# Patient Record
Sex: FEMALE | Race: WHITE | NOT HISPANIC OR LATINO
[De-identification: names, ages, dates, MRNs, and addresses within clinical notes are randomized per-mention and may not be internally consistent; named-entity substitution may affect disease eponyms.]

---

## 2017-01-12 ENCOUNTER — APPOINTMENT (OUTPATIENT)
Dept: INTERNAL MEDICINE | Facility: CLINIC | Age: 72
End: 2017-01-12

## 2017-04-24 ENCOUNTER — FORM ENCOUNTER (OUTPATIENT)
Age: 72
End: 2017-04-24

## 2017-04-25 ENCOUNTER — OUTPATIENT (OUTPATIENT)
Dept: OUTPATIENT SERVICES | Facility: HOSPITAL | Age: 72
LOS: 1 days | End: 2017-04-25
Payer: MEDICARE

## 2017-04-25 ENCOUNTER — APPOINTMENT (OUTPATIENT)
Dept: RADIOLOGY | Facility: CLINIC | Age: 72
End: 2017-04-25

## 2017-04-25 ENCOUNTER — APPOINTMENT (OUTPATIENT)
Dept: INTERNAL MEDICINE | Facility: CLINIC | Age: 72
End: 2017-04-25

## 2017-04-25 VITALS
DIASTOLIC BLOOD PRESSURE: 83 MMHG | HEART RATE: 88 BPM | SYSTOLIC BLOOD PRESSURE: 140 MMHG | TEMPERATURE: 98.2 F | OXYGEN SATURATION: 96 % | RESPIRATION RATE: 13 BRPM

## 2017-04-25 DIAGNOSIS — Z98.89 OTHER SPECIFIED POSTPROCEDURAL STATES: Chronic | ICD-10-CM

## 2017-04-25 DIAGNOSIS — Z33.2 ENCOUNTER FOR ELECTIVE TERMINATION OF PREGNANCY: Chronic | ICD-10-CM

## 2017-04-25 DIAGNOSIS — J20.9 ACUTE BRONCHITIS, UNSPECIFIED: ICD-10-CM

## 2017-04-25 PROCEDURE — 71046 X-RAY EXAM CHEST 2 VIEWS: CPT

## 2017-04-25 RX ORDER — AZITHROMYCIN 250 MG/1
250 TABLET, FILM COATED ORAL
Qty: 6 | Refills: 2 | Status: DISCONTINUED | OUTPATIENT
Start: 2017-04-25 | End: 2017-04-25

## 2017-08-15 ENCOUNTER — APPOINTMENT (OUTPATIENT)
Dept: INTERNAL MEDICINE | Facility: CLINIC | Age: 72
End: 2017-08-15
Payer: MEDICARE

## 2017-08-15 VITALS
HEIGHT: 62 IN | SYSTOLIC BLOOD PRESSURE: 149 MMHG | WEIGHT: 108 LBS | BODY MASS INDEX: 19.88 KG/M2 | DIASTOLIC BLOOD PRESSURE: 87 MMHG | OXYGEN SATURATION: 96 % | RESPIRATION RATE: 14 BRPM | HEART RATE: 89 BPM

## 2017-08-15 PROCEDURE — 99215 OFFICE O/P EST HI 40 MIN: CPT

## 2017-09-05 ENCOUNTER — APPOINTMENT (OUTPATIENT)
Dept: INTERNAL MEDICINE | Facility: CLINIC | Age: 72
End: 2017-09-05
Payer: MEDICARE

## 2017-09-05 PROCEDURE — 94200 LUNG FUNCTION TEST (MBC/MVV): CPT | Mod: 59

## 2017-09-05 PROCEDURE — 94060 EVALUATION OF WHEEZING: CPT

## 2017-09-05 PROCEDURE — 94727 GAS DIL/WSHOT DETER LNG VOL: CPT

## 2017-10-26 ENCOUNTER — APPOINTMENT (OUTPATIENT)
Dept: INTERNAL MEDICINE | Facility: CLINIC | Age: 72
End: 2017-10-26
Payer: MEDICARE

## 2017-10-26 PROCEDURE — 90686 IIV4 VACC NO PRSV 0.5 ML IM: CPT

## 2017-10-26 PROCEDURE — G0008: CPT

## 2017-10-26 PROCEDURE — 99213 OFFICE O/P EST LOW 20 MIN: CPT | Mod: 25

## 2017-10-27 VITALS
DIASTOLIC BLOOD PRESSURE: 83 MMHG | HEART RATE: 86 BPM | RESPIRATION RATE: 14 BRPM | SYSTOLIC BLOOD PRESSURE: 141 MMHG | OXYGEN SATURATION: 97 %

## 2017-12-21 ENCOUNTER — APPOINTMENT (OUTPATIENT)
Dept: ORTHOPEDIC SURGERY | Facility: CLINIC | Age: 72
End: 2017-12-21
Payer: MEDICARE

## 2017-12-21 VITALS — WEIGHT: 104 LBS | HEIGHT: 62 IN | BODY MASS INDEX: 19.14 KG/M2

## 2017-12-21 VITALS — SYSTOLIC BLOOD PRESSURE: 157 MMHG | HEART RATE: 80 BPM | DIASTOLIC BLOOD PRESSURE: 78 MMHG

## 2017-12-21 DIAGNOSIS — M51.34 OTHER INTERVERTEBRAL DISC DEGENERATION, THORACIC REGION: ICD-10-CM

## 2017-12-21 DIAGNOSIS — M16.12 UNILATERAL PRIMARY OSTEOARTHRITIS, LEFT HIP: ICD-10-CM

## 2017-12-21 PROCEDURE — 99214 OFFICE O/P EST MOD 30 MIN: CPT

## 2017-12-21 PROCEDURE — 73522 X-RAY EXAM HIPS BI 3-4 VIEWS: CPT

## 2018-02-21 ENCOUNTER — RESULT REVIEW (OUTPATIENT)
Age: 73
End: 2018-02-21

## 2018-04-16 ENCOUNTER — APPOINTMENT (OUTPATIENT)
Dept: OTOLARYNGOLOGY | Facility: CLINIC | Age: 73
End: 2018-04-16

## 2018-05-15 ENCOUNTER — APPOINTMENT (OUTPATIENT)
Dept: OTOLARYNGOLOGY | Facility: CLINIC | Age: 73
End: 2018-05-15
Payer: MEDICARE

## 2018-05-15 VITALS — HEART RATE: 93 BPM | DIASTOLIC BLOOD PRESSURE: 97 MMHG | SYSTOLIC BLOOD PRESSURE: 161 MMHG

## 2018-05-15 DIAGNOSIS — J39.2 OTHER DISEASES OF PHARYNX: ICD-10-CM

## 2018-05-15 PROCEDURE — 99205 OFFICE O/P NEW HI 60 MIN: CPT | Mod: 25

## 2018-05-15 PROCEDURE — 31231 NASAL ENDOSCOPY DX: CPT

## 2018-05-31 ENCOUNTER — APPOINTMENT (OUTPATIENT)
Dept: OTOLARYNGOLOGY | Facility: CLINIC | Age: 73
End: 2018-05-31
Payer: MEDICARE

## 2018-05-31 VITALS
WEIGHT: 103 LBS | HEART RATE: 91 BPM | SYSTOLIC BLOOD PRESSURE: 156 MMHG | DIASTOLIC BLOOD PRESSURE: 88 MMHG | BODY MASS INDEX: 18.95 KG/M2 | HEIGHT: 62 IN

## 2018-05-31 PROCEDURE — 99214 OFFICE O/P EST MOD 30 MIN: CPT | Mod: 25

## 2018-05-31 PROCEDURE — 92511 NASOPHARYNGOSCOPY: CPT

## 2018-06-14 ENCOUNTER — APPOINTMENT (OUTPATIENT)
Dept: OTOLARYNGOLOGY | Facility: CLINIC | Age: 73
End: 2018-06-14
Payer: MEDICARE

## 2018-06-14 VITALS
HEIGHT: 62 IN | BODY MASS INDEX: 18.77 KG/M2 | WEIGHT: 102 LBS | SYSTOLIC BLOOD PRESSURE: 125 MMHG | HEART RATE: 98 BPM | DIASTOLIC BLOOD PRESSURE: 82 MMHG

## 2018-06-14 PROCEDURE — 99214 OFFICE O/P EST MOD 30 MIN: CPT

## 2018-07-06 ENCOUNTER — OUTPATIENT (OUTPATIENT)
Dept: OUTPATIENT SERVICES | Facility: HOSPITAL | Age: 73
LOS: 1 days | End: 2018-07-06
Payer: MEDICARE

## 2018-07-06 VITALS
SYSTOLIC BLOOD PRESSURE: 130 MMHG | RESPIRATION RATE: 16 BRPM | HEIGHT: 61 IN | DIASTOLIC BLOOD PRESSURE: 86 MMHG | WEIGHT: 100.97 LBS | TEMPERATURE: 97 F | OXYGEN SATURATION: 97 % | HEART RATE: 74 BPM

## 2018-07-06 DIAGNOSIS — J39.2 OTHER DISEASES OF PHARYNX: ICD-10-CM

## 2018-07-06 DIAGNOSIS — E89.2 POSTPROCEDURAL HYPOPARATHYROIDISM: Chronic | ICD-10-CM

## 2018-07-06 DIAGNOSIS — Z33.2 ENCOUNTER FOR ELECTIVE TERMINATION OF PREGNANCY: Chronic | ICD-10-CM

## 2018-07-06 DIAGNOSIS — T78.40XA ALLERGY, UNSPECIFIED, INITIAL ENCOUNTER: ICD-10-CM

## 2018-07-06 DIAGNOSIS — R73.03 PREDIABETES: ICD-10-CM

## 2018-07-06 DIAGNOSIS — I34.1 NONRHEUMATIC MITRAL (VALVE) PROLAPSE: ICD-10-CM

## 2018-07-06 DIAGNOSIS — Z98.89 OTHER SPECIFIED POSTPROCEDURAL STATES: Chronic | ICD-10-CM

## 2018-07-06 LAB
BUN SERPL-MCNC: 13 MG/DL — SIGNIFICANT CHANGE UP (ref 7–23)
CALCIUM SERPL-MCNC: 9.8 MG/DL — SIGNIFICANT CHANGE UP (ref 8.4–10.5)
CHLORIDE SERPL-SCNC: 99 MMOL/L — SIGNIFICANT CHANGE UP (ref 98–107)
CO2 SERPL-SCNC: 29 MMOL/L — SIGNIFICANT CHANGE UP (ref 22–31)
CREAT SERPL-MCNC: 0.58 MG/DL — SIGNIFICANT CHANGE UP (ref 0.5–1.3)
GLUCOSE SERPL-MCNC: 103 MG/DL — HIGH (ref 70–99)
HBA1C BLD-MCNC: 6.2 % — HIGH (ref 4–5.6)
HCT VFR BLD CALC: 44 % — SIGNIFICANT CHANGE UP (ref 34.5–45)
HGB BLD-MCNC: 13.8 G/DL — SIGNIFICANT CHANGE UP (ref 11.5–15.5)
MCHC RBC-ENTMCNC: 26.6 PG — LOW (ref 27–34)
MCHC RBC-ENTMCNC: 31.4 % — LOW (ref 32–36)
MCV RBC AUTO: 84.9 FL — SIGNIFICANT CHANGE UP (ref 80–100)
NRBC # FLD: 0 — SIGNIFICANT CHANGE UP
PLATELET # BLD AUTO: 347 K/UL — SIGNIFICANT CHANGE UP (ref 150–400)
PMV BLD: 10.3 FL — SIGNIFICANT CHANGE UP (ref 7–13)
POTASSIUM SERPL-MCNC: 4.6 MMOL/L — SIGNIFICANT CHANGE UP (ref 3.5–5.3)
POTASSIUM SERPL-SCNC: 4.6 MMOL/L — SIGNIFICANT CHANGE UP (ref 3.5–5.3)
RBC # BLD: 5.18 M/UL — SIGNIFICANT CHANGE UP (ref 3.8–5.2)
RBC # FLD: 15 % — HIGH (ref 10.3–14.5)
SODIUM SERPL-SCNC: 140 MMOL/L — SIGNIFICANT CHANGE UP (ref 135–145)
WBC # BLD: 13.69 K/UL — HIGH (ref 3.8–10.5)
WBC # FLD AUTO: 13.69 K/UL — HIGH (ref 3.8–10.5)

## 2018-07-06 PROCEDURE — 93010 ELECTROCARDIOGRAM REPORT: CPT

## 2018-07-06 RX ORDER — SODIUM CHLORIDE 9 MG/ML
1000 INJECTION, SOLUTION INTRAVENOUS
Qty: 0 | Refills: 0 | Status: DISCONTINUED | OUTPATIENT
Start: 2018-07-11 | End: 2018-07-26

## 2018-07-06 NOTE — H&P PST ADULT - RESPIRATORY AND THORAX COMMENTS
current smoker - COPD - wheezing sometimes - uses inhaler as needed - last used 3-4 months ago- last eval by pulmonologist in 5/2018

## 2018-07-06 NOTE — H&P PST ADULT - NEGATIVE OPHTHALMOLOGIC SYMPTOMS
no loss of vision L/no diplopia/no photophobia/no blurred vision L/no blurred vision R/no loss of vision R

## 2018-07-06 NOTE — H&P PST ADULT - MUSCULOSKELETAL
details… detailed exam no joint swelling/no joint erythema/no joint warmth/no calf tenderness/see back exam/decreased ROM due to pain no joint swelling/decreased ROM due to pain/no calf tenderness/limitation in cervical ROM

## 2018-07-06 NOTE — H&P PST ADULT - PMH
Cigarette smoker  current  COPD (chronic obstructive pulmonary disease)    Costal chondritis    Emphysema lung    Gastritis    Herniated disc, cervical  lumbar  Hyperparathyroidism    MVP (mitral valve prolapse)    Osteoporosis    Prediabetes    Psoriatic arthritis    Tachycardia    Ulcerative colitis    Varicose veins    Vertebral fracture, closed  Lumbar x 4

## 2018-07-06 NOTE — H&P PST ADULT - PROBLEM SELECTOR PLAN 1
Scheduled for nasal endoscopy with biopsy on 7/11/2018. labs done and results pending. Preop instruction given and explained. Famotidine provided with instruction. Verbalized understanding.  Medical evaluation requested by surgeon. Will obtain.

## 2018-07-06 NOTE — H&P PST ADULT - NECK DETAILS
supple/normal thyroid gland/no JVD normal thyroid gland/supple/no JVD/limited ROM neck - extension, rotation to R/L due to pain

## 2018-07-06 NOTE — H&P PST ADULT - NEGATIVE ENMT SYMPTOMS
no dysphagia/no nose bleeds/no gum bleeding/no abnormal taste sensation/no nasal obstruction/no tinnitus/no post-nasal discharge/no throat pain/no recurrent cold sores/no hearing difficulty/no ear pain/no vertigo no throat pain/no hearing difficulty/no gum bleeding/no nose bleeds/no dysphagia/no vertigo/no ear pain/no tinnitus/no nasal obstruction

## 2018-07-06 NOTE — H&P PST ADULT - RS GEN PE MLT RESP DETAILS PC
airway patent/clear to auscultation bilaterally/breath sounds equal/respirations non-labored/good air movement clear to auscultation bilaterally/no rhonchi/no rales/no wheezes/respirations non-labored

## 2018-07-06 NOTE — H&P PST ADULT - HISTORY OF PRESENT ILLNESS
74 yo female with hx of presents to have PST evaluation for nasal endoscopy with biopsy on 7/11/2018. lost 40 lbs in 1 year, had PET scan "found tiny mass in nasal area - s/p biopsy - benign, second biopsy was recommended. 72 yo female with hx of ulcerative colitis, psoriatic arthritis, COPD presents to have PST evaluation for nasal endoscopy with biopsy on 7/11/2018. Patient c/o unintentional weight loss (lost 40 lbs over 1 year), went for an evaluation, during work up, had PET CT scan "found tiny mass in nasal area - s/p biopsy - benign, second biopsy was recommended. Patient c/o nasal congestion, discharge, post nasal drip. Denies dyspnea. 74 yo female with hx of ulcerative colitis, psoriatic arthritis, COPD, MVP presents to have PST evaluation for nasal endoscopy with biopsy on 7/11/2018. Patient c/o unintentional weight loss ("lost 40 lbs over 1 - 1.5 year"), went for an evaluation, during work up, had PET CT scan done "found tiny mass in nasal area - s/p biopsy - benign, second biopsy was recommended. Patient c/o nasal congestion, discharge, post nasal drip. Denies dyspnea.

## 2018-07-06 NOTE — H&P PST ADULT - GASTROINTESTINAL DETAILS
no distention/no rebound tenderness/no bruit/soft/no organomegaly/bowel sounds normal/no rigidity/no guarding/no masses palpable/nontender bowel sounds normal/no distention/soft/nontender

## 2018-07-06 NOTE — H&P PST ADULT - OTHER CARE PROVIDERS
Dr. Brijesh Kebede (cardiologist) [phone] / Dr. Cameron (pain management) / Dr. Mora (GI) / Dr. Ernst (rheumatologist) / Dr. Lin (pulmonologist) [phone]

## 2018-07-06 NOTE — H&P PST ADULT - CHARACTER OF SYSTOLIC MURMUR
upper rsb, pansystolic radiating to b/l neck/murmur loudness: II/VI/upper lsb/crescendo-decrescendo murmur loudness: II/VI

## 2018-07-06 NOTE — H&P PST ADULT - NEGATIVE NEUROLOGICAL SYMPTOMS
no focal seizures/no difficulty walking/no tremors/no vertigo/no transient paralysis/no weakness/no syncope/no generalized seizures/no loss of sensation/no loss of consciousness

## 2018-07-06 NOTE — H&P PST ADULT - NEUROLOGICAL DETAILS
alert and oriented x 3/sensation intact/normal strength/cranial nerves intact alert and oriented x 3/normal strength/sensation intact/responds to verbal commands/responds to pain

## 2018-07-10 ENCOUNTER — TRANSCRIPTION ENCOUNTER (OUTPATIENT)
Age: 73
End: 2018-07-10

## 2018-07-11 ENCOUNTER — RESULT REVIEW (OUTPATIENT)
Age: 73
End: 2018-07-11

## 2018-07-11 ENCOUNTER — OUTPATIENT (OUTPATIENT)
Dept: OUTPATIENT SERVICES | Facility: HOSPITAL | Age: 73
LOS: 1 days | Discharge: HOME | End: 2018-07-11
Payer: MEDICARE

## 2018-07-11 ENCOUNTER — APPOINTMENT (OUTPATIENT)
Dept: OTOLARYNGOLOGY | Facility: HOSPITAL | Age: 73
End: 2018-07-11

## 2018-07-11 VITALS
RESPIRATION RATE: 16 BRPM | HEART RATE: 78 BPM | TEMPERATURE: 98 F | HEIGHT: 61 IN | DIASTOLIC BLOOD PRESSURE: 62 MMHG | SYSTOLIC BLOOD PRESSURE: 135 MMHG | OXYGEN SATURATION: 97 % | WEIGHT: 100.97 LBS

## 2018-07-11 VITALS
RESPIRATION RATE: 16 BRPM | HEART RATE: 66 BPM | SYSTOLIC BLOOD PRESSURE: 128 MMHG | DIASTOLIC BLOOD PRESSURE: 60 MMHG | OXYGEN SATURATION: 96 %

## 2018-07-11 DIAGNOSIS — J31.0 CHRONIC RHINITIS: ICD-10-CM

## 2018-07-11 DIAGNOSIS — J39.2 OTHER DISEASES OF PHARYNX: ICD-10-CM

## 2018-07-11 DIAGNOSIS — E89.2 POSTPROCEDURAL HYPOPARATHYROIDISM: Chronic | ICD-10-CM

## 2018-07-11 DIAGNOSIS — Z98.89 OTHER SPECIFIED POSTPROCEDURAL STATES: Chronic | ICD-10-CM

## 2018-07-11 DIAGNOSIS — Z33.2 ENCOUNTER FOR ELECTIVE TERMINATION OF PREGNANCY: Chronic | ICD-10-CM

## 2018-07-11 PROCEDURE — 31237 NSL/SINS NDSC SURG BX POLYPC: CPT

## 2018-07-11 PROCEDURE — 88331 PATH CONSLTJ SURG 1 BLK 1SPC: CPT | Mod: 26

## 2018-07-11 PROCEDURE — 88305 TISSUE EXAM BY PATHOLOGIST: CPT | Mod: 26

## 2018-07-11 RX ORDER — ONDANSETRON 8 MG/1
4 TABLET, FILM COATED ORAL ONCE
Qty: 0 | Refills: 0 | Status: DISCONTINUED | OUTPATIENT
Start: 2018-07-11 | End: 2018-07-11

## 2018-07-11 RX ORDER — OXYCODONE HYDROCHLORIDE 5 MG/1
5 TABLET ORAL ONCE
Qty: 0 | Refills: 0 | Status: DISCONTINUED | OUTPATIENT
Start: 2018-07-11 | End: 2018-07-11

## 2018-07-11 RX ORDER — FENTANYL CITRATE 50 UG/ML
25 INJECTION INTRAVENOUS
Qty: 0 | Refills: 0 | Status: DISCONTINUED | OUTPATIENT
Start: 2018-07-11 | End: 2018-07-11

## 2018-07-11 RX ORDER — ACETAMINOPHEN 500 MG
0 TABLET ORAL
Qty: 0 | Refills: 0 | COMMUNITY

## 2018-07-11 RX ADMIN — SODIUM CHLORIDE 30 MILLILITER(S): 9 INJECTION, SOLUTION INTRAVENOUS at 09:08

## 2018-07-11 NOTE — ASU DISCHARGE PLAN (ADULT/PEDIATRIC). - MEDICATION SUMMARY - MEDICATIONS TO TAKE
I will START or STAY ON the medications listed below when I get home from the hospital:    Vitamin D  -- 1 tab(s) by mouth once a day  -- Indication: For supplement    Probiotic  -- 1 tab(s) by mouth once a day  -- Indication: For Outpt med    Medical Marijuana oil  -- 1  under tongue every 8-12 hours PRN pain  -- Indication: For Outpt med    Asacol 400 mg oral delayed release tablet  -- Indication: For Outpt med    Percocet 5/325 oral tablet  -- 1 tab(s) by mouth every 6 hours MDD:4  -- Caution federal law prohibits the transfer of this drug to any person other  than the person for whom it was prescribed.  May cause drowsiness.  Alcohol may intensify this effect.  Use care when operating dangerous machinery.  This prescription cannot be refilled.  This product contains acetaminophen.  Do not use  with any other product containing acetaminophen to prevent possible liver damage.  Using more of this medication than prescribed may cause serious breathing problems.    -- Indication: For pain    ProAir HFA 90 mcg/inh inhalation aerosol  -- 2 puff(s) inhaled 4 times a day, As Needed - for shortness of breath and/or wheezing  -- Indication: For asthma    Vitamin C  -- 1 tab oral daily  -- Indication: For supplement

## 2018-07-11 NOTE — ASU DISCHARGE PLAN (ADULT/PEDIATRIC). - MEDICATION SUMMARY - MEDICATIONS TO STOP TAKING
I will STOP taking the medications listed below when I get home from the hospital:    Tylenol 325 mg oral tablet  -- 1-3 tabs daily PRN

## 2018-07-22 PROBLEM — M16.12 PRIMARY LOCALIZED OSTEOARTHROSIS OF PELVIC REGION, LEFT: Status: ACTIVE | Noted: 2017-12-21

## 2018-07-26 ENCOUNTER — APPOINTMENT (OUTPATIENT)
Dept: OTOLARYNGOLOGY | Facility: CLINIC | Age: 73
End: 2018-07-26
Payer: MEDICARE

## 2018-07-26 VITALS
WEIGHT: 100 LBS | SYSTOLIC BLOOD PRESSURE: 141 MMHG | HEART RATE: 88 BPM | BODY MASS INDEX: 18.4 KG/M2 | HEIGHT: 62 IN | DIASTOLIC BLOOD PRESSURE: 75 MMHG

## 2018-07-26 PROCEDURE — 31231 NASAL ENDOSCOPY DX: CPT

## 2018-07-26 PROCEDURE — 99213 OFFICE O/P EST LOW 20 MIN: CPT | Mod: 25

## 2018-07-26 RX ORDER — TRAMADOL HYDROCHLORIDE 50 MG/1
50 TABLET, COATED ORAL
Qty: 5 | Refills: 0 | Status: DISCONTINUED | COMMUNITY
Start: 2017-11-28 | End: 2018-07-26

## 2018-07-26 RX ORDER — METHYLPREDNISOLONE 4 MG/1
4 TABLET ORAL
Qty: 21 | Refills: 0 | Status: DISCONTINUED | COMMUNITY
Start: 2017-12-05 | End: 2018-07-26

## 2018-07-26 RX ORDER — VALACYCLOVIR 500 MG/1
500 TABLET, FILM COATED ORAL
Qty: 10 | Refills: 0 | Status: DISCONTINUED | COMMUNITY
Start: 2017-10-24 | End: 2018-07-26

## 2018-07-26 RX ORDER — SODIUM SULFATE, POTASSIUM SULFATE, MAGNESIUM SULFATE 17.5; 3.13; 1.6 G/ML; G/ML; G/ML
17.5-3.13-1.6 SOLUTION, CONCENTRATE ORAL
Qty: 354 | Refills: 0 | Status: DISCONTINUED | COMMUNITY
Start: 2017-09-18 | End: 2018-07-26

## 2018-09-05 PROBLEM — J43.9 EMPHYSEMA, UNSPECIFIED: Chronic | Status: ACTIVE | Noted: 2018-07-06

## 2018-09-05 PROBLEM — J44.9 CHRONIC OBSTRUCTIVE PULMONARY DISEASE, UNSPECIFIED: Chronic | Status: ACTIVE | Noted: 2018-07-06

## 2018-09-05 PROBLEM — M50.20 OTHER CERVICAL DISC DISPLACEMENT, UNSPECIFIED CERVICAL REGION: Chronic | Status: ACTIVE | Noted: 2018-07-06

## 2018-09-05 PROBLEM — R73.03 PREDIABETES: Chronic | Status: ACTIVE | Noted: 2018-07-06

## 2018-09-12 ENCOUNTER — APPOINTMENT (OUTPATIENT)
Dept: CHRONIC DISEASE MANAGEMENT | Facility: CLINIC | Age: 73
End: 2018-09-12
Payer: MEDICARE

## 2018-09-12 VITALS — BODY MASS INDEX: 18.47 KG/M2 | WEIGHT: 101 LBS

## 2018-09-12 PROCEDURE — 97802 MEDICAL NUTRITION INDIV IN: CPT

## 2018-10-10 ENCOUNTER — APPOINTMENT (OUTPATIENT)
Dept: CHRONIC DISEASE MANAGEMENT | Facility: CLINIC | Age: 73
End: 2018-10-10

## 2019-03-19 NOTE — H&P PST ADULT - MS GEN HX ROS MEA POS PC
Detail Level: Detailed
Price (Use Numbers Only, No Special Characters Or $): 50
back pain/neck pain/arthritis/stiffness

## 2019-04-26 ENCOUNTER — OUTPATIENT (OUTPATIENT)
Dept: OUTPATIENT SERVICES | Facility: HOSPITAL | Age: 74
LOS: 1 days | End: 2019-04-26
Payer: MEDICARE

## 2019-04-26 ENCOUNTER — APPOINTMENT (OUTPATIENT)
Dept: RADIOLOGY | Facility: CLINIC | Age: 74
End: 2019-04-26
Payer: MEDICARE

## 2019-04-26 DIAGNOSIS — J18.9 PNEUMONIA, UNSPECIFIED ORGANISM: ICD-10-CM

## 2019-04-26 DIAGNOSIS — Z98.89 OTHER SPECIFIED POSTPROCEDURAL STATES: Chronic | ICD-10-CM

## 2019-04-26 DIAGNOSIS — E89.2 POSTPROCEDURAL HYPOPARATHYROIDISM: Chronic | ICD-10-CM

## 2019-04-26 DIAGNOSIS — Z33.2 ENCOUNTER FOR ELECTIVE TERMINATION OF PREGNANCY: Chronic | ICD-10-CM

## 2019-04-26 PROCEDURE — 71046 X-RAY EXAM CHEST 2 VIEWS: CPT

## 2019-04-26 PROCEDURE — 71046 X-RAY EXAM CHEST 2 VIEWS: CPT | Mod: 26

## 2019-09-09 ENCOUNTER — EMERGENCY (EMERGENCY)
Facility: HOSPITAL | Age: 74
LOS: 1 days | Discharge: HOME | End: 2019-09-09
Attending: EMERGENCY MEDICINE | Admitting: EMERGENCY MEDICINE
Payer: MEDICARE

## 2019-09-09 VITALS
HEART RATE: 109 BPM | OXYGEN SATURATION: 95 % | SYSTOLIC BLOOD PRESSURE: 102 MMHG | RESPIRATION RATE: 14 BRPM | DIASTOLIC BLOOD PRESSURE: 57 MMHG

## 2019-09-09 VITALS
DIASTOLIC BLOOD PRESSURE: 65 MMHG | RESPIRATION RATE: 18 BRPM | OXYGEN SATURATION: 96 % | TEMPERATURE: 98 F | HEIGHT: 61 IN | HEART RATE: 123 BPM | SYSTOLIC BLOOD PRESSURE: 98 MMHG | WEIGHT: 98.99 LBS

## 2019-09-09 DIAGNOSIS — E89.2 POSTPROCEDURAL HYPOPARATHYROIDISM: Chronic | ICD-10-CM

## 2019-09-09 DIAGNOSIS — Z98.89 OTHER SPECIFIED POSTPROCEDURAL STATES: Chronic | ICD-10-CM

## 2019-09-09 DIAGNOSIS — Z33.2 ENCOUNTER FOR ELECTIVE TERMINATION OF PREGNANCY: Chronic | ICD-10-CM

## 2019-09-09 LAB
ALBUMIN SERPL ELPH-MCNC: 3.7 G/DL — SIGNIFICANT CHANGE UP (ref 3.3–5)
ALP SERPL-CCNC: 113 U/L — SIGNIFICANT CHANGE UP (ref 30–120)
ALT FLD-CCNC: 37 U/L DA — SIGNIFICANT CHANGE UP (ref 10–60)
ANION GAP SERPL CALC-SCNC: 9 MMOL/L — SIGNIFICANT CHANGE UP (ref 5–17)
APPEARANCE UR: CLEAR — SIGNIFICANT CHANGE UP
APTT BLD: 31.8 SEC — SIGNIFICANT CHANGE UP (ref 28.5–37)
AST SERPL-CCNC: 31 U/L — SIGNIFICANT CHANGE UP (ref 10–40)
BACTERIA # UR AUTO: ABNORMAL
BASOPHILS # BLD AUTO: 0.05 K/UL — SIGNIFICANT CHANGE UP (ref 0–0.2)
BASOPHILS NFR BLD AUTO: 0.3 % — SIGNIFICANT CHANGE UP (ref 0–2)
BILIRUB SERPL-MCNC: 0.3 MG/DL — SIGNIFICANT CHANGE UP (ref 0.2–1.2)
BILIRUB UR-MCNC: NEGATIVE — SIGNIFICANT CHANGE UP
BUN SERPL-MCNC: 20 MG/DL — SIGNIFICANT CHANGE UP (ref 7–23)
CALCIUM SERPL-MCNC: 9.7 MG/DL — SIGNIFICANT CHANGE UP (ref 8.4–10.5)
CHLORIDE SERPL-SCNC: 99 MMOL/L — SIGNIFICANT CHANGE UP (ref 96–108)
CO2 SERPL-SCNC: 26 MMOL/L — SIGNIFICANT CHANGE UP (ref 22–31)
COLOR SPEC: YELLOW — SIGNIFICANT CHANGE UP
CREAT SERPL-MCNC: 0.78 MG/DL — SIGNIFICANT CHANGE UP (ref 0.5–1.3)
DIFF PNL FLD: ABNORMAL
EOSINOPHIL # BLD AUTO: 0.04 K/UL — SIGNIFICANT CHANGE UP (ref 0–0.5)
EOSINOPHIL NFR BLD AUTO: 0.2 % — SIGNIFICANT CHANGE UP (ref 0–6)
EPI CELLS # UR: SIGNIFICANT CHANGE UP
GLUCOSE SERPL-MCNC: 122 MG/DL — HIGH (ref 70–99)
GLUCOSE UR QL: NEGATIVE MG/DL — SIGNIFICANT CHANGE UP
HCT VFR BLD CALC: 42.8 % — SIGNIFICANT CHANGE UP (ref 34.5–45)
HGB BLD-MCNC: 13.5 G/DL — SIGNIFICANT CHANGE UP (ref 11.5–15.5)
IMM GRANULOCYTES NFR BLD AUTO: 0.7 % — SIGNIFICANT CHANGE UP (ref 0–1.5)
INR BLD: 1.15 RATIO — SIGNIFICANT CHANGE UP (ref 0.88–1.16)
KETONES UR-MCNC: ABNORMAL
LEUKOCYTE ESTERASE UR-ACNC: ABNORMAL
LIDOCAIN IGE QN: 148 U/L — SIGNIFICANT CHANGE UP (ref 73–393)
LYMPHOCYTES # BLD AUTO: 20.7 % — SIGNIFICANT CHANGE UP (ref 13–44)
LYMPHOCYTES # BLD AUTO: 3.75 K/UL — HIGH (ref 1–3.3)
MAGNESIUM SERPL-MCNC: 1.9 MG/DL — SIGNIFICANT CHANGE UP (ref 1.6–2.6)
MCHC RBC-ENTMCNC: 27.6 PG — SIGNIFICANT CHANGE UP (ref 27–34)
MCHC RBC-ENTMCNC: 31.5 GM/DL — LOW (ref 32–36)
MCV RBC AUTO: 87.5 FL — SIGNIFICANT CHANGE UP (ref 80–100)
MONOCYTES # BLD AUTO: 0.99 K/UL — HIGH (ref 0–0.9)
MONOCYTES NFR BLD AUTO: 5.5 % — SIGNIFICANT CHANGE UP (ref 2–14)
NEUTROPHILS # BLD AUTO: 13.2 K/UL — HIGH (ref 1.8–7.4)
NEUTROPHILS NFR BLD AUTO: 72.6 % — SIGNIFICANT CHANGE UP (ref 43–77)
NITRITE UR-MCNC: NEGATIVE — SIGNIFICANT CHANGE UP
NRBC # BLD: 0 /100 WBCS — SIGNIFICANT CHANGE UP (ref 0–0)
PH UR: 5 — SIGNIFICANT CHANGE UP (ref 5–8)
PLATELET # BLD AUTO: 405 K/UL — HIGH (ref 150–400)
POTASSIUM SERPL-MCNC: 4.1 MMOL/L — SIGNIFICANT CHANGE UP (ref 3.5–5.3)
POTASSIUM SERPL-SCNC: 4.1 MMOL/L — SIGNIFICANT CHANGE UP (ref 3.5–5.3)
PROT SERPL-MCNC: 8 G/DL — SIGNIFICANT CHANGE UP (ref 6–8.3)
PROT UR-MCNC: 100 MG/DL
PROTHROM AB SERPL-ACNC: 12.6 SEC — SIGNIFICANT CHANGE UP (ref 10–12.9)
RBC # BLD: 4.89 M/UL — SIGNIFICANT CHANGE UP (ref 3.8–5.2)
RBC # FLD: 14.9 % — HIGH (ref 10.3–14.5)
RBC CASTS # UR COMP ASSIST: ABNORMAL /HPF (ref 0–4)
SODIUM SERPL-SCNC: 134 MMOL/L — LOW (ref 135–145)
SP GR SPEC: 1.02 — SIGNIFICANT CHANGE UP (ref 1.01–1.02)
UROBILINOGEN FLD QL: NEGATIVE MG/DL — SIGNIFICANT CHANGE UP
WBC # BLD: 18.15 K/UL — HIGH (ref 3.8–10.5)
WBC # FLD AUTO: 18.15 K/UL — HIGH (ref 3.8–10.5)
WBC UR QL: SIGNIFICANT CHANGE UP

## 2019-09-09 PROCEDURE — 83690 ASSAY OF LIPASE: CPT

## 2019-09-09 PROCEDURE — 81001 URINALYSIS AUTO W/SCOPE: CPT

## 2019-09-09 PROCEDURE — 99284 EMERGENCY DEPT VISIT MOD MDM: CPT

## 2019-09-09 PROCEDURE — 83735 ASSAY OF MAGNESIUM: CPT

## 2019-09-09 PROCEDURE — 74176 CT ABD & PELVIS W/O CONTRAST: CPT

## 2019-09-09 PROCEDURE — 85027 COMPLETE CBC AUTOMATED: CPT

## 2019-09-09 PROCEDURE — 80053 COMPREHEN METABOLIC PANEL: CPT

## 2019-09-09 PROCEDURE — 85610 PROTHROMBIN TIME: CPT

## 2019-09-09 PROCEDURE — 93005 ELECTROCARDIOGRAM TRACING: CPT

## 2019-09-09 PROCEDURE — 74176 CT ABD & PELVIS W/O CONTRAST: CPT | Mod: 26

## 2019-09-09 PROCEDURE — 99284 EMERGENCY DEPT VISIT MOD MDM: CPT | Mod: 25

## 2019-09-09 PROCEDURE — 85730 THROMBOPLASTIN TIME PARTIAL: CPT

## 2019-09-09 PROCEDURE — 36415 COLL VENOUS BLD VENIPUNCTURE: CPT

## 2019-09-09 PROCEDURE — 93010 ELECTROCARDIOGRAM REPORT: CPT

## 2019-09-09 RX ORDER — MESALAMINE 400 MG
0 TABLET, DELAYED RELEASE (ENTERIC COATED) ORAL
Qty: 0 | Refills: 0 | DISCHARGE

## 2019-09-09 NOTE — ED PROVIDER NOTE - OBJECTIVE STATEMENT
73 y/o female with a PMHx of COPD, costal chondritis, gastritis, herniated disc, hyperparathyroidism, MVP, osteoporosis, prediabetics, psoriatic arthritis, ulcerative colitis, vertebral fx, varicose veins presents to the ED c/o stomach cramps and nausea. +weakness. Pt was recently started on 2 medications for blood pressure, enalapril and metoprolol. Pt states she hasn't been feeling well since starting meds but feeling worse last few days. Pt denies vomiting or diarrhea. Pt has been urinating more frequently for a while. Pt has significant weight lose over the last few years without trying. Denies fever, blood in stool. Pt did not take metoprolol this morning in hopes it would help but she feels no better.

## 2019-09-09 NOTE — ED ADULT NURSE NOTE - CHPI ED NUR SYMPTOMS NEG
no change in level of consciousness/no vomiting/no blurred vision/no confusion/no fever/no loss of consciousness/no numbness

## 2019-09-09 NOTE — ED PROVIDER NOTE - NS_ ATTENDINGSCRIBEDETAILS _ED_A_ED_FT
Godwin Venegas MD - The scribe's documentation has been prepared under my direction and personally reviewed by me in its entirety. I confirm that the note above accurately reflects all work, treatment, procedures, and medical decision making performed by me.

## 2019-09-09 NOTE — ED PROVIDER NOTE - PATIENT PORTAL LINK FT
You can access the FollowMyHealth Patient Portal offered by [facility] by registering at the following website: [email or web address]. By joining [employer]’s FollowMyHealth portal, you will also be able to view your health information using other applications (apps) compatible with our system.

## 2019-09-09 NOTE — ED PROVIDER NOTE - PROGRESS NOTE DETAILS
Patient states she is allergic to iodine and is refusing oral contrast for the CT. Understands that lack of contrast will reduce the sensitivity of the test

## 2019-10-14 ENCOUNTER — APPOINTMENT (OUTPATIENT)
Dept: ELECTROPHYSIOLOGY | Facility: CLINIC | Age: 74
End: 2019-10-14
Payer: MEDICARE

## 2019-10-14 ENCOUNTER — NON-APPOINTMENT (OUTPATIENT)
Age: 74
End: 2019-10-14

## 2019-10-14 VITALS
HEART RATE: 97 BPM | HEIGHT: 62 IN | DIASTOLIC BLOOD PRESSURE: 84 MMHG | OXYGEN SATURATION: 95 % | SYSTOLIC BLOOD PRESSURE: 159 MMHG

## 2019-10-14 DIAGNOSIS — I47.2 VENTRICULAR TACHYCARDIA: ICD-10-CM

## 2019-10-14 PROCEDURE — 99201 OFFICE OUTPATIENT NEW 10 MINUTES: CPT

## 2019-10-14 PROCEDURE — 93000 ELECTROCARDIOGRAM COMPLETE: CPT

## 2019-10-14 RX ORDER — HYDROCODONE BITARTRATE AND HOMATROPINE METHYLBROMIDE 5; 1.5 MG/5ML; MG/5ML
5-1.5 SYRUP ORAL
Refills: 0 | Status: DISCONTINUED | COMMUNITY
Start: 2017-04-25 | End: 2019-10-14

## 2019-10-17 NOTE — HISTORY OF PRESENT ILLNESS
[FreeTextEntry1] : I had the pleasure of seeing your patient Merry Lee today in the arrhythmia clinic of [facility]. As you well know the patient is a delightful 74-year-old woman with a history of ulcerative colitis, psoriatic arthritis,hypertension and moderate aortic stenosis. Approximately 6 months ago the patient noticed that her heart rate was increased. She occasionally felt lightheaded in the last 2-3 months more so than in the past. She describes the lightheadedness more like feeling spacey than presyncopal. In addition the patient has a significant loss of appetite. This has been going on for a while and she has lost at least 40 pounds. She feels that her weight has stabilized over the last year but she has undergone an extensive workup looking for malignancy as a cause. She had prior parathyroid surgery but her TSH is normal. The patient has undergone an extensive cardiac workup. An echocardiogram in July 2019 showed mild pulmonary hypertension, moderate aortic stenosis and mild mitral stenosis. The peak gradient was 32 and the mean 17 across aortic valve with a valve area of 1.05 cm². Her ejection fraction was 74%. The patient had undergone stress test in 2016 and most recently in October of 2019 with normal ejection fraction and no ischemia. On monitoring the patient was in baseline sinus tachycardia with rates going up to 110-20 beats per minute. She also had a brief run of NSVT. She was begun on low-dose metoprolol, 12.5 mg a couple of months ago.\par \par Today in clinic the patient is overall doing fairly well. Interestingly the patient's blood pressure was slightly elevated and this morning she says it was even higher. It seems that the symptoms that she describes correlate more with her elevated blood pressure than they do with her heart rate. She seems to have fairly labile hypertension as this morning she says it was quite elevated than 2 hours later was down to 120/67. This seems to have correlated with her taking her antihypertensives. Today in clinic her blood pressure was 159/84 the pulse was 97 and regular. Her EKG reveals sinus rhythm at 97 beats per minute with left atrial enlargement.\par

## 2019-10-17 NOTE — REASON FOR VISIT
[Initial Evaluation] : an initial evaluation of [Spouse] : spouse [FreeTextEntry1] : sinus tachycardia

## 2019-10-17 NOTE — DISCUSSION/SUMMARY
[FreeTextEntry1] : In summary the patient is a 74-year-old woman with persistent low-grade sinus tachycardia. It seems like her symptoms of dizziness correlate more with her blood pressure being elevated. Perhaps a different blood pressure regimen can be sought that would give her a little more consistency and pressure management. I do not think we need to more aggressively treat her sinus tachycardia. I am also concerned that she has some underlying process going on with her weight loss. I asked her she drink any protein supplements but said she could not find any because she was lactose intolerant. We went on wine together in clinic and I found for her age lactose free, soy free, gluten-free supplement that she could drink. She is going to try this.

## 2020-01-17 ENCOUNTER — TRANSCRIPTION ENCOUNTER (OUTPATIENT)
Age: 75
End: 2020-01-17

## 2020-01-18 ENCOUNTER — INPATIENT (INPATIENT)
Facility: HOSPITAL | Age: 75
LOS: 2 days | Discharge: HOME | DRG: 482 | End: 2020-01-21
Attending: HOSPITALIST | Admitting: HOSPITALIST
Payer: MEDICARE

## 2020-01-18 VITALS
RESPIRATION RATE: 16 BRPM | DIASTOLIC BLOOD PRESSURE: 58 MMHG | HEIGHT: 61 IN | HEART RATE: 86 BPM | WEIGHT: 100.09 LBS | TEMPERATURE: 98 F | SYSTOLIC BLOOD PRESSURE: 154 MMHG | OXYGEN SATURATION: 98 %

## 2020-01-18 DIAGNOSIS — S72.009A FRACTURE OF UNSPECIFIED PART OF NECK OF UNSPECIFIED FEMUR, INITIAL ENCOUNTER FOR CLOSED FRACTURE: ICD-10-CM

## 2020-01-18 DIAGNOSIS — Z33.2 ENCOUNTER FOR ELECTIVE TERMINATION OF PREGNANCY: Chronic | ICD-10-CM

## 2020-01-18 DIAGNOSIS — E89.2 POSTPROCEDURAL HYPOPARATHYROIDISM: Chronic | ICD-10-CM

## 2020-01-18 DIAGNOSIS — Z98.89 OTHER SPECIFIED POSTPROCEDURAL STATES: Chronic | ICD-10-CM

## 2020-01-18 LAB
ALBUMIN SERPL ELPH-MCNC: 3.6 G/DL — SIGNIFICANT CHANGE UP (ref 3.3–5)
ALP SERPL-CCNC: 101 U/L — SIGNIFICANT CHANGE UP (ref 30–120)
ALT FLD-CCNC: 29 U/L DA — SIGNIFICANT CHANGE UP (ref 10–60)
ANION GAP SERPL CALC-SCNC: 5 MMOL/L — SIGNIFICANT CHANGE UP (ref 5–17)
APTT BLD: 30.9 SEC — SIGNIFICANT CHANGE UP (ref 28.5–37)
AST SERPL-CCNC: 16 U/L — SIGNIFICANT CHANGE UP (ref 10–40)
BASOPHILS # BLD AUTO: 0.06 K/UL — SIGNIFICANT CHANGE UP (ref 0–0.2)
BASOPHILS NFR BLD AUTO: 0.3 % — SIGNIFICANT CHANGE UP (ref 0–2)
BILIRUB SERPL-MCNC: 0.4 MG/DL — SIGNIFICANT CHANGE UP (ref 0.2–1.2)
BLD GP AB SCN SERPL QL: SIGNIFICANT CHANGE UP
BUN SERPL-MCNC: 17 MG/DL — SIGNIFICANT CHANGE UP (ref 7–23)
CALCIUM SERPL-MCNC: 8.9 MG/DL — SIGNIFICANT CHANGE UP (ref 8.4–10.5)
CHLORIDE SERPL-SCNC: 103 MMOL/L — SIGNIFICANT CHANGE UP (ref 96–108)
CO2 SERPL-SCNC: 32 MMOL/L — HIGH (ref 22–31)
CREAT SERPL-MCNC: 0.68 MG/DL — SIGNIFICANT CHANGE UP (ref 0.5–1.3)
EOSINOPHIL # BLD AUTO: 0.09 K/UL — SIGNIFICANT CHANGE UP (ref 0–0.5)
EOSINOPHIL NFR BLD AUTO: 0.4 % — SIGNIFICANT CHANGE UP (ref 0–6)
FLU A RESULT: SIGNIFICANT CHANGE UP
FLU A RESULT: SIGNIFICANT CHANGE UP
FLUAV AG NPH QL: SIGNIFICANT CHANGE UP
FLUBV AG NPH QL: SIGNIFICANT CHANGE UP
GLUCOSE SERPL-MCNC: 108 MG/DL — HIGH (ref 70–99)
HCT VFR BLD CALC: 44.4 % — SIGNIFICANT CHANGE UP (ref 34.5–45)
HGB BLD-MCNC: 14 G/DL — SIGNIFICANT CHANGE UP (ref 11.5–15.5)
IMM GRANULOCYTES NFR BLD AUTO: 1.6 % — HIGH (ref 0–1.5)
INR BLD: 1.02 RATIO — SIGNIFICANT CHANGE UP (ref 0.88–1.16)
LYMPHOCYTES # BLD AUTO: 13.1 % — SIGNIFICANT CHANGE UP (ref 13–44)
LYMPHOCYTES # BLD AUTO: 2.93 K/UL — SIGNIFICANT CHANGE UP (ref 1–3.3)
MCHC RBC-ENTMCNC: 27.4 PG — SIGNIFICANT CHANGE UP (ref 27–34)
MCHC RBC-ENTMCNC: 31.5 GM/DL — LOW (ref 32–36)
MCV RBC AUTO: 86.9 FL — SIGNIFICANT CHANGE UP (ref 80–100)
MONOCYTES # BLD AUTO: 1.06 K/UL — HIGH (ref 0–0.9)
MONOCYTES NFR BLD AUTO: 4.7 % — SIGNIFICANT CHANGE UP (ref 2–14)
NEUTROPHILS # BLD AUTO: 17.9 K/UL — HIGH (ref 1.8–7.4)
NEUTROPHILS NFR BLD AUTO: 79.9 % — HIGH (ref 43–77)
NRBC # BLD: 0 /100 WBCS — SIGNIFICANT CHANGE UP (ref 0–0)
PLATELET # BLD AUTO: 379 K/UL — SIGNIFICANT CHANGE UP (ref 150–400)
POTASSIUM SERPL-MCNC: 3.9 MMOL/L — SIGNIFICANT CHANGE UP (ref 3.5–5.3)
POTASSIUM SERPL-SCNC: 3.9 MMOL/L — SIGNIFICANT CHANGE UP (ref 3.5–5.3)
PROT SERPL-MCNC: 7.5 G/DL — SIGNIFICANT CHANGE UP (ref 6–8.3)
PROTHROM AB SERPL-ACNC: 11.3 SEC — SIGNIFICANT CHANGE UP (ref 10–12.9)
RBC # BLD: 5.11 M/UL — SIGNIFICANT CHANGE UP (ref 3.8–5.2)
RBC # FLD: 14.6 % — HIGH (ref 10.3–14.5)
RSV RESULT: SIGNIFICANT CHANGE UP
RSV RNA RESP QL NAA+PROBE: SIGNIFICANT CHANGE UP
SODIUM SERPL-SCNC: 140 MMOL/L — SIGNIFICANT CHANGE UP (ref 135–145)
WBC # BLD: 22.4 K/UL — HIGH (ref 3.8–10.5)
WBC # FLD AUTO: 22.4 K/UL — HIGH (ref 3.8–10.5)

## 2020-01-18 PROCEDURE — 71045 X-RAY EXAM CHEST 1 VIEW: CPT | Mod: 26

## 2020-01-18 PROCEDURE — 99285 EMERGENCY DEPT VISIT HI MDM: CPT

## 2020-01-18 PROCEDURE — 93010 ELECTROCARDIOGRAM REPORT: CPT

## 2020-01-18 PROCEDURE — 99223 1ST HOSP IP/OBS HIGH 75: CPT | Mod: AI

## 2020-01-18 PROCEDURE — 73502 X-RAY EXAM HIP UNI 2-3 VIEWS: CPT | Mod: 26,LT

## 2020-01-18 RX ORDER — MAGNESIUM HYDROXIDE 400 MG/1
30 TABLET, CHEWABLE ORAL DAILY
Refills: 0 | Status: DISCONTINUED | OUTPATIENT
Start: 2020-01-18 | End: 2020-01-21

## 2020-01-18 RX ORDER — ERGOCALCIFEROL 1.25 MG/1
50000 CAPSULE ORAL
Refills: 0 | Status: DISCONTINUED | OUTPATIENT
Start: 2020-01-18 | End: 2020-01-19

## 2020-01-18 RX ORDER — LOSARTAN POTASSIUM 100 MG/1
25 TABLET, FILM COATED ORAL DAILY
Refills: 0 | Status: DISCONTINUED | OUTPATIENT
Start: 2020-01-18 | End: 2020-01-21

## 2020-01-18 RX ORDER — MORPHINE SULFATE 50 MG/1
2 CAPSULE, EXTENDED RELEASE ORAL ONCE
Refills: 0 | Status: DISCONTINUED | OUTPATIENT
Start: 2020-01-18 | End: 2020-01-18

## 2020-01-18 RX ORDER — OXYCODONE HYDROCHLORIDE 5 MG/1
10 TABLET ORAL
Refills: 0 | Status: DISCONTINUED | OUTPATIENT
Start: 2020-01-18 | End: 2020-01-21

## 2020-01-18 RX ORDER — ENOXAPARIN SODIUM 100 MG/ML
40 INJECTION SUBCUTANEOUS EVERY 24 HOURS
Refills: 0 | Status: DISCONTINUED | OUTPATIENT
Start: 2020-01-19 | End: 2020-01-21

## 2020-01-18 RX ORDER — ACETAMINOPHEN 500 MG
1000 TABLET ORAL EVERY 6 HOURS
Refills: 0 | Status: COMPLETED | OUTPATIENT
Start: 2020-01-19 | End: 2020-01-19

## 2020-01-18 RX ORDER — ACETAMINOPHEN 500 MG
1000 TABLET ORAL EVERY 6 HOURS
Refills: 0 | Status: DISCONTINUED | OUTPATIENT
Start: 2020-01-19 | End: 2020-01-21

## 2020-01-18 RX ORDER — SODIUM CHLORIDE 9 MG/ML
1000 INJECTION, SOLUTION INTRAVENOUS
Refills: 0 | Status: DISCONTINUED | OUTPATIENT
Start: 2020-01-18 | End: 2020-01-21

## 2020-01-18 RX ORDER — HYDROMORPHONE HYDROCHLORIDE 2 MG/ML
0.5 INJECTION INTRAMUSCULAR; INTRAVENOUS; SUBCUTANEOUS
Refills: 0 | Status: DISCONTINUED | OUTPATIENT
Start: 2020-01-18 | End: 2020-01-18

## 2020-01-18 RX ORDER — APREPITANT 80 MG/1
40 CAPSULE ORAL ONCE
Refills: 0 | Status: DISCONTINUED | OUTPATIENT
Start: 2020-01-18 | End: 2020-01-21

## 2020-01-18 RX ORDER — SODIUM CHLORIDE 9 MG/ML
1000 INJECTION INTRAMUSCULAR; INTRAVENOUS; SUBCUTANEOUS
Refills: 0 | Status: DISCONTINUED | OUTPATIENT
Start: 2020-01-18 | End: 2020-01-18

## 2020-01-18 RX ORDER — LACTOBACILLUS ACIDOPHILUS 100MM CELL
1 CAPSULE ORAL
Refills: 0 | Status: DISCONTINUED | OUTPATIENT
Start: 2020-01-18 | End: 2020-01-21

## 2020-01-18 RX ORDER — ONDANSETRON 8 MG/1
4 TABLET, FILM COATED ORAL ONCE
Refills: 0 | Status: DISCONTINUED | OUTPATIENT
Start: 2020-01-18 | End: 2020-01-18

## 2020-01-18 RX ORDER — POLYETHYLENE GLYCOL 3350 17 G/17G
17 POWDER, FOR SOLUTION ORAL AT BEDTIME
Refills: 0 | Status: DISCONTINUED | OUTPATIENT
Start: 2020-01-18 | End: 2020-01-21

## 2020-01-18 RX ORDER — VANCOMYCIN HCL 1 G
750 VIAL (EA) INTRAVENOUS ONCE
Refills: 0 | Status: DISCONTINUED | OUTPATIENT
Start: 2020-01-18 | End: 2020-01-18

## 2020-01-18 RX ORDER — ACETAMINOPHEN 500 MG
1000 TABLET ORAL ONCE
Refills: 0 | Status: DISCONTINUED | OUTPATIENT
Start: 2020-01-18 | End: 2020-01-21

## 2020-01-18 RX ORDER — METOPROLOL TARTRATE 50 MG
12.5 TABLET ORAL
Refills: 0 | Status: DISCONTINUED | OUTPATIENT
Start: 2020-01-18 | End: 2020-01-21

## 2020-01-18 RX ORDER — PANTOPRAZOLE SODIUM 20 MG/1
40 TABLET, DELAYED RELEASE ORAL
Refills: 0 | Status: DISCONTINUED | OUTPATIENT
Start: 2020-01-18 | End: 2020-01-21

## 2020-01-18 RX ORDER — HEPARIN SODIUM 5000 [USP'U]/ML
5000 INJECTION INTRAVENOUS; SUBCUTANEOUS
Refills: 0 | Status: DISCONTINUED | OUTPATIENT
Start: 2020-01-18 | End: 2020-01-18

## 2020-01-18 RX ORDER — HYDROMORPHONE HYDROCHLORIDE 2 MG/ML
0.5 INJECTION INTRAMUSCULAR; INTRAVENOUS; SUBCUTANEOUS EVERY 6 HOURS
Refills: 0 | Status: DISCONTINUED | OUTPATIENT
Start: 2020-01-18 | End: 2020-01-21

## 2020-01-18 RX ORDER — ALBUTEROL 90 UG/1
2 AEROSOL, METERED ORAL EVERY 6 HOURS
Refills: 0 | Status: DISCONTINUED | OUTPATIENT
Start: 2020-01-18 | End: 2020-01-21

## 2020-01-18 RX ORDER — SODIUM CHLORIDE 9 MG/ML
1000 INJECTION, SOLUTION INTRAVENOUS
Refills: 0 | Status: DISCONTINUED | OUTPATIENT
Start: 2020-01-18 | End: 2020-01-18

## 2020-01-18 RX ORDER — VANCOMYCIN HCL 1 G
750 VIAL (EA) INTRAVENOUS ONCE
Refills: 0 | Status: COMPLETED | OUTPATIENT
Start: 2020-01-19 | End: 2020-01-19

## 2020-01-18 RX ORDER — OXYCODONE HYDROCHLORIDE 5 MG/1
5 TABLET ORAL
Refills: 0 | Status: DISCONTINUED | OUTPATIENT
Start: 2020-01-18 | End: 2020-01-21

## 2020-01-18 RX ORDER — ONDANSETRON 8 MG/1
4 TABLET, FILM COATED ORAL EVERY 6 HOURS
Refills: 0 | Status: DISCONTINUED | OUTPATIENT
Start: 2020-01-18 | End: 2020-01-21

## 2020-01-18 RX ORDER — MORPHINE SULFATE 50 MG/1
2 CAPSULE, EXTENDED RELEASE ORAL EVERY 6 HOURS
Refills: 0 | Status: DISCONTINUED | OUTPATIENT
Start: 2020-01-18 | End: 2020-01-18

## 2020-01-18 RX ADMIN — MORPHINE SULFATE 2 MILLIGRAM(S): 50 CAPSULE, EXTENDED RELEASE ORAL at 15:48

## 2020-01-18 RX ADMIN — HYDROMORPHONE HYDROCHLORIDE 0.5 MILLIGRAM(S): 2 INJECTION INTRAMUSCULAR; INTRAVENOUS; SUBCUTANEOUS at 21:46

## 2020-01-18 RX ADMIN — HYDROMORPHONE HYDROCHLORIDE 0.5 MILLIGRAM(S): 2 INJECTION INTRAMUSCULAR; INTRAVENOUS; SUBCUTANEOUS at 21:59

## 2020-01-18 RX ADMIN — HYDROMORPHONE HYDROCHLORIDE 0.5 MILLIGRAM(S): 2 INJECTION INTRAMUSCULAR; INTRAVENOUS; SUBCUTANEOUS at 22:16

## 2020-01-18 RX ADMIN — MORPHINE SULFATE 2 MILLIGRAM(S): 50 CAPSULE, EXTENDED RELEASE ORAL at 15:50

## 2020-01-18 RX ADMIN — SODIUM CHLORIDE 100 MILLILITER(S): 9 INJECTION, SOLUTION INTRAVENOUS at 21:59

## 2020-01-18 RX ADMIN — HYDROMORPHONE HYDROCHLORIDE 0.5 MILLIGRAM(S): 2 INJECTION INTRAMUSCULAR; INTRAVENOUS; SUBCUTANEOUS at 22:36

## 2020-01-18 NOTE — H&P ADULT - NSHPOUTPATIENTPROVIDERS_GEN_ALL_CORE
Christopher Depth Of Biopsy: dermis X Size Of Lesion In Cm: 0 Biopsy Type: H and E Type Of Destruction Used: Curettage Electrodesiccation And Curettage Text: The wound bed was treated with electrodesiccation and curettage after the biopsy was performed. Billing Type: Third-Party Bill Dressing: pressure dressing Post-Care Instructions: The medical assistant reviewed detailed post-care instructions with the patient: leave the original dressing in place for 24 hours, clean the wound once daily allowing water and gentle soap to wash over the site, cover with Aquaphor/Vaseline, and then apply a new bandage daily or if preferred apply a thick layer of Aquaphor/Vaseline twice daily until healed. Advised not to apply Neosporin or Polysporin.\\nInstructional handout provided. Silver Nitrate Text: The wound bed was treated with silver nitrate after the biopsy was performed. Was A Bandage Applied: Yes Hemostasis: Electrocautery Notification Instructions: Patient will be notified of biopsy results in 1-2 weeks. Advised patient to contact the office if results have not been received in 2 weeks. Curettage Text: The wound bed was treated with curettage after the biopsy was performed. Lab: [id number] Biopsy Method: double edge Personna blade Detail Level: Detailed Bill For Surgical Tray: no Consent: Written consent was obtained and risks were reviewed including but not limited to scarring, infection, bleeding, scabbing, incomplete removal, nerve damage and allergy to anesthesia. Cryotherapy Text: The wound bed was treated with cryotherapy after the biopsy was performed. Lab Facility: [id number] Anesthesia Type: 1% lidocaine with epinephrine and a 1:10 solution of 8.4% sodium bicarbonate Wound Care: Aquaphor Electrodesiccation Text: The wound bed was treated with electrodesiccation after the biopsy was performed.

## 2020-01-18 NOTE — H&P ADULT - NSICDXPASTSURGICALHX_GEN_ALL_CORE_FT
PAST SURGICAL HISTORY:  History of tonsillectomy     S/P parathyroidectomy 2014    Termination of pregnancy (fetus)

## 2020-01-18 NOTE — H&P ADULT - NSHPREVIEWOFSYSTEMS_GEN_ALL_CORE
REVIEW OF SYSTEMS:    CONSTITUTIONAL: No fever, weight loss, or fatigue  EYES: No eye pain, visual disturbances, or discharge  ENMT:  No difficulty hearing, tinnitus, vertigo; No sinus or throat pain  NECK: No pain or stiffness  RESPIRATORY: +cough, wheezing, chills or hemoptysis; No shortness of breath  CARDIOVASCULAR: No chest pain, palpitations, dizziness, or leg swelling  GASTROINTESTINAL: No abdominal or epigastric pain. No nausea, vomiting, or hematemesis; No diarrhea or constipation. No melena or hematochezia.  GENITOURINARY: No dysuria, frequency, hematuria, or incontinence  NEUROLOGICAL: No headaches, memory loss, loss of strength, numbness, or tremors  MUSCULOSKELETAL: +Left hip pain.   PSYCHIATRIC: No depression, anxiety, mood swings, or difficulty sleeping REVIEW OF SYSTEMS:    CONSTITUTIONAL: No fever, weight loss, or fatigue  EYES: No eye pain, visual disturbances, or discharge  ENMT:  No difficulty hearing, tinnitus, vertigo; No sinus or throat pain  NECK: No pain or stiffness  RESPIRATORY: +cough, wheezing, chills or hemoptysis; No shortness of breath  CARDIOVASCULAR: No chest pain, palpitations, dizziness, or leg swelling  GASTROINTESTINAL: +nausea, No abdominal or epigastric pain. No  vomiting, or hematemesis; No diarrhea or constipation. No melena or hematochezia.  GENITOURINARY: No dysuria, frequency, hematuria, or incontinence  NEUROLOGICAL: No headaches, memory loss, loss of strength, numbness, or tremors  MUSCULOSKELETAL: +Left hip pain.   PSYCHIATRIC: No depression, anxiety, mood swings, or difficulty sleeping

## 2020-01-18 NOTE — BRIEF OPERATIVE NOTE - NSICDXBRIEFPROCEDURE_GEN_ALL_CORE_FT
PROCEDURES:  Open reduction and internal fixation (ORIF) of intertrochanteric fracture of hip 18-Jan-2020 21:52:15  Marlon Vaz

## 2020-01-18 NOTE — H&P ADULT - HISTORY OF PRESENT ILLNESS
73 y/o female with a PMHx of HTN COPD, psoriatic arthritis, ulcerative colitis presents to the ED c/o left hip pain s/p fall today. Pt lost her balance and fell onto her left side. Pain is exacerbated with movement. Pt denies LOC, head trauma. Of note, pt's  was in the hospital about 3 weeks ago with RSV.  Pt also recently sick but wasn't confirmed with RSV through lab.  PMD wanted pt's family to be seen by Dr. Sanders for clearance 2/2 having RSV.  Spoke with Dr. Sanders over the phone.  He recommended that pt go through the procedure and that RSV shouldn't hold the procedure.  Pt was saturating 96% room air.    In ED, pt's WBC is 22.4.   CXR - no infiltrates  Lift Hip Xray - There is a fracture of the left hip which likely involves the intertrochanteric region. A calcified fibroid overlies the sacrum. The right hip is grossly unremarkable. No evidence of displaced pelvic fracture. Bowel gas overlies and obscures portions of the sacrum and iliac bone.    EKG shows 73 y/o female with a PMHx of HTN COPD, psoriatic arthritis, ulcerative colitis presents to the ED c/o left hip pain s/p fall today. Pt lost her balance and fell onto her left side. Pain is exacerbated with movement. Pt denies LOC, head trauma. Of note, pt's  was in the hospital about 3 weeks ago with RSV.  Pt also recently sick but wasn't confirmed with RSV through lab.  PMD wanted pt's family to be seen by Dr. Sanders for clearance 2/2 having RSV.  Spoke with Dr. Sanders over the phone.  He recommended that pt go through the procedure and that RSV shouldn't hold the procedure.  Pt was saturating 96% room air.    In ED, pt's WBC is 22.4.   CXR - no infiltrates  Lift Hip Xray - There is a fracture of the left hip which likely involves the intertrochanteric region. A calcified fibroid overlies the sacrum. The right hip is grossly unremarkable. No evidence of displaced pelvic fracture. Bowel gas overlies and obscures portions of the sacrum and iliac bone.    EKG shows unofficial read of NSR at 81BPM, t wave abnormality, but looks like artifact in area of question.

## 2020-01-18 NOTE — H&P ADULT - ASSESSMENT
75 y/o female with a PMHx of HTN COPD, psoriatic arthritis, ulcerative colitis admitted for Left hip fracture    1. Left Hip fracture  pre op orders per ortho  medically cleared for procedure.   will need PT eval after procedure  may start heparin after procedure      2. Cough  evaluate for RSV  spoke with Dr. Sanders, no need to hold up procedure for pt if it's positive, will see pt in the AM.   on prednisone 10mg daily till Monday.  Unclear if it was going to be tapered further.      3. Leukocytosis   likely reactive from fx vs pt being on prednisone.     4. HTN  continue metoprolol and losartan with hold parameters    5. COPD  not on any medications per pt.  will order Duonebs PRN    6. Psoriatic arthritis  pt says she doesn't take anything chronic for it    7. Ulcerative colitis  chronic  not on any medications    8. DVT proph   Heparin BID 73 y/o female with a PMHx of HTN COPD, psoriatic arthritis, ulcerative colitis admitted for Left hip fracture    1. Left Hip fracture  pre op orders per ortho  medically optimized and cleared for procedure.   will need PT eval after procedure  may start heparin after procedure      2. Cough  evaluate for RSV  spoke with Dr. Sanders, no need to hold up procedure for pt if it's positive, will see pt in the AM.   on prednisone 10mg daily till Monday.  Unclear if it was going to be tapered further.      3. Leukocytosis   likely reactive from fx vs pt being on prednisone.     4. HTN  continue metoprolol and losartan with hold parameters    5. COPD  not on any medications per pt.  will order Duonebs PRN    6. Psoriatic arthritis  pt says she doesn't take anything chronic for it    7. Ulcerative colitis  chronic  not on any medications    8. DVT proph   Heparin BID

## 2020-01-18 NOTE — H&P ADULT - NSICDXPASTMEDICALHX_GEN_ALL_CORE_FT
PAST MEDICAL HISTORY:  Cigarette smoker current    COPD (chronic obstructive pulmonary disease)     Costal chondritis     Emphysema lung     Gastritis     Herniated disc, cervical lumbar    Hyperparathyroidism     MVP (mitral valve prolapse)     Osteoporosis     Prediabetes     Psoriatic arthritis     Tachycardia     Ulcerative colitis     Varicose veins     Vertebral fracture, closed Lumbar x 4

## 2020-01-18 NOTE — CONSULT NOTE ADULT - SUBJECTIVE AND OBJECTIVE BOX
s/p fall ground level and now with left hip pain     PE:  left hip pain with attempted IR/ER.   no flex or ext due to pain   no open wounds or leisons  no knee pain and no ankle pain  moving the toes and sensation in the extremity intact   remainder of exam limited due to pain     xray:  left IT femur farcture    A/P:  left intertroch femur fracture  - will plan on medical clearance  - NWB  - closed treatment of the femur fracture  - dvt prophylaxis  - risks and benefits discussed regarding surgery and non surgical management  - patient and family agree

## 2020-01-18 NOTE — ED PROVIDER NOTE - OBJECTIVE STATEMENT
75 y/o female with a PMHx of COPD, emphysema lung, herniated disc, prediabetes, costal chondritis, hyperparathyroidism, varicose veins, osteoporosis, vertebral fracture, psoriatic arthritis, cigarette smoker, ulcerative colitis, MVP, tachycardia, gastritis, presents to the ED c/o left hip pain s/p fall today. Pt lost her balance and fell onto her left side. Pain is exacerbated with movement. Denies LOC, head trauma. Recently sick with RSV. Takes metoprolol and multivitamins. Allergic to penicillin, and claims to not react well with most medications. Can take tylenol for pain. PCP: Dr. White. No other complaints at this time.

## 2020-01-18 NOTE — H&P ADULT - NSICDXFAMILYHX_GEN_ALL_CORE_FT
FAMILY HISTORY:  Family history of colorectal cancer  Family history of diabetes mellitus  Family history of Parkinson disease    Child  Still living? Yes, Estimated age: Age Unknown  Family history of melanoma, Age at diagnosis: Age Unknown  Family history of transitional cell carcinoma of bladder, Age at diagnosis: Age Unknown

## 2020-01-18 NOTE — ED ADULT NURSE NOTE - NSIMPLEMENTINTERV_GEN_ALL_ED
Implemented All Fall with Harm Risk Interventions:  [place] to call system. Call bell, personal items and telephone within reach. Instruct patient to call for assistance. Room bathroom lighting operational. Non-slip footwear when patient is off stretcher. Physically safe environment: no spills, clutter or unnecessary equipment. Stretcher in lowest position, wheels locked, appropriate side rails in place. Provide visual cue, wrist band, yellow gown, etc. Monitor gait and stability. Monitor for mental status changes and reorient to person, place, and time. Review medications for side effects contributing to fall risk. Reinforce activity limits and safety measures with patient and family. Provide visual clues: red socks.

## 2020-01-19 DIAGNOSIS — J44.9 CHRONIC OBSTRUCTIVE PULMONARY DISEASE, UNSPECIFIED: ICD-10-CM

## 2020-01-19 LAB
ALBUMIN SERPL ELPH-MCNC: 3.1 G/DL — LOW (ref 3.3–5)
ALP SERPL-CCNC: 73 U/L — SIGNIFICANT CHANGE UP (ref 30–120)
ALT FLD-CCNC: 33 U/L DA — SIGNIFICANT CHANGE UP (ref 10–60)
ANION GAP SERPL CALC-SCNC: 5 MMOL/L — SIGNIFICANT CHANGE UP (ref 5–17)
AST SERPL-CCNC: 20 U/L — SIGNIFICANT CHANGE UP (ref 10–40)
BASOPHILS # BLD AUTO: 0.04 K/UL — SIGNIFICANT CHANGE UP (ref 0–0.2)
BASOPHILS NFR BLD AUTO: 0.2 % — SIGNIFICANT CHANGE UP (ref 0–2)
BILIRUB SERPL-MCNC: 0.6 MG/DL — SIGNIFICANT CHANGE UP (ref 0.2–1.2)
BUN SERPL-MCNC: 12 MG/DL — SIGNIFICANT CHANGE UP (ref 7–23)
CALCIUM SERPL-MCNC: 8.7 MG/DL — SIGNIFICANT CHANGE UP (ref 8.4–10.5)
CHLORIDE SERPL-SCNC: 102 MMOL/L — SIGNIFICANT CHANGE UP (ref 96–108)
CO2 SERPL-SCNC: 30 MMOL/L — SIGNIFICANT CHANGE UP (ref 22–31)
CREAT SERPL-MCNC: 0.67 MG/DL — SIGNIFICANT CHANGE UP (ref 0.5–1.3)
EOSINOPHIL # BLD AUTO: 0 K/UL — SIGNIFICANT CHANGE UP (ref 0–0.5)
EOSINOPHIL NFR BLD AUTO: 0 % — SIGNIFICANT CHANGE UP (ref 0–6)
GLUCOSE SERPL-MCNC: 166 MG/DL — HIGH (ref 70–99)
HCT VFR BLD CALC: 40.1 % — SIGNIFICANT CHANGE UP (ref 34.5–45)
HGB BLD-MCNC: 12.5 G/DL — SIGNIFICANT CHANGE UP (ref 11.5–15.5)
IMM GRANULOCYTES NFR BLD AUTO: 0.7 % — SIGNIFICANT CHANGE UP (ref 0–1.5)
LYMPHOCYTES # BLD AUTO: 1.37 K/UL — SIGNIFICANT CHANGE UP (ref 1–3.3)
LYMPHOCYTES # BLD AUTO: 5.5 % — LOW (ref 13–44)
MCHC RBC-ENTMCNC: 27.1 PG — SIGNIFICANT CHANGE UP (ref 27–34)
MCHC RBC-ENTMCNC: 31.2 GM/DL — LOW (ref 32–36)
MCV RBC AUTO: 87 FL — SIGNIFICANT CHANGE UP (ref 80–100)
MONOCYTES # BLD AUTO: 0.94 K/UL — HIGH (ref 0–0.9)
MONOCYTES NFR BLD AUTO: 3.8 % — SIGNIFICANT CHANGE UP (ref 2–14)
NEUTROPHILS # BLD AUTO: 22.2 K/UL — HIGH (ref 1.8–7.4)
NEUTROPHILS NFR BLD AUTO: 89.8 % — HIGH (ref 43–77)
NRBC # BLD: 0 /100 WBCS — SIGNIFICANT CHANGE UP (ref 0–0)
PLATELET # BLD AUTO: 313 K/UL — SIGNIFICANT CHANGE UP (ref 150–400)
POTASSIUM SERPL-MCNC: 4.3 MMOL/L — SIGNIFICANT CHANGE UP (ref 3.5–5.3)
POTASSIUM SERPL-SCNC: 4.3 MMOL/L — SIGNIFICANT CHANGE UP (ref 3.5–5.3)
PROT SERPL-MCNC: 6.6 G/DL — SIGNIFICANT CHANGE UP (ref 6–8.3)
RBC # BLD: 4.61 M/UL — SIGNIFICANT CHANGE UP (ref 3.8–5.2)
RBC # FLD: 14.6 % — HIGH (ref 10.3–14.5)
SODIUM SERPL-SCNC: 137 MMOL/L — SIGNIFICANT CHANGE UP (ref 135–145)
WBC # BLD: 24.73 K/UL — HIGH (ref 3.8–10.5)
WBC # FLD AUTO: 24.73 K/UL — HIGH (ref 3.8–10.5)

## 2020-01-19 PROCEDURE — 99232 SBSQ HOSP IP/OBS MODERATE 35: CPT

## 2020-01-19 RX ORDER — CHOLECALCIFEROL (VITAMIN D3) 125 MCG
2000 CAPSULE ORAL DAILY
Refills: 0 | Status: DISCONTINUED | OUTPATIENT
Start: 2020-01-19 | End: 2020-01-21

## 2020-01-19 RX ORDER — SODIUM CHLORIDE 0.65 %
1 AEROSOL, SPRAY (ML) NASAL THREE TIMES A DAY
Refills: 0 | Status: DISCONTINUED | OUTPATIENT
Start: 2020-01-19 | End: 2020-01-21

## 2020-01-19 RX ORDER — IPRATROPIUM/ALBUTEROL SULFATE 18-103MCG
3 AEROSOL WITH ADAPTER (GRAM) INHALATION EVERY 12 HOURS
Refills: 0 | Status: DISCONTINUED | OUTPATIENT
Start: 2020-01-19 | End: 2020-01-21

## 2020-01-19 RX ORDER — NICOTINE POLACRILEX 2 MG
2 GUM BUCCAL EVERY 8 HOURS
Refills: 0 | Status: DISCONTINUED | OUTPATIENT
Start: 2020-01-19 | End: 2020-01-21

## 2020-01-19 RX ORDER — PSEUDOEPHEDRINE HCL 30 MG
30 TABLET ORAL DAILY
Refills: 0 | Status: DISCONTINUED | OUTPATIENT
Start: 2020-01-19 | End: 2020-01-21

## 2020-01-19 RX ADMIN — Medication 400 MILLIGRAM(S): at 13:24

## 2020-01-19 RX ADMIN — Medication 1000 MILLIGRAM(S): at 03:20

## 2020-01-19 RX ADMIN — Medication 1000 MILLIGRAM(S): at 22:03

## 2020-01-19 RX ADMIN — Medication 400 MILLIGRAM(S): at 03:15

## 2020-01-19 RX ADMIN — Medication 1000 MILLIGRAM(S): at 08:44

## 2020-01-19 RX ADMIN — POLYETHYLENE GLYCOL 3350 17 GRAM(S): 17 POWDER, FOR SOLUTION ORAL at 22:03

## 2020-01-19 RX ADMIN — Medication 12.5 MILLIGRAM(S): at 17:36

## 2020-01-19 RX ADMIN — Medication 2000 UNIT(S): at 12:45

## 2020-01-19 RX ADMIN — Medication 400 MILLIGRAM(S): at 08:43

## 2020-01-19 RX ADMIN — LOSARTAN POTASSIUM 25 MILLIGRAM(S): 100 TABLET, FILM COATED ORAL at 06:32

## 2020-01-19 RX ADMIN — Medication 1 TABLET(S): at 08:44

## 2020-01-19 RX ADMIN — Medication 1 SPRAY(S): at 22:03

## 2020-01-19 RX ADMIN — Medication 1000 MILLIGRAM(S): at 22:30

## 2020-01-19 RX ADMIN — Medication 250 MILLIGRAM(S): at 05:04

## 2020-01-19 RX ADMIN — Medication 2 MILLIGRAM(S): at 12:47

## 2020-01-19 RX ADMIN — ENOXAPARIN SODIUM 40 MILLIGRAM(S): 100 INJECTION SUBCUTANEOUS at 08:43

## 2020-01-19 RX ADMIN — Medication 10 MILLIGRAM(S): at 12:46

## 2020-01-19 RX ADMIN — Medication 1000 MILLIGRAM(S): at 13:24

## 2020-01-19 NOTE — CONSULT NOTE ADULT - SUBJECTIVE AND OBJECTIVE BOX
Date/Time Patient Seen:  		  Referring MD:   Data Reviewed	       Patient is a 74y old  Female who presents with a chief complaint of hip fracture (18 Jan 2020 18:07)      Subjective/HPI  in bed  on room air  seen and examined  vs and meds reviewed    lives at home  non smoker  recent exp to RSV    73 y/o female with a PMHx of HTN COPD, psoriatic arthritis, ulcerative colitis presents to the ED c/o left hip pain s/p fall today. Pt lost her balance and fell onto her left side. Pain is exacerbated with movement. Pt denies LOC, head trauma. Of note, pt's  was in the hospital about 3 weeks ago with RSV.  Pt also recently sick but wasn't confirmed with RSV through lab.  PMD wanted pt's family to be seen by Dr. Sanders for clearance 2/2 having RSV.  Spoke with Dr. Sanders over the phone.  He recommended that pt go through the procedure and that RSV shouldn't hold the procedure.  Pt was saturating 96% room air.    In ED, pt's WBC is 22.4.   CXR - no infiltrates  Lift Hip Xray - There is a fracture of the left hip which likely involves the intertrochanteric region. A calcified fibroid overlies the sacrum. The right hip is grossly unremarkable. No evidence of displaced pelvic fracture. Bowel gas overlies and obscures portions of the sacrum and iliac bone.    EKG shows unofficial read of NSR at 81BPM, t wave abnormality, but looks like artifact in area of   PAST MEDICAL & SURGICAL HISTORY:  COPD (chronic obstructive pulmonary disease)  Emphysema lung  Herniated disc, cervical: lumbar  Prediabetes  Costal chondritis  Hyperparathyroidism  Varicose veins  Osteoporosis  Vertebral fracture, closed: Lumbar x 4  Psoriatic arthritis  Cigarette smoker: current  Ulcerative colitis  MVP (mitral valve prolapse)  Tachycardia  Gastritis  Bronchitis  Nicotine addiction  S/P parathyroidectomy: 2014  History of tonsillectomy  Termination of pregnancy (fetus)        Medication list         MEDICATIONS  (STANDING):  acetaminophen   Tablet .. 1000 milliGRAM(s) Oral every 6 hours  acetaminophen  IVPB .. 1000 milliGRAM(s) IV Intermittent every 6 hours  acetaminophen  IVPB .. 1000 milliGRAM(s) IV Intermittent once  aprepitant 40 milliGRAM(s) Oral once  enoxaparin Injectable 40 milliGRAM(s) SubCutaneous every 24 hours  ergocalciferol 54882 Unit(s) Oral every week  lactated ringers. 1000 milliLiter(s) (100 mL/Hr) IV Continuous <Continuous>  lactobacillus acidophilus 1 Tablet(s) Oral three times a day with meals  losartan 25 milliGRAM(s) Oral daily  metoprolol tartrate 12.5 milliGRAM(s) Oral two times a day  pantoprazole    Tablet 40 milliGRAM(s) Oral before breakfast  polyethylene glycol 3350 17 Gram(s) Oral at bedtime    MEDICATIONS  (PRN):  ALBUTerol    90 MICROgram(s) HFA Inhaler 2 Puff(s) Inhalation every 6 hours PRN for shortness of breath and/or wheezing  HYDROmorphone  Injectable 0.5 milliGRAM(s) IV Push every 6 hours PRN Severe Pain (7 - 10)  magnesium hydroxide Suspension 30 milliLiter(s) Oral daily PRN Constipation  ondansetron Injectable 4 milliGRAM(s) IV Push every 6 hours PRN Nausea and/or Vomiting  oxyCODONE    IR 10 milliGRAM(s) Oral every 3 hours PRN Moderate Pain (4 - 6)  oxyCODONE    IR 5 milliGRAM(s) Oral every 3 hours PRN Mild Pain (1 - 3)         Vitals log        ICU Vital Signs Last 24 Hrs  T(C): 36.7 (19 Jan 2020 03:30), Max: 37 (18 Jan 2020 21:33)  T(F): 98 (19 Jan 2020 03:30), Max: 98.6 (18 Jan 2020 21:33)  HR: 76 (19 Jan 2020 06:00) (73 - 86)  BP: 128/72 (19 Jan 2020 06:00) (124/69 - 163/79)  BP(mean): --  ABP: --  ABP(mean): --  RR: 16 (19 Jan 2020 03:30) (16 - 22)  SpO2: 90% (19 Jan 2020 03:30) (90% - 99%)           Input and Output:  I&O's Detail    18 Jan 2020 07:01  -  19 Jan 2020 07:00  --------------------------------------------------------  IN:    lactated ringers.: 500 mL  Total IN: 500 mL    OUT:    Estimated Blood Loss: 50 mL    Voided: 350 mL  Total OUT: 400 mL    Total NET: 100 mL          Lab Data                        12.5   24.73 )-----------( 313      ( 19 Jan 2020 06:53 )             40.1     01-18    140  |  103  |  17  ----------------------------<  108<H>  3.9   |  32<H>  |  0.68    Ca    8.9      18 Jan 2020 15:43    TPro  7.5  /  Alb  3.6  /  TBili  0.4  /  DBili  x   /  AST  16  /  ALT  29  /  AlkPhos  101  01-18            Review of Systems	  cough      Objective     Physical Examination    on room air  heart s1s2  lung dec BS  abd soft  head nc      Pertinent Lab findings & Imaging      Crowder:  NO   Adequate UO     I&O's Detail    18 Jan 2020 07:01  -  19 Jan 2020 07:00  --------------------------------------------------------  IN:    lactated ringers.: 500 mL  Total IN: 500 mL    OUT:    Estimated Blood Loss: 50 mL    Voided: 350 mL  Total OUT: 400 mL    Total NET: 100 mL               Discussed with:     Cultures:	        Radiology        EXAM:  XR HIP WITH PELV 2-3V LT                                  PROCEDURE DATE:  01/18/2020          INTERPRETATION:  History:   Patient fell with left hip pain    Technique: AP pelvis and 4 views of the left hip    Comparison: None    Findings/  Impression:There is a fracture of the left hip which likely involves the intertrochanteric region. A calcified fibroid overlies the sacrum. The right hip is grossly unremarkable. No evidence of displaced pelvic fracture. Bowel gas overlies and obscures portions of the sacrum and iliac bone.                  FRANKLIN REY M.D., ATTENDING RADIOLOGIST  This document has been electronically signed. Jan 18 2020  4:50PM            EXAM:  XR CHEST AP OR PA 1V                                  PROCEDURE DATE:  01/18/2020          INTERPRETATION:  HISTORY: ; ; hip injury;  TECHNIQUE: Portable frontal view of the chest, 1 view.  COMPARISON: 4/26/2019.  FINDINGS:     HEART: normal in size   LUNGS: free of consolidation or effusion.    OSSEOUS STRUCTURES:: degenerative changes    IMPRESSION:   No infiltrates.                  FRANKLIN REY M.D., ATTENDING RADIOLOGIST  This document has been electronically signed. Jan 18 2020  4:48PM

## 2020-01-19 NOTE — PROGRESS NOTE ADULT - SUBJECTIVE AND OBJECTIVE BOX
ORTHOPEDIC PA PROGRESS NOTE  GABRIELA WHELAN      74y Female                                 SY 2WST 225 02                                                                                                                           POD #    1d    STATUS POST:       Procedure: Open reduction and internal fixation (ORIF) of intertrochanteric fracture of hip             Patient seen and examined at bedside.      Current Pain Management:    acetaminophen   Tablet .. 1000 milliGRAM(s) Oral every 6 hours  acetaminophen  IVPB .. 1000 milliGRAM(s) IV Intermittent once  aprepitant 40 milliGRAM(s) Oral once  HYDROmorphone  Injectable 0.5 milliGRAM(s) IV Push every 6 hours PRN  ondansetron Injectable 4 milliGRAM(s) IV Push every 6 hours PRN  oxyCODONE    IR 10 milliGRAM(s) Oral every 3 hours PRN  oxyCODONE    IR 5 milliGRAM(s) Oral every 3 hours PRN      T(F): 97.8  HR: 79  BP: 137/65  RR: 18  SpO2: 93%                         12.5   24.73 )-----------( 313      ( 19 Jan 2020 06:53 )             40.1         01-19    137  |  102  |  12  ----------------------------<  166<H>  4.3   |  30  |  0.67    Ca    8.7      19 Jan 2020 06:53    TPro  6.6  /  Alb  3.1<L>  /  TBili  0.6  /  DBili  x   /  AST  20  /  ALT  33  /  AlkPhos  73  01-19 01-18-20 @ 07:01  -  01-19-20 @ 07:00  --------------------------------------------------------  IN:    lactated ringers.: 500 mL  Total IN: 500 mL    OUT:    Estimated Blood Loss: 50 mL    Voided: 350 mL  Total OUT: 400 mL    Total NET: 100 mL        Physical Exam :    -   Dressing  C/D/I.   -   Distal Neurvascular status intact grossly.   -   Warm well perfused; capillary refill <3 seconds   -   (+)EHL/FHL   -   (+) Sensation to light touch  -   (-) Calf tenderness Bilaterally      A/P: 74y Female s/p Open reduction and internal fixation (ORIF) of intertrochanteric fracture of hip     -   Ortho Stable  -   Pain control:  acetaminophen   Tablet .. 1000 milliGRAM(s) Oral every 6 hours  acetaminophen  IVPB .. 1000 milliGRAM(s) IV Intermittent once  aprepitant 40 milliGRAM(s) Oral once  HYDROmorphone  Injectable 0.5 milliGRAM(s) IV Push every 6 hours PRN  ondansetron Injectable 4 milliGRAM(s) IV Push every 6 hours PRN  oxyCODONE    IR 10 milliGRAM(s) Oral every 3 hours PRN  oxyCODONE    IR 5 milliGRAM(s) Oral every 3 hours PRN    -   Medicine to follow  -   DVT ppx:    PAS +  enoxaparin Injectable: 40 milliGRAM(s) SubCutaneous  -   PT/OT OOB,  Weight bearing status: WBAT   -  Dispo:  [facility]

## 2020-01-19 NOTE — PHYSICAL THERAPY INITIAL EVALUATION ADULT - ADDITIONAL COMMENTS
Pt states she [place] with no ROANLD and no steps inside besides 13 steps to basement +HR. Pt does not own any DME.

## 2020-01-19 NOTE — CONSULT NOTE ADULT - PROBLEM SELECTOR RECOMMENDATION 9
copd and bronchitis -   post op  I guanaco  dvt p  ortho follow up  pain regimen - ACAP - and occ Opioids - PRN - bowel regimen  assist with ADL  imaging and labs reviewed  robitussin, sudafed, nasal saline and NEBS for Bronchitis and COPD and Upper Airway Congestion - sp RSV exposure and Bronchitis episode  on room air now  will follow  will message PMD in the community  discussed plan of care with pt and her

## 2020-01-19 NOTE — PROGRESS NOTE ADULT - SUBJECTIVE AND OBJECTIVE BOX
Subjective: DOing well with no overnight issues.  No complaints at this time. Working with PT and pain controlled.     MEDICATIONS  (STANDING):  acetaminophen   Tablet .. 1000 milliGRAM(s) Oral every 6 hours  acetaminophen  IVPB .. 1000 milliGRAM(s) IV Intermittent once  albuterol/ipratropium for Nebulization 3 milliLiter(s) Nebulizer every 12 hours  aprepitant 40 milliGRAM(s) Oral once  cholecalciferol 2000 Unit(s) Oral daily  enoxaparin Injectable 40 milliGRAM(s) SubCutaneous every 24 hours  lactated ringers. 1000 milliLiter(s) (100 mL/Hr) IV Continuous <Continuous>  lactobacillus acidophilus 1 Tablet(s) Oral three times a day with meals  losartan 25 milliGRAM(s) Oral daily  metoprolol tartrate 12.5 milliGRAM(s) Oral two times a day  pantoprazole    Tablet 40 milliGRAM(s) Oral before breakfast  polyethylene glycol 3350 17 Gram(s) Oral at bedtime  predniSONE   Tablet 10 milliGRAM(s) Oral daily  pseudoephedrine 30 milliGRAM(s) Oral daily  sodium chloride 0.65% Nasal 1 Spray(s) Both Nostrils three times a day    MEDICATIONS  (PRN):  ALBUTerol    90 MICROgram(s) HFA Inhaler 2 Puff(s) Inhalation every 6 hours PRN for shortness of breath and/or wheezing  guaiFENesin   Syrup  (Sugar-Free) 200 milliGRAM(s) Oral every 6 hours PRN Cough  HYDROmorphone  Injectable 0.5 milliGRAM(s) IV Push every 6 hours PRN Severe Pain (7 - 10)  magnesium hydroxide Suspension 30 milliLiter(s) Oral daily PRN Constipation  nicotine  Polacrilex Gum 2 milliGRAM(s) Oral every 8 hours PRN smoking  ondansetron Injectable 4 milliGRAM(s) IV Push every 6 hours PRN Nausea and/or Vomiting  oxyCODONE    IR 10 milliGRAM(s) Oral every 3 hours PRN Moderate Pain (4 - 6)  oxyCODONE    IR 5 milliGRAM(s) Oral every 3 hours PRN Mild Pain (1 - 3)      Allergies    caffeine (Blisters)  penicillins (Hives)    Intolerances    lactose (Vomiting)      Vital Signs Last 24 Hrs  T(C): 36.6 (19 Jan 2020 15:53), Max: 37 (18 Jan 2020 21:33)  T(F): 97.9 (19 Jan 2020 15:53), Max: 98.6 (18 Jan 2020 21:33)  HR: 76 (19 Jan 2020 15:53) (73 - 85)  BP: 144/77 (19 Jan 2020 15:53) (124/60 - 163/79)  BP(mean): --  RR: 17 (19 Jan 2020 15:53) (16 - 22)  SpO2: 93% (19 Jan 2020 15:53) (90% - 99%)    PHYSICAL EXAM:  GENERAL: NAD, well-groomed, well-developed  HEAD:  Atraumatic, Normocephalic  ENMT: Moist mucous membranes,   NECK: Supple, No JVD, Normal thyroid  NERVOUS SYSTEM:  All 4 extremities mobile, no gross sensory deficits.   CHEST/LUNG: Clear to auscultation bilaterally; No rales, rhonchi, wheezing, or rubs  HEART: Regular rate and rhythm; No murmurs, rubs, or gallops  ABDOMEN: Soft, Nontender, Nondistended; Bowel sounds present  EXTREMITIES:  2+ Peripheral Pulses, No clubbing, cyanosis, or edema      LABS:                        12.5   24.73 )-----------( 313      ( 19 Jan 2020 06:53 )             40.1     19 Jan 2020 06:53    137    |  102    |  12     ----------------------------<  166    4.3     |  30     |  0.67     Ca    8.7        19 Jan 2020 06:53    TPro  6.6    /  Alb  3.1    /  TBili  0.6    /  DBili  x      /  AST  20     /  ALT  33     /  AlkPhos  73     19 Jan 2020 06:53    PT/INR - ( 18 Jan 2020 15:43 )   PT: 11.3 sec;   INR: 1.02 ratio         PTT - ( 18 Jan 2020 15:43 )  PTT:30.9 sec    CAPILLARY BLOOD GLUCOSE          RADIOLOGY & ADDITIONAL TESTS:    Imaging Personally Reviewed:  [ ] YES     Consultant(s) Notes Reviewed:      Care Discussed with Consultants/Other Providers:    Advanced Directives: [ ] DNR  [ ] No feeding tube  [ ] MOLST in chart  [ ] MOLST completed today  [ ] Unknown

## 2020-01-19 NOTE — PHYSICAL THERAPY INITIAL EVALUATION ADULT - PERTINENT HX OF CURRENT PROBLEM, REHAB EVAL
73 y/o female with a PMHx of HTN COPD, psoriatic arthritis, ulcerative colitis presents to the ED c/o left hip pain s/p fall today. Pt lost her balance and fell onto her left side. Pain is exacerbated with movement. Pt denies LOC, head trauma. Of note, pt's  was in the hospital about 3 weeks ago with RSV.  Pt also recently sick but wasn't confirmed with RSV through lab.

## 2020-01-19 NOTE — PROGRESS NOTE ADULT - ASSESSMENT
75 y/o female with a PMHx of HTN COPD, psoriatic arthritis, ulcerative colitis admitted for Left hip fracture    Left Hip Fracture S/P ORIF  POD 1  Continue Bowel and pain control regimen.   Incentive Spirometer for lung expansion.  Work with PT to increase ambulation as per orthopedics.  Monitor Hgb and follow up electrolytes.   Orhtopedics on board and following     Leukocytosis  Most likely reactive; Remains afebrile but does have RSV infection  Patient also on Prednisone until Monday which we will continue  Monitor Off Abx and will trend CBC     HTN  Metoprolol and losartan with hold parameters    COPD  not on any medications per pt.  will order Duonebs PRN    Psoriatic arthritis  pt says she doesn't take anything chronic for it    Ulcerative colitis  chronic  not on any medications    Diet  Regular    DVT Prophylaxis  Lovenox    Disposition  Full Code/Inpatient  Discharge planning pending hospital course

## 2020-01-20 ENCOUNTER — TRANSCRIPTION ENCOUNTER (OUTPATIENT)
Age: 75
End: 2020-01-20

## 2020-01-20 DIAGNOSIS — J43.9 EMPHYSEMA, UNSPECIFIED: ICD-10-CM

## 2020-01-20 PROCEDURE — 99232 SBSQ HOSP IP/OBS MODERATE 35: CPT

## 2020-01-20 RX ADMIN — Medication 1 SPRAY(S): at 14:56

## 2020-01-20 RX ADMIN — Medication 1 TABLET(S): at 09:09

## 2020-01-20 RX ADMIN — Medication 12.5 MILLIGRAM(S): at 06:08

## 2020-01-20 RX ADMIN — Medication 1000 MILLIGRAM(S): at 17:37

## 2020-01-20 RX ADMIN — Medication 1000 MILLIGRAM(S): at 06:08

## 2020-01-20 RX ADMIN — Medication 1000 MILLIGRAM(S): at 15:18

## 2020-01-20 RX ADMIN — Medication 10 MILLIGRAM(S): at 06:08

## 2020-01-20 RX ADMIN — PANTOPRAZOLE SODIUM 40 MILLIGRAM(S): 20 TABLET, DELAYED RELEASE ORAL at 06:08

## 2020-01-20 RX ADMIN — Medication 12.5 MILLIGRAM(S): at 18:37

## 2020-01-20 RX ADMIN — LOSARTAN POTASSIUM 25 MILLIGRAM(S): 100 TABLET, FILM COATED ORAL at 06:08

## 2020-01-20 RX ADMIN — Medication 1000 MILLIGRAM(S): at 06:29

## 2020-01-20 RX ADMIN — Medication 2000 UNIT(S): at 12:29

## 2020-01-20 RX ADMIN — ENOXAPARIN SODIUM 40 MILLIGRAM(S): 100 INJECTION SUBCUTANEOUS at 09:08

## 2020-01-20 RX ADMIN — Medication 1 SPRAY(S): at 06:09

## 2020-01-20 NOTE — DISCHARGE NOTE NURSING/CASE MANAGEMENT/SOCIAL WORK - PATIENT PORTAL LINK FT
You can access the FollowMyHealth Patient Portal offered by [facility] by registering at the following website: [email or web address]. By joining [facility]’s FollowMyHealth portal, you will also be able to view your health information using other applications (apps) compatible with our system.

## 2020-01-20 NOTE — DISCHARGE NOTE NURSING/CASE MANAGEMENT/SOCIAL WORK - NSSCNAMETXT_GEN_ALL_CORE
[facility] (formerly [facility])  [address] [place] [address]

## 2020-01-20 NOTE — PROGRESS NOTE ADULT - SUBJECTIVE AND OBJECTIVE BOX
Date/Time Patient Seen:  		  Referring MD:   Data Reviewed	       Patient is a 74y old  Female who presents with a chief complaint of hip fracture (19 Jan 2020 17:29)      Subjective/HPI     PAST MEDICAL & SURGICAL HISTORY:  COPD (chronic obstructive pulmonary disease)  Emphysema lung  Herniated disc, cervical: lumbar  Prediabetes  Costal chondritis  Hyperparathyroidism  Varicose veins  Osteoporosis  Vertebral fracture, closed: Lumbar x 4  Psoriatic arthritis  Cigarette smoker: current  Ulcerative colitis  MVP (mitral valve prolapse)  Tachycardia  Gastritis  Bronchitis  Nicotine addiction  S/P parathyroidectomy: 2014  History of tonsillectomy  Termination of pregnancy (fetus)        Medication list         MEDICATIONS  (STANDING):  acetaminophen   Tablet .. 1000 milliGRAM(s) Oral every 6 hours  acetaminophen  IVPB .. 1000 milliGRAM(s) IV Intermittent once  albuterol/ipratropium for Nebulization 3 milliLiter(s) Nebulizer every 12 hours  aprepitant 40 milliGRAM(s) Oral once  cholecalciferol 2000 Unit(s) Oral daily  enoxaparin Injectable 40 milliGRAM(s) SubCutaneous every 24 hours  lactated ringers. 1000 milliLiter(s) (100 mL/Hr) IV Continuous <Continuous>  lactobacillus acidophilus 1 Tablet(s) Oral three times a day with meals  losartan 25 milliGRAM(s) Oral daily  metoprolol tartrate 12.5 milliGRAM(s) Oral two times a day  pantoprazole    Tablet 40 milliGRAM(s) Oral before breakfast  polyethylene glycol 3350 17 Gram(s) Oral at bedtime  predniSONE   Tablet 10 milliGRAM(s) Oral daily  pseudoephedrine 30 milliGRAM(s) Oral daily  sodium chloride 0.65% Nasal 1 Spray(s) Both Nostrils three times a day    MEDICATIONS  (PRN):  ALBUTerol    90 MICROgram(s) HFA Inhaler 2 Puff(s) Inhalation every 6 hours PRN for shortness of breath and/or wheezing  bisacodyl Suppository 10 milliGRAM(s) Rectal daily PRN If no bowel movement  guaiFENesin   Syrup  (Sugar-Free) 200 milliGRAM(s) Oral every 6 hours PRN Cough  HYDROmorphone  Injectable 0.5 milliGRAM(s) IV Push every 6 hours PRN Severe Pain (7 - 10)  magnesium hydroxide Suspension 30 milliLiter(s) Oral daily PRN Constipation  nicotine  Polacrilex Gum 2 milliGRAM(s) Oral every 8 hours PRN smoking  ondansetron Injectable 4 milliGRAM(s) IV Push every 6 hours PRN Nausea and/or Vomiting  oxyCODONE    IR 10 milliGRAM(s) Oral every 3 hours PRN Moderate Pain (4 - 6)  oxyCODONE    IR 5 milliGRAM(s) Oral every 3 hours PRN Mild Pain (1 - 3)         Vitals log        ICU Vital Signs Last 24 Hrs  T(C): 36.7 (19 Jan 2020 23:26), Max: 36.7 (19 Jan 2020 19:39)  T(F): 98.1 (19 Jan 2020 23:26), Max: 98.1 (19 Jan 2020 23:26)  HR: 68 (20 Jan 2020 06:23) (65 - 83)  BP: 155/68 (20 Jan 2020 06:23) (124/60 - 155/68)  BP(mean): --  ABP: --  ABP(mean): --  RR: 14 (19 Jan 2020 23:26) (14 - 18)  SpO2: 97% (19 Jan 2020 23:26) (90% - 97%)           Input and Output:  I&O's Detail    19 Jan 2020 07:01  -  20 Jan 2020 07:00  --------------------------------------------------------  IN:    Oral Fluid: 40 mL  Total IN: 40 mL    OUT:    Voided: 400 mL  Total OUT: 400 mL    Total NET: -360 mL          Lab Data                        12.5   24.73 )-----------( 313      ( 19 Jan 2020 06:53 )             40.1     01-19    137  |  102  |  12  ----------------------------<  166<H>  4.3   |  30  |  0.67    Ca    8.7      19 Jan 2020 06:53    TPro  6.6  /  Alb  3.1<L>  /  TBili  0.6  /  DBili  x   /  AST  20  /  ALT  33  /  AlkPhos  73  01-19            Review of Systems	      Objective     Physical Examination    heart s1s2  lung dec BS  abd soft  on room air  head nc      Pertinent Lab findings & Imaging      Crowder:  NO   Adequate UO     I&O's Detail    19 Jan 2020 07:01  -  20 Jan 2020 07:00  --------------------------------------------------------  IN:    Oral Fluid: 40 mL  Total IN: 40 mL    OUT:    Voided: 400 mL  Total OUT: 400 mL    Total NET: -360 mL               Discussed with:     Cultures:	        Radiology

## 2020-01-20 NOTE — PROGRESS NOTE ADULT - SUBJECTIVE AND OBJECTIVE BOX
Subjective: No overnight events. Still reluctant to go to rehab.     MEDICATIONS  (STANDING):  acetaminophen   Tablet .. 1000 milliGRAM(s) Oral every 6 hours  acetaminophen  IVPB .. 1000 milliGRAM(s) IV Intermittent once  albuterol/ipratropium for Nebulization 3 milliLiter(s) Nebulizer every 12 hours  aprepitant 40 milliGRAM(s) Oral once  cholecalciferol 2000 Unit(s) Oral daily  enoxaparin Injectable 40 milliGRAM(s) SubCutaneous every 24 hours  lactated ringers. 1000 milliLiter(s) (100 mL/Hr) IV Continuous <Continuous>  lactobacillus acidophilus 1 Tablet(s) Oral three times a day with meals  losartan 25 milliGRAM(s) Oral daily  metoprolol tartrate 12.5 milliGRAM(s) Oral two times a day  pantoprazole    Tablet 40 milliGRAM(s) Oral before breakfast  polyethylene glycol 3350 17 Gram(s) Oral at bedtime  predniSONE   Tablet 10 milliGRAM(s) Oral daily  pseudoephedrine 30 milliGRAM(s) Oral daily  sodium chloride 0.65% Nasal 1 Spray(s) Both Nostrils three times a day    MEDICATIONS  (PRN):  ALBUTerol    90 MICROgram(s) HFA Inhaler 2 Puff(s) Inhalation every 6 hours PRN for shortness of breath and/or wheezing  bisacodyl Suppository 10 milliGRAM(s) Rectal daily PRN If no bowel movement  guaiFENesin   Syrup  (Sugar-Free) 200 milliGRAM(s) Oral every 6 hours PRN Cough  HYDROmorphone  Injectable 0.5 milliGRAM(s) IV Push every 6 hours PRN Severe Pain (7 - 10)  magnesium hydroxide Suspension 30 milliLiter(s) Oral daily PRN Constipation  nicotine  Polacrilex Gum 2 milliGRAM(s) Oral every 8 hours PRN smoking  ondansetron Injectable 4 milliGRAM(s) IV Push every 6 hours PRN Nausea and/or Vomiting  oxyCODONE    IR 10 milliGRAM(s) Oral every 3 hours PRN Moderate Pain (4 - 6)  oxyCODONE    IR 5 milliGRAM(s) Oral every 3 hours PRN Mild Pain (1 - 3)      Allergies    caffeine (Blisters)  penicillins (Hives)    Intolerances    lactose (Vomiting)      Vital Signs Last 24 Hrs  T(C): 36.7 (19 Jan 2020 23:26), Max: 36.7 (19 Jan 2020 19:39)  T(F): 98.1 (19 Jan 2020 23:26), Max: 98.1 (19 Jan 2020 23:26)  HR: 68 (20 Jan 2020 06:23) (65 - 83)  BP: 155/68 (20 Jan 2020 06:23) (129/71 - 155/68)  BP(mean): --  RR: 14 (19 Jan 2020 23:26) (14 - 17)  SpO2: 97% (19 Jan 2020 23:26) (90% - 97%)    PHYSICAL EXAM:  GENERAL: NAD, well-groomed, well-developed  HEAD:  Atraumatic, Normocephalic  ENMT: Moist mucous membranes,   NECK: Supple, No JVD, Normal thyroid  NERVOUS SYSTEM:  All 4 extremities mobile, no gross sensory deficits.   CHEST/LUNG: Clear to auscultation bilaterally; No rales, rhonchi, wheezing, or rubs  HEART: Regular rate and rhythm; No murmurs, rubs, or gallops  ABDOMEN: Soft, Nontender, Nondistended; Bowel sounds present  EXTREMITIES:  2+ Peripheral Pulses, No clubbing, cyanosis, or edema      LABS:      Ca    8.7        19 Jan 2020 06:53      PT/INR - ( 18 Jan 2020 15:43 )   PT: 11.3 sec;   INR: 1.02 ratio         PTT - ( 18 Jan 2020 15:43 )  PTT:30.9 sec    CAPILLARY BLOOD GLUCOSE          RADIOLOGY & ADDITIONAL TESTS:    Imaging Personally Reviewed:  [ ] YES     Consultant(s) Notes Reviewed:      Care Discussed with Consultants/Other Providers:    Advanced Directives: [ ] DNR  [ ] No feeding tube  [ ] MOLST in chart  [ ] MOLST completed today  [ ] Unknown

## 2020-01-20 NOTE — PROGRESS NOTE ADULT - ASSESSMENT
73 y/o female with a PMHx of HTN COPD, psoriatic arthritis, ulcerative colitis admitted for Left hip fracture    Left Hip Fracture S/P ORIF  POD 3  Continue Bowel and pain control regimen.   Incentive Spirometer for lung expansion.  Work with PT to increase ambulation as per orthopedics.  Monitor Hgb and follow up electrolytes.   Orhtopedics on board and following     Leukocytosis  Most likely reactive; Remains afebrile but does have RSV infection  She states its usually always elevated but it appears its usually between 14-18 range  Patient also on Prednisone so it could be from that;   Monitor Off Abx and will trend CBC     HTN  Metoprolol and losartan with hold parameters    COPD  not on any medications per pt.  will order Duonebs PRN    Psoriatic arthritis  pt says she doesn't take anything chronic for it    Ulcerative colitis  chronic  not on any medications    Diet  Regular    DVT Prophylaxis  Lovenox    Disposition  Full Code/Inpatient  Discharge planning pending hospital course

## 2020-01-20 NOTE — PROGRESS NOTE ADULT - PROBLEM SELECTOR PLAN 1
am WBC pending - on Prednisone - post op -   copd and bronchitis -   post op  I guanaco  dvt p  ortho follow up  pain regimen - ACAP - and occ Opioids - PRN - bowel regimen  assist with ADL  imaging and labs reviewed  robitussin, sudafed, nasal saline and NEBS for Bronchitis and COPD and Upper Airway Congestion - sp RSV exposure and Bronchitis episode  on room air now  will follow

## 2020-01-20 NOTE — PROGRESS NOTE ADULT - SUBJECTIVE AND OBJECTIVE BOX
ORTHOPEDIC PA PROGRESS NOTE  GABRIELA WHELAN      74y Female                                 SY 2WST 225 02                                                                                                                           POD #    2d    STATUS POST:       Procedure: Open reduction and internal fixation (ORIF) of intertrochanteric fracture of hip             Patient seen and examined at bedside.      Current Pain Management:    acetaminophen   Tablet .. 1000 milliGRAM(s) Oral every 6 hours  acetaminophen  IVPB .. 1000 milliGRAM(s) IV Intermittent once  aprepitant 40 milliGRAM(s) Oral once  HYDROmorphone  Injectable 0.5 milliGRAM(s) IV Push every 6 hours PRN  ondansetron Injectable 4 milliGRAM(s) IV Push every 6 hours PRN  oxyCODONE    IR 10 milliGRAM(s) Oral every 3 hours PRN  oxyCODONE    IR 5 milliGRAM(s) Oral every 3 hours PRN      T(F): --  HR: 68  BP: 155/68  RR: --  SpO2: --               01-19-20 @ 07:01  -  01-20-20 @ 07:00  --------------------------------------------------------  IN:    Oral Fluid: 40 mL  Total IN: 40 mL    OUT:    Voided: 400 mL  Total OUT: 400 mL    Total NET: -360 mL        Physical Exam :    -   Dressing C/D/I.   -   Distal Neurvascular status intact grossly.   -   Warm well perfused; capillary refill <3 seconds   -   (+)EHL/FHL   -   (+) Sensation to light touch  -   (-) Calf tenderness Bilaterally      A/P: 74y Female s/p Open reduction and internal fixation (ORIF) of intertrochanteric fracture of hip     -   Ortho Stable  -   Pain control:  acetaminophen   Tablet .. 1000 milliGRAM(s) Oral every 6 hours  acetaminophen  IVPB .. 1000 milliGRAM(s) IV Intermittent once  aprepitant 40 milliGRAM(s) Oral once  HYDROmorphone  Injectable 0.5 milliGRAM(s) IV Push every 6 hours PRN  ondansetron Injectable 4 milliGRAM(s) IV Push every 6 hours PRN  oxyCODONE    IR 10 milliGRAM(s) Oral every 3 hours PRN  oxyCODONE    IR 5 milliGRAM(s) Oral every 3 hours PRN    -   Medicine to follow  -   DVT ppx:    PAS +  enoxaparin Injectable: 40 milliGRAM(s) SubCutaneous    -   PT/OT OOB,  Weight bearing status: WBAT   -  Dispo:  Pt wants Home PT recommending [facility]

## 2020-01-21 ENCOUNTER — TRANSCRIPTION ENCOUNTER (OUTPATIENT)
Age: 75
End: 2020-01-21

## 2020-01-21 VITALS
TEMPERATURE: 98 F | DIASTOLIC BLOOD PRESSURE: 82 MMHG | RESPIRATION RATE: 17 BRPM | HEART RATE: 66 BPM | OXYGEN SATURATION: 90 % | SYSTOLIC BLOOD PRESSURE: 157 MMHG

## 2020-01-21 PROCEDURE — 99239 HOSP IP/OBS DSCHRG MGMT >30: CPT

## 2020-01-21 PROCEDURE — 97161 PT EVAL LOW COMPLEX 20 MIN: CPT

## 2020-01-21 PROCEDURE — 36415 COLL VENOUS BLD VENIPUNCTURE: CPT

## 2020-01-21 PROCEDURE — 97530 THERAPEUTIC ACTIVITIES: CPT

## 2020-01-21 PROCEDURE — C1769: CPT

## 2020-01-21 PROCEDURE — 85610 PROTHROMBIN TIME: CPT

## 2020-01-21 PROCEDURE — 97116 GAIT TRAINING THERAPY: CPT

## 2020-01-21 PROCEDURE — 85730 THROMBOPLASTIN TIME PARTIAL: CPT

## 2020-01-21 PROCEDURE — 96374 THER/PROPH/DIAG INJ IV PUSH: CPT

## 2020-01-21 PROCEDURE — 86900 BLOOD TYPING SEROLOGIC ABO: CPT

## 2020-01-21 PROCEDURE — C1713: CPT

## 2020-01-21 PROCEDURE — 97165 OT EVAL LOW COMPLEX 30 MIN: CPT

## 2020-01-21 PROCEDURE — 86850 RBC ANTIBODY SCREEN: CPT

## 2020-01-21 PROCEDURE — 80053 COMPREHEN METABOLIC PANEL: CPT

## 2020-01-21 PROCEDURE — 76000 FLUOROSCOPY <1 HR PHYS/QHP: CPT

## 2020-01-21 PROCEDURE — 97110 THERAPEUTIC EXERCISES: CPT

## 2020-01-21 PROCEDURE — 99285 EMERGENCY DEPT VISIT HI MDM: CPT | Mod: 25

## 2020-01-21 PROCEDURE — 73502 X-RAY EXAM HIP UNI 2-3 VIEWS: CPT

## 2020-01-21 PROCEDURE — 86901 BLOOD TYPING SEROLOGIC RH(D): CPT

## 2020-01-21 PROCEDURE — 87631 RESP VIRUS 3-5 TARGETS: CPT

## 2020-01-21 PROCEDURE — 93005 ELECTROCARDIOGRAM TRACING: CPT

## 2020-01-21 PROCEDURE — 71045 X-RAY EXAM CHEST 1 VIEW: CPT

## 2020-01-21 PROCEDURE — 85027 COMPLETE CBC AUTOMATED: CPT

## 2020-01-21 RX ORDER — ENOXAPARIN SODIUM 100 MG/ML
40 INJECTION SUBCUTANEOUS
Qty: 12 | Refills: 0
Start: 2020-01-21 | End: 2020-02-01

## 2020-01-21 RX ORDER — OXYCODONE HYDROCHLORIDE 5 MG/1
1 TABLET ORAL
Qty: 42 | Refills: 0
Start: 2020-01-21 | End: 2020-01-27

## 2020-01-21 RX ORDER — POLYETHYLENE GLYCOL 3350 17 G/17G
17 POWDER, FOR SOLUTION ORAL
Qty: 0 | Refills: 0 | DISCHARGE
Start: 2020-01-21

## 2020-01-21 RX ORDER — ACETAMINOPHEN 500 MG
2 TABLET ORAL
Qty: 0 | Refills: 0 | DISCHARGE
Start: 2020-01-21

## 2020-01-21 RX ADMIN — Medication 1000 MILLIGRAM(S): at 12:25

## 2020-01-21 RX ADMIN — Medication 1 TABLET(S): at 07:59

## 2020-01-21 RX ADMIN — Medication 2000 UNIT(S): at 11:54

## 2020-01-21 RX ADMIN — Medication 12.5 MILLIGRAM(S): at 05:50

## 2020-01-21 RX ADMIN — Medication 1000 MILLIGRAM(S): at 11:54

## 2020-01-21 RX ADMIN — ENOXAPARIN SODIUM 40 MILLIGRAM(S): 100 INJECTION SUBCUTANEOUS at 09:42

## 2020-01-21 RX ADMIN — Medication 1000 MILLIGRAM(S): at 05:50

## 2020-01-21 RX ADMIN — LOSARTAN POTASSIUM 25 MILLIGRAM(S): 100 TABLET, FILM COATED ORAL at 05:50

## 2020-01-21 NOTE — DISCHARGE NOTE PROVIDER - NSDCCPTREATMENT_GEN_ALL_CORE_FT
PRINCIPAL PROCEDURE  Procedure: Open reduction and internal fixation (ORIF) of intertrochanteric fracture of hip  Findings and Treatment: left

## 2020-01-21 NOTE — PROGRESS NOTE ADULT - SUBJECTIVE AND OBJECTIVE BOX
Date/Time Patient Seen:  		  Referring MD:   Data Reviewed	       Patient is a 74y old  Female who presents with a chief complaint of hip fracture (20 Jan 2020 12:41)      Subjective/HPI     PAST MEDICAL & SURGICAL HISTORY:  COPD (chronic obstructive pulmonary disease)  Emphysema lung  Herniated disc, cervical: lumbar  Prediabetes  Costal chondritis  Hyperparathyroidism  Varicose veins  Osteoporosis  Vertebral fracture, closed: Lumbar x 4  Psoriatic arthritis  Cigarette smoker: current  Ulcerative colitis  MVP (mitral valve prolapse)  Tachycardia  Gastritis  Bronchitis  Nicotine addiction  S/P parathyroidectomy: 2014  History of tonsillectomy  Termination of pregnancy (fetus)        Medication list         MEDICATIONS  (STANDING):  acetaminophen   Tablet .. 1000 milliGRAM(s) Oral every 6 hours  acetaminophen  IVPB .. 1000 milliGRAM(s) IV Intermittent once  albuterol/ipratropium for Nebulization 3 milliLiter(s) Nebulizer every 12 hours  aprepitant 40 milliGRAM(s) Oral once  cholecalciferol 2000 Unit(s) Oral daily  enoxaparin Injectable 40 milliGRAM(s) SubCutaneous every 24 hours  lactated ringers. 1000 milliLiter(s) (100 mL/Hr) IV Continuous <Continuous>  lactobacillus acidophilus 1 Tablet(s) Oral three times a day with meals  losartan 25 milliGRAM(s) Oral daily  metoprolol tartrate 12.5 milliGRAM(s) Oral two times a day  pantoprazole    Tablet 40 milliGRAM(s) Oral before breakfast  polyethylene glycol 3350 17 Gram(s) Oral at bedtime  predniSONE   Tablet 10 milliGRAM(s) Oral daily  pseudoephedrine 30 milliGRAM(s) Oral daily  sodium chloride 0.65% Nasal 1 Spray(s) Both Nostrils three times a day    MEDICATIONS  (PRN):  ALBUTerol    90 MICROgram(s) HFA Inhaler 2 Puff(s) Inhalation every 6 hours PRN for shortness of breath and/or wheezing  bisacodyl Suppository 10 milliGRAM(s) Rectal daily PRN If no bowel movement  guaiFENesin   Syrup  (Sugar-Free) 200 milliGRAM(s) Oral every 6 hours PRN Cough  HYDROmorphone  Injectable 0.5 milliGRAM(s) IV Push every 6 hours PRN Severe Pain (7 - 10)  magnesium hydroxide Suspension 30 milliLiter(s) Oral daily PRN Constipation  nicotine  Polacrilex Gum 2 milliGRAM(s) Oral every 8 hours PRN smoking  ondansetron Injectable 4 milliGRAM(s) IV Push every 6 hours PRN Nausea and/or Vomiting  oxyCODONE    IR 10 milliGRAM(s) Oral every 3 hours PRN Moderate Pain (4 - 6)  oxyCODONE    IR 5 milliGRAM(s) Oral every 3 hours PRN Mild Pain (1 - 3)         Vitals log        ICU Vital Signs Last 24 Hrs  T(C): 36.7 (21 Jan 2020 08:06), Max: 37 (20 Jan 2020 23:38)  T(F): 98.1 (21 Jan 2020 08:06), Max: 98.6 (20 Jan 2020 23:38)  HR: 66 (21 Jan 2020 08:06) (66 - 86)  BP: 157/82 (21 Jan 2020 08:06) (143/74 - 177/92)  BP(mean): --  ABP: --  ABP(mean): --  RR: 17 (21 Jan 2020 08:06) (15 - 17)  SpO2: 90% (21 Jan 2020 08:06) (90% - 93%)           Input and Output:  I&O's Detail      Lab Data                  Review of Systems	      Objective     Physical Examination    heart s1s2  lung dc BS  abd soft  head nc  on room air  anxious      Pertinent Lab findings & Imaging      Crowder:  NO   Adequate UO     I&O's Detail           Discussed with:     Cultures:	        Radiology

## 2020-01-21 NOTE — PROGRESS NOTE ADULT - SUBJECTIVE AND OBJECTIVE BOX
Post Op Day # 3    SUBJECTIVE    73yo Female status post ORIF left hip .   Patient is alert and comfortable.    Pain is controlled with current pain regimen.  Denies nausea, vomiting, chest pain, shortness of breath, abdominal pain or fever.   No new complaints.    OBJECTIVE    Vital Signs Last 24 Hrs  T(C): 36.7 (21 Jan 2020 08:06), Max: 37 (20 Jan 2020 23:38)  T(F): 98.1 (21 Jan 2020 08:06), Max: 98.6 (20 Jan 2020 23:38)  HR: 66 (21 Jan 2020 08:06) (66 - 86)  BP: 157/82 (21 Jan 2020 08:06) (143/74 - 177/92)  BP(mean): --  RR: 17 (21 Jan 2020 08:06) (15 - 17)  SpO2: 90% (21 Jan 2020 08:06) (90% - 93%)  I&O's Summary      PHYSICAL EXAM    Left hip incision  is clean, dry and intact.   No erythema/ No exudate/ No blistering/ No ecchymosis.   The calf is supple/nontender.   Sensation to light touch is grossly intact distally.   Motor function distally is intact.   No foot drop.   (2+) dorsalis pedis pulse. Capillary refill is less than 2 seconds. No cyanosis.              ASSESSMENT AND PLAN    - Orthopedically stable  - DVT prophylaxis: PAS + Lovenox 40mg daily  - Continue physical therapy and occupational therapy  - Weight bearing as tolerated of the left lower extremity with assistance of a walker  - Incentive spirometry encouraged  - Pain control as clinically indicated  - Disposition: Home when cleared by medicine

## 2020-01-21 NOTE — PROGRESS NOTE ADULT - PROBLEM SELECTOR PLAN 1
on low dose Steroids - would stop if being used for Resp Sx - no need for any more Prednisone -   post op -   copd and bronchitis -   post op  I guanaco  dvt p  ortho follow up  pain regimen - ACAP - and occ Opioids - PRN - bowel regimen  assist with ADL  imaging and labs reviewed  robitussin, sudafed, nasal saline and NEBS for Bronchitis and COPD and Upper Airway Congestion - sp RSV exposure and Bronchitis episode  on room air now

## 2020-01-21 NOTE — DISCHARGE NOTE PROVIDER - NSDCFUADDINST_GEN_ALL_CORE_FT
Call your doctor if you experience:  • An increase in pain not controlled by pain medication or change in activity or  position.  • Temperature greater than 101° F.  • Redness, increased swelling or foul smelling drainage from or around the  incision.  • Numbness, tingling or a change in color or temperature of the operative leg.  • Call your doctor immediately if you experience chest pain, shortness of breath or calf pain.    For Constipation :   • Increase your water intake. Drink at least 8 glasses of water daily.  • Try adding fiber to your diet by eating fruits, vegetables and foods that are rich in grains.  • If you do experience constipation, you may take an over-the-counter stool softener/laxative such as Colace, Senokot or Milk of Magnesia.

## 2020-01-21 NOTE — DISCHARGE NOTE PROVIDER - NSDCCPCAREPLAN_GEN_ALL_CORE_FT
PRINCIPAL DISCHARGE DIAGNOSIS  Diagnosis: Hip fracture  Assessment and Plan of Treatment: Physical Therapy /Occupational Therapy for: Ambulation, Transfers , Stairs, ADLs (activities of daily living)  - Weight bearing as tolerated  with the assistance of a rolling walker.  • Do not take a tub bath yet.   • Do not resume driving until you have your surgeon’s permission.  - Ice packs to left hip: 20 minutes on, 20 minutes off while awake.  Keep incision area clean and dry.  You may shower post operative day 5 if no drainage present.  Follow up appointment in 2 weeks in surgeons office  Follow up with your primary care physician within 2-3 weeks of discharge home

## 2020-01-21 NOTE — DISCHARGE NOTE PROVIDER - CARE PROVIDER_API CALL
Matt Sharma)  Orthopaedic Surgery  [address] [place] [address]  Phone: [phone]  Fax: [phone]  Follow Up Time: 2 weeks

## 2020-01-21 NOTE — DISCHARGE NOTE PROVIDER - NSDCMRMEDTOKEN_GEN_ALL_CORE_FT
3 in  1 commode: s/p open reduction internal fixation left hipt MDD:1  acetaminophen 500 mg oral tablet: 2 tab(s) orally every 6 hours, As Needed - for moderate pain  calcium 600 mg: once a day  Lovenox 40 mg/0.4 mL injectable solution: 40 milligram(s) subcutaneously once a day   Medical Marijuana oil: 1  sublingual every 8-12 hours PRN pain  metoprolol: 12.5 milligram(s) orally 2 times a day  oxyCODONE 5 mg oral tablet: 1 tab(s) orally every 4 hours, As Needed -Severe Pain  MDD:6   Reference #: [id number]  ProAir HFA 90 mcg/inh inhalation aerosol: 2 puff(s) inhaled 4 times a day, As Needed - for shortness of breath and/or wheezing  Probiotic: 1 tab(s) orally once a day  rolling walker: 1 MDD:1 s/p left hip orif  Vitamin C: 1 tab oral daily  Vitamin D: 1 tab(s) orally once a day 3 in  1 commode: s/p open reduction internal fixation left hipt MDD:1  acetaminophen 500 mg oral tablet: 2 tab(s) orally every 6 hours, As Needed - for moderate pain  calcium 600 mg: once a day  Lovenox 40 mg/0.4 mL injectable solution: 40 milligram(s) subcutaneously once a day   metoprolol: 12.5 milligram(s) orally 2 times a day  oxyCODONE 5 mg oral tablet: 1 tab(s) orally every 4 hours, As Needed -Severe Pain  MDD:6   Reference #: [id number]  polyethylene glycol 3350 oral powder for reconstitution: 17 gram(s) orally once a day (at bedtime), As Needed - for constipation  predniSONE 10 mg oral tablet: 1 tab(s) orally once a day  ProAir HFA 90 mcg/inh inhalation aerosol: 2 puff(s) inhaled 4 times a day, As Needed - for shortness of breath and/or wheezing  Probiotic: 1 tab(s) orally once a day  rolling walker: 1 MDD:1 s/p left hip orif  Vitamin C: 1 tab oral daily  Vitamin D: 1 tab(s) orally once a day 3 in  1 commode: s/p open reduction internal fixation left hipt MDD:1  acetaminophen 500 mg oral tablet: 2 tab(s) orally every 6 hours, As Needed - for moderate pain  calcium 600 mg: once a day  Lovenox 40 mg/0.4 mL injectable solution: 40 milligram(s) subcutaneously once a day   metoprolol: 12.5 milligram(s) orally 2 times a day  oxyCODONE 5 mg oral tablet: 1 tab(s) orally every 4 hours, As Needed -Severe Pain  MDD:6   Reference #: [id number]  polyethylene glycol 3350 oral powder for reconstitution: 17 gram(s) orally once a day (at bedtime), As Needed - for constipation  predniSONE 10 mg oral tablet: 1 tab(s) orally once a day  ProAir HFA 90 mcg/inh inhalation aerosol: 2 puff(s) inhaled 4 times a day, As Needed - for shortness of breath and/or wheezing  Probiotic: 1 tab(s) orally once a day  rolling walker: 1 MDD:1 s/p left hip orif  Vitamin C: 1 tab oral daily  Vitamin D: 1 tab(s) orally once a day 3 in  1 commode: s/p open reduction internal fixation left hipt MDD:1  acetaminophen 500 mg oral tablet: 2 tab(s) orally every 6 hours, As Needed - for moderate pain  calcium 600 mg: once a day  Lovenox 40 mg/0.4 mL injectable solution: 40 milligram(s) subcutaneously once a day   metoprolol: 12.5 milligram(s) orally 2 times a day  oxyCODONE 5 mg oral tablet: 1 tab(s) orally every 4 hours, As Needed -Severe Pain  MDD:6   Reference #: [id number]  polyethylene glycol 3350 oral powder for reconstitution: 17 gram(s) orally once a day (at bedtime), As Needed - for constipation  predniSONE 10 mg oral tablet: 1 tab(s) orally once a day  ProAir HFA 90 mcg/inh inhalation aerosol: 2 puff(s) inhaled 4 times a day, As Needed - for shortness of breath and/or wheezing  Probiotic: 1 tab(s) orally once a day  rolling walker: 1 MDD:1 s/p left hip orif  Vitamin C: 1 tab oral daily  Vitamin D: 1 tab(s) orally once a day

## 2020-01-21 NOTE — DISCHARGE NOTE PROVIDER - HOSPITAL COURSE
GABRIELA WHELAN        Admitted on 01-18-20         75yo.  Female with history of Intertrochanteric fracture of left femur        Surgery:   Open reduction and internal fixation (ORIF) of intertrochanteric fracture of hip        Orthopedic Surgeon:   Dr. MINI Sharma        Letty-operative antibiotic:      vancomycin  IVPB:            Consulted Services : Physical Therapy, Occupational Therapy, Pulmonology        Typical Physical & occupational therapy modalities post Open reduction and internal fixation (ORIF) of intertrochanteric fracture of hip     were performed including ambulation training, range of motion, ADL's, and transfers.         DVT prophylaxis:  enoxaparin Injectable: 40 milliGRAM(s)             The patient had a clean appearing surgical incision with no sign of surgical site infections and had a stable neuro / vascular exam of the operated extremity.  After progression of mobility guided by the PT/ OT staff,  the patient was felt to benefit from further rehabilitative care for restoration to level of function. This was felt to best be accomplished at Home.        Discharge and Orthopedic Care instructions were delineated in the Discharge Plan and reviewed with the patient. All medications were delineated in the medication reconciliation tool and key points were reviewed with the patient.         This patient was deemed stable from an Orthopedic & medical standpoint for discharge today.    She will follow up with Dr. CAROLINA Oropeza for further Orthopedic care.         Patient Discharged with Following prescriptions:      3 in  1 commode: s/p open reduction internal fixation left hip MDD:1    rolling walker: 1 MDD:1 s/p left hip orif GABRIELA WHELAN        Admitted on 01-18-20         75yo.  Female with history of Intertrochanteric fracture of left femur        Surgery:   Open reduction and internal fixation (ORIF) of intertrochanteric fracture of hip        Orthopedic Surgeon:   Dr. MINI Sharma        Letty-operative antibiotic:      vancomycin  IVPB:            Consulted Services : Physical Therapy, Occupational Therapy, Pulmonology        Typical Physical & occupational therapy modalities post Open reduction and internal fixation (ORIF) of intertrochanteric fracture of hip     were performed including ambulation training, range of motion, ADL's, and transfers.         DVT prophylaxis:  enoxaparin Injectable: 40 milliGRAM(s)             The patient had a clean appearing surgical incision with no sign of surgical site infections and had a stable neuro / vascular exam of the operated extremity.  After progression of mobility guided by the PT/ OT staff,  the patient was felt to benefit from further rehabilitative care for restoration to level of function. This was felt to best be accomplished at Home.        Discharge and Orthopedic Care instructions were delineated in the Discharge Plan and reviewed with the patient. All medications were delineated in the medication reconciliation tool and key points were reviewed with the patient.         This patient was deemed stable from an Orthopedic & medical standpoint for discharge today.    She will follow up with Dr. MINI Sharma for further Orthopedic care.         Patient Discharged with Following prescriptions:      3 in  1 commode: s/p open reduction internal fixation left hip MDD:1    rolling walker: 1 MDD:1 s/p left hip orif GABRIELA WHELAN        Admitted on 01-18-20         73yo.  Female with history of Intertrochanteric fracture of left femur        Surgery:   Open reduction and internal fixation (ORIF) of intertrochanteric fracture of hip        Orthopedic Surgeon:   Dr. MINI Sharma        Letty-operative antibiotic:      vancomycin  IVPB:            Consulted Services : Physical Therapy, Occupational Therapy, Pulmonology        Typical Physical & occupational therapy modalities post Open reduction and internal fixation (ORIF) of intertrochanteric fracture of hip     were performed including ambulation training, range of motion, ADL's, and transfers.         DVT prophylaxis:  enoxaparin Injectable: 40 milliGRAM(s)             The patient had a clean appearing surgical incision with no sign of surgical site infections and had a stable neuro / vascular exam of the operated extremity.  After progression of mobility guided by the PT/ OT staff,  the patient was felt to benefit from further rehabilitative care for restoration to level of function. This was felt to best be accomplished at Home.        Discharge and Orthopedic Care instructions were delineated in the Discharge Plan and reviewed with the patient. All medications were delineated in the medication reconciliation tool and key points were reviewed with the patient.         This patient was deemed stable from an Orthopedic & medical standpoint for discharge today.    She will follow up with Dr. MINI Sharma for further Orthopedic care.         Patient Discharged with Following prescriptions:      3 in  1 commode: s/p open reduction internal fixation left hip MDD:1    rolling walker: 1 MDD:1 s/p left hip orif        Addendum (Dr. Astorga)    Patient seen and examined.  Patient doing well and has declined to go to inpatient rehab and wants to go home.  Was evaluated by our Orthopedics team and PT team as well and was deemed appropriate for home with Home PT.  Arrangements have been made.  Patient will continue her Prednisone taper prescribed by her PCP and follow up with her Orthopedic surgeon in 2 weeks. Home care and assisted devices have been arranged. Discharge planning and coordinatio 45 minutes.                 PHYSICAL EXAM:    Vital Signs Last 24 Hrs    T(C): 36.7 (21 Jan 2020 08:06), Max: 37 (20 Jan 2020 23:38)    T(F): 98.1 (21 Jan 2020 08:06), Max: 98.6 (20 Jan 2020 23:38)    HR: 66 (21 Jan 2020 08:06) (66 - 86)    BP: 157/82 (21 Jan 2020 08:06) (143/74 - 177/92)    BP(mean): --    RR: 17 (21 Jan 2020 08:06) (15 - 17)    SpO2: 90% (21 Jan 2020 08:06) (90% - 93%)        GENERAL: NAD, well-groomed, well-developed    HEAD:  Atraumatic, Normocephalic    EYES: EOMI, PERRLA, conjunctiva and sclera clear    ENMT: No tonsillar erythema, exudates, or enlargement; Moist mucous membranes, Good dentition, No lesions    NECK: Supple, No JVD, Normal thyroid    NERVOUS SYSTEM:  Alert & Oriented X3, Good concentration; Motor Strength 5/5 B/L upper and lower extremities; CHEST/LUNG: Clear to auscultation bilaterally; No rales, rhonchi, wheezing, or rubs    HEART: Regular rate and rhythm; No murmurs, rubs, or gallops    ABDOMEN: Soft, Nontender, Nondistended; Bowel sounds present    EXTREMITIES:  2+ Peripheral Pulses, No clubbing, cyanosis, or edema    LYMPH: No lymphadenopathy noted    SKIN: No rashes or lesions

## 2020-01-21 NOTE — DISCHARGE NOTE PROVIDER - NSDCACTIVITY_GEN_ALL_CORE
Walking - Indoors allowed/Showering allowed/Stairs allowed/No heavy lifting/straining/Do not drive or operate machinery/Walking - Outdoors allowed

## 2021-01-05 ENCOUNTER — TRANSCRIPTION ENCOUNTER (OUTPATIENT)
Age: 76
End: 2021-01-05

## 2021-07-16 ENCOUNTER — EMERGENCY (EMERGENCY)
Facility: HOSPITAL | Age: 76
LOS: 1 days | Discharge: HOME | End: 2021-07-16
Attending: EMERGENCY MEDICINE | Admitting: EMERGENCY MEDICINE
Payer: MEDICARE

## 2021-07-16 VITALS
OXYGEN SATURATION: 97 % | DIASTOLIC BLOOD PRESSURE: 80 MMHG | HEART RATE: 85 BPM | RESPIRATION RATE: 18 BRPM | SYSTOLIC BLOOD PRESSURE: 140 MMHG | TEMPERATURE: 98 F

## 2021-07-16 VITALS
HEART RATE: 106 BPM | SYSTOLIC BLOOD PRESSURE: 154 MMHG | RESPIRATION RATE: 20 BRPM | DIASTOLIC BLOOD PRESSURE: 110 MMHG | HEIGHT: 61 IN | WEIGHT: 100.09 LBS | OXYGEN SATURATION: 95 % | TEMPERATURE: 98 F

## 2021-07-16 DIAGNOSIS — E89.2 POSTPROCEDURAL HYPOPARATHYROIDISM: Chronic | ICD-10-CM

## 2021-07-16 DIAGNOSIS — Z98.89 OTHER SPECIFIED POSTPROCEDURAL STATES: Chronic | ICD-10-CM

## 2021-07-16 DIAGNOSIS — Z33.2 ENCOUNTER FOR ELECTIVE TERMINATION OF PREGNANCY: Chronic | ICD-10-CM

## 2021-07-16 PROCEDURE — 99283 EMERGENCY DEPT VISIT LOW MDM: CPT

## 2021-07-16 PROCEDURE — 99284 EMERGENCY DEPT VISIT MOD MDM: CPT

## 2021-07-16 RX ORDER — ASCORBIC ACID 60 MG
0 TABLET,CHEWABLE ORAL
Qty: 0 | Refills: 0 | DISCHARGE

## 2021-07-16 RX ORDER — METOPROLOL TARTRATE 50 MG
12.5 TABLET ORAL
Qty: 0 | Refills: 0 | DISCHARGE

## 2021-07-16 RX ORDER — ALBUTEROL 90 UG/1
2 AEROSOL, METERED ORAL
Qty: 0 | Refills: 0 | DISCHARGE

## 2021-07-16 NOTE — ED PROVIDER NOTE - CARE PROVIDER_API CALL
Matt Sharma)  Orthopaedic Surgery  [address] [place] [address]  Phone: [phone]  Fax: [phone]  Established Patient  Follow Up Time: 1-3 Days

## 2021-07-16 NOTE — ED PROVIDER NOTE - NSFOLLOWUPINSTRUCTIONS_ED_ALL_ED_FT
Proximal Humerus Fracture    WHAT YOU NEED TO KNOW:    A proximal humerus fracture is a crack or break in the top of your upper arm bone. The proximal humerus is one of the bones in your shoulder joint.    Shoulder Anatomy         DISCHARGE INSTRUCTIONS:    Return to the emergency department if:   •Your pain does not get better or gets worse, even after you rest and take medicine.      •Your arm, hand, or fingers feel numb.      •The skin over your fracture is swollen, cold, or pale.      •You cannot move your arm, hand, or fingers.       Call your doctor or orthopedist if:   •You have a fever.      •Your sling gets wet, damaged, or falls off.      •You have questions or concerns about your condition or care.      Medicines: You may need any of the following:   •Prescription pain medicine may be given. Ask your healthcare provider how to take this medicine safely. Some prescription pain medicines contain acetaminophen. Do not take other medicines that contain acetaminophen without talking to your healthcare provider. Too much acetaminophen may cause liver damage. Prescription pain medicine may cause constipation. Ask your healthcare provider how to prevent or treat constipation.       •NSAIDs, such as ibuprofen, help decrease swelling, pain, and fever. This medicine is available with or without a doctor's order. NSAIDs can cause stomach bleeding or kidney problems in certain people. If you take blood thinner medicine, always ask your healthcare provider if NSAIDs are safe for you. Always read the medicine label and follow directions.      •Acetaminophen decreases pain and fever. It is available without a doctor's order. Ask how much to take and how often to take it. Follow directions. Read the labels of all other medicines you are using to see if they also contain acetaminophen, or ask your doctor or pharmacist. Acetaminophen can cause liver damage if not taken correctly. Do not use more than 4 grams (4,000 milligrams) total of acetaminophen in one day.       •Take your medicine as directed. Contact your healthcare provider if you think your medicine is not helping or if you have side effects. Tell him of her if you are allergic to any medicine. Keep a list of the medicines, vitamins, and herbs you take. Include the amounts, and when and why you take them. Bring the list or the pill bottles to follow-up visits. Carry your medicine list with you in case of an emergency.      A sling may be needed to hold your broken bones in place. It will decrease your arm movement and allow the bones to heal.    Shoulder Sling         Manage your symptoms:   •Rest your arm as much as possible. Ask your healthcare provider when you can move your arm. Also ask when you can return to sports or vigorous exercises.      •Apply ice on your arm for 15 to 20 minutes every hour or as directed. Use an ice pack, or put crushed ice in a plastic bag. Cover it with a towel before you put it on your arm. Ice helps prevent tissue damage and decreases swelling and pain.      •Go to physical therapy as directed. A physical therapist teaches you exercises to help improve movement and strength, and to decrease pain.      Follow up with your doctor or orthopedist as directed: Write down your questions so you remember to ask them during your visits.

## 2021-07-16 NOTE — ED PROVIDER NOTE - MUSCULOSKELETAL, MLM
swelling and tenderness in R shoulder with obvious deformity. Distal ROM, pulses, and sensation intact. Remainder unremarkable.

## 2021-07-16 NOTE — ED ADULT NURSE NOTE - NURSING MUSC STRENGTH
[facility]    Discharge Summary  Hospitalist    Date of Admission:  8/21/2018  Date of Discharge:  8/25/2018  2:18 PM  Provider:  Stephon Mathias DO, [facility]    Discharge Diagnoses   1.  Aspiration pneumonia  2.  Progressive dysphagia    Other medical issues:  Past Medical History:   Diagnosis Date     Acid indigestion      Anxiety      Dysphagia      HTN, goal below 140/90      Hyperlipidaemia LDL goal < 100      Hypertension      IGT (impair glucose tolerance)      Inclusion body myositis      Spinal stenosis, lumbar      Tremor      Vertigo        History of Present Illness   Nelia Solano is an 88 year old female who presented with lethargy and pneumonia.  Please see the admission history and physical for full details.    Hospital Course   Nelia Solano was admitted on 8/21/2018.  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  She presented with an aspiration pneumonia and was treated with IV antibiotics.  She improved but her swallow continued to be poor with marked dysphagia.  Speech therapy recommended against eating.  Feeding tubes were discussed with the patient and her .  She strongly does not want a feeding tube. She was seen by GI who felt they could not offer anything procedurally to fix her swallow.   Given her desire to not have a feeding tube and severe dysphagia, Palliative care was consulted to assist with goals of care.  The patient decided to change her CODE status to DNR/DNI.  She decided to continue eating understanding the risk of further aspiration and even death.  She and her  did not want to pursue hospice/full comfort care at this time.  Speech recommended a full liquid diet with thins given their evaluation and her desire.  The patient also did not want thickened liquids.  The patient did reasonably well with fulls prior to discharge but still had some modest coughing at times.  Her  "medications were crushed and generally tolerated toward discharge.  All the patients and her husbands questions were answered.  He has caregivers to help with her care and reports he has adequate equipment otherwise for her base cares.     Significant Results and Procedures   No procedures    Pending Results     Unresulted Labs Ordered in the Past 30 Days of this Admission     Date and Time Order Name Status Description    8/21/2018 1636 Blood culture Preliminary     8/21/2018 1636 Blood culture Preliminary           Code Status   DNR / DNI       Primary Care Physician   Pavan Kern    Blood pressure 132/75, pulse 82, temperature 98.4  F (36.9  C), temperature source Oral, resp. rate 19, height 1.575 m (5' 2\"), weight 66.3 kg (146 lb 2.6 oz), SpO2 91 %, not currently breastfeeding.  GEN:  Alert, oriented x 3, appears weak/ill but comfortable, no overt distress.  HEENT:  No scleral icterus, no nasal discharge, mouth moist.  CV:  Regular rate and rhythm, distant.  No rub.  LUNGS:  Slightly coarse like prior day, but no wheezes/retractions.  Symmetric chest rise on inhalation noted.  ABD:  Active bowel sounds, soft, non-tender/non-distended.  No rebound/guarding/rigidity.  EXT:  Trace edema.  No cyanosis.  SKIN:  Dry to touch, no exanthems noted in the visualized areas.      Discharge Disposition   Discharged to home with home care    Consultations This Hospital Stay   SPEECH LANGUAGE PATH ADULT IP CONSULT  SOCIAL WORK IP CONSULT  OCCUPATIONAL THERAPY ADULT IP CONSULT  PHYSICAL THERAPY ADULT IP CONSULT  PALLIATIVE CARE ADULT IP CONSULT  SOCIAL WORK IP CONSULT  GASTROENTEROLOGY IP CONSULT  PHARMACY IP CONSULT    Time Spent on this Encounter   I, Stephon Mathias, personally saw the patient today and spent greater than 30 minutes discharging this patient.    Discharge Orders     Home care nursing referral     Home Care SLP Referral for Hospital Discharge     Reason for your hospital stay   Aspiration pneumonia with " dysphagia (difficulty swallowing)     Follow-up and recommended labs and tests    Patient/her  to contact their PCP office (Dr. Pavan Kern) in the next week.  Continue home care.     Activity   Your activity upon discharge: activity as tolerated     MD face to face encounter   Documentation of Face to Face and Certification for Home Health Services    I certify that patient: Nelia Solano is under my care and that I, or a nurse practitioner or physician's assistant working with me, had a face-to-face encounter that meets the physician face-to-face encounter requirements with this patient on: 8/25/2018.    This encounter with the patient was in whole, or in part, for the following medical condition, which is the primary reason for home health care: Aspiration pneumonia/dysphagia.    I certify that, based on my findings, the following services are medically necessary home health services: Nursing and Speech Language Therapy.    My clinical findings support the need for the above services because: Nurse is needed: To provide caregiver training to assist with: medication management, assess patient recovery.. and Speech Therapy Services are needed to assess and treat impairments in language and/or swallow functions due to Dysphagia.    Further, I certify that my clinical findings support that this patient is homebound (i.e. absences from home require considerable and taxing effort and are for medical reasons or [facility] services or infrequently or of short duration when for other reasons) because: Patient is homebound due to: myositis, extreme weakness..    Based on the above findings. I certify that this patient is confined to the home and needs intermittent skilled nursing care, physical therapy and/or speech therapy.  The patient is under my care, and I have initiated the establishment of the plan of care.  This patient will be followed by a physician who will periodically review the plan of  care.  Physician/Provider to provide follow up care: Pavan Kern    Attending hospital physician (the Medicare certified BENJIE provider): Stephon Mathias DO, [facility]  Physician Signature: See electronic signature associated with these discharge orders.  Date: 8/25/2018     DNR/DNI     Diet   Follow this diet upon discharge:  Full liquid diet, NO solids.  (There is no safe diet with your swallow difficulties, but full liquids is safer than solids in your situation)       Discharge Medications   Current Discharge Medication List      START taking these medications    Details   acetaminophen (TYLENOL) 325 MG tablet Take 1-2 tablets (325-650 mg) by mouth every 6 hours as needed for mild pain or fever  Qty: 100 tablet, Refills: 0    Associated Diagnoses: Aspiration pneumonitis (H)      amoxicillin-clavulanate (AUGMENTIN) 875-125 MG per tablet Take 1 tablet by mouth every 12 hours for 6 days  Qty: 12 tablet, Refills: 0    Associated Diagnoses: Aspiration pneumonitis (H)         CONTINUE these medications which have NOT CHANGED    Details   ALPRAZolam (XANAX) 0.25 MG tablet Take 1 tablet (0.25 mg) by mouth 3 times daily  Qty: 30 tablet, Refills: 0    Associated Diagnoses: Anxiety      calcium carb 1250 mg, 500 mg Teller,/vitamin D 200 units (OSCAL WITH D) 500-200 MG-UNIT per tablet Take 1 tablet by mouth 2 times daily (with meals)      ciclopirox (LOPROX) 0.77 % cream APPLY EXTERNALLY TO THE AFFECTED AREA TWICE DAILY  Qty: 30 g, Refills: 0    Associated Diagnoses: Encounter for medication refill      cimetidine (TAGAMET) 400 MG tablet TAKE 1 TABLET BY MOUTH AT BEDTIME  Qty: 90 tablet, Refills: 3    Associated Diagnoses: Medication refill; Acid indigestion      folic acid (FOLVITE) 1 MG tablet Take 1 mg by mouth 3 times daily.      lisinopril (PRINIVIL/ZESTRIL) 2.5 MG tablet TAKE 1 TABLET BY MOUTH DAILY  Qty: 90 tablet, Refills: 3    Associated Diagnoses: Encounter for medication refill      Methotrexate Sodium  (METHOTREXATE PO) Take 2.5 mg by mouth once a week 5 tabs = 12.5 mg wed or thur      !! primidone (MYSOLINE) 50 MG tablet Take 50 mg by mouth every morning      !! primidone (MYSOLINE) 50 MG tablet Take 50 mg by mouth daily (with lunch)      !! primidone (MYSOLINE) 50 MG tablet Take 25 mg by mouth daily (with dinner)      VITAMIN D, CHOLECALCIFEROL, PO Take 2,000 Units by mouth daily      nystatin (MYCOSTATIN) 528054 UNIT/GM POWD APPLY TOPICALLY TWICE DAILY AS NEEDED  Qty: 60 g, Refills: 3    Associated Diagnoses: Yeast dermatitis       !! - Potential duplicate medications found. Please discuss with provider.          Allergies   No Known Allergies  Data     Recent Labs  Lab 08/22/18  0846 08/21/18  1538   WBC 6.8 9.1   HGB 12.2 12.3   HCT 37.5 37.1   MCV 92 92    213         Recent Labs  Lab 08/21/18  1638 08/21/18  1557 08/21/18  1526   CULT No growth after 4 days No growth after 4 days No growth       Recent Labs  Lab 08/22/18  0846 08/21/18  1538    134   POTASSIUM 3.9 4.4   CHLORIDE 103 98   CO2 26 29   ANIONGAP 8 7   * 119*   BUN 10 17   CR 0.39* 0.46*   GFRESTIMATED >90 >90   GFRESTBLACK >90 >90   BRANDON 7.6* 9.1   MAG 1.6 1.9   PROTTOTAL 6.0* 6.7*   ALBUMIN 2.2* 2.6*   BILITOTAL 0.3 0.4   ALKPHOS 133 148   AST 33 41   ALT 38 47     Results for orders placed or performed during the hospital encounter of 08/21/18   XR Chest 2 Views    Narrative    XR CHEST 2 VW 8/21/2018 4:04 PM    HISTORY: Fever and cough.     COMPARISON: 11/20/2017    FINDINGS: Patchy airspace opacity in the lower left lung. Right lung  is relatively clear. No pneumothorax. Stable heart size.      Impression    IMPRESSION: Left lower lobe pneumonia.    ALIYAH APONTE MD          hand grasp, leg strength strong and equal bilaterally

## 2021-07-16 NOTE — ED ADULT NURSE NOTE - NSIMPLEMENTINTERV_GEN_ALL_ED
Implemented All Fall Risk Interventions:  [place] to call system. Call bell, personal items and telephone within reach. Instruct patient to call for assistance. Room bathroom lighting operational. Non-slip footwear when patient is off stretcher. Physically safe environment: no spills, clutter or unnecessary equipment. Stretcher in lowest position, wheels locked, appropriate side rails in place. Provide visual cue, wrist band, yellow gown, etc. Monitor gait and stability. Monitor for mental status changes and reorient to person, place, and time. Review medications for side effects contributing to fall risk. Reinforce activity limits and safety measures with patient and family.

## 2021-07-16 NOTE — ED PROVIDER NOTE - OBJECTIVE STATEMENT
77 y/o F with a PMHx of COPD, emphysema, herniated disc, prediabetes, costal chondritis, hyperparathyroidism, varicose veins, osteoporosis, vertebral fracture, psoriatic arthritis, MVP, ulcerative colitis, tachycardia, gastritis s/p parathyroidectomy, tonsillectomy, termination of pregnancy sent from  presents to ED c/o shoulder fx and shoulder pain after a fall. Hx of falls, hip surgery and hairline fracture to rib.   Orthopedist: Dr. Sharma

## 2021-07-16 NOTE — ED PROVIDER NOTE - PATIENT PORTAL LINK FT
You can access the FollowMyHealth Patient Portal offered by [facility] by registering at the following website: [email or web address]. By joining [facility]’s FollowMyHealth portal, you will also be able to view your health information using other applications (apps) compatible with our system.

## 2021-08-18 ENCOUNTER — INPATIENT (INPATIENT)
Facility: HOSPITAL | Age: 76
LOS: 1 days | Discharge: HOME | DRG: 552 | End: 2021-08-20
Attending: STUDENT IN AN ORGANIZED HEALTH CARE EDUCATION/TRAINING PROGRAM | Admitting: INTERNAL MEDICINE
Payer: MEDICARE

## 2021-08-18 VITALS
SYSTOLIC BLOOD PRESSURE: 188 MMHG | RESPIRATION RATE: 18 BRPM | OXYGEN SATURATION: 99 % | TEMPERATURE: 99 F | HEART RATE: 91 BPM | HEIGHT: 61 IN | WEIGHT: 100.09 LBS | DIASTOLIC BLOOD PRESSURE: 98 MMHG
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
## 2021-08-18 DIAGNOSIS — Z98.89 OTHER SPECIFIED POSTPROCEDURAL STATES: Chronic | ICD-10-CM

## 2021-08-18 DIAGNOSIS — Z33.2 ENCOUNTER FOR ELECTIVE TERMINATION OF PREGNANCY: Chronic | ICD-10-CM

## 2021-08-18 DIAGNOSIS — E89.2 POSTPROCEDURAL HYPOPARATHYROIDISM: Chronic | ICD-10-CM

## 2021-08-18 LAB
ALBUMIN SERPL ELPH-MCNC: 3.6 G/DL — SIGNIFICANT CHANGE UP (ref 3.3–5)
ALP SERPL-CCNC: 111 U/L — SIGNIFICANT CHANGE UP (ref 40–120)
ALT FLD-CCNC: 38 U/L — SIGNIFICANT CHANGE UP (ref 12–78)
ANION GAP SERPL CALC-SCNC: 4 MMOL/L — LOW (ref 5–17)
APPEARANCE UR: CLEAR — SIGNIFICANT CHANGE UP
AST SERPL-CCNC: 31 U/L — SIGNIFICANT CHANGE UP (ref 15–37)
BASOPHILS # BLD AUTO: 0 K/UL — SIGNIFICANT CHANGE UP (ref 0–0.2)
BASOPHILS NFR BLD AUTO: 0 % — SIGNIFICANT CHANGE UP (ref 0–2)
BILIRUB SERPL-MCNC: 0.4 MG/DL — SIGNIFICANT CHANGE UP (ref 0.2–1.2)
BILIRUB UR-MCNC: NEGATIVE — SIGNIFICANT CHANGE UP
BUN SERPL-MCNC: 14 MG/DL — SIGNIFICANT CHANGE UP (ref 7–23)
CALCIUM SERPL-MCNC: 9.3 MG/DL — SIGNIFICANT CHANGE UP (ref 8.5–10.1)
CHLORIDE SERPL-SCNC: 105 MMOL/L — SIGNIFICANT CHANGE UP (ref 96–108)
CK MB CFR SERPL CALC: 1.7 NG/ML — SIGNIFICANT CHANGE UP (ref 0–3.6)
CO2 SERPL-SCNC: 29 MMOL/L — SIGNIFICANT CHANGE UP (ref 22–31)
COLOR SPEC: YELLOW — SIGNIFICANT CHANGE UP
CREAT SERPL-MCNC: 0.53 MG/DL — SIGNIFICANT CHANGE UP (ref 0.5–1.3)
DIFF PNL FLD: NEGATIVE — SIGNIFICANT CHANGE UP
EOSINOPHIL # BLD AUTO: 0.13 K/UL — SIGNIFICANT CHANGE UP (ref 0–0.5)
EOSINOPHIL NFR BLD AUTO: 1 % — SIGNIFICANT CHANGE UP (ref 0–6)
GLUCOSE SERPL-MCNC: 108 MG/DL — HIGH (ref 70–99)
GLUCOSE UR QL: NEGATIVE — SIGNIFICANT CHANGE UP
HCT VFR BLD CALC: 41.3 % — SIGNIFICANT CHANGE UP (ref 34.5–45)
HGB BLD-MCNC: 13.1 G/DL — SIGNIFICANT CHANGE UP (ref 11.5–15.5)
KETONES UR-MCNC: NEGATIVE — SIGNIFICANT CHANGE UP
LEUKOCYTE ESTERASE UR-ACNC: ABNORMAL
LYMPHOCYTES # BLD AUTO: 19 % — SIGNIFICANT CHANGE UP (ref 13–44)
LYMPHOCYTES # BLD AUTO: 2.49 K/UL — SIGNIFICANT CHANGE UP (ref 1–3.3)
MCHC RBC-ENTMCNC: 27.3 PG — SIGNIFICANT CHANGE UP (ref 27–34)
MCHC RBC-ENTMCNC: 31.7 GM/DL — LOW (ref 32–36)
MCV RBC AUTO: 86.2 FL — SIGNIFICANT CHANGE UP (ref 80–100)
MONOCYTES # BLD AUTO: 0.78 K/UL — SIGNIFICANT CHANGE UP (ref 0–0.9)
MONOCYTES NFR BLD AUTO: 6 % — SIGNIFICANT CHANGE UP (ref 2–14)
NEUTROPHILS # BLD AUTO: 9.68 K/UL — HIGH (ref 1.8–7.4)
NEUTROPHILS NFR BLD AUTO: 72 % — SIGNIFICANT CHANGE UP (ref 43–77)
NITRITE UR-MCNC: NEGATIVE — SIGNIFICANT CHANGE UP
NRBC # BLD: SIGNIFICANT CHANGE UP /100 WBCS (ref 0–0)
PH UR: 7 — SIGNIFICANT CHANGE UP (ref 5–8)
PLATELET # BLD AUTO: 352 K/UL — SIGNIFICANT CHANGE UP (ref 150–400)
POTASSIUM SERPL-MCNC: 4.1 MMOL/L — SIGNIFICANT CHANGE UP (ref 3.5–5.3)
POTASSIUM SERPL-SCNC: 4.1 MMOL/L — SIGNIFICANT CHANGE UP (ref 3.5–5.3)
PROT SERPL-MCNC: 7.6 G/DL — SIGNIFICANT CHANGE UP (ref 6–8.3)
PROT UR-MCNC: NEGATIVE — SIGNIFICANT CHANGE UP
RBC # BLD: 4.79 M/UL — SIGNIFICANT CHANGE UP (ref 3.8–5.2)
RBC # FLD: 14.2 % — SIGNIFICANT CHANGE UP (ref 10.3–14.5)
SODIUM SERPL-SCNC: 138 MMOL/L — SIGNIFICANT CHANGE UP (ref 135–145)
SP GR SPEC: 1.01 — SIGNIFICANT CHANGE UP (ref 1.01–1.02)
TROPONIN I SERPL-MCNC: <.015 NG/ML — SIGNIFICANT CHANGE UP (ref 0.01–0.04)
UROBILINOGEN FLD QL: NEGATIVE — SIGNIFICANT CHANGE UP
WBC # BLD: 13.08 K/UL — HIGH (ref 3.8–10.5)
WBC # FLD AUTO: 13.08 K/UL — HIGH (ref 3.8–10.5)

## 2021-08-18 PROCEDURE — 99285 EMERGENCY DEPT VISIT HI MDM: CPT

## 2021-08-18 PROCEDURE — 93010 ELECTROCARDIOGRAM REPORT: CPT

## 2021-08-18 PROCEDURE — 76376 3D RENDER W/INTRP POSTPROCES: CPT | Mod: 26

## 2021-08-18 PROCEDURE — 72192 CT PELVIS W/O DYE: CPT | Mod: 26,MA

## 2021-08-18 PROCEDURE — 71045 X-RAY EXAM CHEST 1 VIEW: CPT | Mod: 26

## 2021-08-18 PROCEDURE — 73110 X-RAY EXAM OF WRIST: CPT | Mod: 26,LT

## 2021-08-18 PROCEDURE — 72131 CT LUMBAR SPINE W/O DYE: CPT | Mod: 26,MA

## 2021-08-18 PROCEDURE — 72170 X-RAY EXAM OF PELVIS: CPT | Mod: 26

## 2021-08-18 PROCEDURE — 99223 1ST HOSP IP/OBS HIGH 75: CPT

## 2021-08-18 PROCEDURE — 70450 CT HEAD/BRAIN W/O DYE: CPT | Mod: 26,MA

## 2021-08-18 RX ORDER — ACETAMINOPHEN 500 MG
650 TABLET ORAL EVERY 6 HOURS
Refills: 0 | Status: DISCONTINUED | OUTPATIENT
Start: 2021-08-18 | End: 2021-08-20

## 2021-08-18 RX ORDER — ONDANSETRON 8 MG/1
4 TABLET, FILM COATED ORAL EVERY 8 HOURS
Refills: 0 | Status: DISCONTINUED | OUTPATIENT
Start: 2021-08-18 | End: 2021-08-20

## 2021-08-18 RX ORDER — METOPROLOL TARTRATE 50 MG
25 TABLET ORAL DAILY
Refills: 0 | Status: DISCONTINUED | OUTPATIENT
Start: 2021-08-18 | End: 2021-08-20

## 2021-08-18 RX ORDER — TRAMADOL HYDROCHLORIDE 50 MG/1
25 TABLET ORAL EVERY 8 HOURS
Refills: 0 | Status: DISCONTINUED | OUTPATIENT
Start: 2021-08-18 | End: 2021-08-20

## 2021-08-18 RX ORDER — METOPROLOL TARTRATE 50 MG
1 TABLET ORAL
Qty: 0 | Refills: 0 | DISCHARGE

## 2021-08-18 RX ORDER — ACETAMINOPHEN 500 MG
650 TABLET ORAL ONCE
Refills: 0 | Status: COMPLETED | OUTPATIENT
Start: 2021-08-18 | End: 2021-08-18

## 2021-08-18 RX ORDER — LOSARTAN POTASSIUM 100 MG/1
25 TABLET, FILM COATED ORAL DAILY
Refills: 0 | Status: DISCONTINUED | OUTPATIENT
Start: 2021-08-18 | End: 2021-08-20

## 2021-08-18 RX ORDER — METOPROLOL TARTRATE 50 MG
25 TABLET ORAL DAILY
Refills: 0 | Status: DISCONTINUED | OUTPATIENT
Start: 2021-08-18 | End: 2021-08-18

## 2021-08-18 RX ORDER — TRAMADOL HYDROCHLORIDE 50 MG/1
50 TABLET ORAL EVERY 8 HOURS
Refills: 0 | Status: DISCONTINUED | OUTPATIENT
Start: 2021-08-18 | End: 2021-08-20

## 2021-08-18 RX ADMIN — Medication 25 MILLIGRAM(S): at 23:51

## 2021-08-18 RX ADMIN — Medication 650 MILLIGRAM(S): at 18:33

## 2021-08-18 RX ADMIN — Medication 650 MILLIGRAM(S): at 18:24

## 2021-08-18 NOTE — H&P ADULT - HISTORY OF PRESENT ILLNESS
Ms Lopez is a 75 yo F presenting from home after a fall resulting in multiple fractures. PMH HTN, IBS, Ulcerative Colitis, COPD, Psoriatic arthritis, hx of lumbar fracture, heart murmur.   Patient seen and examined at bedside. She reports that she had a fall earlier today. She denies LOC or any pre-ceeding symptoms. She feels she may have tripped on something. She also had a fall 5 weeks ago landing on her buttocks also she had no LOC or preceeding symptoms. She thinks that maybe her legs give out or she may have slipped on karoline. She denies any pre-ceeding chest pain, light-headedness, dizziness, palpitations dizziness causing her to fall. She does have back pain and L wrist pain right now. Denies bowel/bladder incontinence or retention. No past hx of CAD/MI/TIA/CVA/DM2. She has no other complaints at this time.   Her Left wrist was splinted in the ER>

## 2021-08-18 NOTE — H&P ADULT - NSHPPHYSICALEXAM_GEN_ALL_CORE
T(C): 37.2 (08-18-21 @ 16:54), Max: 37.2 (08-18-21 @ 16:54)  HR: 91 (08-18-21 @ 16:54) (91 - 91)  BP: 188/98 (08-18-21 @ 16:54) (188/98 - 188/98)  RR: 18 (08-18-21 @ 16:54) (18 - 18)  SpO2: 99% (08-18-21 @ 16:54) (99% - 99%)  Wt(kg): --    Physical Exam:   GENERAL: well-groomed, well-developed, NAD  HEENT: head NC/AT; EOM intact, PERRLA, conjunctiva & sclera clear; hearing grossly intact, moist mucous membranes  NECK: supple, no JVD  RESPIRATORY: CTA B/L, no wheezing, rales, rhonchi or rubs  CARDIOVASCULAR: S1&S2, RRR, +murmur  ABDOMEN: soft, non-tender, non-distended, + Bowel sounds x4 quadrants, no guarding, rebound or rigidity  MUSCULOSKELETAL:  no clubbing, cyanosis or edema of all 4 extremities  LYMPH: no cervical lymphadenopathy  VASCULAR: Radial pulses 2+ bilaterally, no varicose veins   SKIN: warm and dry, color normal  NEUROLOGIC: AA&O X3, CN2-12 intact w/ no focal deficits, no sensory loss, motor Strength 5/5 in UE & LE B/L  Psych: Normal mood and affect, normal behavior

## 2021-08-18 NOTE — H&P ADULT - ASSESSMENT
Ms Lopez is a 75 yo F presenting from home after a fall resulting in multiple fractures. PMH HTN, IBS, Ulcerative Colitis, COPD, Psoriatic arthritis, hx of lumbar fracture, heart murmur.     Multiple Falls: etiology unclear. She is not having any syncopal episodes or pre-syncope.   -will place on telemetry to eval for arrythmia.   -check orthostatic vital signs  -monitor on pulse oximetry   neurology consult Dr Zuleta    Left Wrist fracture: orthopedics consulted.   -s/p splinting    Pubic Rami Fracture: ortho consulted.   -CT shows Nondisplaced fracture of the left inferior pubic ramus.  -pain control as ordered.     L3 Fracture: CT shows Nondisplaced right anterior inferior corner fracture of the L3 vertebral body. No significant loss of vertebral body height.  -bedrest until evaluated by Ortho  -pain control as ordered.   -pending ortho consult will need PT eval    HTN: continue Losartan and Toprol.     Ulcerative Colitis: no acute exacerbation. Not on maintenance medications.     COPD: no acute exacerbation.   -takes PROAIR PRN.     Psoriatic Arthritis: not on maintenance medications.     DVT ppx: patient declines pharm dvt ppx. She understands she is at increased risk of VTE and the complications of VTE including death. Agreeable to SCD's

## 2021-08-18 NOTE — H&P ADULT - NSHPREVIEWOFSYSTEMS_GEN_ALL_CORE
CONSTITUTIONAL: denies fever, chills, fatigue, weakness  HEENT: denies blurred vision, sore throat  CARDIOVASCULAR: denies chest pain, chest pressure, palpitations  RESPIRATORY: denies shortness of breath, sputum production  GASTROINTESTINAL: denies nausea, vomiting, diarrhea, abdominal pain, melena, hematochezia  GENITOURINARY: denies dysuria  NEUROLOGICAL: denies numbness, headache, focal weakness  MUSCULOSKELETAL: back and Left wrist pain  HEMATOLOGIC: denies gross bleeding, bruising  LYMPHATICS: denies enlarged lymph nodes, extremity swelling

## 2021-08-18 NOTE — ED PROVIDER NOTE - ATTENDING CONTRIBUTION TO CARE
77yo F ho copd, colitis, gastritis pw fall. pt states she fell 5 weeks ago and again today, unsure how she fell, was opening and door and she suddenly fell landing on her left side now with left hip and wrist pain, and lower back pain. pt was awake during event, denies lightheadedness dizziness chest pain or sob. no head injury, no loc, no headache, vision changes, nausea or vomiting. is unsure how she fell, similar episode 5 weeks ago where she fell without clear reason but recalls whole event. does not walk with walker or cane, was unablet o stand or ambulate after fall due to lower back pain.   on exam + bl straight leg raise, from at hips bl, sacral area back tenderness, + left wrist tendenress, puilses inact, no obv swelling or deformity.   will check labs, CT head, spine, ua, ekg

## 2021-08-18 NOTE — ED ADULT NURSE NOTE - NSIMPLEMENTINTERV_GEN_ALL_ED
Implemented All Fall with Harm Risk Interventions:  [place] to call system. Call bell, personal items and telephone within reach. Instruct patient to call for assistance. Room bathroom lighting operational. Non-slip footwear when patient is off stretcher. Physically safe environment: no spills, clutter or unnecessary equipment. Stretcher in lowest position, wheels locked, appropriate side rails in place. Provide visual cue, wrist band, yellow gown, etc. Monitor gait and stability. Monitor for mental status changes and reorient to person, place, and time. Review medications for side effects contributing to fall risk. Reinforce activity limits and safety measures with patient and family. Provide visual clues: red socks.

## 2021-08-18 NOTE — ED ADULT NURSE NOTE - OBJECTIVE STATEMENT
Received pt alert and orientated. Pt is complaining of a fall. Pt states she was walking at home and was at the front door and fell doesn't know how and denies hitting her head. Pt states she fell on her left side and her left wrist and hip are hurting. Pt has a previous falls in the past with right shoulder pain. Call bell within reach. Freq rounding performed. Will continue to monitor.

## 2021-08-18 NOTE — ED PROVIDER NOTE - OBJECTIVE STATEMENT
Pt is a 75 yo female with pmhx of COPD Gastritis Herniated disc, cervical lumbar Hyperparathyroidism MVP Osteoporosis   Prediabetes Psoriatic arthritis Tachycardia Ulcerative colitis Varicose veins Vertebral fracture, closed Lumbar x 4 BIBEMS s/p fall. Pt unsure why she fell denies any chest pain sob nvd dizziness. Pt states she has had a few falls recently most recent 5 weeks ago and has right shoulder fracture. Pt denies hitting head no loc. Pt also c/o of left wrist pain and low back pain denies any numbness tingling weakness urinary or bladder dysfunction. Pt does not use walker or cane to walk.

## 2021-08-18 NOTE — ED PROVIDER NOTE - MUSCULOSKELETAL, MLM
TTP right shoulder pain with rom left wrist + TTP nvi no swelling pain with palpation and rom TTP b/l lumbar and sacral area pain with straight leg raise nvi normal strength sensation pulses.

## 2021-08-18 NOTE — ED PROVIDER NOTE - CARE PLAN
Intermediate / Complex Repair - Final Wound Length In Cm: 0.8 Principal Discharge DX:	Falls, initial encounter  Secondary Diagnosis:	Wrist fracture  Secondary Diagnosis:	Compression fracture of lumbar vertebra   1

## 2021-08-18 NOTE — ED ADULT TRIAGE NOTE - CHIEF COMPLAINT QUOTE
patient came in ED from home BIBA with c/o lower back pain and wrist pain. s/p fall, unwitnessed fall, lost balance.

## 2021-08-18 NOTE — ED ADULT NURSE NOTE - ED STAT RN HANDOFF DETAILS 2
Report given to DANIEL Vallejo.  Patient is still pending room assignment upstairs.   is still at bedside.

## 2021-08-18 NOTE — ED PROVIDER NOTE - CONSTITUTIONAL, MLM
Well appearing, awake, alert, oriented to person, place, time/situation and in no apparent distress. NC/AT normal...

## 2021-08-19 ENCOUNTER — TRANSCRIPTION ENCOUNTER (OUTPATIENT)
Age: 76
End: 2021-08-19

## 2021-08-19 LAB
ANION GAP SERPL CALC-SCNC: 6 MMOL/L — SIGNIFICANT CHANGE UP (ref 5–17)
BASOPHILS # BLD AUTO: 0.03 K/UL — SIGNIFICANT CHANGE UP (ref 0–0.2)
BASOPHILS NFR BLD AUTO: 0.2 % — SIGNIFICANT CHANGE UP (ref 0–2)
BUN SERPL-MCNC: 12 MG/DL — SIGNIFICANT CHANGE UP (ref 7–23)
CALCIUM SERPL-MCNC: 8.5 MG/DL — SIGNIFICANT CHANGE UP (ref 8.5–10.1)
CHLORIDE SERPL-SCNC: 107 MMOL/L — SIGNIFICANT CHANGE UP (ref 96–108)
CO2 SERPL-SCNC: 27 MMOL/L — SIGNIFICANT CHANGE UP (ref 22–31)
COVID-19 SPIKE DOMAIN AB INTERP: POSITIVE
COVID-19 SPIKE DOMAIN ANTIBODY RESULT: >250 U/ML — HIGH
CREAT SERPL-MCNC: 0.34 MG/DL — LOW (ref 0.5–1.3)
EOSINOPHIL # BLD AUTO: 0.26 K/UL — SIGNIFICANT CHANGE UP (ref 0–0.5)
EOSINOPHIL NFR BLD AUTO: 1.7 % — SIGNIFICANT CHANGE UP (ref 0–6)
GLUCOSE SERPL-MCNC: 134 MG/DL — HIGH (ref 70–99)
HCT VFR BLD CALC: 37.5 % — SIGNIFICANT CHANGE UP (ref 34.5–45)
HGB BLD-MCNC: 12 G/DL — SIGNIFICANT CHANGE UP (ref 11.5–15.5)
IMM GRANULOCYTES NFR BLD AUTO: 0.7 % — SIGNIFICANT CHANGE UP (ref 0–1.5)
LYMPHOCYTES # BLD AUTO: 1.67 K/UL — SIGNIFICANT CHANGE UP (ref 1–3.3)
LYMPHOCYTES # BLD AUTO: 10.9 % — LOW (ref 13–44)
MCHC RBC-ENTMCNC: 27.2 PG — SIGNIFICANT CHANGE UP (ref 27–34)
MCHC RBC-ENTMCNC: 32 GM/DL — SIGNIFICANT CHANGE UP (ref 32–36)
MCV RBC AUTO: 85 FL — SIGNIFICANT CHANGE UP (ref 80–100)
MONOCYTES # BLD AUTO: 0.65 K/UL — SIGNIFICANT CHANGE UP (ref 0–0.9)
MONOCYTES NFR BLD AUTO: 4.3 % — SIGNIFICANT CHANGE UP (ref 2–14)
NEUTROPHILS # BLD AUTO: 12.54 K/UL — HIGH (ref 1.8–7.4)
NEUTROPHILS NFR BLD AUTO: 82.2 % — HIGH (ref 43–77)
NRBC # BLD: 0 /100 WBCS — SIGNIFICANT CHANGE UP (ref 0–0)
PLATELET # BLD AUTO: 334 K/UL — SIGNIFICANT CHANGE UP (ref 150–400)
POTASSIUM SERPL-MCNC: 3.8 MMOL/L — SIGNIFICANT CHANGE UP (ref 3.5–5.3)
POTASSIUM SERPL-SCNC: 3.8 MMOL/L — SIGNIFICANT CHANGE UP (ref 3.5–5.3)
RBC # BLD: 4.41 M/UL — SIGNIFICANT CHANGE UP (ref 3.8–5.2)
RBC # FLD: 14.2 % — SIGNIFICANT CHANGE UP (ref 10.3–14.5)
SARS-COV-2 IGG+IGM SERPL QL IA: >250 U/ML — HIGH
SARS-COV-2 IGG+IGM SERPL QL IA: POSITIVE
SARS-COV-2 RNA SPEC QL NAA+PROBE: SIGNIFICANT CHANGE UP
SODIUM SERPL-SCNC: 140 MMOL/L — SIGNIFICANT CHANGE UP (ref 135–145)
WBC # BLD: 15.26 K/UL — HIGH (ref 3.8–10.5)
WBC # FLD AUTO: 15.26 K/UL — HIGH (ref 3.8–10.5)

## 2021-08-19 PROCEDURE — 73060 X-RAY EXAM OF HUMERUS: CPT | Mod: 26,LT

## 2021-08-19 PROCEDURE — 73110 X-RAY EXAM OF WRIST: CPT | Mod: 26,LT

## 2021-08-19 PROCEDURE — 73000 X-RAY EXAM OF COLLAR BONE: CPT | Mod: 26,RT

## 2021-08-19 PROCEDURE — 99232 SBSQ HOSP IP/OBS MODERATE 35: CPT

## 2021-08-19 PROCEDURE — 73030 X-RAY EXAM OF SHOULDER: CPT | Mod: 26,LT

## 2021-08-19 RX ORDER — HYDRALAZINE HCL 50 MG
5 TABLET ORAL THREE TIMES A DAY
Refills: 0 | Status: DISCONTINUED | OUTPATIENT
Start: 2021-08-19 | End: 2021-08-19

## 2021-08-19 RX ORDER — HYDRALAZINE HCL 50 MG
10 TABLET ORAL EVERY 8 HOURS
Refills: 0 | Status: DISCONTINUED | OUTPATIENT
Start: 2021-08-19 | End: 2021-08-20

## 2021-08-19 RX ADMIN — Medication 650 MILLIGRAM(S): at 06:06

## 2021-08-19 RX ADMIN — Medication 650 MILLIGRAM(S): at 20:08

## 2021-08-19 RX ADMIN — Medication 650 MILLIGRAM(S): at 19:38

## 2021-08-19 RX ADMIN — LOSARTAN POTASSIUM 25 MILLIGRAM(S): 100 TABLET, FILM COATED ORAL at 05:13

## 2021-08-19 RX ADMIN — TRAMADOL HYDROCHLORIDE 25 MILLIGRAM(S): 50 TABLET ORAL at 02:11

## 2021-08-19 RX ADMIN — Medication 25 MILLIGRAM(S): at 05:13

## 2021-08-19 RX ADMIN — Medication 650 MILLIGRAM(S): at 13:50

## 2021-08-19 RX ADMIN — Medication 650 MILLIGRAM(S): at 14:25

## 2021-08-19 RX ADMIN — Medication 650 MILLIGRAM(S): at 00:42

## 2021-08-19 RX ADMIN — Medication 10 MILLIGRAM(S): at 21:26

## 2021-08-19 RX ADMIN — Medication 650 MILLIGRAM(S): at 06:50

## 2021-08-19 RX ADMIN — Medication 650 MILLIGRAM(S): at 01:20

## 2021-08-19 RX ADMIN — TRAMADOL HYDROCHLORIDE 25 MILLIGRAM(S): 50 TABLET ORAL at 02:50

## 2021-08-19 NOTE — CHART NOTE - NSCHARTNOTEFT_GEN_A_CORE
Called by RN for patient requesting to leave AMA. Patient seen with  at bedside. States that she would like to leave because she has been informed that her  is not able to stay with her at bedside overnight. Patient AAOx3, states that she only feels comfortable with allowing her  to help her with her needs.  states they do not have the proper equipment at home for a safe discharge at this time. Discussed case with nursing supervisor who agreed to allow  to stay at bedside overnight due to safety concerns.  informed that he needs to stay in the room and wear his mask at all times

## 2021-08-19 NOTE — DISCHARGE NOTE PROVIDER - NSDCMRMEDTOKEN_GEN_ALL_CORE_FT
losartan 25 mg oral tablet: 1 tab(s) orally once a day  Metoprolol Tartrate 25 mg oral tablet: 1 tab(s) orally once a day  ProAir HFA 90 mcg/inh inhalation aerosol: 2 puff(s) inhaled every 6 hours, As Needed   losartan 25 mg oral tablet: 1 tab(s) orally once a day  LSO Brace: LSO Brace  To Be Worn with Ambulation Only  Dx: Inability to Ambulate  ICD10: R26.2  Metoprolol Tartrate 25 mg oral tablet: 1 tab(s) orally once a day  ProAir HFA 90 mcg/inh inhalation aerosol: 2 puff(s) inhaled every 6 hours, As Needed   losartan 25 mg oral tablet: 1 tab(s) orally once a day  LSO Brace: LSO Brace  To Be Worn with Ambulation Only  Dx: Inability to Ambulate  ICD10: R26.2  Metoprolol Tartrate 25 mg oral tablet: 1 tab(s) orally once a day  ProAir HFA 90 mcg/inh inhalation aerosol: 2 puff(s) inhaled every 6 hours, As Needed  rolling walker with Platform on Left ICD 10: S62.9,  R26.89:

## 2021-08-19 NOTE — DISCHARGE NOTE PROVIDER - CARE PROVIDER_API CALL
Ferdinand Agosto)  Neurology  [address] [place] [address]  Phone: [phone]  Fax: [phone]  Follow Up Time:    Ferdinand Agosto)  Neurology  [address] [place] [address]  Phone: [phone]  Fax: [phone]  Follow Up Time:     Sunday Baker  ORTHOPAEDIC SURGERY  [address] [facility], [address] [place] [address]  Phone: [phone]  Fax: [phone]  Follow Up Time:     Best Briseno)  Orthopaedic Surgery Surgery  [address] [place] [address]  Phone: [phone]  Fax: [phone]  Follow Up Time:     Matt Sharma)  Orthopaedic Surgery  [address] [place] [address]  Phone: [phone]  Fax: [phone]  Follow Up Time:

## 2021-08-19 NOTE — CONSULT NOTE ADULT - SUBJECTIVE AND OBJECTIVE BOX
Pt is a 75 yo F who presents to the [facility] Emergency Department s/p fall at home. Pt reports she went to open the door and fell, possibly slipping on a doormat. Pt reports left wrist pain, pain in the lower back and pelvic pain. Pt denies head strike and/or loss of consciousness.     Pt reports that this is her third fall since . On  patient reports to have fractured her Right clavicle and sustained a RTC, for which she was following up with Dr. Sharma (Orthopaed[facility], [place]), and had a scheduled appointment for today. Dr. Sharma was recommending platform walker. Pt , at bedside during exam, reached out to Dr. Sharma's office and he is aware / wishes that the patient follow up sometime next week.     2 Years prior, patient had a L hip fx s/p short IMN with Dr. Sharma. Pt reports remote history of chronic vertebral compression fractures, at unknown levels (10+ years ago). She has since stopped following up because she is not interested in surgical intervention.       PMH significant for HTN, IBS, UC, COPD/Emphysema (Smoker), Psoriatic arthritis, heart murmur, Hyperparathyroidism, varicose veins.     VITAL SIGNS:  T(C): 36.4 (21 @ 09:03), Max: 37.2 (21 @ 16:54)  HR: 70 (21 @ 09:03) (66 - 93)  BP: 179/90 (21 @ 09:03) (167/85 - 188/98)  RR: 18 (21 @ 09:03) (16 - 18)  SpO2: 91% (21 @ 09:03) (91% - 99%)     LABS:                        12.0   15.26 )-----------( 334      ( 19 Aug 2021 07:25 )             37.5         140  |  107  |  12  ----------------------------<  134<H>  3.8   |  27  |  0.34<L>    Ca    8.5      19 Aug 2021 07:25    TPro  7.6  /  Alb  3.6  /  TBili  0.4  /  DBili  x   /  AST  31  /  ALT  38  /  AlkPhos  111  08-18      Urinalysis Basic - ( 18 Aug 2021 18:35 )    Color: Yellow / Appearance: Clear / S.010 / pH: x  Gluc: x / Ketone: Negative  / Bili: Negative / Urobili: Negative   Blood: x / Protein: Negative / Nitrite: Negative   Leuk Esterase: Trace / RBC: 0-2 /HPF / WBC 3-5   Sq Epi: x / Non Sq Epi: Occasional / Bacteria: Occasional    PE:   GEN: NAD, resting comfortably in bed,  at bedside    LUE:   ACE/Splint in place over L distal radius, applied per ED  No visible deformity  TTP over bones prominences of the left proximal humerus and left wrist.   NTTP over the bony prominences of the clavicle/elbow/hand  Painless active ROM of the shoulder/elbow/hand; unable to examine L wrist 2/2 splint  C5-T1 SILT  Motor grossly intact throughout axillary/musculocutaenous/radial/median/ulnar nerves  + radial pulse    RUE:   Skin intact, no gross deformity  Ecchymosis over distal humerus, volar aspect of distal radius.   Mild edema surrounding elbow   Mild tenderness over dorsal radius, pt states not new, from prior fall  NTTP over the bony prominences of the shoulder/elbow/hand  ROM of the shoulder limited 2/2 5 Clavicle fx () and likely acute rotator cuff tear diagnosed at that time.   Painless ROM elbow/wrist/hand   C5-T1 SILT,   Motor grossly intact throughout axillary/musculocutaenous/radial/median/ulnar nerves   + radial pulse    LLE: No gross deformity, NTTP over the bony prominences of the hip/knee/ankle/foot, painless active ROM of the knee/ankle/foot  Mild lumbar tenderness w/ SLR  L2-S1 SILT  Motor grossly intact throughout hip flexors/quads/hams/TA/EHL/FHL/GSC  + DP pulses,   negative with log roll  neg pain on axial loading  compartments soft and compressible  calves nontender    RLE: No gross deformity,   Mild lumbar tenderness w/ SLR  TTP over Greater trochanter  NTTP over the bony prominences of the knee/ankle/foot  Painless active ROM of the knee/ankle/foot  L2-S1 SILT  motor grossly intact throughout hip flexors/quads/hams/TA/EHL/FHL/GSC,   + DP pulse  Mild pain with log roll in groin and buttocks  Mild pain on axial loading in back  compartments soft and compressible  calves nontender    SECONDARY EXAM: As stated above. Mild Lower lumbar spine tenderness, 2/2 L3 VCF.   SPINE: Skin intact, no bony step-offs appreciated throughout cervical/thoracic/lumbar/sacral spine. Lumbar spine tenderness to palpation. No loss of bowl/bladder function. No saddle anesthesia.    No other obvious orthopedic injuries at this time.     Imagining reviewed  CT Pelv: Nondisplaced fx left inf pub ramus. Sclerosis of left sacral ala, likely from prior insufficiency fx of left sacral ala. L short IMN with appropriate fit, fill and alignment.     CT Lsp: Nondisplaced right anterioinferior fx L3 vertebral body. No retropulsion of fragments into canal. L5-S1 loss of intervertebral disc height, degenerative disease throughout with central/neural forminal stenosis. Uterine fibroid.     CT Head: No acute abnormality    XR L Wrist: Minimally displaced Fx of distal radius with extension in to the radial shaft    GENERAL ORTHOPEDICS CONSULT    Pt is a 75 yo F who presents to the [facility] Emergency Department s/p fall at home. Pt reports she went to open the door and fell, possibly slipping on a doormat. Pt reports left wrist pain, pain in the lower back and pelvic pain. Pt denies head strike and/or loss of consciousness.     Pt reports that this is her third fall since . On  patient reports to have fractured her Right clavicle and sustained a RTC, for which she was following up with Dr. Sharma (Orthopaed[facility], [place]), and had a scheduled appointment for today. Dr. Sharma was recommending platform walker. Pt , at bedside during exam, reached out to Dr. Sharma's office and he is aware / wishes that the patient follow up sometime next week.     2 Years prior, patient had a L hip fx s/p short IMN with Dr. Sharma. Pt reports remote history of chronic vertebral compression fractures, at unknown levels (10+ years ago). She has since stopped following up because she is not interested in surgical intervention.       PMH significant for HTN, IBS, UC, COPD/Emphysema (Smoker), Psoriatic arthritis, heart murmur, Hyperparathyroidism, varicose veins.     VITAL SIGNS:  T(C): 36.4 (21 @ 09:03), Max: 37.2 (21 @ 16:54)  HR: 70 (21 @ 09:03) (66 - 93)  BP: 179/90 (21 @ 09:03) (167/85 - 188/98)  RR: 18 (21 @ 09:03) (16 - 18)  SpO2: 91% (21 @ 09:03) (91% - 99%)     LABS:                        12.0   15.26 )-----------( 334      ( 19 Aug 2021 07:25 )             37.5         140  |  107  |  12  ----------------------------<  134<H>  3.8   |  27  |  0.34<L>    Ca    8.5      19 Aug 2021 07:25    TPro  7.6  /  Alb  3.6  /  TBili  0.4  /  DBili  x   /  AST  31  /  ALT  38  /  AlkPhos  111  08-18      Urinalysis Basic - ( 18 Aug 2021 18:35 )    Color: Yellow / Appearance: Clear / S.010 / pH: x  Gluc: x / Ketone: Negative  / Bili: Negative / Urobili: Negative   Blood: x / Protein: Negative / Nitrite: Negative   Leuk Esterase: Trace / RBC: 0-2 /HPF / WBC 3-5   Sq Epi: x / Non Sq Epi: Occasional / Bacteria: Occasional    PE:   GEN: NAD, resting comfortably in bed,  at bedside    LUE:   ACE/Splint in place over L distal radius, applied per ED  No visible deformity  TTP over bones prominences of the left proximal humerus and left wrist.   NTTP over the bony prominences of the clavicle/elbow/hand  Painless active ROM of the shoulder/elbow/hand; unable to examine L wrist 2/2 splint  C5-T1 SILT  Motor grossly intact throughout axillary/musculocutaenous/radial/median/ulnar nerves  + radial pulse    RUE:   Skin intact, no gross deformity  Ecchymosis over distal humerus, volar aspect of distal radius.   Mild edema surrounding elbow   Mild tenderness over dorsal radius, pt states not new, from prior fall  NTTP over the bony prominences of the shoulder/elbow/hand  ROM of the shoulder limited 2/2 5 Clavicle fx () and likely acute rotator cuff tear diagnosed at that time.   Painless ROM elbow/wrist/hand   C5-T1 SILT,   Motor grossly intact throughout axillary/musculocutaenous/radial/median/ulnar nerves   + radial pulse    LLE: No gross deformity, NTTP over the bony prominences of the hip/knee/ankle/foot, painless active ROM of the knee/ankle/foot  Mild lumbar tenderness w/ SLR  L2-S1 SILT  Motor grossly intact throughout hip flexors/quads/hams/TA/EHL/FHL/GSC  + DP pulses,   negative with log roll  neg pain on axial loading  compartments soft and compressible  calves nontender    RLE: No gross deformity,   Mild lumbar tenderness w/ SLR  TTP over Greater trochanter  NTTP over the bony prominences of the knee/ankle/foot  Painless active ROM of the knee/ankle/foot  L2-S1 SILT  motor grossly intact throughout hip flexors/quads/hams/TA/EHL/FHL/GSC,   + DP pulse  Mild pain with log roll in groin and buttocks  Mild pain on axial loading in back  compartments soft and compressible  calves nontender    SECONDARY EXAM: As stated above. Mild Lower lumbar spine tenderness, 2/2 L3 VCF.   SPINE: Skin intact, no bony step-offs appreciated throughout cervical/thoracic/lumbar/sacral spine. Lumbar spine tenderness to palpation. No loss of bowl/bladder function. No saddle anesthesia.    No other obvious orthopedic injuries at this time.     Imagining reviewed  CT Pelv: Nondisplaced fx left inf pub ramus. Sclerosis of left sacral ala, likely from prior insufficiency fx of left sacral ala. L short IMN with appropriate fit, fill and alignment.     CT Lsp: Nondisplaced right anterioinferior fx L3 vertebral body. No retropulsion of fragments into canal. L5-S1 loss of intervertebral disc height, degenerative disease throughout with central/neural forminal stenosis. Uterine fibroid.     CT Head: No acute abnormality    XR L Wrist: Minimally displaced Fx of distal radius with extension in to the radial shaft

## 2021-08-19 NOTE — PROGRESS NOTE ADULT - SUBJECTIVE AND OBJECTIVE BOX
Patient is a 76y old  Female who presents with a chief complaint of Fall, Left Wrist Fracture (19 Aug 2021 00:34)      INTERVAL HPI/OVERNIGHT EVENTS:    MEDICATIONS  (STANDING):  losartan 25 milliGRAM(s) Oral daily  metoprolol tartrate 25 milliGRAM(s) Oral daily    MEDICATIONS  (PRN):  acetaminophen   Tablet .. 650 milliGRAM(s) Oral every 6 hours PRN Temp greater or equal to 38.5C (101.3F), Mild Pain (1 - 3)  ondansetron Injectable 4 milliGRAM(s) IV Push every 8 hours PRN Nausea and/or Vomiting  traMADol 25 milliGRAM(s) Oral every 8 hours PRN Moderate Pain (4 - 6)  traMADol 50 milliGRAM(s) Oral every 8 hours PRN Severe Pain (7 - 10)      Allergies    caffeine (Blisters)  penicillins (Hives)    Intolerances    lactose (Vomiting)      REVIEW OF SYSTEMS:  CONSTITUTIONAL: No fever or chills  HEENT:  No headache, no sore throat  RESPIRATORY: No cough, wheezing, or shortness of breath  CARDIOVASCULAR: No chest pain, palpitations, or leg swelling  GASTROINTESTINAL: No abd pain, nausea, vomiting, or diarrhea  GENITOURINARY: No dysuria, frequency, or hematuria  NEUROLOGICAL: no focal weakness or dizziness  MUSCULOSKELETAL: no myalgias     Vital Signs Last 24 Hrs  T(C): 36.4 (19 Aug 2021 09:03), Max: 37.2 (18 Aug 2021 16:54)  T(F): 97.6 (19 Aug 2021 09:03), Max: 98.9 (18 Aug 2021 16:54)  HR: 70 (19 Aug 2021 09:03) (66 - 93)  BP: 179/90 (19 Aug 2021 09:03) (167/85 - 188/98)  BP(mean): --  RR: 18 (19 Aug 2021 09:03) (16 - 18)  SpO2: 91% (19 Aug 2021 09:03) (91% - 99%)    PHYSICAL EXAM:  GENERAL: NAD  HEENT:  EOMI, moist mucous membranes  CHEST/LUNG:  CTA b/l, no rales, wheezes, or rhonchi  HEART:  RRR, S1, S2  ABDOMEN:  BS+, soft, nontender, nondistended  EXTREMITIES: no edema, cyanosis, or calf tenderness  NERVOUS SYSTEM: AA&Ox3    LABS:                        12.0   15. )-----------( 334      ( 19 Aug 2021 07:25 )             37.5     CBC Full  -  ( 19 Aug 2021 07:25 )  WBC Count : 15.26 K/uL  Hemoglobin : 12.0 g/dL  Hematocrit : 37.5 %  Platelet Count - Automated : 334 K/uL  Mean Cell Volume : 85.0 fl  Mean Cell Hemoglobin : 27.2 pg  Mean Cell Hemoglobin Concentration : 32.0 gm/dL  Auto Neutrophil # : 12.54 K/uL  Auto Lymphocyte # : 1.67 K/uL  Auto Monocyte # : 0.65 K/uL  Auto Eosinophil # : 0.26 K/uL  Auto Basophil # : 0.03 K/uL  Auto Neutrophil % : 82.2 %  Auto Lymphocyte % : 10.9 %  Auto Monocyte % : 4.3 %  Auto Eosinophil % : 1.7 %  Auto Basophil % : 0.2 %    19 Aug 2021 07:25    140    |  107    |  12     ----------------------------<  134    3.8     |  27     |  0.34     Ca    8.5        19 Aug 2021 07:25    TPro  7.6    /  Alb  3.6    /  TBili  0.4    /  DBili  x      /  AST  31     /  ALT  38     /  AlkPhos  111    18 Aug 2021 17:39      Urinalysis Basic - ( 18 Aug 2021 18:35 )    Color: Yellow / Appearance: Clear / S.010 / pH: x  Gluc: x / Ketone: Negative  / Bili: Negative / Urobili: Negative   Blood: x / Protein: Negative / Nitrite: Negative   Leuk Esterase: Trace / RBC: 0-2 /HPF / WBC 3-5   Sq Epi: x / Non Sq Epi: Occasional / Bacteria: Occasional      CAPILLARY BLOOD GLUCOSE              RADIOLOGY & ADDITIONAL TESTS:    Personally reviewed.     Consultant(s) Notes Reviewed:  [x] YES  [ ] NO    Care Discussed with [x] Consultants  [x] Patient  [ ] Family  [ ]      [ ] Other; RN  DVT ppx   Patient is a 76y old  Female who presents with a chief complaint of Fall, Left Wrist Fracture (19 Aug 2021 00:34)      INTERVAL HPI/OVERNIGHT EVENTS: Pt seen and examined at bedside. Pt states slept well overnight with no acute complaints. States pain controlled.    MEDICATIONS  (STANDING):  losartan 25 milliGRAM(s) Oral daily  metoprolol tartrate 25 milliGRAM(s) Oral daily    MEDICATIONS  (PRN):  acetaminophen   Tablet .. 650 milliGRAM(s) Oral every 6 hours PRN Temp greater or equal to 38.5C (101.3F), Mild Pain (1 - 3)  ondansetron Injectable 4 milliGRAM(s) IV Push every 8 hours PRN Nausea and/or Vomiting  traMADol 25 milliGRAM(s) Oral every 8 hours PRN Moderate Pain (4 - 6)  traMADol 50 milliGRAM(s) Oral every 8 hours PRN Severe Pain (7 - 10)      Allergies    caffeine (Blisters)  penicillins (Hives)    Intolerances    lactose (Vomiting)      REVIEW OF SYSTEMS:  CONSTITUTIONAL: No fever or chills  HEENT:  No headache, no sore throat  RESPIRATORY: No cough, wheezing, or shortness of breath  CARDIOVASCULAR: No chest pain, palpitations, or leg swelling  GASTROINTESTINAL: No abd pain, nausea, vomiting, or diarrhea  GENITOURINARY: No dysuria, frequency, or hematuria  NEUROLOGICAL: no focal weakness or dizziness  MUSCULOSKELETAL: no myalgias     Vital Signs Last 24 Hrs  T(C): 36.4 (19 Aug 2021 09:03), Max: 37.2 (18 Aug 2021 16:54)  T(F): 97.6 (19 Aug 2021 09:03), Max: 98.9 (18 Aug 2021 16:54)  HR: 70 (19 Aug 2021 09:03) (66 - 93)  BP: 179/90 (19 Aug 2021 09:03) (167/85 - 188/98)  BP(mean): --  RR: 18 (19 Aug 2021 09:03) (16 - 18)  SpO2: 91% (19 Aug 2021 09:03) (91% - 99%)    PHYSICAL EXAM:  GENERAL: F in NAD  HEENT:  EOMI, moist mucous membranes  CHEST/LUNG:  CTA b/l, no rales, wheezes, or rhonchi  HEART:  RRR, S1, S2  ABDOMEN:  BS+, soft, nontender, nondistended  EXTREMITIES: LUE splint in place, neurovasc intact  NERVOUS SYSTEM: AA&Ox3    LABS:                        12.0   15.26 )-----------( 334      ( 19 Aug 2021 07:25 )             37.5     CBC Full  -  ( 19 Aug 2021 07:25 )  WBC Count : 15.26 K/uL  Hemoglobin : 12.0 g/dL  Hematocrit : 37.5 %  Platelet Count - Automated : 334 K/uL  Mean Cell Volume : 85.0 fl  Mean Cell Hemoglobin : 27.2 pg  Mean Cell Hemoglobin Concentration : 32.0 gm/dL  Auto Neutrophil # : 12.54 K/uL  Auto Lymphocyte # : 1.67 K/uL  Auto Monocyte # : 0.65 K/uL  Auto Eosinophil # : 0.26 K/uL  Auto Basophil # : 0.03 K/uL  Auto Neutrophil % : 82.2 %  Auto Lymphocyte % : 10.9 %  Auto Monocyte % : 4.3 %  Auto Eosinophil % : 1.7 %  Auto Basophil % : 0.2 %    19 Aug 2021 07:25    140    |  107    |  12     ----------------------------<  134    3.8     |  27     |  0.34     Ca    8.5        19 Aug 2021 07:25    TPro  7.6    /  Alb  3.6    /  TBili  0.4    /  DBili  x      /  AST  31     /  ALT  38     /  AlkPhos  111    18 Aug 2021 17:39      Urinalysis Basic - ( 18 Aug 2021 18:35 )    Color: Yellow / Appearance: Clear / S.010 / pH: x  Gluc: x / Ketone: Negative  / Bili: Negative / Urobili: Negative   Blood: x / Protein: Negative / Nitrite: Negative   Leuk Esterase: Trace / RBC: 0-2 /HPF / WBC 3-5   Sq Epi: x / Non Sq Epi: Occasional / Bacteria: Occasional      CAPILLARY BLOOD GLUCOSE              RADIOLOGY & ADDITIONAL TESTS:    Personally reviewed.     Consultant(s) Notes Reviewed:  [x] YES  [ ] NO    Care Discussed with [x] Consultants  [x] Patient  [ ] Family  [ ]      [ ] Other; RN  DVT ppx

## 2021-08-19 NOTE — CONSULT NOTE ADULT - ASSESSMENT
77 yo F w/ L  fx, ND inf pubic rami fx and L3 VCF s/p MF at home    -Medical management appreciated  -Pain control PRN  -WBAT LLE/RLE  -NWB LUE  -WBAT RUE  -Splint in place LUE, please keep splint c/d/i. Do not remove  -Ice and elevate extremity as needed.   -LSO brace for comfort for L3 VCF, ordered  -DVT ppx per primary team  -PT/OT  -Dispo pending PT  -IS  -FU XR L Proximal Humerus   -FU XR R clavicle for monitoring  -Follow up Dr. Briseno (/Inf Pub Rami) 1 week after discharge  -Plan discussed with Dr. Briseno who is aware and agrees with plan   -For further recommendations regarding L3 VCF please see orthopaedic spine consult note GENERAL ORTHOPEDICS CONSULT    77 yo F w/ L DR parr, ND inf pubic rami fx and L3 VCF s/p MF at home    -Medical management appreciated  -Pain control PRN  -WBAT LLE/RLE  -NWB LUE in Volar Slab - applied by ED  -Splint in place LUE, please keep splint c/d/i. Do not remove  -WBAT RUE  -Ice and elevate extremity as needed.   -LSO brace for comfort for L3 VCF, ordered  -DVT ppx per primary team  -PT/OT  -FU XR L Proximal Humerus   -FU XR R clavicle for monitoring  -Follow up Dr. Briseno (/Inf Pub Rami) 1 week after discharge  -Plan discussed with Dr. Briseno who is aware and agrees with plan   -For further recommendations regarding L3 VCF please see orthopaedic spine consult note GENERAL ORTHOPEDICS CONSULT    77 yo F w/ L DR parr, ND inf pubic rami fx and L3 VCF s/p MF at home    -Medical management appreciated  -Pain control PRN  -WBAT LLE/RLE  -NWB LUE in Volar Slab - applied by ED  -Splint in place LUE, please keep splint c/d/i. Do not remove  -WBAT RUE  -Ice and elevate extremity as needed.   -LSO brace for comfort for L3 VCF, ordered  -DVT ppx per primary team  -PT/OT  -FU XR L Proximal Humerus   -FU XR R clavicle for monitoring  -R distal clavicle fx/RTC (7/16), being followed by Dr. Sharma. Pt OK to work with PT.  -Follow up Dr. Briseno (/Inf Pub Rami) 1 week after discharge  -Plan discussed with Dr. Briseno who is aware and agrees with plan   -For further recommendations regarding L3 VCF please see orthopaedic spine consult note GENERAL ORTHOPEDICS CONSULT    75 yo F w/ L DR parr, ND inf pubic rami fx and L3 VCF s/p MF at home    -Medical management appreciated  -Pain control PRN  -WBAT LLE  -NWB LUE in Volar Slab - applied by ED  -Splint in place LUE, please keep splint c/d/i. Do not remove  -Ice and elevate extremity as needed.   -DVT ppx per primary team  -PT/OT  -FU XR L Shoulder/Proximal Humerus - no acute fracture appreciated on wet read  -FU XR R clavicle for monitoring; well healing in appearance  -R distal clavicle fx/RTC (7/16), being followed by Dr. Sharma. Pt OK to work with PT.  -No acute orthopedic surgical intervention indicated at this time. Orthopedically stable for discharge. Follow up Dr. Briseno (/Inf Pub Rami) 1 week after discharge  -Plan discussed with Dr. Briseno who is aware and agrees with plan   -For further recommendations regarding L3 VCF please see orthopaedic spine consult note

## 2021-08-19 NOTE — DISCHARGE NOTE PROVIDER - PROVIDER TOKENS
PROVIDER:[TOKEN:[[id number]]] PROVIDER:[TOKEN:[[id number]]],PROVIDER:[TOKEN:[[id number]]],PROVIDER:[TOKEN:[[id number]]],PROVIDER:[TOKEN:[[id number]]]

## 2021-08-19 NOTE — PROGRESS NOTE ADULT - ASSESSMENT
Ms Lopez is a 75 yo F presenting from home after a fall resulting in multiple fractures. PMH HTN, IBS, Ulcerative Colitis, COPD, Psoriatic arthritis, hx of lumbar fracture, heart murmur.     Multiple Falls: etiology unclear. She is not having any syncopal episodes or pre-syncope.   -will place on telemetry to eval for arrythmia.   -check orthostatic vital signs  -monitor on pulse oximetry   neurology consult Dr Zuleta    Left Wrist fracture: orthopedics consulted.   -s/p splinting    Pubic Rami Fracture: ortho consulted.   -CT shows Nondisplaced fracture of the left inferior pubic ramus.  -pain control as ordered.     L3 Fracture: CT shows Nondisplaced right anterior inferior corner fracture of the L3 vertebral body. No significant loss of vertebral body height.  -bedrest until evaluated by Ortho  -pain control as ordered.   -pending ortho consult will need PT eval    HTN: continue Losartan and Toprol.     Ulcerative Colitis: no acute exacerbation. Not on maintenance medications.     COPD: no acute exacerbation.   -takes PROAIR PRN.     Psoriatic Arthritis: not on maintenance medications.     DVT ppx: patient declines pharm dvt ppx. She understands she is at increased risk of VTE and the complications of VTE including death. Agreeable to SCD's Ms Lopez is a 75 yo F presenting from home after a fall resulting in multiple fractures. PMH HTN, IBS, Ulcerative Colitis, COPD, Psoriatic arthritis, hx of lumbar fracture, heart murmur.     Multiple Falls: etiology unclear. She is not having any syncopal episodes or pre-syncope.   -c/w telemetry to eval for arrythmia.   -check orthostatic vital signs  -monitor on pulse oximetry   neurology consult Dr Zuleta -- CTH WNL, MRI in AM tomorrow    Left Wrist fracture: orthopedics consulted.   -s/p splinting, sugar tong splint now    Pubic Rami Fracture: ortho consulted.   -CT shows Nondisplaced fracture of the left inferior pubic ramus.  -pain control as ordered.     L3 Fracture: CT shows Nondisplaced right anterior inferior corner fracture of the L3 vertebral body. No significant loss of vertebral body height.  -bedrest until evaluated by Ortho  -pain control as ordered.   -pending ortho consult will need PT eval as well as OT eval    HTN: continue Losartan and Toprol. -- added PO hydralazine PRN for SBP >160    Ulcerative Colitis: no acute exacerbation. Not on maintenance medications.     COPD: no acute exacerbation.   -takes PROAIR PRN.     Psoriatic Arthritis: not on maintenance medications.     DVT ppx: patient declines pharm dvt ppx. She understands she is at increased risk of VTE and the complications of VTE including death. Agreeable to SCD's

## 2021-08-19 NOTE — CONSULT NOTE ADULT - ASSESSMENT
75 yo F w/ L3 VCF, L  fx, and  left inf pubic rami fx s/p MF at home.     -Medical management appreciated  -Pain control PRN  --LSO brace for comfort for L3 VCF, ordered, rep to deliver  -WBAT LLE/RLE  -NWB LUE  -WBAT RUE  -Splint in place LUE, please keep splint c/d/i. Do not remove  -Ice and elevate extremity as needed.   -DVT ppx per primary team  -PT/OT  -5 week old right distal clavicle fx being managed by Dr. Sharma  -Follow up Dr. Baker for L3 VCF 1 week after discharge  -Plan discussed with Dr. Baker who is aware and agrees with plan   -For further recommendations regarding L , and L inf pub ramus fx 77 yo F w/ L3 VCF, L  fx, and  left inf pubic rami fx s/p MF at home.     -Medical management appreciated  - Patient neurologically intact, unable to evaluate L C6 secondary to overlying splint immobilization, BL L2 3/5 secondary to pain. No numbness tingling urinary or bowel incontinence or red flags. No concern for spinal cord injury at this time.   -Pain control PRN  -LSO brace for comfort for L3 VCF, ordered, rep to deliver  -WBAT LLE/RLE  -NWB LUE per general orthopedic note  -WBAT RUE  -DVT ppx per primary team; please consider risk v benefits in light of acute VCF and risk of epidural hematoma formation  -PT/OT  -Follow up Dr. Baker for L3 VCF 1 week after discharge  -Plan discussed with Dr. Baker who is aware and agrees with plan   -For further recommendations regarding L  fx, and L inf pub ramus fx please see general orthopedic consult note 77 yo F w/ L3 VCF, L  fx, and  left inf pubic rami fx s/p MF at home.     -Medical management appreciated  - Patient neurologically intact, unable to evaluate L C6 secondary to overlying splint immobilization, BL L2 3/5 secondary to pain. No numbness tingling urinary or bowel incontinence or red flags. No concern for spinal cord injury at this time.   -Pain control PRN  -LSO brace for comfort for L3 VCF, ordered, rep to deliver - to be worn with ambulation only  -WBAT LLE/RLE  -NWB LUE per general orthopedic note  -WBAT RUE  -DVT ppx per primary team; please consider risk v benefits in light of acute VCF and risk of epidural hematoma formation  -PT/OT  -Follow up Dr. Baker for L3 VCF 1 week after discharge  -Plan discussed with Dr. Baker who is aware and agrees with plan   -For further recommendations regarding L  fx, and L inf pub ramus fx please see general orthopedic consult note 77 yo F w/ L3 VCF, L  fx, and  left inf pubic rami fx s/p MF at home.     -Medical management appreciated  - Patient neurologically intact, unable to evaluate L C6 secondary to overlying splint immobilization, BL L2 3/5 secondary to pain. No numbness tingling urinary or bowel incontinence or red flags. No concern for spinal cord injury at this time.   -Pain control PRN  -LSO brace for comfort for L3 VCF, ordered, rep to deliver - to be worn with ambulation only  -WBAT LLE/RLE  -NWB LUE per general orthopedic note  -WBAT RUE  -DVT ppx per primary team; please consider risk v benefits in light of acute VCF and risk of epidural hematoma formation  -PT/OT  -No acute orthopedic surgical intervention indicated at this time. Orthopedically stable for discharge. Follow up Dr. Baker for L3 VCF 1 week after discharge  -Plan discussed with Dr. Baker who is aware and agrees with plan   -For further recommendations regarding L  fx, and L inf pub ramus fx please see general orthopedic consult note

## 2021-08-19 NOTE — ED ADULT NURSE REASSESSMENT NOTE - NS ED NURSE REASSESS COMMENT FT1
Patient given crackers prior to receiving PO tramadol for pain at her request.  Patient drinking water and tolerating PO intake well;  is still at the bedside. Patient given crackers prior to receiving PO tramadol for pain at her request.  Patient drinking water and tolerating PO intake well;  is still at the bedside.  Patient is laughing with , in NAD.

## 2021-08-19 NOTE — CONSULT NOTE ADULT - SUBJECTIVE AND OBJECTIVE BOX
Spine Orthopaedics Consult Note    Pt is a 75 yo F who presents to the [facility] Emergency Department s/p fall at home. Pt reports she went to open the door and fell, possibly slipping on a doormat. Pt reports left wrist pain, pain in the lower back and pelvic pain. Pt denies head strike and/or loss of consciousness. While in the ED, patient reports increased urinary frequency. Denies loss of bowel or bladder function.     Pt reports that this is her third fall since . On  patient reports to have fractured her Right clavicle and sustained a RTC, for which she was following up with Dr. Sharma (Orthopaedics, [place]), and had a scheduled appointment for today. Dr. Sharma was recommending platform walker. Pt , at bedside during exam, reached out to Dr. Sharma's office and he is aware / wishes that the patient follow up sometime next week.     2 Years prior, patient had a L hip fx s/p short IMN with Dr. Sharma. Pt reports remote history of chronic vertebral compression fractures, at unknown levels (10+ years ago). She has since stopped following up because she is not interested in surgical intervention.     PMH significant for HTN, IBS, UC, COPD/Emphysema (Smoker), Psoriatic arthritis, heart murmur, Hyperparathyroidism, varicose veins.     LABS:                        12.0   15.26 )-----------( 334      ( 19 Aug 2021 07:25 )             37.5     08-19    140  |  107  |  12  ----------------------------<  134<H>  3.8   |  27  |  0.34<L>    Ca    8.5      19 Aug 2021 07:25    TPro  7.6  /  Alb  3.6  /  TBili  0.4  /  DBili  x   /  AST  31  /  ALT  38  /  AlkPhos  111  08-18      Urinalysis Basic - ( 18 Aug 2021 18:35 )    Color: Yellow / Appearance: Clear / S.010 / pH: x  Gluc: x / Ketone: Negative  / Bili: Negative / Urobili: Negative   Blood: x / Protein: Negative / Nitrite: Negative   Leuk Esterase: Trace / RBC: 0-2 /HPF / WBC 3-5   Sq Epi: x / Non Sq Epi: Occasional / Bacteria: Occasional    VITAL SIGNS:  T(C): 36.4 (21 @ 09:03), Max: 37.2 (21 @ 16:54)  HR: 70 (21 @ 09:03) (66 - 93)  BP: 179/90 (21 @ 09:03) (167/85 - 188/98)  RR: 18 (21 @ 09:03) (16 - 18)  SpO2: 91% (21 @ 09:03) (91% - 99%)    PE:   GEN: NAD, resting comfortably in bed,  at bedside      PHYSICAL EXAM  GEN: NAD, AAOx3    SPINE:  Skin intact  TTP over the bony prominences of the lumbar/sacral spine  NTTP over the bony prominences of the cervical/thoracic spine  No bony step-offs  Grossly moving all extremities  + Radial Pulse  + DP Pulses      MOTOR EXAM:                          Elbow Flex (C5)     Wrist Ext (C6)     Elbow Ext (C7)      Finger Flex (C8)    Finger Abduction (T1)  RIGHT                 5/5                         5/5                         5/5                          5/5                              5/5  LEFT                    5/5                         NT                         5/5                          5/5                              5/5                           Hip Flex (L2)      Knee Ext (L3)      Ank Dorsiflex (L4)     Hallux Ext (L5)     Ank PlantarFlex (S1)  RIGHT               3/5                      5/5                          5/5                            5/5                           5/5  LEFT                  3/5                      5/5                          5/5                            5/5                           5/5    ***Exam limited 2/2 Left Wrist splint; and Low back pain with hip flexion    SENSORY EXAM:                        C5      C6      C7      C8       T1          RIGHT          2         2        2         2         2          (0=absent, 1=impaired, 2=normal, NT=not testable)  LEFT             2         2        2         2         2          (0=absent, 1=impaired, 2=normal, NT=not testable)                        L2        L3       L4      L5       S1          RIGHT        2          2         2        2        2           (0=absent, 1=impaired, 2=normal, NT=not testable)  LEFT           2          2         2        2        2           (0=absent, 1=impaired, 2=normal, NT=not testable)    Negative Tong's sign bilaterally  Negative Babinski bilaterally   Negative Myoclonus bilaterally        SECONDARY:  LUE:   ACE/Splint in place over L distal radius, applied per ED  No visible deformity  TTP over bones prominences of the left proximal humerus and left wrist.   NTTP over the bony prominences of the clavicle/elbow/hand  Painless active ROM of the shoulder/elbow/hand; unable to examine L wrist 2/2 splint  C5-T1 SILT  Motor grossly intact throughout axillary/musculocutaenous/radial/median/ulnar nerves  + radial pulse    RUE:   Skin intact, no gross deformity  Ecchymosis over distal humerus, volar aspect of distal radius.   Mild edema surrounding elbow   Mild tenderness over dorsal radius, pt states not new, from prior fall  NTTP over the bony prominences of the shoulder/elbow/hand  ROM of the shoulder limited 2/2 5 Clavicle fx () and likely acute rotator cuff tear diagnosed at that time.   Painless ROM elbow/wrist/hand   C5-T1 SILT,   Motor grossly intact throughout axillary/musculocutaenous/radial/median/ulnar nerves   + radial pulse    LLE: No gross deformity, NTTP over the bony prominences of the hip/knee/ankle/foot, painless active ROM of the knee/ankle/foot  Mild lumbar tenderness w/ SLR  L2-S1 SILT  Motor grossly intact throughout hip flexors/quads/hams/TA/EHL/FHL/GSC  + DP pulses,   negative with log roll  neg pain on axial loading  compartments soft and compressible  calves nontender    RLE: No gross deformity,   Mild lumbar tenderness w/ SLR  TTP over Greater trochanter  NTTP over the bony prominences of the knee/ankle/foot  Painless active ROM of the knee/ankle/foot  L2-S1 SILT  motor grossly intact throughout hip flexors/quads/hams/TA/EHL/FHL/GSC,   + DP pulse  Mild pain with log roll in groin and buttocks  Mild pain on axial loading in back  compartments soft and compressible  calves nontender      Imagining reviewed  CT Pelv: Nondisplaced fx left inf pub ramus. Sclerosis of left sacral ala, likely from prior insufficiency fx of left sacral ala. L short IMN with appropriate fit, fill and alignment.     CT Lsp: Nondisplaced right anterioinferior fx L3 vertebral body. No retropulsion of fragments into canal. L5-S1 loss of intervertebral disc height, degenerative disease throughout with central/neural forminal stenosis. Uterine fibroid.     CT Head: No acute abnormality    XR L Wrist: Minimally displaced Fx of distal radius with extension in to the radial shaft

## 2021-08-19 NOTE — DISCHARGE NOTE PROVIDER - HOSPITAL COURSE
FROM ADMISSION H+P:   HPI:  Ms Lopez is a 75 yo F presenting from home after a fall resulting in multiple fractures. PMH HTN, IBS, Ulcerative Colitis, COPD, Psoriatic arthritis, hx of lumbar fracture, heart murmur.   Patient seen and examined at bedside. She reports that she had a fall earlier today. She denies LOC or any pre-ceeding symptoms. She feels she may have tripped on something. She also had a fall 5 weeks ago landing on her buttocks also she had no LOC or preceeding symptoms. She thinks that maybe her legs give out or she may have slipped on karoline. She denies any pre-ceeding chest pain, light-headedness, dizziness, palpitations dizziness causing her to fall. She does have back pain and L wrist pain right now. Denies bowel/bladder incontinence or retention. No past hx of CAD/MI/TIA/CVA/DM2. She has no other complaints at this time.   Her Left wrist was splinted in the ER>  (18 Aug 2021 20:48)      ---  HOSPITAL COURSE: Pt was admitted with frequent falls, found multiple fx: L wrist fx, pubic rami f/x, L3 VBF s/p fall. CTH and MR Head was negative for acute stroke and neurology evaluated pt. Pt's BP was elevated during her stay, from pain, however pt refused most pain meds offered -- only took tylenol. Pt also preferred taking her own BP meds, was advised against this in hospital setting. Pt evaluated by PT, however pt preferred to leave hospital to go home. Orthostatics performed, were negative. No events noted on telemetry monitoring. Advised pt to follow closely with neuro as outpt. Given TLSO and walker by orthopedics. Pt seen and examined on day of discharge and is clinically optimized to return to   home with recommendations to follow up with PMD and listed specialists within one week. Pt refused pharmacological dvt ppx during hospital stay.    T(C): 36.6 (08-20-21 @ 06:25), Max: 36.8 (08-19-21 @ 12:54)  HR: 87 (08-20-21 @ 06:25) (79 - 87)  BP: 172/91 (08-20-21 @ 06:25) (135/85 - 176/76)  RR: 18 (08-20-21 @ 06:25) (16 - 20)  SpO2: 92% (08-20-21 @ 06:25) (90% - 94%)    Physical Exam:  General: Well developed, well nourished, NAD  HEENT: NC/AT, PERRLA, EOMI B/L, moist mucous membranes   Neck: Supple, nontender, no masses  CV: RRR, +S1/S2, no murmurs, rubs or gallops  Respiratory: CTA B/L, No W/R/R  Abdominal: Soft, NT, ND +BSx4  Extremities: LUE in splint   Neurology: AAOx3, nonfocal      ---  CONSULTANTS:   neuro: Dr. Zuleta  ortho: Dr. Baker  ---  TIME SPENT:  I, the attending physician, was physically present for the key portions of the evaluation and management (E/M) service provided. The total amount of time spent reviewing the hospital notes, laboratory values, imaging findings, assessing/counseling the patient, discussing with consultant physicians, social work, nursing staff was 37 minutes    ---  Primary care provider was made aware of plan for discharge:      [  ] NO     [ x ] YES   FROM ADMISSION H+P:   HPI:  Ms Lopez is a 77 yo F presenting from home after a fall resulting in multiple fractures. PMH HTN, IBS, Ulcerative Colitis, COPD, Psoriatic arthritis, hx of lumbar fracture, heart murmur.   Patient seen and examined at bedside. She reports that she had a fall earlier today. She denies LOC or any pre-ceeding symptoms. She feels she may have tripped on something. She also had a fall 5 weeks ago landing on her buttocks also she had no LOC or preceeding symptoms. She thinks that maybe her legs give out or she may have slipped on karolien. She denies any pre-ceeding chest pain, light-headedness, dizziness, palpitations dizziness causing her to fall. She does have back pain and L wrist pain right now. Denies bowel/bladder incontinence or retention. No past hx of CAD/MI/TIA/CVA/DM2. She has no other complaints at this time.   Her Left wrist was splinted in the ER>  (18 Aug 2021 20:48)      ---  HOSPITAL COURSE: Pt was admitted with frequent falls, found multiple fx: L wrist fx, pubic rami f/x, L3 VBF s/p fall. CTH and MR Head was negative for acute stroke and neurology evaluated pt. Pt's BP was elevated during her stay, from pain, however pt refused most pain meds offered -- only took tylenol. Pt also preferred taking her own BP meds, was advised against this in hospital setting. Pt evaluated by PT, [facility] recommended, however pt preferred to leave hospital to go home. Orthostatics performed, were negative. No events noted on telemetry monitoring. Advised pt to follow closely with neuro as outpt. Given TLSO and walker by orthopedics. Pt seen and examined on day of discharge and is clinically optimized to return to   home with recommendations to follow up with PMD and listed specialists within one week. Pt refused pharmacological dvt ppx during hospital stay.    T(C): 36.6 (08-20-21 @ 06:25), Max: 36.8 (08-19-21 @ 12:54)  HR: 87 (08-20-21 @ 06:25) (79 - 87)  BP: 172/91 (08-20-21 @ 06:25) (135/85 - 176/76)  RR: 18 (08-20-21 @ 06:25) (16 - 20)  SpO2: 92% (08-20-21 @ 06:25) (90% - 94%)    Physical Exam:  General: Well developed, well nourished, NAD  HEENT: NC/AT, PERRLA, EOMI B/L, moist mucous membranes   Neck: Supple, nontender, no masses  CV: RRR, +S1/S2, no murmurs, rubs or gallops  Respiratory: CTA B/L, No W/R/R  Abdominal: Soft, NT, ND +BSx4  Extremities: LUE in splint   Neurology: AAOx3, nonfocal      ---  CONSULTANTS:   neuro: Dr. Zuleta  ortho: Dr. Baker  ---  TIME SPENT:  I, the attending physician, was physically present for the key portions of the evaluation and management (E/M) service provided. The total amount of time spent reviewing the hospital notes, laboratory values, imaging findings, assessing/counseling the patient, discussing with consultant physicians, social work, nursing staff was 37 minutes    ---  Primary care provider was made aware of plan for discharge:      [  ] NO     [ x ] YES

## 2021-08-19 NOTE — CHART NOTE - NSCHARTNOTEFT_GEN_A_CORE
Splint from ED was removed. LUE was hung by fingers from IV pole. No Manipulation was required. Patient was placed in a well padded sugar tong splint for minimally displaced/shortened Left Distal Radius fracture. Pt tolerated splint application. Neurovascularly intact before and after. +AIN, PIN, Ulnar N. Cap refill <3 seconds. Warm, well perfused LUE.     Plain films were ordered to evaluate splint/extremity.

## 2021-08-19 NOTE — ED ADULT NURSE REASSESSMENT NOTE - NS ED NURSE REASSESS COMMENT FT1
Patient safely transferred onto hospital bed for safety and comfort.  Patient in NAD.  Admitting resident now at the bedside.

## 2021-08-19 NOTE — CHART NOTE - NSCHARTNOTEFT_GEN_A_CORE
Order received from ortho resident to fit patient with LSO to be worn at all times OOB for lumbar VCF.  Pt measured and brace adjusted for circumference (36).  Tension straps also adjusted.  Haet ed posterior panel  Follow up if needed  Pt instructed to keely and vivek  Written instructions left with patient      Carlos ceja CO  [phone]

## 2021-08-19 NOTE — CONSULT NOTE ADULT - SUBJECTIVE AND OBJECTIVE BOX
Pt Name: GABRIELA WHELAN    MRN: [id number]    HPI: Patient is a 76y Female who presents to the emergency department with back and left wrist pain. Patient states she had a fall this morning and a fall about 5 weeks ago. She states 5 weeks ago she fell on her right side and fractured her shoulder which is "healing on its own." Patient states today she fell again. She states she doesn't recall exactly how, "i just went down." Denies any HS, LOC, any numbness or tingling to any extremities, bowel or bladder dysfunction.     PAST MEDICAL & SURGICAL HISTORY:  Gastritis    Tachycardia    MVP (mitral valve prolapse)    Ulcerative colitis    Cigarette smoker  current    Psoriatic arthritis    Vertebral fracture, closed  Lumbar x 4    Osteoporosis    Varicose veins    Hyperparathyroidism    Costal chondritis    Prediabetes    Herniated disc, cervical  lumbar    Emphysema lung    COPD (chronic obstructive pulmonary disease)    Termination of pregnancy (fetus)    History of tonsillectomy    S/P parathyroidectomy  2014        Allergies: caffeine (Blisters)  lactose (Vomiting)  penicillins (Hives)                            13.1   13.08 )-----------( 352      ( 18 Aug 2021 17:39 )             41.3     08-18    138  |  105  |  14  ----------------------------<  108<H>  4.1   |  29  |  0.53    Ca    9.3      18 Aug 2021 17:39    TPro  7.6  /  Alb  3.6  /  TBili  0.4  /  DBili  x   /  AST  31  /  ALT  38  /  AlkPhos  111  08-18          PHYSICAL EXAM:    Vital Signs Last 24 Hrs  T(C): 36.3 (18 Aug 2021 23:44), Max: 37.2 (18 Aug 2021 16:54)  T(F): 97.4 (18 Aug 2021 23:44), Max: 98.9 (18 Aug 2021 16:54)  HR: 93 (18 Aug 2021 23:44) (89 - 93)  BP: 170/89 (18 Aug 2021 23:44) (167/85 - 188/98)  BP(mean): --  RR: 16 (18 Aug 2021 23:44) (16 - 18)  SpO2: 92% (18 Aug 2021 23:44) (92% - 99%)    Physical Exam:  General: NAD, Alert, Awake and oriented  Respiratory: Symmetric chest wall expansion bilaterally, no accessory muscle use  RIGHT UE: TTP over shoulder and elbow from previous fall 5 weeks ago. Patient states pain has not worsened from this fall. Ecchymosis noted over forearm. Skin intact. 2+ radial pulse. Full ROM of wrist and hand. Limited ROM of shoulder and elbow due to pain.   Left Upper extremity: Splinted per ED. AIN/PIN/U sensory and motor intact. brisk cap refill, Full ROM of elbow and shoulder.   PELVIS: TTP over right posterio-lateral aspect of pelvis. Able to actively SLR bilaterally. Negative Log Roll. Mild pain on left hip flexion. DP pulse 2+  Spine: point tenderness over L3-L4 region. no bony tenderness otherwise. Patient refused to turn to view skin in the back due to pain. EHL/TA/GS intact  Motor:               L2             L3             L4               L5            S1  R         5/5           5/5          5/5             5/5           5/5  L          5/5          5/5           5/5             5/5           5/5                 L2          L3         L4      L5       S1         (0=absent, 1=impaired, 2=normal, NT=not testable)  R         2            2            2        2        2  L          2            2           2        2         2    Secondary Survey:   RLE: No TTP over bony prominences, SILT, palpable pulses, full/painless range of motion          Imaging:   CT Pelvis: Nondisplaced fracture of the left inferior pubic ramus. Sequela of prior insufficiency fracture of the left sacral ala.  Xray Left Wrist: Distal radius fracture    CT lumbar spine: Nondisplaced right anterior inferior corner fracture of the L3 vertebral body. No significant loss of vertebral body height.        Orthopedic A/P:    Pt is a  76y Female with left distal radius fracture, non displaced pubic rami fracture and L3 VBF    -Appreciate medical management  -Pain control per primary team  -NWB LUE. Keep splint/dressing clean and dry. ICE and elevate. Do not remove dressing/splint until follow up in the office.   -WBAT BLE.   -Lumabr spine: LSO brace for comfort, WBAT    -DVT ppx per primary team  -Follow up with Dr. Briseno (ortho) and Dr. Baker (spine) within 1 week. Please call the office to make an appointment.   -L3 fracture discussed with Dr. Huitron who approves of plan.   -Will discuss plan with Dr. Briesno and ortho team to update if any changes.

## 2021-08-19 NOTE — DISCHARGE NOTE PROVIDER - NSDCCPCAREPLAN_GEN_ALL_CORE_FT
PRINCIPAL DISCHARGE DIAGNOSIS  Diagnosis: Falls, initial encounter  Assessment and Plan of Treatment: you came in with several falls in the last month. No events were noted on telemetry monitoring. you were evaluated by PT but preferred to leave hospital prior to their recommendations. you were evaluated by neurology, and had no signs of stroke on MRI of your head. your orthostatic blood pressures were normal. Be sure to follow up with your neurologist within 1-2 weeks for further assessment of your falls.      SECONDARY DISCHARGE DIAGNOSES  Diagnosis: Wrist fracture  Assessment and Plan of Treatment: placed in a well padded sugar tong splint for minimally displaced/shortened Left Distal Radius fracture. Follow up with orthopedist within 1 week. NO WEIGHT BEARING L UPPER EXTREMITY.    Diagnosis: Compression fracture of lumbar vertebra  Assessment and Plan of Treatment: -LSO brace for comfort for L3 VCF, ordered, rep to deliver - to be worn with ambulation only  -No acute orthopedic surgical intervention indicated at this time. Orthopedically stable for discharge. Follow up Dr. Baker for L3 VCF 1 week after discharge    Diagnosis: Bilateral fracture of pubic rami, closed, initial encounter  Assessment and Plan of Treatment: -No acute orthopedic surgical intervention indicated at this time. Orthopedically stable for discharge. Follow up Dr. Briseno (DR/Inf Pub Rami) 1 week after discharge

## 2021-08-20 ENCOUNTER — TRANSCRIPTION ENCOUNTER (OUTPATIENT)
Age: 76
End: 2021-08-20

## 2021-08-20 VITALS
HEART RATE: 105 BPM | SYSTOLIC BLOOD PRESSURE: 121 MMHG | RESPIRATION RATE: 16 BRPM | DIASTOLIC BLOOD PRESSURE: 71 MMHG | OXYGEN SATURATION: 95 % | TEMPERATURE: 98 F

## 2021-08-20 PROCEDURE — 36415 COLL VENOUS BLD VENIPUNCTURE: CPT

## 2021-08-20 PROCEDURE — 81001 URINALYSIS AUTO W/SCOPE: CPT

## 2021-08-20 PROCEDURE — 72131 CT LUMBAR SPINE W/O DYE: CPT | Mod: MA

## 2021-08-20 PROCEDURE — 76376 3D RENDER W/INTRP POSTPROCES: CPT

## 2021-08-20 PROCEDURE — 97162 PT EVAL MOD COMPLEX 30 MIN: CPT

## 2021-08-20 PROCEDURE — 80053 COMPREHEN METABOLIC PANEL: CPT

## 2021-08-20 PROCEDURE — 82553 CREATINE MB FRACTION: CPT

## 2021-08-20 PROCEDURE — 73030 X-RAY EXAM OF SHOULDER: CPT

## 2021-08-20 PROCEDURE — 73000 X-RAY EXAM OF COLLAR BONE: CPT

## 2021-08-20 PROCEDURE — 70551 MRI BRAIN STEM W/O DYE: CPT | Mod: 26

## 2021-08-20 PROCEDURE — 99285 EMERGENCY DEPT VISIT HI MDM: CPT | Mod: 25

## 2021-08-20 PROCEDURE — 29125 APPL SHORT ARM SPLINT STATIC: CPT

## 2021-08-20 PROCEDURE — 86769 SARS-COV-2 COVID-19 ANTIBODY: CPT

## 2021-08-20 PROCEDURE — 72192 CT PELVIS W/O DYE: CPT | Mod: MA

## 2021-08-20 PROCEDURE — 85025 COMPLETE CBC W/AUTO DIFF WBC: CPT

## 2021-08-20 PROCEDURE — 70544 MR ANGIOGRAPHY HEAD W/O DYE: CPT

## 2021-08-20 PROCEDURE — 99239 HOSP IP/OBS DSCHRG MGMT >30: CPT

## 2021-08-20 PROCEDURE — U0003: CPT

## 2021-08-20 PROCEDURE — 97166 OT EVAL MOD COMPLEX 45 MIN: CPT

## 2021-08-20 PROCEDURE — 70450 CT HEAD/BRAIN W/O DYE: CPT | Mod: MA

## 2021-08-20 PROCEDURE — 72170 X-RAY EXAM OF PELVIS: CPT

## 2021-08-20 PROCEDURE — 70551 MRI BRAIN STEM W/O DYE: CPT

## 2021-08-20 PROCEDURE — 80048 BASIC METABOLIC PNL TOTAL CA: CPT

## 2021-08-20 PROCEDURE — 84484 ASSAY OF TROPONIN QUANT: CPT

## 2021-08-20 PROCEDURE — 93005 ELECTROCARDIOGRAM TRACING: CPT

## 2021-08-20 PROCEDURE — 73110 X-RAY EXAM OF WRIST: CPT

## 2021-08-20 PROCEDURE — 70544 MR ANGIOGRAPHY HEAD W/O DYE: CPT | Mod: 26,59

## 2021-08-20 PROCEDURE — U0005: CPT

## 2021-08-20 PROCEDURE — 71045 X-RAY EXAM CHEST 1 VIEW: CPT

## 2021-08-20 PROCEDURE — 73060 X-RAY EXAM OF HUMERUS: CPT

## 2021-08-20 RX ADMIN — Medication 650 MILLIGRAM(S): at 12:20

## 2021-08-20 RX ADMIN — Medication 650 MILLIGRAM(S): at 11:21

## 2021-08-20 NOTE — OCCUPATIONAL THERAPY INITIAL EVALUATION ADULT - MANUAL MUSCLE TESTING RESULTS, REHAB EVAL
2-/5 right shoulder otherwise at least 3+/5 throughout RUE; LUE not assessed due to NWB/grossly assessed due to

## 2021-08-20 NOTE — OCCUPATIONAL THERAPY INITIAL EVALUATION ADULT - ADDITIONAL COMMENTS
Pt has a h/o frequent falls in the past month. Pt lives with her  in a private home. Bathroom has a stall shower with built-in seat. Pt has a commode.

## 2021-08-20 NOTE — PROGRESS NOTE ADULT - SUBJECTIVE AND OBJECTIVE BOX
Neurology follow up note    GABRIELA [id number]      Interval History:    Patient feels ok no new complaints occ pain in leg seen with spouse     Allergies    caffeine (Blisters)  penicillins (Hives)    Intolerances    lactose (Vomiting)      MEDICATIONS    acetaminophen   Tablet .. 650 milliGRAM(s) Oral every 6 hours PRN  hydrALAZINE 10 milliGRAM(s) Oral every 8 hours PRN  losartan 25 milliGRAM(s) Oral daily  metoprolol tartrate 25 milliGRAM(s) Oral daily  ondansetron Injectable 4 milliGRAM(s) IV Push every 8 hours PRN  traMADol 25 milliGRAM(s) Oral every 8 hours PRN  traMADol 50 milliGRAM(s) Oral every 8 hours PRN              Vital Signs Last 24 Hrs  T(C): 36.6 (20 Aug 2021 06:25), Max: 36.8 (19 Aug 2021 12:54)  T(F): 97.8 (20 Aug 2021 06:25), Max: 98.3 (19 Aug 2021 12:54)  HR: 87 (20 Aug 2021 06:25) (79 - 87)  BP: 172/91 (20 Aug 2021 06:25) (135/85 - 176/76)  BP(mean): --  RR: 18 (20 Aug 2021 06:25) (16 - 20)  SpO2: 92% (20 Aug 2021 06:25) (90% - 94%)    REVIEW OF SYSTEMS:  Constitutional:  No fever, chills, or night sweats.  Head:  No headaches.  Eyes:  No double vision or blurry vision.  Ears:  No ringing in the ears.  Neck:  No neck pain.  Respiratory:  No shortness of breath.  Cardiovascular:  No chest pain.  Abdomen:  No nausea, vomiting, or abdominal pain.  Extremities/Neurological:  No numbness or tingling.  Musculoskeletal:  Complains of pelvic pain and some hip pain but status post fall.  Genitourinary:  No burning upon urination.    PHYSICAL EXAMINATION:  HEENT:  Head:  Normocephalic, atraumatic.  Eyes:  No scleral icterus.  Ears:  Hearing bilaterally intact.  NECK:  Supple.  RESPIRATORY:  Good air entry bilaterally.  CARDIOVASCULAR:  S1 and S2 heard.  ABDOMEN:  Soft and nontender.  EXTREMITIES:  No clubbing or cyanosis was noted.      NEUROLOGIC:  The patient is awake, alert, and oriented x3.  Extraocular movements were intact.  Pupils were equal, round, and reactive bilaterally 3 mm to 2 mm.  Speech was fluent.  Smile was symmetric.  Motor:  Left upper was 5/5.  Right upper, the patient is unable to elevate her arm at the shoulders, states that she fell a few weeks ago with a fracture.  Biceps, triceps, and hand grasp were 5/5.  Right lower extremity was 4/5, left lower extremity hip flexor was 3-/5, distally was 3+/5, subtle asymmetry was noted with right versus left leg.  Also does complain of some pelvic pain upon flexation.  Sensory:  Bilateral upper and lower intact to light touch.              LABS:  CBC Full  -  ( 19 Aug 2021 07:25 )  WBC Count : 15.26 K/uL  RBC Count : 4.41 M/uL  Hemoglobin : 12.0 g/dL  Hematocrit : 37.5 %  Platelet Count - Automated : 334 K/uL  Mean Cell Volume : 85.0 fl  Mean Cell Hemoglobin : 27.2 pg  Mean Cell Hemoglobin Concentration : 32.0 gm/dL  Auto Neutrophil # : 12.54 K/uL  Auto Lymphocyte # : 1.67 K/uL  Auto Monocyte # : 0.65 K/uL  Auto Eosinophil # : 0.26 K/uL  Auto Basophil # : 0.03 K/uL  Auto Neutrophil % : 82.2 %  Auto Lymphocyte % : 10.9 %  Auto Monocyte % : 4.3 %  Auto Eosinophil % : 1.7 %  Auto Basophil % : 0.2 %    Urinalysis Basic - ( 18 Aug 2021 18:35 )    Color: Yellow / Appearance: Clear / S.010 / pH: x  Gluc: x / Ketone: Negative  / Bili: Negative / Urobili: Negative   Blood: x / Protein: Negative / Nitrite: Negative   Leuk Esterase: Trace / RBC: 0-2 /HPF / WBC 3-5   Sq Epi: x / Non Sq Epi: Occasional / Bacteria: Occasional      -    140  |  107  |  12  ----------------------------<  134<H>  3.8   |  27  |  0.34<L>    Ca    8.5      19 Aug 2021 07:25    TPro  7.6  /  Alb  3.6  /  TBili  0.4  /  DBili  x   /  AST  31  /  ALT  38  /  AlkPhos  111  -    Hemoglobin A1C:     LIVER FUNCTIONS - ( 18 Aug 2021 17:39 )  Alb: 3.6 g/dL / Pro: 7.6 g/dL / ALK PHOS: 111 U/L / ALT: 38 U/L / AST: 31 U/L / GGT: x           Vitamin B12         RADIOLOGY      ANALYSIS AND PLAN:  This is a 76-year-old with an episode of frequent falls.  For episode of frequent falls, as per my conversation with the patient, states that she lost her balance, which was causing her to fall.  On examination, the patient does have subtle left-sided weakness, questionable due to  a pelvic fracture, but would recommend to rule out cerebrovascular accident causing her fall.  Her frequent falls could be secondary to balance issues, age-related changes, or any type of peripheral neuropathy, and spinal disc disease.  For history of COPD, monitor respiratory status as needed.  For history of hypertension, monitor systolic blood pressure.  Fall precautions.    Spoke with the patient and the spouse, Jordan, at the bedside, his telephone number is [phone].  Based on MRI, if normal, cleared by Neurology for discharge planning.  The patient will follow up with an outside neurologist which she had seen before in the past.    Greater than 40 minutes of time was spent with the patient, plan of care, reviewing data, speaking to the family and multidisciplinary healthcare team with greater than 50% of that time in counseling and care coordination.

## 2021-08-20 NOTE — PHYSICAL THERAPY INITIAL EVALUATION ADULT - DID THE PATIENT HAVE SURGERY?
Ortho cleared patient shoulder x-rays and Weight through the left platform walker, Dr. Samuel Allen/n/a

## 2021-08-20 NOTE — CHART NOTE - NSCHARTNOTEFT_GEN_A_CORE
Called by RN to report elevated blood pressure reading, 176/76 which became 173/84 s/p 10mg Hydralazine. Patient seen and examined at bedside. Denies chest pain, shortness of breath, abdominal pain, dizziness, headache, or nausea/vomiting. Endorses severe muscle pain from fractures, patient on pain regimen of tylenol, and tramadol, however, patient refuses to take tramadol citing poor tolerance from prior experience.         T(C): 36.7 (08-19-21 @ 21:33), Max: 36.8 (08-19-21 @ 12:54)  HR: 85 (08-19-21 @ 23:41) (66 - 90)  BP: 172/84 (08-19-21 @ 23:55) (135/85 - 179/90)  RR: 20 (08-19-21 @ 21:33) (16 - 20)  SpO2: 90% (08-19-21 @ 21:33) (90% - 94%)  Wt(kg): --    Physical Exam:  Gen: well appearing, NAD  HEENT: NCAT  Cardio: regular rate and rhythm, +s1s2  Pulm: CTA b/l, no wheezes, rales or rhonchi  Skin: warm and dry    Assessment/Plan  Ms Lopez is a 75 yo F presenting from home after a fall resulting in multiple fractures. PMH HTN, IBS, Ulcerative Colitis, COPD, Psoriatic arthritis, hx of lumbar fracture, heart murmur.     1) Elevated blood pressure - most likely 2/2 to pain, however, patient refuses all pain medications except tylenol citing GI sensitivity issue and tolerance issues. Patient endorses wanting to deal with pain her own way, without medications. Patient appears to be in no acute distress at this time, no other interventions indicated at this time.

## 2021-08-20 NOTE — DISCHARGE NOTE NURSING/CASE MANAGEMENT/SOCIAL WORK - NSDCPNINST_GEN_ALL_CORE
Walk with help. More falls call your doctor. Numbness of the arm and other extremity call your doctor. Shortness of breath and chest pain call 911.

## 2021-08-20 NOTE — OCCUPATIONAL THERAPY INITIAL EVALUATION ADULT - ANTICIPATED DISCHARGE DISPOSITION, OT EVAL
Home with HCOT and assist from spouse for ADLs. Rec pt sponge bathe for safety upon initial d/c./home w/ level of assist/home w/ OT

## 2021-08-20 NOTE — DISCHARGE NOTE NURSING/CASE MANAGEMENT/SOCIAL WORK - NSDCPEFALRISK_GEN_ALL_CORE
For information on Fall & injury Prevention, visit [email or web address]

## 2021-08-20 NOTE — DISCHARGE NOTE NURSING/CASE MANAGEMENT/SOCIAL WORK - PATIENT PORTAL LINK FT
You can access the FollowMyHealth Patient Portal offered by [facility] by registering at the following website: [email or web address]. By joining [facility]’s FollowMyHealth portal, you will also be able to view your health information using other applications (apps) compatible with our system.

## 2021-08-20 NOTE — OCCUPATIONAL THERAPY INITIAL EVALUATION ADULT - RANGE OF MOTION EXAMINATION, UPPER EXTREMITY
less than 1/2 range right shoulder (recent clavicle fx), all other joints RUE WFL; left shoulder WFL, left elbow limited due to splint, left wrist NT, able to "wiggle" left digits

## 2021-08-20 NOTE — PHYSICAL THERAPY INITIAL EVALUATION ADULT - PERSONAL SAFETY AND JUDGMENT, REHAB EVAL
attempts to use her left hand to push up, doesn't wear her back brace/impaired/at risk behaviors demonstrated

## 2021-08-20 NOTE — OCCUPATIONAL THERAPY INITIAL EVALUATION ADULT - ADL RETRAINING, OT EVAL
Patient will dress upper body with minimal assistance within 1 week. Patient will dress lower body with moderate assistance, AE as needed within 1 week.

## 2021-08-20 NOTE — PHYSICAL THERAPY INITIAL EVALUATION ADULT - PERTINENT HX OF CURRENT PROBLEM, REHAB EVAL
Pt is a 77 yo female with pmhx of COPD Gastritis Herniated disc, cervical lumbar Hyperparathyroidism MVP Osteoporosis

## 2021-08-20 NOTE — OCCUPATIONAL THERAPY INITIAL EVALUATION ADULT - PERTINENT HX OF CURRENT PROBLEM, REHAB EVAL
77 yo F presenting from home after a fall resulting in multiple fractures (L DR fx, ND inf pubic rami fx and L3 VCF). PMH HTN, IBS, Ulcerative Colitis, COPD, Psoriatic arthritis, hx of lumbar fracture, heart murmur.

## 2021-09-27 NOTE — PATIENT PROFILE ADULT - DEAF OR HARD OF HEARING?
Patient has no complaints at this time, used lab results from 9/21/21, removed 400ml of blood, patient tolerated well. Patient has appt for next time.   no

## 2021-10-19 ENCOUNTER — TRANSCRIPTION ENCOUNTER (OUTPATIENT)
Age: 76
End: 2021-10-19

## 2021-10-20 ENCOUNTER — EMERGENCY (EMERGENCY)
Facility: HOSPITAL | Age: 76
LOS: 1 days | Discharge: LEFT BEFORE DISCHARGE | End: 2021-10-20
Attending: EMERGENCY MEDICINE | Admitting: EMERGENCY MEDICINE
Payer: MEDICARE

## 2021-10-20 VITALS
SYSTOLIC BLOOD PRESSURE: 147 MMHG | RESPIRATION RATE: 18 BRPM | WEIGHT: 97 LBS | TEMPERATURE: 98 F | OXYGEN SATURATION: 99 % | HEIGHT: 61 IN | HEART RATE: 94 BPM | DIASTOLIC BLOOD PRESSURE: 82 MMHG

## 2021-10-20 VITALS
SYSTOLIC BLOOD PRESSURE: 170 MMHG | HEART RATE: 90 BPM | RESPIRATION RATE: 19 BRPM | OXYGEN SATURATION: 98 % | TEMPERATURE: 99 F | DIASTOLIC BLOOD PRESSURE: 90 MMHG

## 2021-10-20 DIAGNOSIS — Z98.89 OTHER SPECIFIED POSTPROCEDURAL STATES: Chronic | ICD-10-CM

## 2021-10-20 DIAGNOSIS — E89.2 POSTPROCEDURAL HYPOPARATHYROIDISM: Chronic | ICD-10-CM

## 2021-10-20 DIAGNOSIS — Z33.2 ENCOUNTER FOR ELECTIVE TERMINATION OF PREGNANCY: Chronic | ICD-10-CM

## 2021-10-20 LAB
ALBUMIN SERPL ELPH-MCNC: 3.5 G/DL — SIGNIFICANT CHANGE UP (ref 3.3–5)
ALP SERPL-CCNC: 103 U/L — SIGNIFICANT CHANGE UP (ref 30–120)
ALT FLD-CCNC: 20 U/L DA — SIGNIFICANT CHANGE UP (ref 10–60)
ANION GAP SERPL CALC-SCNC: 7 MMOL/L — SIGNIFICANT CHANGE UP (ref 5–17)
APTT BLD: 31.3 SEC — SIGNIFICANT CHANGE UP (ref 27.5–35.5)
AST SERPL-CCNC: 26 U/L — SIGNIFICANT CHANGE UP (ref 10–40)
BASOPHILS # BLD AUTO: 0.03 K/UL — SIGNIFICANT CHANGE UP (ref 0–0.2)
BASOPHILS NFR BLD AUTO: 0.2 % — SIGNIFICANT CHANGE UP (ref 0–2)
BILIRUB SERPL-MCNC: 0.4 MG/DL — SIGNIFICANT CHANGE UP (ref 0.2–1.2)
BUN SERPL-MCNC: 16 MG/DL — SIGNIFICANT CHANGE UP (ref 7–23)
CALCIUM SERPL-MCNC: 8.9 MG/DL — SIGNIFICANT CHANGE UP (ref 8.4–10.5)
CHLORIDE SERPL-SCNC: 100 MMOL/L — SIGNIFICANT CHANGE UP (ref 96–108)
CO2 SERPL-SCNC: 29 MMOL/L — SIGNIFICANT CHANGE UP (ref 22–31)
CREAT SERPL-MCNC: 0.63 MG/DL — SIGNIFICANT CHANGE UP (ref 0.5–1.3)
D DIMER BLD IA.RAPID-MCNC: 203 NG/ML DDU — SIGNIFICANT CHANGE UP
EOSINOPHIL # BLD AUTO: 0.06 K/UL — SIGNIFICANT CHANGE UP (ref 0–0.5)
EOSINOPHIL NFR BLD AUTO: 0.4 % — SIGNIFICANT CHANGE UP (ref 0–6)
GLUCOSE SERPL-MCNC: 96 MG/DL — SIGNIFICANT CHANGE UP (ref 70–99)
HCT VFR BLD CALC: 42.7 % — SIGNIFICANT CHANGE UP (ref 34.5–45)
HGB BLD-MCNC: 13.5 G/DL — SIGNIFICANT CHANGE UP (ref 11.5–15.5)
IMM GRANULOCYTES NFR BLD AUTO: 0.4 % — SIGNIFICANT CHANGE UP (ref 0–1.5)
INR BLD: 1.03 RATIO — SIGNIFICANT CHANGE UP (ref 0.88–1.16)
LYMPHOCYTES # BLD AUTO: 22.6 % — SIGNIFICANT CHANGE UP (ref 13–44)
LYMPHOCYTES # BLD AUTO: 3.15 K/UL — SIGNIFICANT CHANGE UP (ref 1–3.3)
MCHC RBC-ENTMCNC: 27.2 PG — SIGNIFICANT CHANGE UP (ref 27–34)
MCHC RBC-ENTMCNC: 31.6 GM/DL — LOW (ref 32–36)
MCV RBC AUTO: 86.1 FL — SIGNIFICANT CHANGE UP (ref 80–100)
MONOCYTES # BLD AUTO: 0.79 K/UL — SIGNIFICANT CHANGE UP (ref 0–0.9)
MONOCYTES NFR BLD AUTO: 5.7 % — SIGNIFICANT CHANGE UP (ref 2–14)
NEUTROPHILS # BLD AUTO: 9.86 K/UL — HIGH (ref 1.8–7.4)
NEUTROPHILS NFR BLD AUTO: 70.7 % — SIGNIFICANT CHANGE UP (ref 43–77)
NRBC # BLD: 0 /100 WBCS — SIGNIFICANT CHANGE UP (ref 0–0)
NT-PROBNP SERPL-SCNC: 1778 PG/ML — HIGH (ref 0–450)
PLATELET # BLD AUTO: 395 K/UL — SIGNIFICANT CHANGE UP (ref 150–400)
POTASSIUM SERPL-MCNC: 3.9 MMOL/L — SIGNIFICANT CHANGE UP (ref 3.5–5.3)
POTASSIUM SERPL-SCNC: 3.9 MMOL/L — SIGNIFICANT CHANGE UP (ref 3.5–5.3)
PROT SERPL-MCNC: 8 G/DL — SIGNIFICANT CHANGE UP (ref 6–8.3)
PROTHROM AB SERPL-ACNC: 12.5 SEC — SIGNIFICANT CHANGE UP (ref 10.6–13.6)
RAPID RVP RESULT: SIGNIFICANT CHANGE UP
RBC # BLD: 4.96 M/UL — SIGNIFICANT CHANGE UP (ref 3.8–5.2)
RBC # FLD: 14.6 % — HIGH (ref 10.3–14.5)
SARS-COV-2 RNA SPEC QL NAA+PROBE: SIGNIFICANT CHANGE UP
SODIUM SERPL-SCNC: 136 MMOL/L — SIGNIFICANT CHANGE UP (ref 135–145)
TROPONIN I SERPL-MCNC: 0 NG/ML — LOW (ref 0.02–0.06)
WBC # BLD: 13.94 K/UL — HIGH (ref 3.8–10.5)
WBC # FLD AUTO: 13.94 K/UL — HIGH (ref 3.8–10.5)

## 2021-10-20 PROCEDURE — 36415 COLL VENOUS BLD VENIPUNCTURE: CPT

## 2021-10-20 PROCEDURE — 80053 COMPREHEN METABOLIC PANEL: CPT

## 2021-10-20 PROCEDURE — 99285 EMERGENCY DEPT VISIT HI MDM: CPT | Mod: CS

## 2021-10-20 PROCEDURE — 71045 X-RAY EXAM CHEST 1 VIEW: CPT | Mod: 26

## 2021-10-20 PROCEDURE — 93306 TTE W/DOPPLER COMPLETE: CPT

## 2021-10-20 PROCEDURE — 85379 FIBRIN DEGRADATION QUANT: CPT

## 2021-10-20 PROCEDURE — 85730 THROMBOPLASTIN TIME PARTIAL: CPT

## 2021-10-20 PROCEDURE — 93005 ELECTROCARDIOGRAM TRACING: CPT

## 2021-10-20 PROCEDURE — 85025 COMPLETE CBC W/AUTO DIFF WBC: CPT

## 2021-10-20 PROCEDURE — 83880 ASSAY OF NATRIURETIC PEPTIDE: CPT

## 2021-10-20 PROCEDURE — 0225U NFCT DS DNA&RNA 21 SARSCOV2: CPT

## 2021-10-20 PROCEDURE — 85610 PROTHROMBIN TIME: CPT

## 2021-10-20 PROCEDURE — 71045 X-RAY EXAM CHEST 1 VIEW: CPT

## 2021-10-20 PROCEDURE — 99285 EMERGENCY DEPT VISIT HI MDM: CPT | Mod: 25

## 2021-10-20 PROCEDURE — 93306 TTE W/DOPPLER COMPLETE: CPT | Mod: 26

## 2021-10-20 PROCEDURE — 93010 ELECTROCARDIOGRAM REPORT: CPT

## 2021-10-20 PROCEDURE — 94640 AIRWAY INHALATION TREATMENT: CPT

## 2021-10-20 PROCEDURE — 84484 ASSAY OF TROPONIN QUANT: CPT

## 2021-10-20 RX ORDER — ALBUTEROL 90 UG/1
1 AEROSOL, METERED ORAL ONCE
Refills: 0 | Status: COMPLETED | OUTPATIENT
Start: 2021-10-20 | End: 2021-10-20

## 2021-10-20 RX ADMIN — ALBUTEROL 1 PUFF(S): 90 AEROSOL, METERED ORAL at 13:00

## 2021-10-20 NOTE — ED PROVIDER NOTE - ATTENDING CONTRIBUTION TO CARE
pt referred by dr white for sob since yest with nonproductive cough. pt relates epigastric discomfort yest, none today. pt also reports near syncope yest. no fevers, chills, ha, n/v, cp, edema, calf pain, travel.  wd, wn, female, nad, s1s2 rrr normal pulses, lungs decreased but equql b/l, abd soft, nt, ext no edema or calf tenderness

## 2021-10-20 NOTE — ED ADULT NURSE NOTE - PUPILS PERRL
Would decrease Lasix back to 40mg daily for 3 days, after that go back to prior dose of just one daily.  If swelling and SOB return, please call.    Stool samples that she had done at [facility] ER I believe were negative I never got final reports.  Has she had to take Imodium daily?      Is she waking due to pain? Urination?  Would avoid Benadryl in older adults.  Doxepin 3mg half hour prior to bedtime #30 no RF   yes

## 2021-10-20 NOTE — ED PROVIDER NOTE - PROGRESS NOTE DETAILS
Cardio advised admission. pt refusing to be admitted. Discussed risk of MI/death. Pt requesting to leave ER. Risks of leaving have been discussed  such as misdiagnosis, worsening illness leading up to and including prolonged or permanent disability or death. Pt refusing further evaluation, treatment, or admission at this time. They appear clinically sober, AxOx3, free from distracting injury, appear to have intact insight, judgement, and reason and in my opinion have the capacity to make this decision. The patient was able to verbally state back in a coherent manner their current medical condition/current diagnosis, the proposes course of treatment, and the risks, benefits, and alternatives of treatment versus leaving against medical advice. They understand that they may return to seek medical attention here at whatever time they want. I highly advised them to return to the ED immediately if they experienced any new or worsening symptoms, reconsider treatment or had any other concerns.

## 2021-10-20 NOTE — ED PROVIDER NOTE - NSFOLLOWUPINSTRUCTIONS_ED_ALL_ED_FT
You are leaving against medical advice (AMA).  This may result in recurrent or worsening symptoms, heart attack, death, severe permanent disability, pain and suffering, harm, injury, and/or even death.  The risks, benefits, and alternatives to treatment as well as the attendant risks of refusing treatment at this time have been discussed.  You have demonstrated comprehension and verbalized understanding of these risks.  If you change your mind, please return to the ER immediately.  Please return to the ER immediately for persistent or recurring symptoms, worsening symptoms, or any other problems or concerns.  Please contact the ER if you have any further questions or concerns.

## 2021-10-20 NOTE — CONSULT NOTE ADULT - SUBJECTIVE AND OBJECTIVE BOX
History of Present Illness: The patient is a 76 year old female with a history of HTN, COPD, UC, OA, MVP who presents with chest pain and shortness of breath. She states over the past two days she has had bilateral and substernal chest heaviness only when exerting herself. There is no chest discomfort at rest. She also notes worsening dyspnea on exertion during this time. There has been wheezing and a cough at times. Yesterday while walking she felt lightheaded as if she was going to pass out. She states she had an echo a year ago and has a "valve" issue.    Past Medical/Surgical History:  HTN, COPD, UC, OA, MVP    Medications:  Home Medications:  losartan 25 mg oral tablet: 1 tab(s) orally once a day (20 Oct 2021 12:34)  Metoprolol Tartrate 25 mg oral tablet: 1 tab(s) orally once a day (20 Oct 2021 12:34)  ProAir HFA 90 mcg/inh inhalation aerosol: 2 puff(s) inhaled every 6 hours, As Needed (20 Oct 2021 12:34)      Family History: Non-contributory family history of premature cardiovascular atherosclerotic disease    Social History: No tobacco, alcohol or drug use    Review of Systems:  General: No fevers, chills, weight loss or gain  Skin: No rashes, color changes  Cardiovascular: No chest pain, orthopnea  Respiratory: No shortness of breath, cough  Gastrointestinal: No nausea, abdominal pain  Genitourinary: No incontinence, pain with urination  Musculoskeletal: No pain, swelling, decreased range of motion  Neurological: No headache, weakness  Psychiatric: No depression, anxiety  Endocrine: No weight loss or gain, increased thirst  All other systems are comprehensively negative.    Physical Exam:  Vitals:        Vital Signs Last 24 Hrs  T(C): 36.8 (20 Oct 2021 12:16), Max: 36.8 (20 Oct 2021 12:16)  T(F): 98.3 (20 Oct 2021 12:16), Max: 98.3 (20 Oct 2021 12:16)  HR: 94 (20 Oct 2021 12:16) (94 - 94)  BP: 147/82 (20 Oct 2021 12:16) (147/82 - 147/82)  BP(mean): --  RR: 18 (20 Oct 2021 12:16) (18 - 18)  SpO2: 99% (20 Oct 2021 12:16) (99% - 99%)  General: NAD  HEENT: MMM  Neck: No JVD, no carotid bruit  Lungs: CTAB  CV: RRR, nl S1/S2, no M/R/G  Abdomen: S/NT/ND, +BS  Extremities: No LE edema, no cyanosis  Neuro: AAOx3, non-focal  Skin: No rash    Labs:                        13.5   13.94 )-----------( 395      ( 20 Oct 2021 12:56 )             42.7     10-20    136  |  100  |  16  ----------------------------<  96  3.9   |  29  |  0.63    Ca    8.9      20 Oct 2021 12:56    TPro  8.0  /  Alb  3.5  /  TBili  0.4  /  DBili  x   /  AST  26  /  ALT  20  /  AlkPhos  103  10-20    CARDIAC MARKERS ( 20 Oct 2021 12:56 )  .000 ng/mL / x     / x     / x     / x          PT/INR - ( 20 Oct 2021 12:56 )   PT: 12.5 sec;   INR: 1.03 ratio         PTT - ( 20 Oct 2021 12:56 )  PTT:31.3 sec    ECG: NSR, significantly prolonged QTc, lateral ST abnormality     History of Present Illness: The patient is a 76 year old female with a history of HTN, COPD, UC, OA, MVP who presents with chest pain and shortness of breath. She states over the past two days she has had bilateral and substernal chest heaviness only when exerting herself. There is no chest discomfort at rest. She also notes worsening dyspnea on exertion during this time. There has been wheezing and a cough at times. Yesterday while walking she felt lightheaded as if she was going to pass out. She states she had an echo a year ago and has a "valve" issue.    Past Medical/Surgical History:  HTN, COPD, UC, OA, MVP    Medications:  Home Medications:  losartan 25 mg oral tablet: 1 tab(s) orally once a day (20 Oct 2021 12:34)  Metoprolol Tartrate 25 mg oral tablet: 1 tab(s) orally once a day (20 Oct 2021 12:34)  ProAir HFA 90 mcg/inh inhalation aerosol: 2 puff(s) inhaled every 6 hours, As Needed (20 Oct 2021 12:34)      Family History: Non-contributory family history of premature cardiovascular atherosclerotic disease    Social History: No tobacco, alcohol or drug use    Review of Systems:  General: No fevers, chills, weight loss or gain  Skin: No rashes, color changes  Cardiovascular: No chest pain, orthopnea  Respiratory: No shortness of breath, cough  Gastrointestinal: No nausea, abdominal pain  Genitourinary: No incontinence, pain with urination  Musculoskeletal: No pain, swelling, decreased range of motion  Neurological: No headache, weakness  Psychiatric: No depression, anxiety  Endocrine: No weight loss or gain, increased thirst  All other systems are comprehensively negative.    Physical Exam:  Vitals:        Vital Signs Last 24 Hrs  T(C): 36.8 (20 Oct 2021 12:16), Max: 36.8 (20 Oct 2021 12:16)  T(F): 98.3 (20 Oct 2021 12:16), Max: 98.3 (20 Oct 2021 12:16)  HR: 94 (20 Oct 2021 12:16) (94 - 94)  BP: 147/82 (20 Oct 2021 12:16) (147/82 - 147/82)  BP(mean): --  RR: 18 (20 Oct 2021 12:16) (18 - 18)  SpO2: 99% (20 Oct 2021 12:16) (99% - 99%)  General: NAD  HEENT: MMM  Neck: No JVD, no carotid bruit  Lungs: CTAB  CV: RRR, nl S1/S2, no M/R/G  Abdomen: S/NT/ND, +BS  Extremities: No LE edema, no cyanosis  Neuro: AAOx3, non-focal  Skin: No rash    Labs:                        13.5   13.94 )-----------( 395      ( 20 Oct 2021 12:56 )             42.7     10-20    136  |  100  |  16  ----------------------------<  96  3.9   |  29  |  0.63    Ca    8.9      20 Oct 2021 12:56    TPro  8.0  /  Alb  3.5  /  TBili  0.4  /  DBili  x   /  AST  26  /  ALT  20  /  AlkPhos  103  10-20    CARDIAC MARKERS ( 20 Oct 2021 12:56 )  .000 ng/mL / x     / x     / x     / x          PT/INR - ( 20 Oct 2021 12:56 )   PT: 12.5 sec;   INR: 1.03 ratio         PTT - ( 20 Oct 2021 12:56 )  PTT:31.3 sec    ECG: NSR, significantly prolonged QTc, lateral and inferior ST abnormality     History of Present Illness: The patient is a 76 year old female with a history of HTN, COPD, UC, OA, MVP who presents with chest pain and shortness of breath. She states over the past two days she has had bilateral and substernal chest heaviness only when exerting herself. There is no chest discomfort at rest. She also notes worsening dyspnea on exertion during this time. There has been wheezing and a cough at times. Yesterday while walking she felt lightheaded as if she was going to pass out. She states she had an echo a year ago and has a "valve" issue.    Past Medical/Surgical History:  HTN, COPD, UC, OA, MVP    Medications:  Home Medications:  losartan 25 mg oral tablet: 1 tab(s) orally once a day (20 Oct 2021 12:34)  Metoprolol Tartrate 25 mg oral tablet: 1 tab(s) orally once a day (20 Oct 2021 12:34)  ProAir HFA 90 mcg/inh inhalation aerosol: 2 puff(s) inhaled every 6 hours, As Needed (20 Oct 2021 12:34)      Family History: Non-contributory family history of premature cardiovascular atherosclerotic disease    Social History: No tobacco, alcohol or drug use    Review of Systems:  General: No fevers, chills, weight loss or gain  Skin: No rashes, color changes  Cardiovascular: No chest pain, orthopnea  Respiratory: No shortness of breath, cough  Gastrointestinal: No nausea, abdominal pain  Genitourinary: No incontinence, pain with urination  Musculoskeletal: No pain, swelling, decreased range of motion  Neurological: No headache, weakness  Psychiatric: No depression, anxiety  Endocrine: No weight loss or gain, increased thirst  All other systems are comprehensively negative.    Physical Exam:  Vitals:        Vital Signs Last 24 Hrs  T(C): 36.8 (20 Oct 2021 12:16), Max: 36.8 (20 Oct 2021 12:16)  T(F): 98.3 (20 Oct 2021 12:16), Max: 98.3 (20 Oct 2021 12:16)  HR: 94 (20 Oct 2021 12:16) (94 - 94)  BP: 147/82 (20 Oct 2021 12:16) (147/82 - 147/82)  BP(mean): --  RR: 18 (20 Oct 2021 12:16) (18 - 18)  SpO2: 99% (20 Oct 2021 12:16) (99% - 99%)  General: NAD  HEENT: MMM  Neck: No JVD, no carotid bruit  Lungs: CTAB  CV: RRR, nl S1/S2, 2/6 systolic murmur  Abdomen: S/NT/ND, +BS  Extremities: No LE edema, no cyanosis  Neuro: AAOx3, non-focal  Skin: No rash    Labs:                        13.5   13.94 )-----------( 395      ( 20 Oct 2021 12:56 )             42.7     10-20    136  |  100  |  16  ----------------------------<  96  3.9   |  29  |  0.63    Ca    8.9      20 Oct 2021 12:56    TPro  8.0  /  Alb  3.5  /  TBili  0.4  /  DBili  x   /  AST  26  /  ALT  20  /  AlkPhos  103  10-20    CARDIAC MARKERS ( 20 Oct 2021 12:56 )  .000 ng/mL / x     / x     / x     / x          PT/INR - ( 20 Oct 2021 12:56 )   PT: 12.5 sec;   INR: 1.03 ratio         PTT - ( 20 Oct 2021 12:56 )  PTT:31.3 sec    ECG: NSR, significantly prolonged QTc, lateral and inferior ST abnormality

## 2021-10-20 NOTE — ED ADULT NURSE NOTE - OBJECTIVE STATEMENT
pt is A&Ox4, pt denies any sob, chest pain, dizziness, headache at this moment, resp even and unlabored, will continue to monitor

## 2021-10-20 NOTE — ED ADULT TRIAGE NOTE - ARRIVAL FROM
Results posted, Allergy Clinic referral ordered.    Home <<----- Click to add NO pertinent Family History

## 2021-10-20 NOTE — CONSULT NOTE ADULT - ASSESSMENT
The patient is a 76 year old female with a history of HTN, COPD, UC, OA, MVP who presents with chest pain and shortness of breath.    Plan:  -  The patient is a 76 year old female with a history of HTN, COPD, UC, OA, MVP who presents with chest pain and shortness of breath.    Plan:  - ECG with above noted ST abnormalities; unchanged from prior  - Given duration of pain, an acute MI is ruled out with one set of cardiac enzymes  - CXR with possible left lung atelectasis; otherwise clear  - BNP mildly elevated at 1778  - Check echo  - Continue losartan 25 mg daily  - Continue metoprolol succinate 25 mg daily  - Start aspirin 81 mg daily  - Start atorvastatin 40 mg daily  - Patient's exertional chest pain and dyspnea symptoms, while they may be COPD related, are concerning for progressive angina. If the patient is agreeable, she should be transferred for cardiac catheterization if above testing does not reveal any significant abnormalities.

## 2021-10-20 NOTE — ED PROVIDER NOTE - CLINICAL SUMMARY MEDICAL DECISION MAKING FREE TEXT BOX
sent by Dr. Howard due to SOB. pt reports she chronically is SOB but worsened yesterday. pt reports she was walking in the supermarket yesterday in which she felt nauseous, SOB, and felt like she was going to pass out. pt reports mild chest congestion, cough, and wheezing. plan includes labs, EKG/troponin r/o CAD, CXR r/o pneumonia, d-dimer r/o PE, re-assess

## 2021-10-20 NOTE — ED PROVIDER NOTE - PATIENT PORTAL LINK FT
You can access the FollowMyHealth Patient Portal offered by [facility] by registering at the following website: [email or web address]. By joining [employer]’s FollowMyHealth portal, you will also be able to view your health information using other applications (apps) compatible with our system.

## 2021-10-20 NOTE — ED PROVIDER NOTE - OBJECTIVE STATEMENT
77 y/o female with PMHx COPD sent by Dr. Howard due to SOB. pt reports she chronically is SOB but worsened yesterday. pt reports she was walking in the supermarket yesterday in which she felt nauseous, SOB, and felt like she was going to pass out. pt reports mild chest congestion, cough, and wheezing. pt denies leg swelling, hemoptysis, blood thinner use, hx of DVT, calf pain, abd pain, vomiting, chest pain, nausea currently, or any other complaints.

## 2021-11-15 ENCOUNTER — EMERGENCY (EMERGENCY)
Facility: HOSPITAL | Age: 76
LOS: 1 days | Discharge: ANOTHER HOSPITAL | End: 2021-11-15
Attending: EMERGENCY MEDICINE | Admitting: EMERGENCY MEDICINE
Payer: MEDICARE

## 2021-11-15 ENCOUNTER — INPATIENT (INPATIENT)
Facility: HOSPITAL | Age: 76
LOS: 2 days | Discharge: ANOTHER HOSPITAL | DRG: 280 | End: 2021-11-18
Attending: STUDENT IN AN ORGANIZED HEALTH CARE EDUCATION/TRAINING PROGRAM | Admitting: INTERNAL MEDICINE
Payer: MEDICARE

## 2021-11-15 VITALS
TEMPERATURE: 98 F | DIASTOLIC BLOOD PRESSURE: 81 MMHG | HEIGHT: 61 IN | OXYGEN SATURATION: 94 % | RESPIRATION RATE: 16 BRPM | HEART RATE: 87 BPM | WEIGHT: 102.07 LBS | SYSTOLIC BLOOD PRESSURE: 156 MMHG

## 2021-11-15 VITALS
HEART RATE: 92 BPM | TEMPERATURE: 98 F | DIASTOLIC BLOOD PRESSURE: 82 MMHG | SYSTOLIC BLOOD PRESSURE: 151 MMHG | RESPIRATION RATE: 16 BRPM | OXYGEN SATURATION: 92 %

## 2021-11-15 VITALS
RESPIRATION RATE: 20 BRPM | HEART RATE: 81 BPM | DIASTOLIC BLOOD PRESSURE: 87 MMHG | TEMPERATURE: 98 F | OXYGEN SATURATION: 99 % | SYSTOLIC BLOOD PRESSURE: 164 MMHG

## 2021-11-15 DIAGNOSIS — J43.9 EMPHYSEMA, UNSPECIFIED: ICD-10-CM

## 2021-11-15 DIAGNOSIS — Z33.2 ENCOUNTER FOR ELECTIVE TERMINATION OF PREGNANCY: Chronic | ICD-10-CM

## 2021-11-15 DIAGNOSIS — E89.2 POSTPROCEDURAL HYPOPARATHYROIDISM: Chronic | ICD-10-CM

## 2021-11-15 DIAGNOSIS — Z29.9 ENCOUNTER FOR PROPHYLACTIC MEASURES, UNSPECIFIED: ICD-10-CM

## 2021-11-15 DIAGNOSIS — I10 ESSENTIAL (PRIMARY) HYPERTENSION: ICD-10-CM

## 2021-11-15 DIAGNOSIS — Z98.89 OTHER SPECIFIED POSTPROCEDURAL STATES: Chronic | ICD-10-CM

## 2021-11-15 DIAGNOSIS — R42 DIZZINESS AND GIDDINESS: ICD-10-CM

## 2021-11-15 LAB
ALBUMIN SERPL ELPH-MCNC: 3.4 G/DL — SIGNIFICANT CHANGE UP (ref 3.3–5)
ALP SERPL-CCNC: 105 U/L — SIGNIFICANT CHANGE UP (ref 30–120)
ALT FLD-CCNC: 21 U/L DA — SIGNIFICANT CHANGE UP (ref 10–60)
ANION GAP SERPL CALC-SCNC: 8 MMOL/L — SIGNIFICANT CHANGE UP (ref 5–17)
APPEARANCE UR: CLEAR — SIGNIFICANT CHANGE UP
APTT BLD: 32.5 SEC — SIGNIFICANT CHANGE UP (ref 27.5–35.5)
AST SERPL-CCNC: 20 U/L — SIGNIFICANT CHANGE UP (ref 10–40)
BASOPHILS # BLD AUTO: 0.02 K/UL — SIGNIFICANT CHANGE UP (ref 0–0.2)
BASOPHILS NFR BLD AUTO: 0.1 % — SIGNIFICANT CHANGE UP (ref 0–2)
BILIRUB SERPL-MCNC: 0.4 MG/DL — SIGNIFICANT CHANGE UP (ref 0.2–1.2)
BILIRUB UR-MCNC: NEGATIVE — SIGNIFICANT CHANGE UP
BUN SERPL-MCNC: 13 MG/DL — SIGNIFICANT CHANGE UP (ref 7–23)
CALCIUM SERPL-MCNC: 8.7 MG/DL — SIGNIFICANT CHANGE UP (ref 8.4–10.5)
CHLORIDE SERPL-SCNC: 103 MMOL/L — SIGNIFICANT CHANGE UP (ref 96–108)
CO2 SERPL-SCNC: 30 MMOL/L — SIGNIFICANT CHANGE UP (ref 22–31)
COLOR SPEC: YELLOW — SIGNIFICANT CHANGE UP
CREAT SERPL-MCNC: 0.62 MG/DL — SIGNIFICANT CHANGE UP (ref 0.5–1.3)
DIFF PNL FLD: ABNORMAL
EOSINOPHIL # BLD AUTO: 0.04 K/UL — SIGNIFICANT CHANGE UP (ref 0–0.5)
EOSINOPHIL NFR BLD AUTO: 0.2 % — SIGNIFICANT CHANGE UP (ref 0–6)
GLUCOSE SERPL-MCNC: 136 MG/DL — HIGH (ref 70–99)
GLUCOSE UR QL: NEGATIVE MG/DL — SIGNIFICANT CHANGE UP
HCT VFR BLD CALC: 39.1 % — SIGNIFICANT CHANGE UP (ref 34.5–45)
HGB BLD-MCNC: 12.2 G/DL — SIGNIFICANT CHANGE UP (ref 11.5–15.5)
IMM GRANULOCYTES NFR BLD AUTO: 0.4 % — SIGNIFICANT CHANGE UP (ref 0–1.5)
INR BLD: 1.13 RATIO — SIGNIFICANT CHANGE UP (ref 0.88–1.16)
KETONES UR-MCNC: NEGATIVE — SIGNIFICANT CHANGE UP
LACTATE SERPL-SCNC: 1 MMOL/L — SIGNIFICANT CHANGE UP (ref 0.7–2)
LEUKOCYTE ESTERASE UR-ACNC: NEGATIVE — SIGNIFICANT CHANGE UP
LYMPHOCYTES # BLD AUTO: 1.76 K/UL — SIGNIFICANT CHANGE UP (ref 1–3.3)
LYMPHOCYTES # BLD AUTO: 9.6 % — LOW (ref 13–44)
MCHC RBC-ENTMCNC: 26.6 PG — LOW (ref 27–34)
MCHC RBC-ENTMCNC: 31.2 GM/DL — LOW (ref 32–36)
MCV RBC AUTO: 85.4 FL — SIGNIFICANT CHANGE UP (ref 80–100)
MONOCYTES # BLD AUTO: 0.91 K/UL — HIGH (ref 0–0.9)
MONOCYTES NFR BLD AUTO: 5 % — SIGNIFICANT CHANGE UP (ref 2–14)
NEUTROPHILS # BLD AUTO: 15.5 K/UL — HIGH (ref 1.8–7.4)
NEUTROPHILS NFR BLD AUTO: 84.7 % — HIGH (ref 43–77)
NITRITE UR-MCNC: NEGATIVE — SIGNIFICANT CHANGE UP
NRBC # BLD: 0 /100 WBCS — SIGNIFICANT CHANGE UP (ref 0–0)
PH UR: 7 — SIGNIFICANT CHANGE UP (ref 5–8)
PLATELET # BLD AUTO: 396 K/UL — SIGNIFICANT CHANGE UP (ref 150–400)
POTASSIUM SERPL-MCNC: 3.8 MMOL/L — SIGNIFICANT CHANGE UP (ref 3.5–5.3)
POTASSIUM SERPL-SCNC: 3.8 MMOL/L — SIGNIFICANT CHANGE UP (ref 3.5–5.3)
PROT SERPL-MCNC: 7.5 G/DL — SIGNIFICANT CHANGE UP (ref 6–8.3)
PROT UR-MCNC: 30 MG/DL
PROTHROM AB SERPL-ACNC: 13.6 SEC — SIGNIFICANT CHANGE UP (ref 10.6–13.6)
RBC # BLD: 4.58 M/UL — SIGNIFICANT CHANGE UP (ref 3.8–5.2)
RBC # FLD: 14.6 % — HIGH (ref 10.3–14.5)
SARS-COV-2 RNA SPEC QL NAA+PROBE: SIGNIFICANT CHANGE UP
SODIUM SERPL-SCNC: 141 MMOL/L — SIGNIFICANT CHANGE UP (ref 135–145)
SP GR SPEC: 1.01 — SIGNIFICANT CHANGE UP (ref 1.01–1.02)
TROPONIN I, HIGH SENSITIVITY RESULT: 2509 NG/L — HIGH
TROPONIN I, HIGH SENSITIVITY RESULT: 375 NG/L — HIGH
UROBILINOGEN FLD QL: NEGATIVE MG/DL — SIGNIFICANT CHANGE UP
WBC # BLD: 18.31 K/UL — HIGH (ref 3.8–10.5)
WBC # FLD AUTO: 18.31 K/UL — HIGH (ref 3.8–10.5)

## 2021-11-15 PROCEDURE — 71045 X-RAY EXAM CHEST 1 VIEW: CPT | Mod: 26

## 2021-11-15 PROCEDURE — 74176 CT ABD & PELVIS W/O CONTRAST: CPT | Mod: 26,MA

## 2021-11-15 PROCEDURE — 71250 CT THORAX DX C-: CPT | Mod: 26,MA

## 2021-11-15 PROCEDURE — 93010 ELECTROCARDIOGRAM REPORT: CPT | Mod: 76

## 2021-11-15 PROCEDURE — 70450 CT HEAD/BRAIN W/O DYE: CPT | Mod: 26,MA

## 2021-11-15 PROCEDURE — 99222 1ST HOSP IP/OBS MODERATE 55: CPT | Mod: GC

## 2021-11-15 PROCEDURE — 99285 EMERGENCY DEPT VISIT HI MDM: CPT

## 2021-11-15 RX ORDER — ASPIRIN/CALCIUM CARB/MAGNESIUM 324 MG
81 TABLET ORAL DAILY
Refills: 0 | Status: DISCONTINUED | OUTPATIENT
Start: 2021-11-15 | End: 2021-11-15

## 2021-11-15 RX ORDER — SODIUM CHLORIDE 9 MG/ML
500 INJECTION INTRAMUSCULAR; INTRAVENOUS; SUBCUTANEOUS ONCE
Refills: 0 | Status: COMPLETED | OUTPATIENT
Start: 2021-11-15 | End: 2021-11-15

## 2021-11-15 RX ORDER — ATORVASTATIN CALCIUM 80 MG/1
40 TABLET, FILM COATED ORAL AT BEDTIME
Refills: 0 | Status: DISCONTINUED | OUTPATIENT
Start: 2021-11-15 | End: 2021-11-18

## 2021-11-15 RX ORDER — SODIUM CHLORIDE 9 MG/ML
1000 INJECTION INTRAMUSCULAR; INTRAVENOUS; SUBCUTANEOUS
Refills: 0 | Status: DISCONTINUED | OUTPATIENT
Start: 2021-11-15 | End: 2021-11-16

## 2021-11-15 RX ORDER — ASPIRIN/CALCIUM CARB/MAGNESIUM 324 MG
324 TABLET ORAL ONCE
Refills: 0 | Status: COMPLETED | OUTPATIENT
Start: 2021-11-15 | End: 2021-11-15

## 2021-11-15 RX ADMIN — Medication 324 MILLIGRAM(S): at 18:30

## 2021-11-15 RX ADMIN — SODIUM CHLORIDE 50 MILLILITER(S): 9 INJECTION INTRAMUSCULAR; INTRAVENOUS; SUBCUTANEOUS at 22:40

## 2021-11-15 RX ADMIN — ATORVASTATIN CALCIUM 40 MILLIGRAM(S): 80 TABLET, FILM COATED ORAL at 22:40

## 2021-11-15 RX ADMIN — SODIUM CHLORIDE 500 MILLILITER(S): 9 INJECTION INTRAMUSCULAR; INTRAVENOUS; SUBCUTANEOUS at 18:35

## 2021-11-15 RX ADMIN — Medication 81 MILLIGRAM(S): at 22:41

## 2021-11-15 RX ADMIN — SODIUM CHLORIDE 500 MILLILITER(S): 9 INJECTION INTRAMUSCULAR; INTRAVENOUS; SUBCUTANEOUS at 17:27

## 2021-11-15 NOTE — ED PROVIDER NOTE - OBJECTIVE STATEMENT
75 y/o F with hx of HTN, COPD, emphysema, pre-DM, hyperparathyroidism, osteoporosis, psoriatic arthritis, MVP, tachycardia, gastritis, varicose veins, vertebral fracture, ulcerative colitis, costal chondritis presents with c/o dizziness, nausea and disorientation since this morning. As per  at bedside, pt last known well was at 8:30am this morning. States that she called her friend and then her  at 11am stating that she was not feeling well c/o nausea, feeling lightheaded and lethargic.  came home and found pt with unsteady gait, lightheaded and nauseous with mild confusion, made her chicken soup, symptoms never improved and then brought to urgent care. States that while at urgent care became 75 y/o F with hx of HTN, COPD, emphysema, pre-DM, hyperparathyroidism, osteoporosis, psoriatic arthritis, MVP, tachycardia, gastritis, varicose veins, vertebral fracture, ulcerative colitis, costal chondritis presents with c/o dizziness, nausea and disorientation since this morning. As per  at bedside, pt last known well was at 8:30am this morning. States that she called her friend and then her  at 11am stating that she was not feeling well c/o nausea, feeling lightheaded and lethargic.  came home and found pt with unsteady gait, lightheaded and nauseous with mild confusion, made her chicken soup, symptoms never improved and then brought to urgent care. States that while at urgent care became more disoriented and was then sent to ED for evaluation. Pt was scheduled for cardiac cath next week. Denies CP, SOB, vomiting, fever, abdominal pain.  pcp: Dr. Llamas  cards: Dr. Brijesh Kebede

## 2021-11-15 NOTE — CONSULT NOTE ADULT - SUBJECTIVE AND OBJECTIVE BOX
History of Present Illness: The patient is a 76 year old female with a history of HTN, COPD, UC, OA, severe AS who presents with confusion. History obtained from . She was feeling dizzy and nauseous earlier. On way to urgent care, she became confused, disoriented, and could not answer questions properly. She was scheduled to undergo a cardiac catheterization next week. She had been recommended this last month here but left AMA.    Past Medical/Surgical History:  HTN, COPD, UC, OA, severe AS    Medications:  Home Medications:  losartan 25 mg oral tablet: 1 tab(s) orally once a day (15 Nov 2021 15:58)  Metoprolol Tartrate 25 mg oral tablet: 1 tab(s) orally once a day (15 Nov 2021 15:58)  ProAir HFA 90 mcg/inh inhalation aerosol: 2 puff(s) inhaled every 6 hours, As Needed (15 Nov 2021 15:58)      Family History: Non-contributory family history of premature cardiovascular atherosclerotic disease    Social History: No tobacco, alcohol or drug use    Review of Systems:  General: No fevers, chills, weight gain  Skin: No rashes, color changes  Cardiovascular: No chest pain, orthopnea  Respiratory: No shortness of breath, cough  Gastrointestinal: No nausea, abdominal pain  Genitourinary: No incontinence, pain with urination  Musculoskeletal: No pain, swelling, decreased range of motion  Neurological: No headache, weakness  Psychiatric: No depression, anxiety  Endocrine: No weight gain, increased thirst  All other systems are comprehensively negative.    Physical Exam:  Vitals:        Vital Signs Last 24 Hrs  T(C): 36.2 (15 Nov 2021 15:50), Max: 36.6 (15 Nov 2021 15:48)  T(F): 97.2 (15 Nov 2021 15:50), Max: 97.9 (15 Nov 2021 15:48)  HR: 92 (15 Nov 2021 15:50) (87 - 92)  BP: 149/66 (15 Nov 2021 15:50) (149/66 - 156/81)  BP(mean): --  RR: 19 (15 Nov 2021 15:50) (16 - 19)  SpO2: 97% (15 Nov 2021 15:50) (94% - 97%)  General: NAD  HEENT: MMM  Neck: No JVD, no carotid bruit  Lungs: CTAB  CV: RRR, nl S1/S2, no M/R/G  Abdomen: S/NT/ND, +BS  Extremities: No LE edema, no cyanosis  Neuro: Confused  Skin: No rash    Labs:                        12.2   18.31 )-----------( 396      ( 15 Nov 2021 16:20 )             39.1     11-15    141  |  103  |  13  ----------------------------<  136<H>  3.8   |  30  |  0.62    Ca    8.7      15 Nov 2021 16:20    TPro  7.5  /  Alb  3.4  /  TBili  0.4  /  DBili  x   /  AST  20  /  ALT  21  /  AlkPhos  105  11-15        PT/INR - ( 15 Nov 2021 16:20 )   PT: 13.6 sec;   INR: 1.13 ratio         PTT - ( 15 Nov 2021 16:20 )  PTT:32.5 sec    ECG: NSR, 1st deg AVB, normal axis, nonspecific ST abnormality

## 2021-11-15 NOTE — ED ADULT NURSE REASSESSMENT NOTE - NS ED NURSE REASSESS COMMENT FT1
RN made several attempts to give report to Tele RN at [facility] with no success. [facility] AND [facility] made aware.

## 2021-11-15 NOTE — H&P ADULT - ASSESSMENT
The patient is a 75 y/o F with pmhx significant for HTN, COPD/emphysema, osteoporosis, psoriatic arthritis, MVP, tachycardia hx, gastritis, vertebral fracture, ulcerative colitis, costal chondritis, pre-DM, who presents with dizziness, nausea, and disorientation that began after 8:30am this morning, around which time she was last known well. Admit for possible NSTEMI and r/o CVA.

## 2021-11-15 NOTE — CONSULT NOTE ADULT - ASSESSMENT
The patient is a 76 year old female with a history of HTN, COPD, UC, OA, severe AS who presents with confusion.    Plan:  - ECG with no evidence of ischemia or infarction  - Troponin elevated at 375 - may be demand ischemia from underlying CVA. Will eventually need further work-up, likely cardiac catheterization as planned, but no urgent indication for this currently. Continue to trend.  - Echo done last month with normal LV systolic function, severe AS  - If CVA confirmed, repeat echo  - CTA head/neck pending  - Start aspirin 81 mg daily  - Start atorvastatin 40 mg daily  - Permissive hypertension  - Monitor on telemetry  - Neurology eval The patient is a 76 year old female with a history of HTN, COPD, UC, OA, severe AS who presents with confusion.    Plan:  - ECG with no evidence of ischemia or infarction  - Troponin elevated at 375 - may be demand ischemia from underlying CVA. Will eventually need further work-up, likely cardiac catheterization as planned, but no urgent indication for this currently. Continue to trend.  - Echo done last month with normal LV systolic function, severe AS  - If CVA confirmed, repeat echo  - CTA head/neck pending  - Start aspirin 81 mg daily  - Start atorvastatin 40 mg daily  - Permissive hypertension  - Monitor on telemetry  - Neurology eval    ADDENDUM:  - Troponin trended up to 2500. Continue to trend. If trends up further, can start a heparin drip, only if ok with neuro. Neurologic work-up will need to be completed prior to consideration of further ischemic work-up.

## 2021-11-15 NOTE — H&P ADULT - PROBLEM SELECTOR PLAN 3
- hx of aortic stenosis  - f/u echo  - cardiology consulted, Dr Puckett - hx of aortic stenosis  - f/u echo  - cardiology consulted, Dr Driscoll

## 2021-11-15 NOTE — H&P ADULT - NSHPREVIEWOFSYSTEMS_GEN_ALL_CORE
Unable REVIEW OF SYSTEMS:  Constitutional: denies fever or chills  HEENT: denies headache, dizziness, or lightheadedness  Respiratory: denies SOB or cough   Cardiovascular: denies CP or palpitations  Gastrointestinal: +nausea, denies vomiting, diarrhea, abdominal pain, or hematochezia  Genitourinary: denies hematuria, dysuria, frequency, urgency  Musculoskeletal: denies muscle aches, joint swelling, or muscle weakness

## 2021-11-15 NOTE — ED PROVIDER NOTE - CONSTITUTIONAL, MLM
awake, alert, oriented to person, place, confused to time/situation/president and in no apparent distress. normal...

## 2021-11-15 NOTE — H&P ADULT - HISTORY OF PRESENT ILLNESS
The patient is a 77 y/o F with pmhx significant for HTN, COPD/emphysema, hyperparathyroidism, osteoporosis, psoriatic arthritis, MVP, tachycardia hx, gastritis, varicose veins, vertebral fracture, ulcerative colitis, costal chondritis, pre-DM, who presents with dizziness, nausea, and disoriented that began after 8:30am this morning, around which time she was last known well. The patient called her friend and then  around 11am stating she was feeling lightheaded, lethargi and nausea at this time. When the  arrived home, patient was unsteady with gait, lightheaded and mildly confused. The patient's symptoms failed to improve, pt taken to , became more disoriented. Sent to ED for eval. Of note, patient was scheduled for a cardiac catheterization next week.  ___. The patient denies chest pain, SOB, palpitations, fevers, chills, nausea, vomiting.    IN THE ED:  Temp   F , HR  , BP  /  ,RR  , SpO2  S/P  EKG:  Labs significant for  Imaging:   The patient is a 75 y/o F with pmhx significant for HTN, COPD/emphysema, osteoporosis, psoriatic arthritis, MVP, tachycardia hx, gastritis, vertebral fracture, ulcerative colitis, costal chondritis, pre-DM, who presents with dizziness, nausea, and disorientation that began after 8:30am this morning, around which time she was last known well. The patient is confused on exam today. Hx taken from son and  over the phone largely. Per family, patient was not herself today but was at baseline yesterday A&Ox3. The patient was unable to remember family names and the year, president earlier today per family. Per family, the patient called her friend and then  around 11am stating she was feeling lightheaded, lethargic and nausea at this time. When the  arrived home, patient was unsteady with gait, lightheaded and mildly confused. The patient's symptoms failed to improve, pt taken to UC, became more disoriented. Sent to ED for eval. Of note, patient was scheduled for a cardiac catheterization next week. Per family, TTE was done last week outpatient and were told further workup required.    Currently endorsing nausea, pt denies chest pain, vomiting, palpitations, SOB, fevers, chills, dysuria.     IN THE ED:  Temp 97.7F, HR 81, /82, RR 16, SpO2 92%  EKG: no evidence of acute ischemia or infarction  Labs significant for WBC 18.31, troponin 375-> 2509  UA: protein 30, trace blood  Imaging:  - CT c/a/p: no acute findings  - CT head: no acute findings  - CXR: no acute findings

## 2021-11-15 NOTE — ED ADULT NURSE NOTE - NS ED NOTE ABUSE RESPONSE YN
Refill request received from pt pharmacy. They are requesting a refill of methylphenidate (Ritalin LA) 10 mg 24 hr capsule and methylphenidate (Methylin) 5 mg chewable tablets.  This pt was last seen in clinic on 9/3/2020 and does not have follow up scheduled at this time..  Pended orders to Dr. Pope on September 22, 2020 to approve or deny the request.    Cordelia Albrecht CMA      
Yes

## 2021-11-15 NOTE — H&P ADULT - PROBLEM SELECTOR PLAN 4
- hx of MVP  - f/u echo  - cardiology consulted, Dr Puckett - hx of MVP  - f/u echo  - cardiology consulted, Dr Driscoll

## 2021-11-15 NOTE — ED PROVIDER NOTE - ATTENDING CONTRIBUTION TO CARE
Shelly HEBERT for ED attending, Dr. Perdomo: 75 y/o F w/ PMHx of COPD, emphysema, pre-DM, hyperparathyroidism, osteoporosis, psoriatic arthritis, MVP, tachycardia, gastritis, varicose veins, vertebral fracture, ulcerative colitis, costal chondritis presents to ED c/o new onset nausea, lightheadedness and confusion since this morning. Pt was last admitted to the hospital from 8/19-8/20 s/p multiple mechanical falls. Pt was found to have a left wrist fracture, pubic rami fracture and L3VBF. MRI of the brain relieved no evidence of acute CVA. Pt was offered rehab declined and was discharged home with TLSO brace. She was then seen again in the ED on 10/20 with worsening SOB and nausea. Pt was elv at that time by cardiology and there was concern for possible unstable angina. Pt was advised admission for possible cardiac cath but refused. ECHO at that time relieved mild LVH, severe AS and hyperdynamic left ventricular function. Pt refused admission and left AMA. Per  reports pt was scheduled to have heart cath next week for further work up. Pt is unable to provide history at this time secondary to AMS. Pt's  reports she was in normal state of health at 8:30 this morning when he left for work. As per  pt called a friend and then called him at 11am c/o nausea, lightheadedness, unsteady gait. He came home and found the pt unsteady while ambulating. He made her chicken soup but her symptoms and put her in bed but found her symptoms were not improving. Pt was then taken to urgent care where he noticed pt to have increased confusion.  Denies headache, blurred vision, chest pain, SOB, abd pain. On exam pt lying in bed NAD appears to be slightly withdrawn with flat affect. NCAT, PERRL, EOMI, heart RR, lungs CTA, abd soft NT/ND. NIH stroke scale 0. Pt presenting with AMS, ataxia and concern for CVA. Outside of window as last known well was 8:30 am. Will obtain screening labs, CT head, and will monitor.

## 2021-11-15 NOTE — H&P ADULT - NSHPSOCIALHISTORY_GEN_ALL_CORE
Lives:  ADLs:  Diet:  Tobacco:  Alcohol:  Vaccination: Lives: with   ADLs: independent except poor ambulation today  Diet: regular, recently became gluten free  Tobacco: denies  Alcohol: denies  Vaccination: fully+booster COVID

## 2021-11-15 NOTE — ED PROVIDER NOTE - NEUROLOGICAL LEVEL OF CONSCIOUSNESS
Problem: Infection  Goal: Will remain free from infection  Outcome: PROGRESSING AS EXPECTED  Pt remains afebrile. No s/s of infection noted. Cefepime IV as ordered.    Problem: Respiratory:  Goal: Respiratory status will improve  Outcome: PROGRESSING AS EXPECTED  Pt remains on 0.5 L O2 via NC, tolerating well. No s/s of distress noted.         alert/follows commands/CONFUSED

## 2021-11-15 NOTE — H&P ADULT - PROBLEM SELECTOR PLAN 1
- per family, pt was at baseline prior to this AM, A&Ox3, likely 2/2 NSTEMI versus CVA/TIA  - +WBC 18.31 ?reactive leukocytosis, afebrile, no signs/sx infection  - ECG with no evidence of ischemia or infarction  - troponin 375 on admission-> 2509. f/u repeat troponin, trend to peak  - UA: protein 30, trace blood, negative otherwise  - CT head, c/a/p both negative for acute process  - CXR: no acute process   - dysphagia screen, formal S&S in AM  - f/u echo, MRI, CTA head/neck  - PT, OT consulted  - neurology consulted, Dr Zuleta, f/u rec  - cardiology consulted, Dr Puckett - per family, pt was at baseline prior to this AM, A&Ox3, likely 2/2 NSTEMI versus CVA/TIA  - +WBC 18.31 ?reactive leukocytosis, afebrile, no signs/sx infection  - ECG with no evidence of ischemia or infarction  - troponin 375 on admission-> 2509. f/u repeat troponin, trend to peak  - UA: protein 30, trace blood, negative otherwise  - CT head, c/a/p both negative for acute process  - CXR: no acute process   - passed dysphagia screen, will start DASH diet with formal S&S in AM  - f/u echo, MRI head  - PT, OT consulted  - neurology consulted, Dr Zuleta, f/u rec  - cardiology consulted, Dr Puckett - per family, pt was at baseline prior to this AM, A&Ox3, likely 2/2 NSTEMI versus CVA/TIA  - +WBC 18.31 ?reactive leukocytosis, afebrile, no signs/sx infection  - ECG with no evidence of ischemia or infarction  - troponin 375 on admission-> 2509. f/u repeat troponin, trend to peak  - UA: protein 30, trace blood, negative otherwise  - CT head, c/a/p both negative for acute process  - CXR: no acute process   - passed dysphagia screen, will start DASH diet with formal S&S in AM  - f/u echo, MRI head  - PT, OT consulted  - neurology consulted, Dr Zuleta, f/u rec  - cardiology consulted, Dr Driscoll - per family, pt was at baseline prior to this AM, A&Ox3, metabolic encephalopathy.  suspect CVA vs acute infection  - +WBC 18.31 ?reactive leukocytosis, afebrile, no signs/sx infection  - UA: protein 30, trace blood, negative otherwise  - CT head, c/a/p both negative for acute process  - CXR: no acute process   - passed dysphagia screen, will start DASH diet with formal S&S in AM  - f/u echo, MRI head  - PT, OT consulted  - neurology consulted, Dr Zuleta, f/u rec  - cardiology consulted, Dr Driscoll

## 2021-11-15 NOTE — ED PROVIDER NOTE - PROGRESS NOTE DETAILS
Shelly HEBERT for ED attending, Dr. Perdomo: 75 y/o F w/ PMHx of COPD, emphysema, pre-DM, hyperparathyroidism, osteoporosis, psoriatic arthritis, MVP, tachycardia, gastritis, varicose veins, vertebral fracture, ulcerative colitis, costal chondritis presents to ED c/o new onset nausea, lightheadedness and confusion since this morning. Pt's  reports she was in normal state of health at 8:30 this morning. As per  pt called a friend and then called him at 11am c/o nausea, lightheadedness, unsteady gait, he made her chicken soup but her symptoms were not improving. Pt was then taken to urgent care where he noticed pt to have increased confusion.  reports pt is scheduled for a heart cath next week. Denies headache, blurred vision, chest pain, SOB, abd pain. pt and  both now reporting that pt has unknown severe reaction to IV contrast. Unable to confirm reaction CTs changed. Spoke with hospitalist, Dr. Castellon, advised to transfer pt to [facility], tele bed available. accepting, Dr. Jose Sanchez, hospitalist. Pt was seen by cardiologist, Dr. Driscoll in ED. Aware of elevated repeat trop, advised to give aspirin (which was given at 1814).

## 2021-11-15 NOTE — ED PROVIDER NOTE - CLINICAL SUMMARY MEDICAL DECISION MAKING FREE TEXT BOX
77 y/o F with hx of HTN, COPD, emphysema, pre-DM, hyperparathyroidism, osteoporosis, psoriatic arthritis, MVP, tachycardia, gastritis, varicose veins, vertebral fracture, ulcerative colitis, costal chondritis presents with c/o dizziness, nausea and disorientation since this morning. As per  at bedside, pt last known well was at 8:30am this morning. States that she called her friend and then her  at 11am stating that she was not feeling well c/o nausea, feeling lightheaded and lethargic.  came home and found pt with unsteady gait, lightheaded and nauseous with mild confusion, made her chicken soup, symptoms never improved and then brought to urgent care. States that while at urgent care became more disoriented and was then sent to ED for evaluation. Pt was scheduled for cardiac cath next week. Denies CP, SOB, vomiting, fever, abdominal pain. PE: as above A/P: CVA, NIHSS : 2, pt outside window for TPA; will get ct head, cta, labs, ekg, UA, reassess

## 2021-11-15 NOTE — H&P ADULT - NSHPPHYSICALEXAM_GEN_ALL_CORE
T(C): 36.6 (11-15-21 @ 20:09), Max: 37.1 (11-15-21 @ 17:50)  HR: 81 (11-15-21 @ 20:09) (80 - 92)  BP: 164/87 (11-15-21 @ 20:09) (149/66 - 166/71)  RR: 20 (11-15-21 @ 20:09) (16 - 20)  SpO2: 99% (11-15-21 @ 20:09) (94% - 99%)    PHYSICAL EXAM:  Gen: well appearing, in no acute distress  HEENT: NCAT, mmm  Cardio: regular rate and rhythm, +s1s2, no murmurs, rubs, or gallops  Pulm: CTA b/l, no wheezes, rales or rhonchi  Abdomen: normoactive bowel sounds, soft, nontender, nondistended  Extremities: no edema   Neuro: AAOx3, good movement of all four extremities  Skin: warm and dry, no obvious rashes or lesions T(C): 36.6 (11-15-21 @ 20:09), Max: 37.1 (11-15-21 @ 17:50)  HR: 81 (11-15-21 @ 20:09) (80 - 92)  BP: 164/87 (11-15-21 @ 20:09) (149/66 - 166/71)  RR: 20 (11-15-21 @ 20:09) (16 - 20)  SpO2: 99% (11-15-21 @ 20:09) (94% - 99%)    PHYSICAL EXAM:  Gen: appears comfortable, in NAD, mildly confused when conversing  HEENT: NCAT, mmm  Cardio: +aortic stenosis murmur, regular rate and rhythm. +S1, S2  Pulm: CTA b/l, no wheezes, rales or rhonchi  Abdomen: normoactive bowel sounds, soft, nontender, nondistended  Extremities: no edema   Neuro: AAOx2 to person and place. CN II-XII grossly intact. Sensation grossly intact throughout. Muscle strength 5/5 throughout UE and LE b/l.  Skin: warm and dry, no obvious rashes or lesions

## 2021-11-15 NOTE — ED ADULT NURSE NOTE - NSIMPLEMENTINTERV_GEN_ALL_ED
Implemented All Fall with Harm Risk Interventions:  [place] to call system. Call bell, personal items and telephone within reach. Instruct patient to call for assistance. Room bathroom lighting operational. Non-slip footwear when patient is off stretcher. Physically safe environment: no spills, clutter or unnecessary equipment. Stretcher in lowest position, wheels locked, appropriate side rails in place. Provide visual cue, wrist band, yellow gown, etc. Monitor gait and stability. Monitor for mental status changes and reorient to person, place, and time. Review medications for side effects contributing to fall risk. Reinforce activity limits and safety measures with patient and family. Provide visual clues: red socks.

## 2021-11-15 NOTE — ED ADULT TRIAGE NOTE - CHIEF COMPLAINT QUOTE
according to - 11am today pt c/o nausea & lightheadedness. At 1pm she seemed disoriented to him. Went to Urgent Care who sent her here.

## 2021-11-15 NOTE — ED ADULT NURSE NOTE - CAS TRG GENERAL AIRWAY, MLM
S/w pt c/o intermittent sore throat that started last HS, pt also c/o nasal congestion and nonprod cough that also started this AM, pt denies taking any OTC tx at this time since last wk. Pt denies:  Denies SOB/CP  Denies FEVER  Denies N/V/D  Denies recent exp to person w/ dx COVID-19 or Influenza  Advised pt would send message to Dr. Montaño, and let pt know when we receive 's response, pt voiced understanding.     Patent

## 2021-11-15 NOTE — H&P ADULT - PROBLEM SELECTOR PLAN 7
- HOLD pharmacological ppx in setting of r/o CVA  - continue SCDs - HOLD pharmacological ppx in setting of r/o CVA  - continue SCDs      Merlin (son) [phone]

## 2021-11-15 NOTE — H&P ADULT - PROBLEM SELECTOR PLAN 2
- admit to telemetry, likely 2/2 NSTEMI vs CVA/TIA  - ECG with no evidence of ischemia or infarction  - troponin 375 -> 2509. f/u repeat troponin, trend to peak. If uptrending and if ok with neuro, can start heparin drip per cardio.  - continue aspirin 81mg, atorvastatin 40mg qd per cardio recs  - f/u repeat echo, CTA head/neck  - neurology consulted, Dr Zuleta, f/u recs  - cardiology consulted, Dr Puckett - admit to telemetry, likely 2/2 NSTEMI vs CVA/TIA  - ECG with no evidence of ischemia or infarction  - troponin 375 -> 2509. f/u repeat troponin, trend to peak. If uptrending and if ok with neuro, can start heparin drip per cardio.  - continue aspirin 81mg, atorvastatin 40mg qd per cardio recs  - f/u repeat echo   - neurology consulted, Dr Zuleta, f/u recs  - cardiology consulted, Dr Puckett - admit to telemetry, likely 2/2 NSTEMI vs CVA/TIA  - ECG with no evidence of ischemia or infarction  - troponin 375 -> 2509. f/u repeat troponin, trend to peak. If uptrending and if ok with neuro, can start heparin drip per cardio.  - continue aspirin 81mg, atorvastatin 40mg qd per cardio recs  - f/u repeat echo  - neurology consulted, Dr Zuleta, f/u recs  - cardiology consulted, Dr Puckett - admit to telemetry, likely 2/2 NSTEMI vs CVA/TIA  - ECG with no evidence of ischemia or infarction  - troponin 375 -> 2509. f/u repeat troponin, trend to peak. If uptrending and if ok with neuro, can start heparin drip per cardio.  - continue aspirin 81mg, atorvastatin 40mg qd per cardio recs  - f/u repeat echo  - neurology consulted, Dr Zlueta, f/u recs  - cardiology consulted, Dr Driscoll - admit to telemetry  - ECG with no evidence of ischemia or infarction  - troponin 375 -> 2509. f/u repeat troponin, trend to peak. If uptrending and if ok with neuro, can start heparin drip per cardio.  - continue aspirin 81mg, atorvastatin 40mg qd per cardio recs  - f/u repeat echo  - neurology consulted, Dr Zuleta, f/u recs  - cardiology consulted, Dr Driscoll

## 2021-11-15 NOTE — ED ADULT NURSE NOTE - OBJECTIVE STATEMENT
patient is from home, AMS since 1pm and last known well was 830am as per pt's , IV accessed and tolerating. ekg done, denies any pain at this time,  at bedside, Labs drawn & sent results pending. will continue to monitor.

## 2021-11-15 NOTE — PATIENT PROFILE ADULT - NSPROPTRIGHTREPPHONE_GEN_A_NUR
Therapy Center at [address], [address] [facility],[address] [place], [address]  Phone: [phone]   Fax: [phone]    Outpatient PHYSICAL THERAPY: Daily Note  Fall Risk Score: 0 (? 5 = High Risk)  ICD-9: Treatment Diagnosis: Other specified disorders of joint - other disorders of cervical region, cervicalgia  ICD-10: Treatment Diagnosis: Cervicalgia (M54.2)     REFERRING PHYSICIAN: Jimena Alicea MD MD Orders: Evaluate and Treat  Return Physician Appointment: TBD  MEDICAL/REFERRING DIAGNOSIS:  Cervicalgia [M54.2]  Other physical therapy [[id number]]  DATE OF ONSET: Chronic   PRIOR LEVEL OF FUNCTION: Independentd  PRECAUTIONS/ALLERGIES:   Allergies   Allergen Reactions    Alendronate Sodium Rash    Antihistamine [Triprolidine-Pseudoephedrine] Anxiety    Boniva [Ibandronate] Diarrhea     ASSESSMENT:  ?????? ? ? This section established at most recent assessment?????????? Patient presents with improving range of motion, decreased strength, and pain in right shoulder and neck secondary to chronic pain. Patient has attended a total of 93 scheduled physical therapy visits including initial evaluation on 1/7/2014. Treatment has consisted of manual therapy and streching to improve pain and performance with activities of daily living. PROBLEM LIST (Impairments causing functional limitations):  1. Decreased independence with ADLs requiring any extensive UE activity (lifting/reaching/vacuuming). 2.  Decreased tolerance of prolonged sitting/standing activities (>30 minutes) due to exacerbation of neck pain. 3.  Unable to participate in social/recreational activities due to fear avoidance of onset of neck/shoulder pain. 4.  Outcome measure score of 10/50 on Neck Disability Index with minimal effect of pain on patient's ability to manage every day life activities.   GOALS: (Goals have been discussed and agreed upon with patient.)  SHORT-TERM FUNCTIONAL GOALS: Time Frame: 3 weeks  1. Patient will be independent with home exercise program without exacerbation of symptoms or cueing needed--goal met. 2. Patient will be independent with correct sleeping positions and awareness/avoidance of aggravating positions without cueing needed--goal met. DISCHARGE GOALS: Time Frame: 8 weeks  1. Patient will be independent with all ADLs with minimal onset of neck/shoulder pain and no deficits with daily tasks--goal ongoing. 2. Patient will report no fear avoidance with social or recreational activities due to neck/shoulder pain--goal ongoing. 3. Patient will score less than or equal to 5/50 on Neck Disability Index with minimal effect of pain on patient's ability to manage every day life activities--goal ongoing. REHABILITATION POTENTIAL FOR STATED GOALS: Ananth Chavez OF CARE:  INTERVENTIONS PLANNED: (Benefits and precautions of physical therapy have been discussed with the patient.)  1. Patient education including pathophysiology of neck/shoulder symptoms, education/training (sleeping positions, posture re-education and body mechanics), and instructions of home exercise program.  2. Therapeutic exercises including mobility through neck/shoulder region and postural stabilization. 3. Manual therapy including soft tissue mobilizations, functional joint mobilizations and neuromuscular re-education to neck and shoulder region. 4. Therapeutic modalities including moist heat, cold pack, electrical stimulation, and/or ultrasound as needed for relief of symptoms with treatment. TREATMENT PLAN EFFECTIVE DATES: 1/7/2018 TO 4/7/2018  FREQUENCY/DURATION: Follow patient 1 time every 2 weeks for 12 weeks to address above goals. SUBJECTIVE:  History of Present Injury/Illness (Reason for Referral): Patient is well known to therapist.  Patient returned to therapy today with continued complaints of neck and shoulder pain. Patient reports that her right shoulder/arm feel weak at times.  She reports she hasn't ever dropped anything, but her arm will catch and she will have to be careful that she doesn't throw anything. She reports that her neck was hurting during the holidays. She reports that she did move out of her work office over the holidays. She reports she is still using cold packs at night and taking her pain medication as needed. She reports she has also been trying to get a few deep tissue massages to help with her pain. Present Symptoms: 3/6/2018: Patient reports she has been doing some yard work and now has some tightness in her shoulder with occasional tingling into her hand. Pain Intensity 1: 4  Pain Location 1: Neck, Shoulder  Pain Orientation 1: Right, Left  Pain Intervention(s) 1: Rest, Medication (see MAR)  Pain Description 1: Aching, Constant  Pain Onset 1: Chronic  Pain Scale 1: Numeric (0 - 10)  Patient Stated Pain Goal: 0  · Patient Stated Pain Goal: 0   Dominant Side: right  Past Medical History: Patient reports no significant past medical history. Current Medications: Cymblata, pain medication (PRN)   Date Last Reviewed: 3/6/2018  Social History/Home Situation:   Home Environment: Private residence  Living Alone: No  Support Systems: Family member(s); Friends \ neighbors  Work/Activity History: Retired  OBJECTIVE:  Outcome Measure: Tool Used: Neck Disability Index (NDI)  Score:  Initial: 10/50  Most Recent: 9/50 (Date: 2/14/2018)    Interpretation of Score: The Neck Disability Index is a revised form of the Oswestry Low Back Pain Index and is designed to measure the activities of daily living in person's with neck pain. Each section is scored on a 0-5 scale, 5 representing the greatest disability. The scores of each section are added together for a total score of 50. Score 0 1-10 11-20 21-30 31-40 41-49 50   Modifier CH CI CJ CK CL CM CN     ?  Carrying, Moving, and Handling Objects:     - CURRENT STATUS: CI - 1%-19% impaired, limited or restricted    - GOAL STATUS: CI - 1%-19% impaired, limited or restricted    - D/C STATUS:  ---------------To be determined---------------     Observation/Orthostatic Postural Assessment:   Posture (WDL): Exceptions to WDL  Posture Assessment: Rounded shoulders; Forward head  Palpation:  Tenderness to palpation noted in cervical spine and shoulder region on right greater than left (upper trapezius, levator scapulae, rhomboids, sub-occipitals). No bruising or swelling noted. ROM:    Cervical Assessment (AROM):   · Flexion 100%  · Extension 90%  · Lateral flexion L 85%, R 100%    LUE Assessment (AROM):   · Within defined limits        Shoulder Assessment (AROM):  · Flexion B WFL  · L ext. · Rot. 90, R 80  · IR: L 70, R 65     RLE Assessment (AROM):   · Within defined limits      Strength:   LUE Strength, Tone, Sensation:   · Within defined limits  · Deep cervical flexion 20s against gravity    RUE Strength, Tone, Sensation:  · R Shoulder Flexion: 3+  · R Shoulder ABduction: 3+  · R Shoulder Internal Rotation: 3+  · R Shoulder External Rotation: 3+  · R Elbow Flexion: 3+  · R Elbow Extension: 3+        Special Tests: Negative  Neurological Screen:    Myotomes:        RUE Myotomes  · C1 (Cervical Rotation): Strong  · C2, C3, C4 (Shoulder Shrug): Strong  · C5 (Shoulder ABduction): Strong  · C6 (Biceps & Wrist Extension): Strong  · C7 (Triceps & Wrist Flexion): Strong  · C8 (Thumb Extension): Strong  · T1 (Finger ADduction): Strong          Dermatomes:     Within normal limits     Within normal limits          Reflexes:   · Left Bicep (C5,C6): 2+  · Left Bracheoradialis (C5,C6): 2+  · Left Tricep (C6,C7,C8): 2+    · Right Bicep (C5,C6): 2+  · Right Bracheoradialis (C5,C6): 2+  · Right Tricep (C6,C7,C8): 2+          Neural Tension Tests: Negative  Functional Mobility:  Gait Description (WDL): Within defined limits     Balance:   Sitting: Intact  Standing: Intact  TREATMENT:    (In addition to Assessment/Re-Assessment sessions the following treatments were rendered)  Therapeutic Exercise: ( 5 minutes):  Exercises per grid below to improve mobility and strength. Required minimal visual, verbal and manual cues to promote proper body alignment, promote proper body posture and promote proper body mechanics. Progressed resistance, range, repetitions and complexity of movement as indicated. Date:  3/6/2018    Activity/Exercise Parameters   Scapular rows HEP - reviewed   Lat pulls HEP - reviewed   Manual Therapy     · Joint Mobilization (10 Minutes): Grade II anterior-posterior joint mobilization to right glenohumeral joint with active and passive gross movement in all planes to improve range of motion and decrease pain with daily activities. Prone t-spine central posterior anterior grade IV T1-4. · Soft Tissue Mobilization (30 Minutes): Soft tissue mobilization to cervical spinal musculature to improve mobility and decrease tightness and pain with daily activities. Therapeutic Modalities: N/A today                                                                                             HEP: As above; handouts given to patient for all exercises. ______________________________________________________________________________________________________    Treatment Assessment:  Patient tolerated treatment well. Patient reported decreased pain after treatment. Progression/Medical Necessity:   · Patient is expected to demonstrate progress in strength and range of motion to increase independence with daily activities. · Patient demonstrates good rehab potential due to higher previous functional level. · Skilled intervention continues to be required due to decrease in functional mobility. Compliance with Program/Exercises: Compliant   Reason for Continuation of Services/Other Comments:  · Patient continues to demonstrate capacity to improve overall mobility which will increase safety.   Recommendations/Intent for next treatment session: \"Treatment next visit will focus on advancements to more challenging activities\".     Total Treatment Duration:  PT Patient Time In/Time Out  Time In: 0800  Time Out: [address] [place], [place] [phone]

## 2021-11-15 NOTE — H&P ADULT - PROBLEM SELECTOR PLAN 6
- pt without acute SOB, unlikely exacerbation  - continue home albuterol 90mcg 2puffs q6hrs - pt without acute SOB, unlikely exacerbation  - if worsening mental status will obtain abg   - continue home albuterol 90mcg 2puffs q6hrs

## 2021-11-16 ENCOUNTER — TRANSCRIPTION ENCOUNTER (OUTPATIENT)
Age: 76
End: 2021-11-16

## 2021-11-16 DIAGNOSIS — I24.8 OTHER FORMS OF ACUTE ISCHEMIC HEART DISEASE: ICD-10-CM

## 2021-11-16 DIAGNOSIS — I35.0 NONRHEUMATIC AORTIC (VALVE) STENOSIS: ICD-10-CM

## 2021-11-16 DIAGNOSIS — I21.4 NON-ST ELEVATION (NSTEMI) MYOCARDIAL INFARCTION: ICD-10-CM

## 2021-11-16 DIAGNOSIS — I34.1 NONRHEUMATIC MITRAL (VALVE) PROLAPSE: ICD-10-CM

## 2021-11-16 DIAGNOSIS — R41.82 ALTERED MENTAL STATUS, UNSPECIFIED: ICD-10-CM

## 2021-11-16 DIAGNOSIS — I63.9 CEREBRAL INFARCTION, UNSPECIFIED: ICD-10-CM

## 2021-11-16 LAB
A1C WITH ESTIMATED AVERAGE GLUCOSE RESULT: 6.1 % — HIGH (ref 4–5.6)
ALBUMIN SERPL ELPH-MCNC: 2.8 G/DL — LOW (ref 3.3–5)
ALP SERPL-CCNC: 92 U/L — SIGNIFICANT CHANGE UP (ref 40–120)
ALT FLD-CCNC: 20 U/L — SIGNIFICANT CHANGE UP (ref 12–78)
ANION GAP SERPL CALC-SCNC: 6 MMOL/L — SIGNIFICANT CHANGE UP (ref 5–17)
AST SERPL-CCNC: 81 U/L — HIGH (ref 15–37)
BASOPHILS # BLD AUTO: 0.04 K/UL — SIGNIFICANT CHANGE UP (ref 0–0.2)
BASOPHILS NFR BLD AUTO: 0.3 % — SIGNIFICANT CHANGE UP (ref 0–2)
BILIRUB SERPL-MCNC: 0.5 MG/DL — SIGNIFICANT CHANGE UP (ref 0.2–1.2)
BUN SERPL-MCNC: 10 MG/DL — SIGNIFICANT CHANGE UP (ref 7–23)
CALCIUM SERPL-MCNC: 8.7 MG/DL — SIGNIFICANT CHANGE UP (ref 8.5–10.1)
CHLORIDE SERPL-SCNC: 108 MMOL/L — SIGNIFICANT CHANGE UP (ref 96–108)
CHOLEST SERPL-MCNC: 185 MG/DL — SIGNIFICANT CHANGE UP
CK MB BLD-MCNC: 10 % — HIGH (ref 0–3.5)
CK MB BLD-MCNC: 8.2 % — HIGH (ref 0–3.5)
CK MB CFR SERPL CALC: 33.5 NG/ML — HIGH (ref 0–3.6)
CK MB CFR SERPL CALC: 47.5 NG/ML — HIGH (ref 0–3.6)
CK SERPL-CCNC: 407 U/L — HIGH (ref 26–192)
CK SERPL-CCNC: 473 U/L — HIGH (ref 26–192)
CO2 SERPL-SCNC: 28 MMOL/L — SIGNIFICANT CHANGE UP (ref 22–31)
COVID-19 NUCLEOCAPSID GAM AB INTERP: NEGATIVE — SIGNIFICANT CHANGE UP
COVID-19 NUCLEOCAPSID TOTAL GAM ANTIBODY RESULT: 0.08 INDEX — SIGNIFICANT CHANGE UP
COVID-19 SPIKE DOMAIN AB INTERP: POSITIVE
COVID-19 SPIKE DOMAIN ANTIBODY RESULT: >250 U/ML — HIGH
CREAT SERPL-MCNC: 0.55 MG/DL — SIGNIFICANT CHANGE UP (ref 0.5–1.3)
EOSINOPHIL # BLD AUTO: 0.04 K/UL — SIGNIFICANT CHANGE UP (ref 0–0.5)
EOSINOPHIL NFR BLD AUTO: 0.3 % — SIGNIFICANT CHANGE UP (ref 0–6)
ESTIMATED AVERAGE GLUCOSE: 128 MG/DL — HIGH (ref 68–114)
GLUCOSE SERPL-MCNC: 111 MG/DL — HIGH (ref 70–99)
HCT VFR BLD CALC: 36.9 % — SIGNIFICANT CHANGE UP (ref 34.5–45)
HDLC SERPL-MCNC: 74 MG/DL — SIGNIFICANT CHANGE UP
HGB BLD-MCNC: 11.3 G/DL — LOW (ref 11.5–15.5)
IMM GRANULOCYTES NFR BLD AUTO: 0.4 % — SIGNIFICANT CHANGE UP (ref 0–1.5)
LIPID PNL WITH DIRECT LDL SERPL: 95 MG/DL — SIGNIFICANT CHANGE UP
LYMPHOCYTES # BLD AUTO: 2.81 K/UL — SIGNIFICANT CHANGE UP (ref 1–3.3)
LYMPHOCYTES # BLD AUTO: 20.5 % — SIGNIFICANT CHANGE UP (ref 13–44)
MAGNESIUM SERPL-MCNC: 2.2 MG/DL — SIGNIFICANT CHANGE UP (ref 1.6–2.6)
MCHC RBC-ENTMCNC: 26 PG — LOW (ref 27–34)
MCHC RBC-ENTMCNC: 30.6 GM/DL — LOW (ref 32–36)
MCV RBC AUTO: 84.8 FL — SIGNIFICANT CHANGE UP (ref 80–100)
MONOCYTES # BLD AUTO: 0.86 K/UL — SIGNIFICANT CHANGE UP (ref 0–0.9)
MONOCYTES NFR BLD AUTO: 6.3 % — SIGNIFICANT CHANGE UP (ref 2–14)
NEUTROPHILS # BLD AUTO: 9.87 K/UL — HIGH (ref 1.8–7.4)
NEUTROPHILS NFR BLD AUTO: 72.2 % — SIGNIFICANT CHANGE UP (ref 43–77)
NON HDL CHOLESTEROL: 112 MG/DL — SIGNIFICANT CHANGE UP
NRBC # BLD: 0 /100 WBCS — SIGNIFICANT CHANGE UP (ref 0–0)
PHOSPHATE SERPL-MCNC: 3.6 MG/DL — SIGNIFICANT CHANGE UP (ref 2.5–4.5)
PLATELET # BLD AUTO: 314 K/UL — SIGNIFICANT CHANGE UP (ref 150–400)
POTASSIUM SERPL-MCNC: 4 MMOL/L — SIGNIFICANT CHANGE UP (ref 3.5–5.3)
POTASSIUM SERPL-SCNC: 4 MMOL/L — SIGNIFICANT CHANGE UP (ref 3.5–5.3)
PROT SERPL-MCNC: 6.9 G/DL — SIGNIFICANT CHANGE UP (ref 6–8.3)
RBC # BLD: 4.35 M/UL — SIGNIFICANT CHANGE UP (ref 3.8–5.2)
RBC # FLD: 14.5 % — SIGNIFICANT CHANGE UP (ref 10.3–14.5)
SARS-COV-2 IGG+IGM SERPL QL IA: 0.08 INDEX — SIGNIFICANT CHANGE UP
SARS-COV-2 IGG+IGM SERPL QL IA: >250 U/ML — HIGH
SARS-COV-2 IGG+IGM SERPL QL IA: NEGATIVE — SIGNIFICANT CHANGE UP
SARS-COV-2 IGG+IGM SERPL QL IA: POSITIVE
SODIUM SERPL-SCNC: 142 MMOL/L — SIGNIFICANT CHANGE UP (ref 135–145)
TRIGL SERPL-MCNC: 86 MG/DL — SIGNIFICANT CHANGE UP
TROPONIN I, HIGH SENSITIVITY RESULT: 5609.5 NG/L — HIGH
TROPONIN I, HIGH SENSITIVITY RESULT: 5756.6 NG/L — HIGH
TROPONIN I, HIGH SENSITIVITY RESULT: 6623.7 NG/L — HIGH
TSH SERPL-MCNC: 1.53 UIU/ML — SIGNIFICANT CHANGE UP (ref 0.36–3.74)
VIT B12 SERPL-MCNC: 386 PG/ML — SIGNIFICANT CHANGE UP (ref 232–1245)
WBC # BLD: 13.68 K/UL — HIGH (ref 3.8–10.5)
WBC # FLD AUTO: 13.68 K/UL — HIGH (ref 3.8–10.5)

## 2021-11-16 PROCEDURE — 99233 SBSQ HOSP IP/OBS HIGH 50: CPT | Mod: GC

## 2021-11-16 PROCEDURE — 70544 MR ANGIOGRAPHY HEAD W/O DYE: CPT | Mod: 26,59

## 2021-11-16 PROCEDURE — 70551 MRI BRAIN STEM W/O DYE: CPT | Mod: 26

## 2021-11-16 PROCEDURE — 70547 MR ANGIOGRAPHY NECK W/O DYE: CPT | Mod: 26

## 2021-11-16 RX ORDER — PREGABALIN 225 MG/1
1000 CAPSULE ORAL DAILY
Refills: 0 | Status: DISCONTINUED | OUTPATIENT
Start: 2021-11-16 | End: 2021-11-18

## 2021-11-16 RX ORDER — ASPIRIN/CALCIUM CARB/MAGNESIUM 324 MG
81 TABLET ORAL DAILY
Refills: 0 | Status: DISCONTINUED | OUTPATIENT
Start: 2021-11-16 | End: 2021-11-16

## 2021-11-16 RX ORDER — ALBUTEROL 90 UG/1
2 AEROSOL, METERED ORAL EVERY 6 HOURS
Refills: 0 | Status: DISCONTINUED | OUTPATIENT
Start: 2021-11-15 | End: 2021-11-18

## 2021-11-16 RX ORDER — ENOXAPARIN SODIUM 100 MG/ML
50 INJECTION SUBCUTANEOUS
Refills: 0 | Status: DISCONTINUED | OUTPATIENT
Start: 2021-11-16 | End: 2021-11-18

## 2021-11-16 RX ORDER — METOPROLOL TARTRATE 50 MG
1 TABLET ORAL
Qty: 0 | Refills: 0 | DISCHARGE

## 2021-11-16 RX ORDER — ASPIRIN/CALCIUM CARB/MAGNESIUM 324 MG
325 TABLET ORAL DAILY
Refills: 0 | Status: DISCONTINUED | OUTPATIENT
Start: 2021-11-16 | End: 2021-11-18

## 2021-11-16 RX ADMIN — ATORVASTATIN CALCIUM 40 MILLIGRAM(S): 80 TABLET, FILM COATED ORAL at 22:17

## 2021-11-16 RX ADMIN — ENOXAPARIN SODIUM 50 MILLIGRAM(S): 100 INJECTION SUBCUTANEOUS at 17:08

## 2021-11-16 RX ADMIN — Medication 325 MILLIGRAM(S): at 11:17

## 2021-11-16 NOTE — CONSULT NOTE ADULT - SUBJECTIVE AND OBJECTIVE BOX
PULMONARY/CRITICAL CARE    Pt. well known to me admitted for AMS.     Patient is a 76y old  Female who presents with a chief complaint of NSTEMI, r/o CVA (15 Nov 2021 22:25)    BRIEF HOSPITAL COURSE: ***   75 y/o F with pmhx significant for HTN, COPD/emphysema, osteoporosis, psoriatic arthritis, MVP, tachycardia hx, gastritis, vertebral fracture, ulcerative colitis, costal chondritis, pre-DM, who presents with dizziness, nausea, and disorientation that began after 8:30am this morning, around which time she was last known well. The patient is confused on exam today. Hx taken from son and  over the phone largely. Per family, patient was not herself today but was at baseline yesterday A&Ox3. The patient was unable to remember family names and the year, president earlier today per family. Per family, the patient called her friend and then  around 11am stating she was feeling lightheaded, lethargic and nausea at this time. When the  arrived home, patient was unsteady with gait, lightheaded and mildly confused. The patient's symptoms failed to improve, pt taken to UC, became more disoriented. Sent to ED for eval. Of note, patient was scheduled for a cardiac catheterization next week. Per family, TTE was done last week outpatient and were told further workup required.  Now alert, but somewhat confused. Has no complaints.   Events last 24 hours: ***    PAST MEDICAL & SURGICAL HISTORY:  Gastritis    Tachycardia    MVP (mitral valve prolapse)    Ulcerative colitis    Cigarette smoker  current    Psoriatic arthritis    Vertebral fracture, closed  Lumbar x 4    Osteoporosis    Varicose veins    Hyperparathyroidism    Costal chondritis    Prediabetes    Herniated disc, cervical  lumbar    Emphysema lung    COPD (chronic obstructive pulmonary disease)    Termination of pregnancy (fetus)    History of tonsillectomy    S/P parathyroidectomy        Allergies    caffeine (Blisters)  Gluten (Unknown)  penicillins (Hives)    Intolerances    lactose (Vomiting)    FAMILY HISTORY: former smoker.   Family history of Parkinson disease    Family history of colorectal cancer    Family history of diabetes mellitus    Family history of transitional cell carcinoma of bladder (Child)    Family history of melanoma (Child)        Review of Systems:  CONSTITUTIONAL: No fever, chills, or fatigue  EYES: No eye pain, visual disturbances, or discharge  ENMT:  No difficulty hearing, tinnitus, vertigo; No sinus or throat pain  NECK: No pain or stiffness  RESPIRATORY: No cough, wheezing, chills or hemoptysis; No shortness of breath  CARDIOVASCULAR: No chest pain, palpitations, dizziness, or leg swelling  GASTROINTESTINAL: No abdominal or epigastric pain. No nausea, vomiting, or hematemesis; No diarrhea or constipation. No melena or hematochezia.  GENITOURINARY: No dysuria, frequency, hematuria, or incontinence  NEUROLOGICAL: No headaches, has  memory loss, no  loss of strength, numbness, or tremors  SKIN: No itching, burning, rashes, or lesions   MUSCULOSKELETAL: No joint pain or swelling; No muscle, back, or extremity pain  PSYCHIATRIC: No depression, anxiety, mood swings, or difficulty sleeping      Medications:      ALBUTerol    90 MICROgram(s) HFA Inhaler 2 Puff(s) Inhalation every 6 hours PRN        aspirin enteric coated 81 milliGRAM(s) Oral daily        atorvastatin 40 milliGRAM(s) Oral at bedtime    sodium chloride 0.9%. 1000 milliLiter(s) IV Continuous <Continuous>                ICU Vital Signs Last 24 Hrs  T(C): 36.5 (2021 04:56), Max: 37.1 (15 Nov 2021 17:50)  T(F): 97.7 (2021 04:56), Max: 98.8 (15 Nov 2021 17:50)  HR: 78 (2021 04:56) (78 - 92)  BP: 147/83 (2021 04:56) (147/83 - 166/71)  BP(mean): --  ABP: --  ABP(mean): --  RR: 18 (2021 04:56) (16 - 20)  SpO2: 91% (2021 04:56) (91% - 99%)    Vital Signs Last 24 Hrs  T(C): 36.5 (2021 04:56), Max: 37.1 (15 Nov 2021 17:50)  T(F): 97.7 (2021 04:56), Max: 98.8 (15 Nov 2021 17:50)  HR: 78 (2021 04:56) (78 - 92)  BP: 147/83 (2021 04:56) (147/83 - 166/71)  BP(mean): --  RR: 18 (2021 04:56) (16 - 20)  SpO2: 91% (2021 04:56) (91% - 99%)        I&O's Detail    15 Nov 2021 07:01  -  2021 07:00  --------------------------------------------------------  IN:    sodium chloride 0.9%: 450 mL  Total IN: 450 mL    OUT:  Total OUT: 0 mL    Total NET: 450 mL            LABS:                        11.3   13.68 )-----------( 314      ( 2021 05:16 )             36.9     -16    142  |  108  |  10  ----------------------------<  111<H>  4.0   |  28  |  0.55    Ca    8.7      2021 05:16  Phos  3.6       Mg     2.2         TPro  6.9  /  Alb  2.8<L>  /  TBili  0.5  /  DBili  x   /  AST  81<H>  /  ALT  20  /  AlkPhos  92  11-16          CAPILLARY BLOOD GLUCOSE      POCT Blood Glucose.: 119 mg/dL (15 Nov 2021 16:06)    PT/INR - ( 15 Nov 2021 16:20 )   PT: 13.6 sec;   INR: 1.13 ratio         PTT - ( 15 Nov 2021 16:20 )  PTT:32.5 sec  Urinalysis Basic - ( 15 Nov 2021 16:44 )    Color: Yellow / Appearance: Clear / S.015 / pH: x  Gluc: x / Ketone: Negative  / Bili: Negative / Urobili: Negative mg/dL   Blood: x / Protein: 30 mg/dL / Nitrite: Negative   Leuk Esterase: Negative / RBC: 0-2 /HPF / WBC 0-2   Sq Epi: x / Non Sq Epi: Negative / Bacteria: Negative      CULTURES:      Physical Examination:    General: No acute distress.  Elderly female sitting up comfortably in bed    HEENT: Pupils equal, reactive to light.  Symmetric.    PULM: Clear to auscultation bilaterally, no wheeze rhonchi rales, good excursion no change percussion    CVS: Regular rate and rhythm, no murmurs, rubs, or gallops    ABD: Soft, nondistended, nontender, normoactive bowel sounds, no masses    EXT: No edema, nontender calves    SKIN: Warm and well perfused, no rashes noted.    NEURO: Alert, oriented to place and person, interactive, nonfocal  Moves all ext    RADIOLOGY: ***rad< from: CT Chest No Cont (11.15.21 @ 17:19) >  EXAM:  CT ABDOMEN AND PELVIS                          EXAM:  CT CHEST                                  PROCEDURE DATE:  11/15/2021          INTERPRETATION:  CLINICAL INFORMATION: Dizziness for 10 days. Nausea. Leukocytosis.    COMPARISON: Outside whole-body PET/CT dated 2018, noncontrast CT of the abdomen and pelvis dated 2019.    CONTRAST/COMPLICATIONS:  IV Contrast: NONE  Oral Contrast: NONE  Complications: None reported at time of study completion    PROCEDURE:  CT of the Chest, Abdomen and Pelvis was performed.  Sagittal and coronal reformats were performed.    FINDINGS:  CHEST:  LUNGS AND LARGE AIRWAYS: Patent central airways. Moderate centrilobular emphysematous changes. Mild lingular atelectasis. Right lower lobe mucus plugging on image 113. No discrete pulmonary nodule or confluent airspace opacity is identified.  PLEURA: No pleural effusion.  VESSELS: Within normal limits.  HEART: Mitral annulus calcification. Heavy coronary artery calcification. Heart size is normal. No pericardial effusion.  MEDIASTINUM AND EVANGELINA: No lymphadenopathy.  CHEST WALL AND LOWER NECK: Within normal limits.    ABDOMEN AND PELVIS:  LIVER: Within normal limits.  BILE DUCTS: Normal caliber.  GALLBLADDER: Within normal limits.  SPLEEN: Within normal limits.  PANCREAS: Within normal limits.  ADRENALS: Stable low-attenuation thickening of the left adrenal gland. The right adrenal gland is unremarkable in morphology.  KIDNEYS/URETERS: Within normal limits.    BLADDER: Minimally distended.  REPRODUCTIVE ORGANS: Multiple calcified uterine fibroids. The adnexa are unremarkable by CT criteria.    BOWEL: No bowel obstruction.  PERITONEUM: No ascites.  VESSELS: Within normal limits.  RETROPERITONEUM/LYMPH NODES: No lymphadenopathy.  ABDOMINAL WALL: Within normal limits.  BONES: ORIF of intertrochanteric left femur fracture. Healed left sacral ala fracture. Moderate compression deformity of the inferior endplate of L3. Healed impacted right humeral neck fracture.    IMPRESSION: No evidence of acute infectious process in the thorax. No evidence of acute inflammatory or obstructive process in the abdomen and pelvis.    --- End of Report ---    < end of copied text >  < from: 12 Lead ECG (11.15. @ 20:17) >  Ventricular Rate 81 BPM    Atrial Rate 81 BPM    P-R Interval 294 ms    QRS Duration 72 ms    Q-T Interval 394 ms    QTC Calculation(Bazett) 457 ms    P Axis 60 degrees    R Axis 75 degrees    T Axis -79 degrees    Diagnosis Line Sinus rhythm with 1st degree AV block  Nonspecific ST and T wave abnormality  Abnormal ECG  When compared with ECG of 15-NOV-2021 15:56, (Unconfirmed)  T wave inversion now evident in Anterior leads  and anterolateral leads  Confirmed by DARWIN GILBERT MD ([id number]) on 2021 7:01:01 AM    < end of copied text >      CRITICAL CARE TIME SPENT: ***

## 2021-11-16 NOTE — CONSULT NOTE ADULT - ASSESSMENT
Elderly pt admitted for altered mental status; possible CVA.  NSTEMI, but asymptomatic.   Hx stable COPD.  Suggest cardio, neuro workup.  Dw  in detail.

## 2021-11-16 NOTE — DISCHARGE NOTE PROVIDER - NSDCCPCAREPLAN_GEN_ALL_CORE_FT
PRINCIPAL DISCHARGE DIAGNOSIS  Diagnosis: Altered mental status  Assessment and Plan of Treatment: You were admitted with altered mental status. You got a CT scan of the head, abdomen, and pelvis which all showed no specific findings. You later got an MRI of the Brain and Neck which showed small blood clots in several areas. You are being transferred to higher level of care for further workup to determine the cause of your condition. Care will be per the primary team at the hospital.   Please follow up with your PCP after your hospitalizaiton within 1 week. You or your family member are responsible for making all follow up appointments.      SECONDARY DISCHARGE DIAGNOSES  Diagnosis: Hypertension  Assessment and Plan of Treatment: You have a known history of hypertension, the medical term for high blood pressure. Your BP medication was held while at [facility]. You are being transferred to an outside hospital for further workup and care. Please defer all medication decisions and doses to the new team at the [facility].   When you get home, please also follow up with your primary care physician on a regular basis to make sure your blood pressure continues to be well controlled.  If you experience symptoms such as but not limited to: sudden onset blurry vision, nausea, vomiting, chest pain, shortness of breath, or palpitations, please seek medical attention.      Diagnosis: Hyperlipidemia  Assessment and Plan of Treatment: You have a known history of high cholesterol. You are being transferred to an outside hospital for further workup and care. Please defer all medication decisions and doses to the new team at the [facility].     PRINCIPAL DISCHARGE DIAGNOSIS  Diagnosis: Altered mental status  Assessment and Plan of Treatment: You were admitted with altered mental status. You got a CT scan of the head, abdomen, and pelvis which all showed no specific findings. You later got an MRI of the Brain and Neck which showed small blood clots in several areas. You are being transferred to higher level of care for further workup to determine the cause of your condition. Care will be per the primary team at the hospital.   Please follow up with your PCP after your hospitalizaiton within 1 week. You or your family member are responsible for making all follow up appointments.      SECONDARY DISCHARGE DIAGNOSES  Diagnosis: NSTEMI (non-ST elevation myocardial infarction)  Assessment and Plan of Treatment: You cardiac enzymes continued to trend upwards and you were seen by a cardiologist. This is potentially due to a heart attack. You are being transferred to higher level of care for further workup to determine the cause of your condition. Care will be per the primary team at the hospital.   Please follow up with your PCP after your hospitalizaiton within 1 week. You or your family member are responsible for making all follow up appointments.    Diagnosis: Hypertension  Assessment and Plan of Treatment: You have a known history of hypertension, the medical term for high blood pressure. Your BP medication was held while at [facility]. You are being transferred to an outside hospital for further workup and care. Please defer all medication decisions and doses to the new team at the [facility].   When you get home, please also follow up with your primary care physician on a regular basis to make sure your blood pressure continues to be well controlled.  If you experience symptoms such as but not limited to: sudden onset blurry vision, nausea, vomiting, chest pain, shortness of breath, or palpitations, please seek medical attention.      Diagnosis: Hyperlipidemia  Assessment and Plan of Treatment: You have a known history of high cholesterol. You are being transferred to an outside hospital for further workup and care. Please defer all medication decisions and doses to the new team at the [facility].     PRINCIPAL DISCHARGE DIAGNOSIS  Diagnosis: Altered mental status  Assessment and Plan of Treatment: You were admitted with altered mental status. You were found to have multiple small strokes on MRI of your brain which raised concern for embolic strokes. You are being transferred to [facility] to have a transesophageal echocardiogram to rule out any sign of clot or vegetation in the heart that may have thrown small particles to the brain to cause that multifocal stroke.   You will be given more information in that hospital for next steps based on findings of your continued work-up.      SECONDARY DISCHARGE DIAGNOSES  Diagnosis: NSTEMI (non-ST elevation myocardial infarction)  Assessment and Plan of Treatment: You cardiac enzymes were high and you were seen by a cardiologist. This is potentially due to a heart attack and you were treated medically with medication for potential heart attack. You are being transferred to [facility] for further workup where you may need to have an angiogram.  Follow up instructions of cardiologists in [facility].    Diagnosis: Aortic stenosis  Assessment and Plan of Treatment: You have severe aortic stenosis. Follow up with cardiologist for further care and recs.    Diagnosis: Hypertension  Assessment and Plan of Treatment: Your BP medication were held while at [facility] as is protocol in acute stroke. You are being transferred [facility] where your blood pressure medications will be restarted when appropriate.    Diagnosis: Hyperlipidemia  Assessment and Plan of Treatment: Take lipitor as prescribed. Follow instructions of physicians in [facility].     PRINCIPAL DISCHARGE DIAGNOSIS  Diagnosis: Altered mental status  Assessment and Plan of Treatment: You were admitted with altered mental status. You were found to have multiple small strokes on MRI of your brain which raised concern for embolic strokes. You are being transferred to [facility] to have a transesophageal echocardiogram to rule out any sign of clot or vegetation in the heart that may have thrown small particles to the brain to cause that multifocal stroke.   You will be given more information in that hospital for next steps based on findings of your continued work-up.      SECONDARY DISCHARGE DIAGNOSES  Diagnosis: NSTEMI (non-ST elevation myocardial infarction)  Assessment and Plan of Treatment: You cardiac enzymes were high and you were seen by a cardiologist. This is potentially due to a heart attack and you were treated medically with medication for potential heart attack. You are being transferred to [facility] for further workup where you may need to have an angiogram.  Follow up instructions of cardiologists in [facility].    Diagnosis: Aortic stenosis  Assessment and Plan of Treatment: You have severe aortic stenosis. Follow up with cardiologist for further care and recs.    Diagnosis: Hypertension  Assessment and Plan of Treatment: Your BP medication were held while at [facility] as is protocol in acute stroke. [facility] will be [facility] when appropriate.    Diagnosis: Hyperlipidemia  Assessment and Plan of Treatment: Take lipitor as prescribed. Follow instructions of physicians in [facility].    Diagnosis: Vitamin B12 deficiency  Assessment and Plan of Treatment: Your B12 vitamin level was in low end of normal. Please take a B12 supplement and follow up with your PMD.

## 2021-11-16 NOTE — PROGRESS NOTE ADULT - PROBLEM SELECTOR PLAN 2
ECG on admission with no evidence of ischemia or infarction  - Troponin peaked at 6623, likely in setting of NSTEMI   - Patient started on full dose Lovenox  - ASA increased to 325mg, atorvastatin 40mg qd per cardio recs  - f/u repeat echo  - neurology consulted, Dr Zuleta, f/u recs  - cardiology consulted, Dr Driscoll - EKG on admission without significant ischemic changes and pt had nausea / discomfort yesterday but no chest pain.  - Troponin peaked at 6623, CK/CKMB also elevated up to 473/47.5 -- suspected NSTEMI   - per discussion with cardio - started pt on full dose Lovenox and on ASA (will hold off plavix as pt with multiple strokes and albeit small, wish to decrease chance of hemorrhagic conversion)   - ASA increased to 325mg per neuro recs, atorvastatin 40mg QHS  - f/u TTE but will need JASON in [facility] given likely embolic CVA  - may need cardiac cath on same admission.  - cardiology consulted, Dr Driscoll, recs appreciated

## 2021-11-16 NOTE — DISCHARGE NOTE PROVIDER - CARE PROVIDERS DIRECT ADDRESSES
[email or web address]

## 2021-11-16 NOTE — DISCHARGE NOTE PROVIDER - PROVIDER TOKENS
PROVIDER:[TOKEN:[[id number]],FOLLOWUP:[1 week]] PROVIDER:[TOKEN:[[id number]],FOLLOWUP:[1 week]],PROVIDER:[TOKEN:[[id number]]]

## 2021-11-16 NOTE — PROGRESS NOTE ADULT - SUBJECTIVE AND OBJECTIVE BOX
Chief Complaint: AMS    Interval Events: Transferred to [facility] for admission.    Review of Systems:  General: No fevers, chills, weight gain  Skin: No rashes, color changes  Cardiovascular: No chest pain, orthopnea  Respiratory: No shortness of breath, cough  Gastrointestinal: No nausea, abdominal pain  Genitourinary: No incontinence, pain with urination  Musculoskeletal: No pain, swelling, decreased range of motion  Neurological: No headache, weakness  Psychiatric: No depression, anxiety  Endocrine: No weight gain, increased thirst  All other systems are comprehensively negative.    Physical Exam:  Vitals:        Vital Signs Last 24 Hrs  T(C): 36.5 (16 Nov 2021 04:56), Max: 37.1 (15 Nov 2021 17:50)  T(F): 97.7 (16 Nov 2021 04:56), Max: 98.8 (15 Nov 2021 17:50)  HR: 78 (16 Nov 2021 04:56) (78 - 92)  BP: 147/83 (16 Nov 2021 04:56) (147/83 - 166/71)  BP(mean): --  RR: 18 (16 Nov 2021 04:56) (16 - 20)  SpO2: 91% (16 Nov 2021 04:56) (91% - 99%)  General: NAD  HEENT: MMM  Neck: No JVD, no carotid bruit  Lungs: CTAB  CV: RRR, nl S1/S2, no M/R/G  Abdomen: S/NT/ND, +BS  Extremities: No LE edema, no cyanosis  Neuro: AAOx3, non-focal  Skin: No rash    Labs:                        11.3   13.68 )-----------( 314      ( 16 Nov 2021 05:16 )             36.9     11-16    142  |  108  |  10  ----------------------------<  111<H>  4.0   |  28  |  0.55    Ca    8.7      16 Nov 2021 05:16  Phos  3.6     11-16  Mg     2.2     11-16    TPro  6.9  /  Alb  2.8<L>  /  TBili  0.5  /  DBili  x   /  AST  81<H>  /  ALT  20  /  AlkPhos  92  11-16        PT/INR - ( 15 Nov 2021 16:20 )   PT: 13.6 sec;   INR: 1.13 ratio         PTT - ( 15 Nov 2021 16:20 )  PTT:32.5 sec    Telemetry: Sinus rhythm, atrial runs with aberrancy

## 2021-11-16 NOTE — PROGRESS NOTE ADULT - PROBLEM SELECTOR PLAN 1
per family, pt was at baseline prior to AM 11/15, A&Ox3  - CT head, c/a/p both negative for acute process  -MRA showing no significant stenosis  -MR brain showing multiple small foci of diffusion restriction in both hemispheres as well as within the posterior fossa suggesting embolic or thrombotic showering in the anterior and posterior circulation  -Patient started on 325mg PO ASA, atorvastatin 40mg qd  - SS recommended soft, bite sized, mildly thick liquids, f/u MBS tomorrow AM  - f/u echo  - f/u lipid panel, HgbA1c  - Patient also with significantly elevated troponins likely in setting of NSTEMI, given MR brain findings patient with significant thrombosis, will need further workup for valve vegetation via JASON and potential cardiac cath, pending transfer to [facility]  - PT, OT consulted  - neurology consulted, Dr Zuleta, f/u rec  - cardiology consulted, Dr Driscoll - per family, pt was at baseline prior to AM 11/15, A&Ox3  - CT head, c/a/p negative for acute process  - MRA head&neck showing no significant stenosis  - MR brain showing multiple small foci of diffusion restriction in both hemispheres as well as within the posterior fossa suggesting embolic or thrombotic showering in the anterior and posterior circulation  - Patient started on 325mg PO ASA per neuro recs, atorvastatin 40mg qd  - no hx of Afib or aflutter -- has been in SR so far on tele - continue to monitor on tele   - SS recommended soft, bite sized, mildly thick liquids, f/u MBS tomorrow AM  - f/u lipid panel, HgbA1c  - f/u TTE but will need JASON in [facility] given likely embolic CVA  - arranged for transfer to [facility] - pt accepted, likely to go tomorrow per discussion with transfer center  - PT, OT consulted  - neurology consulted, Dr Zuleta, recs appreciated  - permissive HTN  - cardiology consulted, Dr Driscoll, recs appreciated

## 2021-11-16 NOTE — PROGRESS NOTE ADULT - PROBLEM SELECTOR PLAN 5
- HOLD home metoprolol 12.5 mg qd, monitor routine hemodynamics - HOLD home metoprolol 12.5 mg qd, monitor routine hemodynamics  - permissive HTN given CVA

## 2021-11-16 NOTE — DISCHARGE NOTE PROVIDER - CARE PROVIDER_API CALL
Marlon White)  Internal Medicine  [address] [place] [address]  Phone: [phone]  Fax: [phone]  Follow Up Time: 1 week   Marlon White)  Internal Medicine  [address] [facility] [address]  [place] [address]  Phone: [phone]  Fax: [phone]  Follow Up Time: 1 week    Jason Gilbert (DO)  Neurology; Vascular Neurology  [address] [facility] [address] [facility], [address]  [place] [address]  Phone: [phone]  Fax: [phone]  Follow Up Time:

## 2021-11-16 NOTE — SWALLOW BEDSIDE ASSESSMENT ADULT - PHARYNGEAL PHASE
Delayed pharyngeal swallow/Decreased laryngeal elevation/Cough post oral intake Within functional limits Delayed pharyngeal swallow/Decreased laryngeal elevation/Wet vocal quality post oral intake/Cough post oral intake

## 2021-11-16 NOTE — SWALLOW BEDSIDE ASSESSMENT ADULT - SWALLOW EVAL: DIAGNOSIS
1. The patient demonstrated functional oral management of pureed, soft & bite-sized, mildly thick, and thin liquid textures marked by adequate bolus collection, transfer, and posterior transport. 2. The patient demonstrated a mild oral dysphagia for solids marked by adequate bolus collection with delayed bolus collection, transfer, and posterior transport. 3. The patient demonstrated a judged functional pharyngeal phase of the swallow for pureed, soft & bite-sized, and mildly thick liquids marked by a suspected timely pharyngeal swallow trigger with hyolaryngeal elevation noted upon digital palpation w/o evidence of airway penetration. 4. The patient demonstrated a moderate pharyngeal dysphagia for regular solid and thin liquid textures marked by a suspected delayed pharyngeal swallow trigger with hyolaryngeal elevation noted upon digital palpation resulting in intermittent coughing and change in vocal quality to a 'wet' tone suggestive of airway penetration.

## 2021-11-16 NOTE — SWALLOW BEDSIDE ASSESSMENT ADULT - ASR SWALLOW RECOMMEND DIAG
A Modified Barium Swallow is recommended to objectively determine safest and least restrictive PO diet./VFSS/MBS

## 2021-11-16 NOTE — PROGRESS NOTE ADULT - ASSESSMENT
The patient is a 77 y/o F with pmhx significant for HTN, COPD/emphysema, osteoporosis, psoriatic arthritis, MVP, tachycardia hx, gastritis, vertebral fracture, ulcerative colitis, costal chondritis, pre-DM, who presents with dizziness, nausea, and disorientation that began after 8:30am this morning, around which time she was last known well. Patient admitted for CVA and NSTEMI. 75 y/o F with PMH significant for HTN, COPD/emphysema, osteoporosis, psoriatic arthritis, MVP, tachycardia hx, gastritis, vertebral fracture, ulcerative colitis, costal chondritis, pre-DM, who presents with dizziness, nausea, and disorientation admitted with suspected CVA and NSTEMI, found to have acute CVA with multiple areas of stroke consistent with embolic CVA.

## 2021-11-16 NOTE — DISCHARGE NOTE PROVIDER - NSDCQMPATIENTREHAB_NEU_A_CORE
Patient to be transferred to OSH for trans-esophageal echocardiography./Medically contraindicated Assessment performed

## 2021-11-16 NOTE — PROGRESS NOTE ADULT - SUBJECTIVE AND OBJECTIVE BOX
Patient is a 76y old  Female who presents with a chief complaint of NSTEMI, r/o CVA (2021 14:26)      INTERVAL HPI/OVERNIGHT EVENTS: Patient admitted overnight for NSTEMI and CVA. Patient seen and examined at bedside. Patient feeling well. Denies any acute complaints. Denies CP, dizziness, lightheadedness, SOB.    MEDICATIONS  (STANDING):  aspirin enteric coated 325 milliGRAM(s) Oral daily  atorvastatin 40 milliGRAM(s) Oral at bedtime  cyanocobalamin 1000 MICROGram(s) Oral daily  enoxaparin Injectable 50 milliGRAM(s) SubCutaneous two times a day    MEDICATIONS  (PRN):  ALBUTerol    90 MICROgram(s) HFA Inhaler 2 Puff(s) Inhalation every 6 hours PRN Shortness of Breath and/or Wheezing      Allergies    caffeine (Blisters)  Gluten (Unknown)  penicillins (Hives)    Intolerances    lactose (Vomiting)      REVIEW OF SYSTEMS:  CONSTITUTIONAL: No fever or chills  HEENT:  No headache, no sore throat  RESPIRATORY: No cough, wheezing, or shortness of breath  CARDIOVASCULAR: No chest pain, palpitations  GASTROINTESTINAL: No abd pain, nausea, vomiting, or diarrhea  GENITOURINARY: No dysuria, frequency, or hematuria  NEUROLOGICAL: no focal weakness or dizziness  MUSCULOSKELETAL: no myalgias     Vital Signs Last 24 Hrs  T(C): 36.6 (2021 10:48), Max: 36.7 (15 Nov 2021 18:50)  T(F): 97.8 (2021 10:48), Max: 98 (15 Nov 2021 18:50)  HR: 87 (2021 10:48) (78 - 92)  BP: 161/87 (2021 10:48) (147/83 - 166/71)  BP(mean): --  RR: 17 (2021 10:48) (16 - 20)  SpO2: 92% (2021 10:48) (91% - 99%)    PHYSICAL EXAM:  GENERAL: NAD  HEENT:  anicteric, moist mucous membranes  CHEST/LUNG:  CTA b/l, no rales, wheezes, or rhonchi  HEART:  RRR, S1, S2  ABDOMEN:  BS+, soft, nontender, nondistended  EXTREMITIES: trace edema, no cyanosis or calf tenderness  NERVOUS SYSTEM: answers questions and follows commands appropriately    LABS:                        11.3   13.68 )-----------( 314      ( 2021 05:16 )             36.9     CBC Full  -  ( 2021 05:16 )  WBC Count : 13.68 K/uL  Hemoglobin : 11.3 g/dL  Hematocrit : 36.9 %  Platelet Count - Automated : 314 K/uL  Mean Cell Volume : 84.8 fl  Mean Cell Hemoglobin : 26.0 pg  Mean Cell Hemoglobin Concentration : 30.6 gm/dL  Auto Neutrophil # : 9.87 K/uL  Auto Lymphocyte # : 2.81 K/uL  Auto Monocyte # : 0.86 K/uL  Auto Eosinophil # : 0.04 K/uL  Auto Basophil # : 0.04 K/uL  Auto Neutrophil % : 72.2 %  Auto Lymphocyte % : 20.5 %  Auto Monocyte % : 6.3 %  Auto Eosinophil % : 0.3 %  Auto Basophil % : 0.3 %    2021 05:16    142    |  108    |  10     ----------------------------<  111    4.0     |  28     |  0.55     Ca    8.7        2021 05:16  Phos  3.6       2021 05:16  Mg     2.2       2021 05:16    TPro  6.9    /  Alb  2.8    /  TBili  0.5    /  DBili  x      /  AST  81     /  ALT  20     /  AlkPhos  92     2021 05:16    PT/INR - ( 15 Nov 2021 16:20 )   PT: 13.6 sec;   INR: 1.13 ratio         PTT - ( 15 Nov 2021 16:20 )  PTT:32.5 sec  Urinalysis Basic - ( 15 Nov 2021 16:44 )    Color: Yellow / Appearance: Clear / S.015 / pH: x  Gluc: x / Ketone: Negative  / Bili: Negative / Urobili: Negative mg/dL   Blood: x / Protein: 30 mg/dL / Nitrite: Negative   Leuk Esterase: Negative / RBC: 0-2 /HPF / WBC 0-2   Sq Epi: x / Non Sq Epi: Negative / Bacteria: Negative      CAPILLARY BLOOD GLUCOSE              RADIOLOGY & ADDITIONAL TESTS:  < from: MR Angio Neck No Cont (21 @ 10:17) >  EXAM:  MR ANGIO NECK                            PROCEDURE DATE:  2021          INTERPRETATION:  HISTORY:   Dizziness. TIA.    TECHNIQUE:   2-D time of flight imaging was conducted.. Multiplanar MIP images were obtained from the axial. data. Additional 3-D time of flight imaging was conducted.        FINDINGS:    COMMON CAROTID ARTERIES:  Bilaterally patent    RIGHT CAROTID BIFURCATION: Widely patent. No stenosis seen    RIGHT INTERNAL CAROTID:  No intraluminal abnormality noted    LEFT CAROTID BIFURCATION: Widely patent    LEFT INTERNAL CAROTID: No intraluminal abnormality noted    VERTEBRALS:  Bilaterally patent with left-sided dominance.        IMPRESSION:    Unremarkable study.    --- End of Report ---            LEONORA COYNE MD; Attending Radiologist  This document has been electronically signed. 2021 10:55AM    < end of copied text >  < from: MR Angio Head No Cont (21 @ 10:01) >    EXAM:  MR ANGIO BRAIN                            PROCEDURE DATE:  2021          INTERPRETATION:  INDICATION:  Stroke. TIA.    TECHNIQUE:  MR angiography of the brain was performed using three dimensional time-of-flight (3D-TOF) technique.  Imaging was performed on a 1.5 Vaishali magnet.    FINDINGS:    INTERNAL CAROTID ARTERIES:  Bilaterally patent.  No intraluminal filling defect or dissection seen.    [place]:  No aneurysm identified. There is fetal-type supply of the left posterior cerebral artery. Left P1 segment is absent.    CEREBRAL ARTERIES:  Both anterior cerebral arteries are patent. Both A1 segments are well formed. Both middle cerebral arteries are patent. No M1 lesion or stenosis is noted.    VERTEBROBASILAR SYSTEM: The vertebrobasilar system is patent, posterior cerebral arteries are patent. Right vertebral ends in PICA. Basilar is supplied from left vertebral.    IMPRESSION:    1. Widely patent vasculature, with variant developmental anatomy as outlined above.    --- End of Report ---    < end of copied text >  < from: MR Head No Cont (21 @ 09:44) >  EXAM:  MR BRAIN                            PROCEDURE DATE:  2021          INTERPRETATION:  INDICATION:    Dizziness. TIA. Rule out stroke.  TECHNIQUE:  Multiplanar brain imaging was conducted. T1, T2 and FLAIR imaging was performed.  In addition, diffusion imaging, diffusion coefficient assessment (ADC) and T2* was incorporated . No contrast administered.  COMPARISON EXAMINATION:  CT 11/15/2021    FINDINGS:    HEMISPHERES: There are scattered foci of diffusion restriction in both hemispheres, suggesting embolic or thrombotic showering. There is involvement of both basal ganglia and white matter, without evidence of a large acute territorial infarct. Also noted are small white matter lesions and chronic ischemic changes in both hemispheres with volume loss.  VENTRICLES:  Midline with ex vacuo enlargement  POSTERIOR FOSSA:  Scattered areas of diffusion abnormality are also noted in the cerebellar hemispheres bilaterally.  EXTRA-AXIAL:  No mass or collections are depicted.  VASCULATURE:  The major vessels and venous sinuses are grossly patent.  SINUSES AND MASTOIDS:  Clear.  MISCELLANEOUS:  No orbital or pituitary abnormality noted.  No skull base lesion suggested.    IMPRESSION:    1)  Multiple small foci of diffusion restriction in both hemispheres as well as within the posterior fossa suggesting embolic or thrombotic showering in the anterior and posterior circulation. Further workup and assessment recommended.  2)  also noted are chronic ischemic changes in both hemispheres with atrophic change.    --- End of Report ---            LEONORA COYNE MD; Attending Radiologist  This document has been electronically signed. 2021 10:51AM    < end of copied text >          Personally reviewed.     Consultant(s) Notes Reviewed:  [x] YES  [ ] NO     Patient is a 76y old  Female who presents with a chief complaint of NSTEMI, suspected CVA.      INTERVAL HPI/OVERNIGHT EVENTS: Patient seen and examined at bedside. Patient feeling well. Denies any acute complaints. Pt states the nausea and dizziness she had yesterday have resolved. Denies CP, dizziness, lightheadedness, SOB. Pt's family at bedside she has had improvement, but still has difficulty with her memory / reasoning compared to baseline.    MEDICATIONS  (STANDING):  aspirin enteric coated 325 milliGRAM(s) Oral daily  atorvastatin 40 milliGRAM(s) Oral at bedtime  cyanocobalamin 1000 MICROGram(s) Oral daily  enoxaparin Injectable 50 milliGRAM(s) SubCutaneous two times a day    MEDICATIONS  (PRN):  ALBUTerol    90 MICROgram(s) HFA Inhaler 2 Puff(s) Inhalation every 6 hours PRN Shortness of Breath and/or Wheezing      Allergies    caffeine (Blisters)  Gluten (Unknown)  penicillins (Hives)    Intolerances    lactose (Vomiting)      REVIEW OF SYSTEMS:  CONSTITUTIONAL: No fever or chills  HEENT:  No headache, no sore throat  RESPIRATORY: No cough, wheezing, or shortness of breath  CARDIOVASCULAR: No chest pain, palpitations  GASTROINTESTINAL: No abd pain, nausea, vomiting, or diarrhea  GENITOURINARY: No dysuria, frequency, or hematuria  NEUROLOGICAL: no focal weakness or dizziness  MUSCULOSKELETAL: no myalgias     Vital Signs Last 24 Hrs  T(C): 36.6 (2021 10:48), Max: 36.7 (15 Nov 2021 18:50)  T(F): 97.8 (2021 10:48), Max: 98 (15 Nov 2021 18:50)  HR: 87 (2021 10:48) (78 - 92)  BP: 161/87 (2021 10:48) (147/83 - 166/71)  BP(mean): --  RR: 17 (2021 10:48) (16 - 20)  SpO2: 92% (2021 10:48) (91% - 99%)    PHYSICAL EXAM:  GENERAL: NAD at rest  HEENT:  anicteric, moist mucous membranes  CHEST/LUNG:  CTA b/l, no rales, wheezes, or rhonchi  HEART:  RRR, S1, S2, +systolic murmur  ABDOMEN:  BS+, soft, nontender, nondistended  EXTREMITIES: trace edema, no cyanosis or calf tenderness  NERVOUS SYSTEM: answers questions and follows commands appropriately ; strength 5/5 in all 4 extremities; sensation to light touch intact; CN 2-12 intact    LABS:                        11.3   13.68 )-----------( 314      ( 2021 05:16 )             36.9     CBC Full  -  ( 2021 05:16 )  WBC Count : 13.68 K/uL  Hemoglobin : 11.3 g/dL  Hematocrit : 36.9 %  Platelet Count - Automated : 314 K/uL  Mean Cell Volume : 84.8 fl  Mean Cell Hemoglobin : 26.0 pg  Mean Cell Hemoglobin Concentration : 30.6 gm/dL  Auto Neutrophil # : 9.87 K/uL  Auto Lymphocyte # : 2.81 K/uL  Auto Monocyte # : 0.86 K/uL  Auto Eosinophil # : 0.04 K/uL  Auto Basophil # : 0.04 K/uL  Auto Neutrophil % : 72.2 %  Auto Lymphocyte % : 20.5 %  Auto Monocyte % : 6.3 %  Auto Eosinophil % : 0.3 %  Auto Basophil % : 0.3 %    2021 05:16    142    |  108    |  10     ----------------------------<  111    4.0     |  28     |  0.55     Ca    8.7        2021 05:16  Phos  3.6       2021 05:16  Mg     2.2       2021 05:16    TPro  6.9    /  Alb  2.8    /  TBili  0.5    /  DBili  x      /  AST  81     /  ALT  20     /  AlkPhos  92     2021 05:16    PT/INR - ( 15 Nov 2021 16:20 )   PT: 13.6 sec;   INR: 1.13 ratio         PTT - ( 15 Nov 2021 16:20 )  PTT:32.5 sec  Urinalysis Basic - ( 15 Nov 2021 16:44 )    Color: Yellow / Appearance: Clear / S.015 / pH: x  Gluc: x / Ketone: Negative  / Bili: Negative / Urobili: Negative mg/dL   Blood: x / Protein: 30 mg/dL / Nitrite: Negative   Leuk Esterase: Negative / RBC: 0-2 /HPF / WBC 0-2   Sq Epi: x / Non Sq Epi: Negative / Bacteria: Negative      CAPILLARY BLOOD GLUCOSE              RADIOLOGY & ADDITIONAL TESTS:  < from: MR Angio Neck No Cont (21 @ 10:17) >  EXAM:  MR ANGIO NECK                            PROCEDURE DATE:  2021          INTERPRETATION:  HISTORY:   Dizziness. TIA.    TECHNIQUE:   2-D time of flight imaging was conducted.. Multiplanar MIP images were obtained from the axial. data. Additional 3-D time of flight imaging was conducted.        FINDINGS:    COMMON CAROTID ARTERIES:  Bilaterally patent    RIGHT CAROTID BIFURCATION: Widely patent. No stenosis seen    RIGHT INTERNAL CAROTID:  No intraluminal abnormality noted    LEFT CAROTID BIFURCATION: Widely patent    LEFT INTERNAL CAROTID: No intraluminal abnormality noted    VERTEBRALS:  Bilaterally patent with left-sided dominance.        IMPRESSION:    Unremarkable study.    --- End of Report ---            LEONORA COYNE MD; Attending Radiologist  This document has been electronically signed. 2021 10:55AM    < end of copied text >  < from: MR Angio Head No Cont (21 @ 10:01) >    EXAM:  MR ANGIO BRAIN                            PROCEDURE DATE:  2021          INTERPRETATION:  INDICATION:  Stroke. TIA.    TECHNIQUE:  MR angiography of the brain was performed using three dimensional time-of-flight (3D-TOF) technique.  Imaging was performed on a 1.5 Vaishali magnet.    FINDINGS:    INTERNAL CAROTID ARTERIES:  Bilaterally patent.  No intraluminal filling defect or dissection seen.    [place]:  No aneurysm identified. There is fetal-type supply of the left posterior cerebral artery. Left P1 segment is absent.    CEREBRAL ARTERIES:  Both anterior cerebral arteries are patent. Both A1 segments are well formed. Both middle cerebral arteries are patent. No M1 lesion or stenosis is noted.    VERTEBROBASILAR SYSTEM: The vertebrobasilar system is patent, posterior cerebral arteries are patent. Right vertebral ends in PICA. Basilar is supplied from left vertebral.    IMPRESSION:    1. Widely patent vasculature, with variant developmental anatomy as outlined above.    --- End of Report ---    < end of copied text >  < from: MR Head No Cont (21 @ 09:44) >  EXAM:  MR BRAIN                            PROCEDURE DATE:  2021          INTERPRETATION:  INDICATION:    Dizziness. TIA. Rule out stroke.  TECHNIQUE:  Multiplanar brain imaging was conducted. T1, T2 and FLAIR imaging was performed.  In addition, diffusion imaging, diffusion coefficient assessment (ADC) and T2* was incorporated . No contrast administered.  COMPARISON EXAMINATION:  CT 11/15/2021    FINDINGS:    HEMISPHERES: There are scattered foci of diffusion restriction in both hemispheres, suggesting embolic or thrombotic showering. There is involvement of both basal ganglia and white matter, without evidence of a large acute territorial infarct. Also noted are small white matter lesions and chronic ischemic changes in both hemispheres with volume loss.  VENTRICLES:  Midline with ex vacuo enlargement  POSTERIOR FOSSA:  Scattered areas of diffusion abnormality are also noted in the cerebellar hemispheres bilaterally.  EXTRA-AXIAL:  No mass or collections are depicted.  VASCULATURE:  The major vessels and venous sinuses are grossly patent.  SINUSES AND MASTOIDS:  Clear.  MISCELLANEOUS:  No orbital or pituitary abnormality noted.  No skull base lesion suggested.    IMPRESSION:    1)  Multiple small foci of diffusion restriction in both hemispheres as well as within the posterior fossa suggesting embolic or thrombotic showering in the anterior and posterior circulation. Further workup and assessment recommended.  2)  also noted are chronic ischemic changes in both hemispheres with atrophic change.    --- End of Report ---            LEONORA COYNE MD; Attending Radiologist  This document has been electronically signed. 2021 10:51AM    < end of copied text >          Personally reviewed.     Consultant(s) Notes Reviewed:  [x] YES  [ ] NO

## 2021-11-16 NOTE — DISCHARGE NOTE PROVIDER - NSDCQMMRS_NEU_ALL_CORE
2 - Slight disability. Able to lookafter own affairs without assistance, but unable to carry out all previous activities 0 - No symptoms

## 2021-11-16 NOTE — SWALLOW BEDSIDE ASSESSMENT ADULT - ASR SWALLOW LINGUAL MOBILITY
Impression: Diabetes mellitus Type 2 without mention of complication: G22.9. Plan: Diabetes type II: no background retinopathy, no signs of neovascularization noted. Discussed ocular and systemic benefits of blood sugar control. MAC OCT preformed and reviewed. Return to clinic with Dr. Mahin Real in one year for a diabetic eye exam and MAC OCT. within functional limits

## 2021-11-16 NOTE — DISCHARGE NOTE PROVIDER - NSDCMRMEDTOKEN_GEN_ALL_CORE_FT
losartan 25 mg oral tablet: 1 tab(s) orally once a day  Metoprolol Tartrate 25 mg oral tablet: 0.5 tab(s) orally once a day  ProAir HFA 90 mcg/inh inhalation aerosol: 2 puff(s) inhaled every 6 hours, As Needed   aspirin 325 mg oral delayed release tablet: 1 tab(s) orally once a day  atorvastatin 40 mg oral tablet: 1 tab(s) orally once a day (at bedtime)  cyanocobalamin 1000 mcg oral tablet: 1 tab(s) orally once a day  enoxaparin: 50 milligram(s) subcutaneous 2 times a day  ProAir HFA 90 mcg/inh inhalation aerosol: 2 puff(s) inhaled every 6 hours, As Needed

## 2021-11-16 NOTE — SWALLOW BEDSIDE ASSESSMENT ADULT - ASR SWALLOW REFERRAL
Consider a nutritional consult to ensure adequate daily caloric intake given dysphagia state./Registered Dietitian

## 2021-11-16 NOTE — DISCHARGE NOTE PROVIDER - HOSPITAL COURSE
HPI:  The patient is a 75 y/o F with pmhx significant for HTN, COPD/emphysema, osteoporosis, psoriatic arthritis, MVP, tachycardia hx, gastritis, vertebral fracture, ulcerative colitis, costal chondritis, pre-DM, who presents with dizziness, nausea, and disorientation that began after 8:30am this morning, around which time she was last known well. The patient is confused on exam today. Hx taken from son and  over the phone largely. Per family, patient was not herself today but was at baseline yesterday A&Ox3. The patient was unable to remember family names and the year, president earlier today per family. Per family, the patient called her friend and then  around 11am stating she was feeling lightheaded, lethargic and nausea at this time. When the  arrived home, patient was unsteady with gait, lightheaded and mildly confused. The patient's symptoms failed to improve, pt taken to UC, became more disoriented. Sent to ED for eval. Of note, patient was scheduled for a cardiac catheterization next week. Per family, TTE was done last week outpatient and were told further workup required.    Currently endorsing nausea, pt denies chest pain, vomiting, palpitations, SOB, fevers, chills, dysuria.     IN THE ED:  Temp 97.7F, HR 81, /82, RR 16, SpO2 92%  EKG: no evidence of acute ischemia or infarction  Labs significant for WBC 18.31, troponin 375-> 2509  UA: protein 30, trace blood  Imaging:  - CT c/a/p: no acute findings  - CT head: no acute findings  - CXR: no acute findings   (15 Nov 2021 22:25)      ---  HOSPITAL COURSE:   Patient presented with altered mental status and was admitted to telemetry for further workup of possible CVA and NSTEMI. CT head was negative. MRA head showed widely patent vasculature, MR head showed multiple small foci of diffusion restriction in both hemispheres as well as within the posterior fossa suggesting embolic or thrombotic showing in the anterior and posterior circulation, also chronic ischemic changes in both hemispheres with atrophic change. Patient's cardiac enzymes also continued to trend upwards. Cardiology was consulted. Given the upwards trend of cardiac enzymes and MR head with multiple emboli, there is a large concern for valve vegetation and/or bacteremia. Patient transferred to [place] for further working including JASON and potential cardiac cath. Blood cultures obtained, pending upon transfer. Patient started on full dose lovenox given findings. Patient's home metoprolol was held to allow for permissive hypertension.     Patient seen and examined on day of transfer....    Vital Signs Last 24 Hrs  T(C): 36.6 (16 Nov 2021 10:48), Max: 37.1 (15 Nov 2021 17:50)  T(F): 97.8 (16 Nov 2021 10:48), Max: 98.8 (15 Nov 2021 17:50)  HR: 87 (16 Nov 2021 10:48) (78 - 92)  BP: 161/87 (16 Nov 2021 10:48) (147/83 - 166/71)  BP(mean): --  RR: 17 (16 Nov 2021 10:48) (16 - 20)  SpO2: 92% (16 Nov 2021 10:48) (91% - 99%)    Exam      ---  CONSULTANTS:   Cardio: Dr. Puckett  Neuro: Dr. Zuleta   Pulm: Dr. Howard    ---  TIME SPENT:  I, the attending physician, was physically present for the key portions of the evaluation and management (E/M) service provided. The total amount of time spent reviewing the hospital notes, laboratory values, imaging findings, assessing/counseling the patient, discussing with consultant physicians, social work, nursing staff was -- minutes    ---  Primary care provider was made aware of plan for discharge:      [  ] NO     [  ] YES   HPI:  The patient is a 75 y/o F with pmhx significant for HTN, COPD/emphysema, osteoporosis, psoriatic arthritis, MVP, tachycardia hx, gastritis, vertebral fracture, ulcerative colitis, costal chondritis, pre-DM, who presents with dizziness, nausea, and disorientation that began after 8:30am this morning, around which time she was last known well. The patient is confused on exam today. Hx taken from son and  over the phone largely. Per family, patient was not herself today but was at baseline yesterday A&Ox3. The patient was unable to remember family names and the year, president earlier today per family. Per family, the patient called her friend and then  around 11am stating she was feeling lightheaded, lethargic and nausea at this time. When the  arrived home, patient was unsteady with gait, lightheaded and mildly confused. The patient's symptoms failed to improve, pt taken to UC, became more disoriented. Sent to ED for eval. Of note, patient was scheduled for a cardiac catheterization next week. Per family, TTE was done last week outpatient and were told further workup required.    Currently endorsing nausea, pt denies chest pain, vomiting, palpitations, SOB, fevers, chills, dysuria.     IN THE ED:  Temp 97.7F, HR 81, /82, RR 16, SpO2 92%  EKG: no evidence of acute ischemia or infarction  Labs significant for WBC 18.31, troponin 375-> 2509  UA: protein 30, trace blood  Imaging:  - CT c/a/p: no acute findings  - CT head: no acute findings  - CXR: no acute findings   (15 Nov 2021 22:25)      ---  HOSPITAL COURSE:   Patient presented with altered mental status and was admitted to telemetry for further workup of possible CVA and NSTEMI. CT head was negative. MRA head showed widely patent vasculature, MR head showed multiple small foci of diffusion restriction in both hemispheres as well as within the posterior fossa suggesting embolic or thrombotic showing in the anterior and posterior circulation, also chronic ischemic changes in both hemispheres with atrophic change. Patient's cardiac enzymes also continued to trend upwards. Cardiology was consulted. Given the upwards trend of cardiac enzymes and MR head with multiple emboli, there is a large concern for valve vegetation and/or bacteremia. Patient transferred to [place] for further working including JASON and potential cardiac cath. Blood cultures obtained, pending upon transfer. Patient started on full dose lovenox given findings. Patient's home metoprolol was held to allow for permissive hypertension.     Patient seen and examined on day of transfer. Patient denies palpitations, chest pain, SOB, lightheadedness, dizziness, calf pain, leg swelling.    Vital Signs Last 24 Hrs  T(C): 36.6 (16 Nov 2021 10:48), Max: 37.1 (15 Nov 2021 17:50)  T(F): 97.8 (16 Nov 2021 10:48), Max: 98.8 (15 Nov 2021 17:50)  HR: 87 (16 Nov 2021 10:48) (78 - 92)  BP: 161/87 (16 Nov 2021 10:48) (147/83 - 166/71)  BP(mean): --  RR: 17 (16 Nov 2021 10:48) (16 - 20)  SpO2: 92% (16 Nov 2021 10:48) (91% - 99%)    Physical Exam:  GENERAL: NAD  HEENT:  EOMI, moist mucous membranes  CHEST/LUNG:  CTA b/l, no rales, wheezes, or rhonchi  HEART:  RRR, aortic stenosis murmur, physiologic S1 and S2  ABDOMEN:  BS+, soft, nontender, nondistended  EXTREMITIES: no obvious ecchymoses or petechiae. trace b/l LE edema  NERVOUS SYSTEM: AA&Ox3. sensation and motor strength grossly intact.      ---  CONSULTANTS:   Cardio: Dr. Puckett  Neuro: Dr. Zuleta   Pulm: Dr. Howard    ---  TIME SPENT:  I, the attending physician, was physically present for the key portions of the evaluation and management (E/M) service provided. The total amount of time spent reviewing the hospital notes, laboratory values, imaging findings, assessing/counseling the patient, discussing with consultant physicians, social work, nursing staff was -- minutes    ---  Primary care provider was made aware of plan for discharge:      [  ] NO     [  ] YES   HPI:  The patient is a 77 y/o F with pmhx significant for HTN, COPD/emphysema, osteoporosis, psoriatic arthritis, MVP, tachycardia hx, gastritis, vertebral fracture, ulcerative colitis, costal chondritis, pre-DM, who presents with dizziness, nausea, and disorientation that began after 8:30am this morning, around which time she was last known well. The patient is confused on exam today. Hx taken from son and  over the phone largely. Per family, patient was not herself today but was at baseline yesterday A&Ox3. The patient was unable to remember family names and the year, president earlier today per family. Per family, the patient called her friend and then  around 11am stating she was feeling lightheaded, lethargic and nausea at this time. When the  arrived home, patient was unsteady with gait, lightheaded and mildly confused. The patient's symptoms failed to improve, pt taken to UC, became more disoriented. Sent to ED for eval. Of note, patient was scheduled for a cardiac catheterization next week. Per family, TTE was done last week outpatient and were told further workup required.    Currently endorsing nausea, pt denies chest pain, vomiting, palpitations, SOB, fevers, chills, dysuria.     IN THE ED:  Temp 97.7F, HR 81, /82, RR 16, SpO2 92%  EKG: no evidence of acute ischemia or infarction  Labs significant for WBC 18.31, troponin 375-> 2509  UA: protein 30, trace blood  Imaging:  - CT c/a/p: no acute findings  - CT head: no acute findings  - CXR: no acute findings   (15 Nov 2021 22:25)      ---  HOSPITAL COURSE:   Patient presented with altered mental status and was admitted to telemetry for further workup of possible CVA and NSTEMI. CT head was negative. MRA head showed widely patent vasculature, MR head showed multiple small foci of diffusion restriction in both hemispheres as well as within the posterior fossa suggesting embolic or thrombotic showing in the anterior and posterior circulation, also chronic ischemic changes in both hemispheres with atrophic change. Patient's cardiac enzymes also continued to trend upwards. Cardiology was consulted. Given the upwards trend of cardiac enzymes and MR head with multiple emboli, there is a large concern for valve vegetation and/or bacteremia. Patient transferred to [place] for further working including JASON and potential cardiac cath. Blood cultures obtained, pending upon transfer. Patient started on full dose lovenox given findings. Patient's home metoprolol was held to allow for permissive hypertension.     Patient seen and examined on day of transfer. Patient denies palpitations, chest pain, SOB, lightheadedness, dizziness, calf pain, leg swelling.    Vital Signs Last 24 Hrs  T(C): 37.1 (17 Nov 2021 04:41), Max: 37.1 (17 Nov 2021 04:41)  T(F): 98.7 (17 Nov 2021 04:41), Max: 98.7 (17 Nov 2021 04:41)  HR: 79 (17 Nov 2021 04:41) (79 - 88)  BP: 159/98 (17 Nov 2021 04:41) (138/85 - 161/87)  BP(mean): --  RR: 18 (17 Nov 2021 04:41) (17 - 18)  SpO2: 90% (17 Nov 2021 04:41) (90% - 93%)    PHYSICAL EXAM:  GENERAL: NAD at rest  HEENT:  anicteric, moist mucous membranes  CHEST/LUNG:  CTA b/l, no rales, wheezes, or rhonchi  HEART:  RRR, S1, S2, +systolic murmur  ABDOMEN:  BS+, soft, nontender, nondistended  EXTREMITIES: trace edema, no cyanosis or calf tenderness  NERVOUS SYSTEM: answers questions and follows commands appropriately ; strength 5/5 in all 4 extremities; sensation to light touch intact; CN 2-12 intact    Patient optimized from medical standpoint for transfer to [facility] for further evaluation.       ---  CONSULTANTS:   Cardio: Dr. Puckett  Neuro: Dr. Zuleta   Pulm: Dr. Howard    ---  TIME SPENT:  I, the attending physician, was physically present for the key portions of the evaluation and management (E/M) service provided. The total amount of time spent reviewing the hospital notes, laboratory values, imaging findings, assessing/counseling the patient, discussing with consultant physicians, social work, nursing staff was -- minutes    ---     HPI:  The patient is a 75 y/o F with pmhx significant for HTN, COPD/emphysema, osteoporosis, psoriatic arthritis, MVP, tachycardia hx, gastritis, vertebral fracture, ulcerative colitis, costal chondritis, pre-DM, who presents with dizziness, nausea, and disorientation that began after 8:30am this morning, around which time she was last known well. The patient is confused on exam today. Hx taken from son and  over the phone largely. Per family, patient was not herself today but was at baseline yesterday A&Ox3. The patient was unable to remember family names and the year, president earlier today per family. Per family, the patient called her friend and then  around 11am stating she was feeling lightheaded, lethargic and nausea at this time. When the  arrived home, patient was unsteady with gait, lightheaded and mildly confused. The patient's symptoms failed to improve, pt taken to UC, became more disoriented. Sent to ED for eval. Of note, patient was scheduled for a cardiac catheterization next week. Per family, TTE was done last week outpatient and were told further workup required.    Currently endorsing nausea, pt denies chest pain, vomiting, palpitations, SOB, fevers, chills, dysuria.     IN THE ED:  Temp 97.7F, HR 81, /82, RR 16, SpO2 92%  EKG: no evidence of acute ischemia or infarction  Labs significant for WBC 18.31, troponin 375-> 2509  UA: protein 30, trace blood  Imaging:  - CT c/a/p: no acute findings  - CT head: no acute findings  - CXR: no acute findings   (15 Nov 2021 22:25)      ---  HOSPITAL COURSE:   Patient presented with altered mental status and was admitted to telemetry for further workup of possible CVA and NSTEMI. CT head was negative. MRA head showed widely patent vasculature, MR head showed multiple small foci of diffusion restriction in both hemispheres as well as within the posterior fossa suggesting embolic or thrombotic showing in the anterior and posterior circulation, also chronic ischemic changes in both hemispheres with atrophic change. MRA neck did not have significant stenosis. Neuro (Song) consulted on the case. Patient's cardiac enzymes also trended upwards with peak HStroponin/CK/CKMB of 6623/473/47.5 before starting to improve. Pt did not have CP and EKG did not have significant ischemic changes. Cardiology (Americo) consulted on the case and rec treating as NSTEMI (while also covering possible cardiac thrombus) with full dose lovenox and ASA (325 per neuro recs) and plavix held off by cardio because of concern for hemorrhagic conversion of pt's multiple strokes (albeit small). Of note, pt has no hx of afib or aflutter and was in SR on tele during this hospitalization. Given suspicion for embolic CVA, pt recommended for JASON and was arranged for transfer to [facility] for JASON and potential subsequent cardiac cath for NSTEMI. Pt had leukocytosis on admission that downtrended. Pt was afebrile, had no reported fever, chills. Blood cultures obtained as there is possibility of endocarditis causing embolic CVA. Given lack of fever and improving leukocytosis, antibiotics were deferred pending culture results. Patient's home metoprolol was held to allow for permissive hypertension.     Patient seen and examined on day of transfer. Patient denies palpitations, chest pain, SOB, lightheadedness, dizziness, calf pain, leg swelling.    Vital Signs Last 24 Hrs  T(C): 37.1 (17 Nov 2021 04:41), Max: 37.1 (17 Nov 2021 04:41)  T(F): 98.7 (17 Nov 2021 04:41), Max: 98.7 (17 Nov 2021 04:41)  HR: 79 (17 Nov 2021 04:41) (79 - 88)  BP: 159/98 (17 Nov 2021 04:41) (138/85 - 161/87)  BP(mean): --  RR: 18 (17 Nov 2021 04:41) (17 - 18)  SpO2: 90% (17 Nov 2021 04:41) (90% - 93%)    PHYSICAL EXAM:  GENERAL: NAD at rest  HEENT:  anicteric, moist mucous membranes  CHEST/LUNG:  CTA b/l, no rales, wheezes, or rhonchi  HEART:  RRR, S1, S2, +systolic murmur  ABDOMEN:  BS+, soft, nontender, nondistended  EXTREMITIES: trace edema, no cyanosis or calf tenderness  NERVOUS SYSTEM: answers questions and follows commands appropriately ; strength 5/5 in all 4 extremities; sensation to light touch intact; CN 2-12 intact    Patient optimized from medical standpoint for transfer to [facility] for further evaluation.       ---  CONSULTANTS:   Cardio: Dr. Puckett  Neuro: Dr. Zuleta   Pulm: Dr. Howard    ---  TIME SPENT:  I, the attending physician, was physically present for the key portions of the evaluation and management (E/M) service provided. The total amount of time spent reviewing the hospital notes, laboratory values, imaging findings, assessing/counseling the patient, discussing with consultant physicians, social work, nursing staff was -- minutes    ---     HPI:  The patient is a 77 y/o F with pmhx significant for HTN, COPD/emphysema, osteoporosis, psoriatic arthritis, MVP, tachycardia hx, gastritis, vertebral fracture, ulcerative colitis, costal chondritis, pre-DM, who presents with dizziness, nausea, and disorientation that began after 8:30am this morning, around which time she was last known well. The patient is confused on exam today. Hx taken from son and  over the phone largely. Per family, patient was not herself today but was at baseline yesterday A&Ox3. The patient was unable to remember family names and the year, president earlier today per family. Per family, the patient called her friend and then  around 11am stating she was feeling lightheaded, lethargic and nausea at this time. When the  arrived home, patient was unsteady with gait, lightheaded and mildly confused. The patient's symptoms failed to improve, pt taken to UC, became more disoriented. Sent to ED for eval. Of note, patient was scheduled for a cardiac catheterization next week. Per family, TTE was done last week outpatient and were told further workup required.    Currently endorsing nausea, pt denies chest pain, vomiting, palpitations, SOB, fevers, chills, dysuria.     IN THE ED:  Temp 97.7F, HR 81, /82, RR 16, SpO2 92%  EKG: no evidence of acute ischemia or infarction  Labs significant for WBC 18.31, troponin 375-> 2509  UA: protein 30, trace blood  Imaging:  - CT c/a/p: no acute findings  - CT head: no acute findings  - CXR: no acute findings   (15 Nov 2021 22:25)      ---  HOSPITAL COURSE:   Patient presented with altered mental status and was admitted to telemetry for further workup of suspected CVA and NSTEMI. CT head was negative. MRA head showed widely patent vasculature. MR head showed multiple small foci of diffusion restriction in both hemispheres as well as within the posterior fossa suggesting embolic or thrombotic showing in the anterior and posterior circulation, also chronic ischemic changes in both hemispheres with atrophic change. MRA neck did not have significant stenosis. Neuro (Song) consulted on the case. Patient's cardiac enzymes also trended upwards with peak HStroponin/CK/CKMB of 6623/473/47.5 before starting to improve. Pt did not have CP and EKG did not have significant ischemic changes. Cardiology (Americo) consulted on the case and rec treating as NSTEMI (while also covering possible cardiac thrombus) with lipitor, full dose lovenox and ASA (325 per neuro recs) and plavix held off by cardio because of concern for hemorrhagic conversion of pt's multiple strokes (albeit small). Of note, pt has no hx of afib or aflutter and was in SR on tele during this hospitalization. Given suspicion for embolic CVA, pt recommended for JASON and was arranged for transfer to [facility] for JASON and potential subsequent cardiac cath for NSTEMI. Pulm (Lee) also consulted on the case. Pt had leukocytosis on admission that downtrended. Pt was afebrile, had no reported fever, chills. Blood cultures obtained as there is possibility of endocarditis causing embolic CVA. Given lack of fever and improving leukocytosis, antibiotics were deferred pending culture results. Patient's anti-hypertensives were held to allow for permissive hypertension. B12 was low normal so pt started on B12 supplement. Pt had no sensory or motor deficits on exam. Pt felt well and mental status was improving though still not baseline per pt's family.     Patient seen and examined on day of transfer. Patient denies palpitations, chest pain, SOB, lightheadedness, dizziness, calf pain, leg swelling.    Vital Signs Last 24 Hrs  T(C): 37.1 (17 Nov 2021 04:41), Max: 37.1 (17 Nov 2021 04:41)  T(F): 98.7 (17 Nov 2021 04:41), Max: 98.7 (17 Nov 2021 04:41)  HR: 79 (17 Nov 2021 04:41) (79 - 88)  BP: 159/98 (17 Nov 2021 04:41) (138/85 - 161/87)  BP(mean): --  RR: 18 (17 Nov 2021 04:41) (17 - 18)  SpO2: 90% (17 Nov 2021 04:41) (90% - 93%)    PHYSICAL EXAM:  GENERAL: NAD at rest  HEENT:  anicteric, moist mucous membranes  CHEST/LUNG:  CTA b/l, no rales, wheezes, or rhonchi  HEART:  RRR, S1, S2, +systolic murmur  ABDOMEN:  BS+, soft, nontender, nondistended  EXTREMITIES: trace edema, no cyanosis or calf tenderness  NERVOUS SYSTEM: answers questions and follows commands appropriately ; strength 5/5 in all 4 extremities; sensation to light touch intact; CN 2-12 intact    Patient optimized from medical standpoint for transfer to [facility] for further evaluation.       ---  CONSULTANTS:   Cardio: Dr. Driscoll  Neuro: Dr. Zuleta   Pulm: Dr. Howard    ---  TIME SPENT: 40min   HPI:  The patient is a 77 y/o F with pmhx significant for HTN, COPD/emphysema, osteoporosis, psoriatic arthritis, MVP, tachycardia hx, gastritis, vertebral fracture, ulcerative colitis, costal chondritis, pre-DM, who presents with dizziness, nausea, and disorientation that began after 8:30am this morning, around which time she was last known well. The patient is confused on exam today. Hx taken from son and  over the phone largely. Per family, patient was not herself today but was at baseline yesterday A&Ox3. The patient was unable to remember family names and the year, president earlier today per family. Per family, the patient called her friend and then  around 11am stating she was feeling lightheaded, lethargic and nausea at this time. When the  arrived home, patient was unsteady with gait, lightheaded and mildly confused. The patient's symptoms failed to improve, pt taken to UC, became more disoriented. Sent to ED for eval. Of note, patient was scheduled for a cardiac catheterization next week. Per family, TTE was done last week outpatient and were told further workup required.    Currently endorsing nausea, pt denies chest pain, vomiting, palpitations, SOB, fevers, chills, dysuria.     IN THE ED:  Temp 97.7F, HR 81, /82, RR 16, SpO2 92%  EKG: no evidence of acute ischemia or infarction  Labs significant for WBC 18.31, troponin 375-> 2509  UA: protein 30, trace blood  Imaging:  - CT c/a/p: no acute findings  - CT head: no acute findings  - CXR: no acute findings   (15 Nov 2021 22:25)      ---  HOSPITAL COURSE:   Patient presented with altered mental status and was admitted to telemetry for further workup of suspected CVA and NSTEMI. CT head was negative. MRA head showed widely patent vasculature. MR head showed multiple small foci of diffusion restriction in both hemispheres as well as within the posterior fossa suggesting embolic or thrombotic showing in the anterior and posterior circulation, also chronic ischemic changes in both hemispheres with atrophic change. MRA neck did not have significant stenosis. Neuro (Song) consulted on the case. Patient's cardiac enzymes also trended upwards with peak HStroponin/CK/CKMB of 6623/473/47.5 before starting to improve. Pt did not have CP and EKG did not have significant ischemic changes. Cardiology (Americo) consulted on the case and rec treating as NSTEMI (while also covering possible cardiac thrombus) with lipitor, full dose lovenox and ASA (325 per neuro recs) and plavix held off by cardio because of concern for hemorrhagic conversion of pt's multiple strokes (albeit small). Of note, pt has no hx of afib or aflutter and was in SR on tele during this hospitalization. Given suspicion for embolic CVA, pt recommended for JASON and was arranged for transfer to [facility] for JASON and potential subsequent cardiac cath for NSTEMI. Pulm (Lee) also consulted on the case. Pt had leukocytosis on admission that downtrended. Pt was afebrile, had no reported fever, chills. Blood cultures obtained as there is possibility of endocarditis causing embolic CVA. Given lack of fever and improving leukocytosis, antibiotics were deferred pending culture results. Patient's anti-hypertensives were held to allow for permissive hypertension. B12 was low normal so pt started on B12 supplement. Pt had no sensory or motor deficits on exam. Pt felt well and mental status was improving though still not baseline per pt's family.     Patient seen and examined on day of transfer. Patient denies palpitations, chest pain, SOB, lightheadedness, dizziness, calf pain, leg swelling.    Vital Signs Last 24 Hrs  T(C): 36.6 (18 Nov 2021 10:44), Max: 36.7 (17 Nov 2021 20:07)  T(F): 97.9 (18 Nov 2021 10:44), Max: 98.1 (17 Nov 2021 20:07)  HR: 88 (18 Nov 2021 10:44) (73 - 88)  BP: 154/82 (18 Nov 2021 10:44) (154/82 - 166/93)  BP(mean): --  RR: 18 (18 Nov 2021 10:44) (18 - 19)  SpO2: 93% (18 Nov 2021 10:44) (91% - 93%)    PHYSICAL EXAM:  GENERAL: NAD at rest  HEENT:  anicteric, moist mucous membranes  CHEST/LUNG:  CTA b/l, no rales, wheezes, or rhonchi  HEART:  RRR, S1, S2, +systolic murmur  ABDOMEN:  BS+, soft, nontender, nondistended  EXTREMITIES: trace edema, no cyanosis or calf tenderness  NERVOUS SYSTEM: answers questions and follows commands appropriately ; strength 5/5 in all 4 extremities; sensation to light touch intact; CN 2-12 intact    Patient optimized from medical standpoint for transfer to [facility] for further evaluation.       ---  CONSULTANTS:   Cardio: Dr. Driscoll  Neuro: Dr. Zuleta   Pulm: Dr. Hoawrd    ---  TIME SPENT: 40min

## 2021-11-17 LAB
A1C WITH ESTIMATED AVERAGE GLUCOSE RESULT: 6.2 % — HIGH (ref 4–5.6)
ALBUMIN SERPL ELPH-MCNC: 2.8 G/DL — LOW (ref 3.3–5)
ALP SERPL-CCNC: 94 U/L — SIGNIFICANT CHANGE UP (ref 40–120)
ALT FLD-CCNC: 21 U/L — SIGNIFICANT CHANGE UP (ref 12–78)
ANION GAP SERPL CALC-SCNC: 5 MMOL/L — SIGNIFICANT CHANGE UP (ref 5–17)
AST SERPL-CCNC: 57 U/L — HIGH (ref 15–37)
BASOPHILS # BLD AUTO: 0.03 K/UL — SIGNIFICANT CHANGE UP (ref 0–0.2)
BASOPHILS NFR BLD AUTO: 0.2 % — SIGNIFICANT CHANGE UP (ref 0–2)
BILIRUB SERPL-MCNC: 0.4 MG/DL — SIGNIFICANT CHANGE UP (ref 0.2–1.2)
BUN SERPL-MCNC: 11 MG/DL — SIGNIFICANT CHANGE UP (ref 7–23)
CALCIUM SERPL-MCNC: 9.3 MG/DL — SIGNIFICANT CHANGE UP (ref 8.5–10.1)
CHLORIDE SERPL-SCNC: 107 MMOL/L — SIGNIFICANT CHANGE UP (ref 96–108)
CHOLEST SERPL-MCNC: 186 MG/DL — SIGNIFICANT CHANGE UP
CO2 SERPL-SCNC: 30 MMOL/L — SIGNIFICANT CHANGE UP (ref 22–31)
CREAT SERPL-MCNC: 0.63 MG/DL — SIGNIFICANT CHANGE UP (ref 0.5–1.3)
CULTURE RESULTS: NO GROWTH — SIGNIFICANT CHANGE UP
EOSINOPHIL # BLD AUTO: 0.1 K/UL — SIGNIFICANT CHANGE UP (ref 0–0.5)
EOSINOPHIL NFR BLD AUTO: 0.7 % — SIGNIFICANT CHANGE UP (ref 0–6)
ESTIMATED AVERAGE GLUCOSE: 131 MG/DL — HIGH (ref 68–114)
GLUCOSE SERPL-MCNC: 97 MG/DL — SIGNIFICANT CHANGE UP (ref 70–99)
HCT VFR BLD CALC: 38.8 % — SIGNIFICANT CHANGE UP (ref 34.5–45)
HDLC SERPL-MCNC: 72 MG/DL — SIGNIFICANT CHANGE UP
HGB BLD-MCNC: 11.8 G/DL — SIGNIFICANT CHANGE UP (ref 11.5–15.5)
IMM GRANULOCYTES NFR BLD AUTO: 0.6 % — SIGNIFICANT CHANGE UP (ref 0–1.5)
LIPID PNL WITH DIRECT LDL SERPL: 92 MG/DL — SIGNIFICANT CHANGE UP
LYMPHOCYTES # BLD AUTO: 18.9 % — SIGNIFICANT CHANGE UP (ref 13–44)
LYMPHOCYTES # BLD AUTO: 2.67 K/UL — SIGNIFICANT CHANGE UP (ref 1–3.3)
MAGNESIUM SERPL-MCNC: 2.3 MG/DL — SIGNIFICANT CHANGE UP (ref 1.6–2.6)
MCHC RBC-ENTMCNC: 25.8 PG — LOW (ref 27–34)
MCHC RBC-ENTMCNC: 30.4 GM/DL — LOW (ref 32–36)
MCV RBC AUTO: 84.7 FL — SIGNIFICANT CHANGE UP (ref 80–100)
MONOCYTES # BLD AUTO: 0.83 K/UL — SIGNIFICANT CHANGE UP (ref 0–0.9)
MONOCYTES NFR BLD AUTO: 5.9 % — SIGNIFICANT CHANGE UP (ref 2–14)
NEUTROPHILS # BLD AUTO: 10.41 K/UL — HIGH (ref 1.8–7.4)
NEUTROPHILS NFR BLD AUTO: 73.7 % — SIGNIFICANT CHANGE UP (ref 43–77)
NON HDL CHOLESTEROL: 113 MG/DL — SIGNIFICANT CHANGE UP
NRBC # BLD: 0 /100 WBCS — SIGNIFICANT CHANGE UP (ref 0–0)
PHOSPHATE SERPL-MCNC: 3.5 MG/DL — SIGNIFICANT CHANGE UP (ref 2.5–4.5)
PLATELET # BLD AUTO: 345 K/UL — SIGNIFICANT CHANGE UP (ref 150–400)
POTASSIUM SERPL-MCNC: 4.5 MMOL/L — SIGNIFICANT CHANGE UP (ref 3.5–5.3)
POTASSIUM SERPL-SCNC: 4.5 MMOL/L — SIGNIFICANT CHANGE UP (ref 3.5–5.3)
PROCALCITONIN SERPL-MCNC: <0.05 — SIGNIFICANT CHANGE UP (ref 0–0.04)
PROT SERPL-MCNC: 6.9 G/DL — SIGNIFICANT CHANGE UP (ref 6–8.3)
RBC # BLD: 4.58 M/UL — SIGNIFICANT CHANGE UP (ref 3.8–5.2)
RBC # FLD: 14.6 % — HIGH (ref 10.3–14.5)
SODIUM SERPL-SCNC: 142 MMOL/L — SIGNIFICANT CHANGE UP (ref 135–145)
SPECIMEN SOURCE: SIGNIFICANT CHANGE UP
TRIGL SERPL-MCNC: 106 MG/DL — SIGNIFICANT CHANGE UP
WBC # BLD: 14.13 K/UL — HIGH (ref 3.8–10.5)
WBC # FLD AUTO: 14.13 K/UL — HIGH (ref 3.8–10.5)

## 2021-11-17 PROCEDURE — 99233 SBSQ HOSP IP/OBS HIGH 50: CPT | Mod: GC

## 2021-11-17 RX ORDER — ASPIRIN/CALCIUM CARB/MAGNESIUM 324 MG
1 TABLET ORAL
Qty: 0 | Refills: 0 | DISCHARGE
Start: 2021-11-17

## 2021-11-17 RX ORDER — ATORVASTATIN CALCIUM 80 MG/1
1 TABLET, FILM COATED ORAL
Qty: 0 | Refills: 0 | DISCHARGE
Start: 2021-11-17

## 2021-11-17 RX ORDER — METOPROLOL TARTRATE 50 MG
0.5 TABLET ORAL
Qty: 0 | Refills: 0 | DISCHARGE

## 2021-11-17 RX ORDER — PREGABALIN 225 MG/1
1 CAPSULE ORAL
Qty: 0 | Refills: 0 | DISCHARGE
Start: 2021-11-17

## 2021-11-17 RX ORDER — ENOXAPARIN SODIUM 100 MG/ML
50 INJECTION SUBCUTANEOUS
Qty: 0 | Refills: 0 | DISCHARGE
Start: 2021-11-17

## 2021-11-17 RX ORDER — LOSARTAN POTASSIUM 100 MG/1
1 TABLET, FILM COATED ORAL
Qty: 0 | Refills: 0 | DISCHARGE

## 2021-11-17 RX ADMIN — ENOXAPARIN SODIUM 50 MILLIGRAM(S): 100 INJECTION SUBCUTANEOUS at 17:21

## 2021-11-17 RX ADMIN — Medication 325 MILLIGRAM(S): at 11:01

## 2021-11-17 RX ADMIN — PREGABALIN 1000 MICROGRAM(S): 225 CAPSULE ORAL at 11:01

## 2021-11-17 RX ADMIN — ENOXAPARIN SODIUM 50 MILLIGRAM(S): 100 INJECTION SUBCUTANEOUS at 05:24

## 2021-11-17 NOTE — PROGRESS NOTE ADULT - SUBJECTIVE AND OBJECTIVE BOX
Neurology follow up note    GABRIELA [id number]      Interval History:    Patient feels ok no new complaints.    Allergies    caffeine (Blisters)  Gluten (Unknown)  penicillins (Hives)    Intolerances    lactose (Vomiting)      MEDICATIONS    ALBUTerol    90 MICROgram(s) HFA Inhaler 2 Puff(s) Inhalation every 6 hours PRN  aspirin enteric coated 325 milliGRAM(s) Oral daily  atorvastatin 40 milliGRAM(s) Oral at bedtime  cyanocobalamin 1000 MICROGram(s) Oral daily  enoxaparin Injectable 50 milliGRAM(s) SubCutaneous two times a day            Weight (kg): 47.6 (11-16 @ 11:49)    Vital Signs Last 24 Hrs  T(C): 37.1 (2021 04:41), Max: 37.1 (2021 04:41)  T(F): 98.7 (2021 04:41), Max: 98.7 (2021 04:41)  HR: 79 (2021 04:41) (79 - 88)  BP: 159/98 (2021 04:41) (138/85 - 161/87)  BP(mean): --  RR: 18 (2021 04:41) (17 - 18)  SpO2: 90% (2021 04:41) (90% - 93%)    REVIEW OF SYSTEMS:  Constitutional:  At present, no fever, chills, or night sweats.  Head:  No headaches.  Eyes:  No double vision or blurry vision.  Ears:  No ringing in the ears.  Neck:  No neck pain.  Respiratory:  No shortness of breath.  Cardiovascular:  No chest pain.  Abdomen:  No nausea, vomiting, or abdominal pain.  Extremities/Neurological:  No numbness or tingling.  Musculoskeletal:  No joint pain.    PHYSICAL EXAMINATION:  HEENT:  Head:  Normocephalic, atraumatic.  Eyes:  No scleral icterus.  Ears:  Hearing bilaterally intact.  NECK:  Supple.  RESPIRATORY:  Good air entry bilaterally.  CARDIOVASCULAR:  S1 and S2 heard.  ABDOMEN:  Soft and nontender.  EXTREMITIES:  No clubbing or cyanosis was noted.      NEUROLOGIC:  The patient is awake and alert.  Able to say correct age and month.  Location was hospital, year was . Full visual fields.  Speech was fluent.  Smile was symmetric.  Motor:  Bilateral upper were 5/5, bilateral lower were 4+/5.  Finger-to-nose within normal limits.  Heel-knee-to-shin within normal limits.  No drift.                LABS:  CBC Full  -  ( 2021 07:19 )  WBC Count : 14.13 K/uL  RBC Count : 4.58 M/uL  Hemoglobin : 11.8 g/dL  Hematocrit : 38.8 %  Platelet Count - Automated : 345 K/uL  Mean Cell Volume : 84.7 fl  Mean Cell Hemoglobin : 25.8 pg  Mean Cell Hemoglobin Concentration : 30.4 gm/dL  Auto Neutrophil # : 10.41 K/uL  Auto Lymphocyte # : 2.67 K/uL  Auto Monocyte # : 0.83 K/uL  Auto Eosinophil # : 0.10 K/uL  Auto Basophil # : 0.03 K/uL  Auto Neutrophil % : 73.7 %  Auto Lymphocyte % : 18.9 %  Auto Monocyte % : 5.9 %  Auto Eosinophil % : 0.7 %  Auto Basophil % : 0.2 %    Urinalysis Basic - ( 15 Nov 2021 16:44 )    Color: Yellow / Appearance: Clear / S.015 / pH: x  Gluc: x / Ketone: Negative  / Bili: Negative / Urobili: Negative mg/dL   Blood: x / Protein: 30 mg/dL / Nitrite: Negative   Leuk Esterase: Negative / RBC: 0-2 /HPF / WBC 0-2   Sq Epi: x / Non Sq Epi: Negative / Bacteria: Negative          142  |  107  |  11  ----------------------------<  97  4.5   |  30  |  0.63    Ca    9.3      2021 07:19  Phos  3.5       Mg     2.3         TPro  6.9  /  Alb  2.8<L>  /  TBili  0.4  /  DBili  x   /  AST  57<H>  /  ALT  21  /  AlkPhos  94      Hemoglobin A1C:   Lipid Panel  @ 11:24  Total Cholesterol, Serum 185  LDL --  Triglycerides 86    LIVER FUNCTIONS - ( 2021 07:19 )  Alb: 2.8 g/dL / Pro: 6.9 g/dL / ALK PHOS: 94 U/L / ALT: 21 U/L / AST: 57 U/L / GGT: x           Vitamin B12 Vitamin B12, Serum: 386 pg/mL ( @ 11:23)    PT/INR - ( 15 Nov 2021 16:20 )   PT: 13.6 sec;   INR: 1.13 ratio         PTT - ( 15 Nov 2021 16:20 )  PTT:32.5 sec      RADIOLOGY    ANALYSIS AND PLAN:  This is a 76-year-old with an episode of disorientation and ataxia.  For episode of disorientation and ataxia multiple areas of cerebrovascular accident, suspect possibly cardiac in nature.  The patient should undergo JASON.  The patient does have an elevated white blood cell count workup as needed.  We will place the patient on aspirin 325 once a day for now.  We will place the patient on high-dose statin.  Cardiology follow-up.  Telemetry evaluation to evaluate for underlying arrhythmia.  The patient may need to undergo a loop recorder as an outpatient.  Physical Therapy evaluation.  Fall precautions.  avoid hypotension if possible   full dose AC as per cardiology and medical     Spoke with the spouse and the son in great detail.  The spouse's name is Jordan, telephone number is [phone].  Alternate number is [phone]  called today no response     Greater than 45 minutes of time was spent with the patient, plan of care, reviewing data, with greater than 50% of the visit was spent counseling and/or coordinating care with multidisciplinary healthcare team   Neurology follow up note    GABRIELA [id number]      Interval History:    Patient feels ok no new complaints.    Allergies    caffeine (Blisters)  Gluten (Unknown)  penicillins (Hives)    Intolerances    lactose (Vomiting)      MEDICATIONS    ALBUTerol    90 MICROgram(s) HFA Inhaler 2 Puff(s) Inhalation every 6 hours PRN  aspirin enteric coated 325 milliGRAM(s) Oral daily  atorvastatin 40 milliGRAM(s) Oral at bedtime  cyanocobalamin 1000 MICROGram(s) Oral daily  enoxaparin Injectable 50 milliGRAM(s) SubCutaneous two times a day            Weight (kg): 47.6 (11-16 @ 11:49)    Vital Signs Last 24 Hrs  T(C): 37.1 (2021 04:41), Max: 37.1 (2021 04:41)  T(F): 98.7 (2021 04:41), Max: 98.7 (2021 04:41)  HR: 79 (2021 04:41) (79 - 88)  BP: 159/98 (2021 04:41) (138/85 - 161/87)  BP(mean): --  RR: 18 (2021 04:41) (17 - 18)  SpO2: 90% (2021 04:41) (90% - 93%)    REVIEW OF SYSTEMS:  Constitutional:  At present, no fever, chills, or night sweats.  Head:  No headaches.  Eyes:  No double vision or blurry vision.  Ears:  No ringing in the ears.  Neck:  No neck pain.  Respiratory:  No shortness of breath.  Cardiovascular:  No chest pain.  Abdomen:  No nausea, vomiting, or abdominal pain.  Extremities/Neurological:  No numbness or tingling.  Musculoskeletal:  No joint pain.    PHYSICAL EXAMINATION:  HEENT:  Head:  Normocephalic, atraumatic.  Eyes:  No scleral icterus.  Ears:  Hearing bilaterally intact.  NECK:  Supple.  RESPIRATORY:  Good air entry bilaterally.  CARDIOVASCULAR:  S1 and S2 heard.  ABDOMEN:  Soft and nontender.  EXTREMITIES:  No clubbing or cyanosis was noted.      NEUROLOGIC:  The patient is awake and alert.  Able to say correct age and month.  Location was hospital, year was . Full visual fields.  Speech was fluent.  Smile was symmetric.  Motor:  Bilateral upper were 5/5, bilateral lower were 4+/5.  Finger-to-nose within normal limits.  Heel-knee-to-shin within normal limits.  No drift.                LABS:  CBC Full  -  ( 2021 07:19 )  WBC Count : 14.13 K/uL  RBC Count : 4.58 M/uL  Hemoglobin : 11.8 g/dL  Hematocrit : 38.8 %  Platelet Count - Automated : 345 K/uL  Mean Cell Volume : 84.7 fl  Mean Cell Hemoglobin : 25.8 pg  Mean Cell Hemoglobin Concentration : 30.4 gm/dL  Auto Neutrophil # : 10.41 K/uL  Auto Lymphocyte # : 2.67 K/uL  Auto Monocyte # : 0.83 K/uL  Auto Eosinophil # : 0.10 K/uL  Auto Basophil # : 0.03 K/uL  Auto Neutrophil % : 73.7 %  Auto Lymphocyte % : 18.9 %  Auto Monocyte % : 5.9 %  Auto Eosinophil % : 0.7 %  Auto Basophil % : 0.2 %    Urinalysis Basic - ( 15 Nov 2021 16:44 )    Color: Yellow / Appearance: Clear / S.015 / pH: x  Gluc: x / Ketone: Negative  / Bili: Negative / Urobili: Negative mg/dL   Blood: x / Protein: 30 mg/dL / Nitrite: Negative   Leuk Esterase: Negative / RBC: 0-2 /HPF / WBC 0-2   Sq Epi: x / Non Sq Epi: Negative / Bacteria: Negative          142  |  107  |  11  ----------------------------<  97  4.5   |  30  |  0.63    Ca    9.3      2021 07:19  Phos  3.5       Mg     2.3         TPro  6.9  /  Alb  2.8<L>  /  TBili  0.4  /  DBili  x   /  AST  57<H>  /  ALT  21  /  AlkPhos  94      Hemoglobin A1C:   Lipid Panel  @ 11:24  Total Cholesterol, Serum 185  LDL --  Triglycerides 86    LIVER FUNCTIONS - ( 2021 07:19 )  Alb: 2.8 g/dL / Pro: 6.9 g/dL / ALK PHOS: 94 U/L / ALT: 21 U/L / AST: 57 U/L / GGT: x           Vitamin B12 Vitamin B12, Serum: 386 pg/mL ( @ 11:23)    PT/INR - ( 15 Nov 2021 16:20 )   PT: 13.6 sec;   INR: 1.13 ratio         PTT - ( 15 Nov 2021 16:20 )  PTT:32.5 sec      RADIOLOGY    ANALYSIS AND PLAN:  This is a 76-year-old with an episode of disorientation and ataxia.  For episode of disorientation and ataxia multiple areas of cerebrovascular accident, suspect possibly cardiac in nature.  The patient should undergo JASON.  The patient does have an elevated white blood cell count workup as needed.  We will place the patient on aspirin 325 once a day for now.  We will place the patient on high-dose statin.  Cardiology follow-up.  Telemetry evaluation to evaluate for underlying arrhythmia.  The patient may need to undergo a loop recorder as an outpatient.  Physical Therapy evaluation.  Fall precautions.  avoid hypotension if possible   full dose AC as per cardiology and medical team NSTEMI    Spoke with the spouse and the son in great detail.  The spouse's name is Jordan, telephone number is [phone].  Alternate number is [phone]  called today no response     Greater than 45 minutes of time was spent with the patient, plan of care, reviewing data, with greater than 50% of the visit was spent counseling and/or coordinating care with multidisciplinary healthcare team

## 2021-11-17 NOTE — PROGRESS NOTE ADULT - ASSESSMENT
The patient is a 76 year old female with a history of HTN, COPD, UC, OA, severe AS who presents with confusion.    Plan:  - ECG with no evidence of ischemia or infarction  - Troponin trended up to 6623 - may be reflective of underlying neurologic event. Cannot exclude NSTEMI although no symptoms to suggest such at this time.  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 Would also consider cardiac catheterization given possible NSTEMI.

## 2021-11-17 NOTE — PHYSICAL THERAPY INITIAL EVALUATION ADULT - ACTIVE RANGE OF MOTION EXAMINATION, REHAB EVAL
Tyler Ramsey attended 1 hours of group therapy today.    9/7/2017 3:44 PM Radha Gaming  
no Active ROM deficits were identified

## 2021-11-17 NOTE — PROGRESS NOTE ADULT - PROBLEM SELECTOR PLAN 6
- pt without acute SOB, unlikely exacerbation  - if worsening mental status will obtain abg   - continue home albuterol 90mcg 2puffs q6hrs PRN

## 2021-11-17 NOTE — PROGRESS NOTE ADULT - PROBLEM SELECTOR PLAN 7
On full dose Lovenox for CVA, NSTEMI      Merlin (son) [phone]
On full dose Lovenox for CVA, NSTEMI      Merlin (son) [phone]

## 2021-11-17 NOTE — PROGRESS NOTE ADULT - PROBLEM SELECTOR PLAN 4
- hx of MVP  - f/u echo  - cardiology consulted, Dr Driscoll
- hx of MVP  - f/u echo  - cardiology consulted, Dr Driscoll

## 2021-11-17 NOTE — PROGRESS NOTE ADULT - PROBLEM SELECTOR PLAN 1
- per family, pt was at baseline prior to AM 11/15, A&Ox3  - CT head, c/a/p negative for acute process  - MRA head&neck showing no significant stenosis  - MR brain showing multiple small foci of diffusion restriction in both hemispheres as well as within the posterior fossa suggesting embolic or thrombotic showering in the anterior and posterior circulation  - Patient started on 325mg PO ASA per neuro recs, atorvastatin 40mg qd  - no hx of Afib or aflutter -- has been in SR so far on tele - continue to monitor on tele   - SS recommended soft, bite sized, mildly thick liquids  - MBS refused by patient because she "does not have a problem swallowing" and reports throwing up from barium liquid in the past.  - f/u lipid panel, HgbA1c  - f/u TTE but will need JASON in [facility] given likely embolic CVA  - arranged for transfer to [facility] - pt accepted, likely to go tomorrow per discussion with transfer center  - PT, OT consulted  - neurology consulted, Dr Zuleta, recs appreciated  - permissive HTN  - cardiology consulted, Dr Driscoll, recs appreciated  - awaiting bed at [facility] 11/17

## 2021-11-17 NOTE — PROGRESS NOTE ADULT - SUBJECTIVE AND OBJECTIVE BOX
Patient is a 76y old  Female who presents with a chief complaint of NSTEMI, r/o CVA (2021 10:27)      INTERVAL HPI/OVERNIGHT EVENTS: Patient seen and examined at bedside. No overnight events occurred. Patient has no complaints at this time. Denies fevers, chills, headache, lightheadedness, chest pain, dyspnea, abdominal pain, n/v/d/c.    MEDICATIONS  (STANDING):  aspirin enteric coated 325 milliGRAM(s) Oral daily  atorvastatin 40 milliGRAM(s) Oral at bedtime  cyanocobalamin 1000 MICROGram(s) Oral daily  enoxaparin Injectable 50 milliGRAM(s) SubCutaneous two times a day    MEDICATIONS  (PRN):  ALBUTerol    90 MICROgram(s) HFA Inhaler 2 Puff(s) Inhalation every 6 hours PRN Shortness of Breath and/or Wheezing      Allergies    caffeine (Blisters)  Gluten (Unknown)  penicillins (Hives)    Intolerances    lactose (Vomiting)      REVIEW OF SYSTEMS:  CONSTITUTIONAL: No fever or chills  HEENT:  No headache, no sore throat  RESPIRATORY: No cough, wheezing, or shortness of breath  CARDIOVASCULAR: No chest pain, palpitations  GASTROINTESTINAL: No abd pain, nausea, vomiting, or diarrhea  GENITOURINARY: No dysuria, frequency, or hematuria  NEUROLOGICAL: no focal weakness or dizziness  MUSCULOSKELETAL: no myalgias     Vital Signs Last 24 Hrs  T(C): 36.6 (2021 11:33), Max: 37.1 (2021 04:41)  T(F): 97.8 (2021 11:33), Max: 98.7 (2021 04:41)  HR: 86 (2021 11:33) (79 - 88)  BP: 158/89 (2021 11:33) (138/85 - 159/98)  BP(mean): --  RR: 18 (2021 11:33) (18 - 18)  SpO2: 92% (2021 11:33) (90% - 93%)    PHYSICAL EXAM:  GENERAL: NAD  HEENT:  anicteric, moist mucous membranes  CHEST/LUNG:  CTA b/l, no rales, wheezes, or rhonchi  HEART:  RRR, systolic murmur present, physiologic S1 and S2  ABDOMEN:  BS+, soft, nontender, nondistended  EXTREMITIES: no edema, cyanosis, or calf tenderness  NERVOUS SYSTEM: answers questions and follows commands appropriately    LABS:                        11.8   14.13 )-----------( 345      ( 2021 07:19 )             38.8     CBC Full  -  ( 2021 07:19 )  WBC Count : 14.13 K/uL  Hemoglobin : 11.8 g/dL  Hematocrit : 38.8 %  Platelet Count - Automated : 345 K/uL  Mean Cell Volume : 84.7 fl  Mean Cell Hemoglobin : 25.8 pg  Mean Cell Hemoglobin Concentration : 30.4 gm/dL  Auto Neutrophil # : 10.41 K/uL  Auto Lymphocyte # : 2.67 K/uL  Auto Monocyte # : 0.83 K/uL  Auto Eosinophil # : 0.10 K/uL  Auto Basophil # : 0.03 K/uL  Auto Neutrophil % : 73.7 %  Auto Lymphocyte % : 18.9 %  Auto Monocyte % : 5.9 %  Auto Eosinophil % : 0.7 %  Auto Basophil % : 0.2 %    2021 07:19    142    |  107    |  11     ----------------------------<  97     4.5     |  30     |  0.63     Ca    9.3        2021 07:19  Phos  3.5       2021 07:19  Mg     2.3       2021 07:19    TPro  6.9    /  Alb  2.8    /  TBili  0.4    /  DBili  x      /  AST  57     /  ALT  21     /  AlkPhos  94     2021 07:19    PT/INR - ( 15 Nov 2021 16:20 )   PT: 13.6 sec;   INR: 1.13 ratio         PTT - ( 15 Nov 2021 16:20 )  PTT:32.5 sec  Urinalysis Basic - ( 15 Nov 2021 16:44 )    Color: Yellow / Appearance: Clear / S.015 / pH: x  Gluc: x / Ketone: Negative  / Bili: Negative / Urobili: Negative mg/dL   Blood: x / Protein: 30 mg/dL / Nitrite: Negative   Leuk Esterase: Negative / RBC: 0-2 /HPF / WBC 0-2   Sq Epi: x / Non Sq Epi: Negative / Bacteria: Negative      CAPILLARY BLOOD GLUCOSE            Culture - Urine (collected 21 @ 00:41)  Source: Catheterized Catheterized  Final Report (21 @ 04:08):    No growth    Culture - Blood (collected 21 @ 00:23)  Source: .Blood Blood  Preliminary Report (21 @ 01:02):    No growth to date.    Culture - Blood (collected 21 @ 00:23)  Source: .Blood Blood  Preliminary Report (21 @ 01:02):    No growth to date.        RADIOLOGY & ADDITIONAL TESTS:    Personally reviewed.     Consultant(s) Notes Reviewed:  [x] YES  [ ] NO

## 2021-11-17 NOTE — PROGRESS NOTE ADULT - ASSESSMENT
77 y/o F with PMH significant for HTN, COPD/emphysema, osteoporosis, psoriatic arthritis, MVP, tachycardia hx, gastritis, vertebral fracture, ulcerative colitis, costal chondritis, pre-DM, who presents with dizziness, nausea, and disorientation admitted with suspected CVA and NSTEMI, found to have acute CVA with multiple areas of stroke consistent with embolic CVA.

## 2021-11-17 NOTE — PROGRESS NOTE ADULT - ASSESSMENT
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  For JASON [facility].    NSTEMI, but asymptomatic.   Hx stable COPD.  Suggest cardio, neuro fu.

## 2021-11-17 NOTE — PROGRESS NOTE ADULT - SUBJECTIVE AND OBJECTIVE BOX
Chief Complaint: AMS    Interval Events: Transferred to [facility] for admission.    Review of Systems:  General: No fevers, chills, weight gain  Skin: No rashes, color changes  Cardiovascular: No chest pain, orthopnea  Respiratory: No shortness of breath, cough  Gastrointestinal: No nausea, abdominal pain  Genitourinary: No incontinence, pain with urination  Musculoskeletal: No pain, swelling, decreased range of motion  Neurological: No headache, weakness  Psychiatric: No depression, anxiety  Endocrine: No weight gain, increased thirst  All other systems are comprehensively negative.    Physical Exam:  Vital Signs Last 24 Hrs  T(C): 37.1 (17 Nov 2021 04:41), Max: 37.1 (17 Nov 2021 04:41)  T(F): 98.7 (17 Nov 2021 04:41), Max: 98.7 (17 Nov 2021 04:41)  HR: 79 (17 Nov 2021 04:41) (79 - 88)  BP: 159/98 (17 Nov 2021 04:41) (138/85 - 161/87)  BP(mean): --  RR: 18 (17 Nov 2021 04:41) (17 - 18)  SpO2: 90% (17 Nov 2021 04:41) (90% - 93%)  General: NAD  HEENT: MMM  Neck: No JVD, no carotid bruit  Lungs: CTAB  CV: RRR, nl S1/S2, no M/R/G  Abdomen: S/NT/ND, +BS  Extremities: No LE edema, no cyanosis  Neuro: AAOx3, non-focal  Skin: No rash    Labs:             11-17    142  |  107  |  11  ----------------------------<  97  4.5   |  30  |  0.63    Ca    9.3      17 Nov 2021 07:19  Phos  3.5     11-17  Mg     2.3     11-17    TPro  6.9  /  Alb  2.8<L>  /  TBili  0.4  /  DBili  x   /  AST  57<H>  /  ALT  21  /  AlkPhos  94  11-17                        11.8   14.13 )-----------( 345      ( 17 Nov 2021 07:19 )             38.8       Telemetry: Sinus rhythm, atrial runs with aberrancy

## 2021-11-17 NOTE — PROGRESS NOTE ADULT - SUBJECTIVE AND OBJECTIVE BOX
PULMONARY/CRITICAL CARE  DOS 21  Doing well. More alert. No cp.   Pt. well known to me admitted for AMS.     Patient is a 76y old  Female who presents with a chief complaint of NSTEMI, r/o CVA (15 Nov 2021 22:25)    BRIEF HOSPITAL COURSE: ***   75 y/o F with pmhx significant for HTN, COPD/emphysema, osteoporosis, psoriatic arthritis, MVP, tachycardia hx, gastritis, vertebral fracture, ulcerative colitis, costal chondritis, pre-DM, who presents with dizziness, nausea, and disorientation that began after 8:30am this morning, around which time she was last known well. The patient is confused on exam today. Hx taken from son and  over the phone largely. Per family, patient was not herself today but was at baseline yesterday A&Ox3. The patient was unable to remember family names and the year, president earlier today per family. Per family, the patient called her friend and then  around 11am stating she was feeling lightheaded, lethargic and nausea at this time. When the  arrived home, patient was unsteady with gait, lightheaded and mildly confused. The patient's symptoms failed to improve, pt taken to UC, became more disoriented. Sent to ED for eval. Of note, patient was scheduled for a cardiac catheterization next week. Per family, TTE was done last week outpatient and were told further workup required.  Now alert, but somewhat confused. Has no complaints.   Events last 24 hours: ***    PAST MEDICAL & SURGICAL HISTORY:  Gastritis    Tachycardia    MVP (mitral valve prolapse)    Ulcerative colitis    Cigarette smoker  current    Psoriatic arthritis    Vertebral fracture, closed  Lumbar x 4    Osteoporosis    Varicose veins    Hyperparathyroidism    Costal chondritis    Prediabetes    Herniated disc, cervical  lumbar    Emphysema lung    COPD (chronic obstructive pulmonary disease)    Termination of pregnancy (fetus)    History of tonsillectomy    S/P parathyroidectomy        Allergies    caffeine (Blisters)  Gluten (Unknown)  penicillins (Hives)    Intolerances    lactose (Vomiting)    FAMILY HISTORY: former smoker.   Family history of Parkinson disease    Family history of colorectal cancer    Family history of diabetes mellitus    Family history of transitional cell carcinoma of bladder (Child)    Family history of melanoma (Child)          Medications:      ALBUTerol    90 MICROgram(s) HFA Inhaler 2 Puff(s) Inhalation every 6 hours PRN        aspirin enteric coated 81 milliGRAM(s) Oral daily        atorvastatin 40 milliGRAM(s) Oral at bedtime    sodium chloride 0.9%. 1000 milliLiter(s) IV Continuous <Continuous>                ICU Vital Signs Last 24 Hrs  T(C): 36.5 (2021 04:56), Max: 37.1 (15 Nov 2021 17:50)  T(F): 97.7 (2021 04:56), Max: 98.8 (15 Nov 2021 17:50)  HR: 78 (2021 04:56) (78 - 92)  BP: 147/83 (2021 04:56) (147/83 - 166/71)  BP(mean): --  ABP: --  ABP(mean): --  RR: 18 (2021 04:56) (16 - 20)  SpO2: 91% (2021 04:56) (91% - 99%)    Vital Signs Last 24 Hrs  T(C): 36.5 (2021 04:56), Max: 37.1 (15 Nov 2021 17:50)  T(F): 97.7 (2021 04:56), Max: 98.8 (15 Nov 2021 17:50)  HR: 78 (2021 04:56) (78 - 92)  BP: 147/83 (2021 04:56) (147/83 - 166/71)  BP(mean): --  RR: 18 (2021 04:56) (16 - 20)  SpO2: 91% (2021 04:56) (91% - 99%)        I&O's Detail    15 Nov 2021 07:01  -  2021 07:00  --------------------------------------------------------  IN:    sodium chloride 0.9%: 450 mL  Total IN: 450 mL    OUT:  Total OUT: 0 mL    Total NET: 450 mL            LABS:                        11.3   13.68 )-----------( 314      ( 2021 05:16 )             36.9     11-16    142  |  108  |  10  ----------------------------<  111<H>  4.0   |  28  |  0.55    Ca    8.7      2021 05:16  Phos  3.6       Mg     2.2         TPro  6.9  /  Alb  2.8<L>  /  TBili  0.5  /  DBili  x   /  AST  81<H>  /  ALT  20  /  AlkPhos  92            CAPILLARY BLOOD GLUCOSE      POCT Blood Glucose.: 119 mg/dL (15 Nov 2021 16:06)    PT/INR - ( 15 Nov 2021 16:20 )   PT: 13.6 sec;   INR: 1.13 ratio         PTT - ( 15 Nov 2021 16:20 )  PTT:32.5 sec  Urinalysis Basic - ( 15 Nov 2021 16:44 )    Color: Yellow / Appearance: Clear / S.015 / pH: x  Gluc: x / Ketone: Negative  / Bili: Negative / Urobili: Negative mg/dL   Blood: x / Protein: 30 mg/dL / Nitrite: Negative   Leuk Esterase: Negative / RBC: 0-2 /HPF / WBC 0-2   Sq Epi: x / Non Sq Epi: Negative / Bacteria: Negative      CULTURES:      Physical Examination:    General: No acute distress.  Elderly female sitting up comfortably in bed    HEENT: Pupils equal, reactive to light.  Symmetric.    PULM: Clear to auscultation bilaterally, no wheeze rhonchi rales, good excursion no change percussion    CVS: Regular rate and rhythm, no murmurs, rubs, or gallops    ABD: Soft, nondistended, nontender, normoactive bowel sounds, no masses    EXT: No edema, nontender calves    SKIN: Warm and well perfused, no rashes noted.    NEURO: Alert, oriented to time, place and person, interactive, nonfocal  Moves all ext    RADIOLOGY: ***rad< from: CT Chest No Cont (11.15.21 @ 17:19) >  EXAM:  CT ABDOMEN AND PELVIS                          EXAM:  CT CHEST                                  PROCEDURE DATE:  11/15/2021          INTERPRETATION:  CLINICAL INFORMATION: Dizziness for 10 days. Nausea. Leukocytosis.    COMPARISON: Outside whole-body PET/CT dated 2018, noncontrast CT of the abdomen and pelvis dated 2019.    CONTRAST/COMPLICATIONS:  IV Contrast: NONE  Oral Contrast: NONE  Complications: None reported at time of study completion    PROCEDURE:  CT of the Chest, Abdomen and Pelvis was performed.  Sagittal and coronal reformats were performed.    FINDINGS:  CHEST:  LUNGS AND LARGE AIRWAYS: Patent central airways. Moderate centrilobular emphysematous changes. Mild lingular atelectasis. Right lower lobe mucus plugging on image 113. No discrete pulmonary nodule or confluent airspace opacity is identified.  PLEURA: No pleural effusion.  VESSELS: Within normal limits.  HEART: Mitral annulus calcification. Heavy coronary artery calcification. Heart size is normal. No pericardial effusion.  MEDIASTINUM AND EVANGELINA: No lymphadenopathy.  CHEST WALL AND LOWER NECK: Within normal limits.    ABDOMEN AND PELVIS:  LIVER: Within normal limits.  BILE DUCTS: Normal caliber.  GALLBLADDER: Within normal limits.  SPLEEN: Within normal limits.  PANCREAS: Within normal limits.  ADRENALS: Stable low-attenuation thickening of the left adrenal gland. The right adrenal gland is unremarkable in morphology.  KIDNEYS/URETERS: Within normal limits.    BLADDER: Minimally distended.  REPRODUCTIVE ORGANS: Multiple calcified uterine fibroids. The adnexa are unremarkable by CT criteria.    BOWEL: No bowel obstruction.  PERITONEUM: No ascites.  VESSELS: Within normal limits.  RETROPERITONEUM/LYMPH NODES: No lymphadenopathy.  ABDOMINAL WALL: Within normal limits.  BONES: ORIF of intertrochanteric left femur fracture. Healed left sacral ala fracture. Moderate compression deformity of the inferior endplate of L3. Healed impacted right humeral neck fracture.    IMPRESSION: No evidence of acute infectious process in the thorax. No evidence of acute inflammatory or obstructive process in the abdomen and pelvis.    --- End of Report ---    < end of copied text >  < from: 12 Lead ECG (11.15.21 @ 20:17) >  Ventricular Rate 81 BPM    Atrial Rate 81 BPM    P-R Interval 294 ms    QRS Duration 72 ms    Q-T Interval 394 ms    QTC Calculation(Bazett) 457 ms    P Axis 60 degrees    R Axis 75 degrees    T Axis -79 degrees    Diagnosis Line Sinus rhythm with 1st degree AV block  Nonspecific ST and T wave abnormality  Abnormal ECG  When compared with ECG of 15-NOV-2021 15:56, (Unconfirmed)  T wave inversion now evident in Anterior leads  and anterolateral leads  Confirmed by DARWIN GILBERT MD ([id number]) on 2021 7:01:01 AM    < end of copied text >      < from: MR Head No Cont (21 @ 09:44) >  EXAM:  MR BRAIN                            PROCEDURE DATE:  2021          INTERPRETATION:  INDICATION:    Dizziness. TIA. Rule out stroke.  TECHNIQUE:  Multiplanar brain imaging was conducted. T1, T2 and FLAIR imaging was performed.  In addition, diffusion imaging, diffusion coefficient assessment (ADC) and T2* was incorporated . No contrast administered.  COMPARISON EXAMINATION:  CT 11/15/2021    FINDINGS:    HEMISPHERES: There are scattered foci of diffusion restriction in both hemispheres, suggesting embolic or thrombotic showering. There is involvement of both basal ganglia and white matter, without evidence of a large acute territorial infarct. Also noted are small white matter lesions and chronic ischemic changes in both hemispheres with volume loss.  VENTRICLES:  Midline with ex vacuo enlargement  POSTERIOR FOSSA:  Scattered areas of diffusion abnormality are also noted in the cerebellar hemispheres bilaterally.  EXTRA-AXIAL:  No mass or collections are depicted.  VASCULATURE:  The major vessels and venous sinuses are grossly patent.  SINUSES AND MASTOIDS:  Clear.  MISCELLANEOUS:  No orbital or pituitary abnormality noted.  No skull base lesion suggested.    IMPRESSION:    1)  Multiple small foci of diffusion restriction in both hemispheres as well as within the posterior fossa suggesting embolic or thrombotic showering in the anterior and posterior circulation. Further workup and assessment recommended.  2)  also noted are chronic ischemic changes in both hemispheres with atrophic change.    --- End of Report ---            LEONORA COYNE MD; Attending Radiologist  This document has been electronically signed. 2021 10:51AM    < end of copied text >

## 2021-11-17 NOTE — SWALLOW VFSS/MBS ASSESSMENT ADULT - COMMENTS
Clinical swallow assessment completed 11/16, at which time pt was recommended soft and bite sized with mildly thick liquids. Pt reported she has been noncompliant with mildly thick liquids and "will not drink them" despite provided rationale. MBS was recommended to objectively determine safest and least restrictive PO diet.  Pt arrived to radiology suite. Initially, pt refused to participate in MBS and stated, "I do not have a problem swallowing". SLP discussed rationale for MBS and provided cues of encouragement, pt was eventually agreeable. Pt safely transferred to MBS chair in lateral plane of view. When attempting to administer barium contrast, pt adamantly refused to consume contrast and stated, "That makes me sick. I will not drink that." SLP discussed only barium can be utilized during study, pt continued to adamantly refuse. MBS then cancelled, discussed with Dr. Peterson's resident. Please reconsult this department, as pt is agreeable to participate in study.

## 2021-11-17 NOTE — PROGRESS NOTE ADULT - TIME BILLING
Pt seen + examined. Case discussed with resident. Agree with assessment and plan above (edited by me in detail):  Time spent: 45min. More than 50% of the visit was spent counseling the patient, pt's sons and pt's  on medical condition -- acute CVA in multiple locations concerning for embolic CVA, work-up with JASON, NSTEMI, possible cardiac cath, transfer to [facility].    77 y/o F with PMH significant for HTN, COPD/emphysema, osteoporosis, psoriatic arthritis, MVP, tachycardia hx, gastritis, vertebral fracture, ulcerative colitis, costal chondritis, pre-DM, who presents with dizziness, nausea, and disorientation admitted with suspected CVA and NSTEMI, found to have acute CVA with multiple areas of stroke consistent with embolic CVA.    - per family, pt was at baseline prior to AM 11/15, A&Ox3  - CT head, c/a/p negative for acute process  - MRA head&neck showing no significant stenosis  - MR brain showing multiple small foci of diffusion restriction in both hemispheres as well as within the posterior fossa suggesting embolic or thrombotic showering in the anterior and posterior circulation  - Patient started on 325mg PO ASA per neuro recs, atorvastatin 40mg qd  - no hx of Afib or aflutter -- has been in SR so far on tele - continue to monitor on tele   - SS recommended soft, bite sized, mildly thick liquids, f/u MBS tomorrow AM  - f/u lipid panel, HgbA1c  - f/u TTE but will need JASON in [facility] given likely embolic CVA  - arranged for transfer to [facility] - pt accepted, likely to go tomorrow per discussion with transfer center  - PT, OT consulted  - neurology consulted, Dr Zuleta, recs appreciated  - permissive HTN  - cardiology consulted, Dr Driscoll, recs appreciated    - EKG on admission without significant ischemic changes and pt had nausea / discomfort yesterday but no chest pain.  - Troponin peaked at 6623, CK/CKMB also elevated up to 473/47.5 -- suspected NSTEMI   - per discussion with cardio - started pt on full dose Lovenox and on ASA (will hold off plavix as pt with multiple strokes and albeit small, wish to decrease chance of hemorrhagic conversion)   - ASA increased to 325mg per neuro recs, atorvastatin 40mg QHS  - f/u TTE but will need JASON in [facility] given likely embolic CVA  - may need cardiac cath on same admission.  - cardiology consulted, Dr Driscoll, recs appreciated    - await bed for transfer to [facility]
Pt seen + examined. Case discussed with resident. Agree with assessment and plan above (edited by me in detail):  Time spent: 35min. More than 50% of the visit was spent counseling the patient, pt's sons and pt's  on medical condition -- acute CVA in multiple locations concerning for embolic CVA, work-up with JASON, NSTEMI, possible cardiac cath, transfer to [facility].    75 y/o F with PMH significant for HTN, COPD/emphysema, osteoporosis, psoriatic arthritis, MVP, tachycardia hx, gastritis, vertebral fracture, ulcerative colitis, costal chondritis, pre-DM, who presents with dizziness, nausea, and disorientation admitted with suspected CVA and NSTEMI, found to have acute CVA with multiple areas of stroke consistent with embolic CVA.    - per family, pt was at baseline prior to AM 11/15, A&Ox3  - CT head, c/a/p negative for acute process  - MRA head&neck showing no significant stenosis  - MR brain showing multiple small foci of diffusion restriction in both hemispheres as well as within the posterior fossa suggesting embolic or thrombotic showering in the anterior and posterior circulation  - Patient started on 325mg PO ASA per neuro recs, atorvastatin 40mg qd  - no hx of Afib or aflutter -- has been in SR so far on tele - continue to monitor on tele   - SS recommended soft, bite sized, mildly thick liquids, f/u MBS tomorrow AM  - f/u lipid panel, HgbA1c  - f/u TTE but will need JASON in [facility] given likely embolic CVA  - arranged for transfer to [facility] - pt accepted, likely to go tomorrow per discussion with transfer center  - PT, OT consulted  - neurology consulted, Dr Zuleta, recs appreciated  - permissive HTN  - cardiology consulted, Dr Driscoll, recs appreciated    - EKG on admission without significant ischemic changes and pt had nausea / discomfort yesterday but no chest pain.  - Troponin peaked at 6623, CK/CKMB also elevated up to 473/47.5 -- suspected NSTEMI   - per discussion with cardio - started pt on full dose Lovenox and on ASA (will hold off plavix as pt with multiple strokes and albeit small, wish to decrease chance of hemorrhagic conversion)   - ASA increased to 325mg per neuro recs, atorvastatin 40mg QHS  - f/u TTE but will need JASON in [facility] given likely embolic CVA  - may need cardiac cath on same admission.  - cardiology consulted, Dr Driscoll, recs appreciated    - await bed for transfer to [facility]- transfer center called - still awaiting bed.     Discussed with cardiology.

## 2021-11-17 NOTE — SWALLOW VFSS/MBS ASSESSMENT ADULT - SLP PERTINENT HISTORY OF CURRENT PROBLEM
Per charting, "75 y/o F with PMH significant for HTN, COPD/emphysema, osteoporosis, psoriatic arthritis, MVP, tachycardia hx, gastritis, vertebral fracture, ulcerative colitis, costal chondritis, pre-DM, who presents with dizziness, nausea, and disorientation admitted with suspected CVA and NSTEMI, found to have acute CVA with multiple areas of stroke consistent with embolic CVA."

## 2021-11-18 ENCOUNTER — TRANSCRIPTION ENCOUNTER (OUTPATIENT)
Age: 76
End: 2021-11-18

## 2021-11-18 ENCOUNTER — INPATIENT (INPATIENT)
Facility: HOSPITAL | Age: 76
LOS: 4 days | Discharge: HOME | DRG: 40 | End: 2021-11-23
Attending: INTERNAL MEDICINE | Admitting: HOSPITALIST
Payer: MEDICARE

## 2021-11-18 VITALS — OXYGEN SATURATION: 93 % | HEART RATE: 85 BPM

## 2021-11-18 VITALS
DIASTOLIC BLOOD PRESSURE: 92 MMHG | SYSTOLIC BLOOD PRESSURE: 167 MMHG | RESPIRATION RATE: 18 BRPM | HEART RATE: 86 BPM | TEMPERATURE: 97 F | OXYGEN SATURATION: 96 %

## 2021-11-18 DIAGNOSIS — I63.9 CEREBRAL INFARCTION, UNSPECIFIED: ICD-10-CM

## 2021-11-18 DIAGNOSIS — Z33.2 ENCOUNTER FOR ELECTIVE TERMINATION OF PREGNANCY: Chronic | ICD-10-CM

## 2021-11-18 DIAGNOSIS — Z02.9 ENCOUNTER FOR ADMINISTRATIVE EXAMINATIONS, UNSPECIFIED: ICD-10-CM

## 2021-11-18 DIAGNOSIS — Z29.9 ENCOUNTER FOR PROPHYLACTIC MEASURES, UNSPECIFIED: ICD-10-CM

## 2021-11-18 DIAGNOSIS — I21.4 NON-ST ELEVATION (NSTEMI) MYOCARDIAL INFARCTION: ICD-10-CM

## 2021-11-18 DIAGNOSIS — R41.82 ALTERED MENTAL STATUS, UNSPECIFIED: ICD-10-CM

## 2021-11-18 DIAGNOSIS — J43.9 EMPHYSEMA, UNSPECIFIED: ICD-10-CM

## 2021-11-18 DIAGNOSIS — I10 ESSENTIAL (PRIMARY) HYPERTENSION: ICD-10-CM

## 2021-11-18 DIAGNOSIS — Z98.89 OTHER SPECIFIED POSTPROCEDURAL STATES: Chronic | ICD-10-CM

## 2021-11-18 DIAGNOSIS — E89.2 POSTPROCEDURAL HYPOPARATHYROIDISM: Chronic | ICD-10-CM

## 2021-11-18 LAB
ALBUMIN SERPL ELPH-MCNC: 2.9 G/DL — LOW (ref 3.3–5)
ALP SERPL-CCNC: 88 U/L — SIGNIFICANT CHANGE UP (ref 40–120)
ALT FLD-CCNC: 21 U/L — SIGNIFICANT CHANGE UP (ref 12–78)
ANION GAP SERPL CALC-SCNC: 7 MMOL/L — SIGNIFICANT CHANGE UP (ref 5–17)
AST SERPL-CCNC: 37 U/L — SIGNIFICANT CHANGE UP (ref 15–37)
BASOPHILS # BLD AUTO: 0.02 K/UL — SIGNIFICANT CHANGE UP (ref 0–0.2)
BASOPHILS NFR BLD AUTO: 0.2 % — SIGNIFICANT CHANGE UP (ref 0–2)
BILIRUB SERPL-MCNC: 0.4 MG/DL — SIGNIFICANT CHANGE UP (ref 0.2–1.2)
BUN SERPL-MCNC: 10 MG/DL — SIGNIFICANT CHANGE UP (ref 7–23)
CALCIUM SERPL-MCNC: 9.3 MG/DL — SIGNIFICANT CHANGE UP (ref 8.5–10.1)
CHLORIDE SERPL-SCNC: 106 MMOL/L — SIGNIFICANT CHANGE UP (ref 96–108)
CO2 SERPL-SCNC: 29 MMOL/L — SIGNIFICANT CHANGE UP (ref 22–31)
CREAT SERPL-MCNC: 0.51 MG/DL — SIGNIFICANT CHANGE UP (ref 0.5–1.3)
EOSINOPHIL # BLD AUTO: 0.11 K/UL — SIGNIFICANT CHANGE UP (ref 0–0.5)
EOSINOPHIL NFR BLD AUTO: 1.1 % — SIGNIFICANT CHANGE UP (ref 0–6)
GLUCOSE SERPL-MCNC: 96 MG/DL — SIGNIFICANT CHANGE UP (ref 70–99)
HCT VFR BLD CALC: 36.3 % — SIGNIFICANT CHANGE UP (ref 34.5–45)
HGB BLD-MCNC: 11.2 G/DL — LOW (ref 11.5–15.5)
IMM GRANULOCYTES NFR BLD AUTO: 0.4 % — SIGNIFICANT CHANGE UP (ref 0–1.5)
LYMPHOCYTES # BLD AUTO: 2.38 K/UL — SIGNIFICANT CHANGE UP (ref 1–3.3)
LYMPHOCYTES # BLD AUTO: 23.7 % — SIGNIFICANT CHANGE UP (ref 13–44)
MCHC RBC-ENTMCNC: 25.9 PG — LOW (ref 27–34)
MCHC RBC-ENTMCNC: 30.9 GM/DL — LOW (ref 32–36)
MCV RBC AUTO: 84 FL — SIGNIFICANT CHANGE UP (ref 80–100)
MONOCYTES # BLD AUTO: 0.69 K/UL — SIGNIFICANT CHANGE UP (ref 0–0.9)
MONOCYTES NFR BLD AUTO: 6.9 % — SIGNIFICANT CHANGE UP (ref 2–14)
NEUTROPHILS # BLD AUTO: 6.81 K/UL — SIGNIFICANT CHANGE UP (ref 1.8–7.4)
NEUTROPHILS NFR BLD AUTO: 67.7 % — SIGNIFICANT CHANGE UP (ref 43–77)
NRBC # BLD: 0 /100 WBCS — SIGNIFICANT CHANGE UP (ref 0–0)
PLATELET # BLD AUTO: 306 K/UL — SIGNIFICANT CHANGE UP (ref 150–400)
POTASSIUM SERPL-MCNC: 3.9 MMOL/L — SIGNIFICANT CHANGE UP (ref 3.5–5.3)
POTASSIUM SERPL-SCNC: 3.9 MMOL/L — SIGNIFICANT CHANGE UP (ref 3.5–5.3)
PROT SERPL-MCNC: 6.9 G/DL — SIGNIFICANT CHANGE UP (ref 6–8.3)
RBC # BLD: 4.32 M/UL — SIGNIFICANT CHANGE UP (ref 3.8–5.2)
RBC # FLD: 14.5 % — SIGNIFICANT CHANGE UP (ref 10.3–14.5)
SODIUM SERPL-SCNC: 142 MMOL/L — SIGNIFICANT CHANGE UP (ref 135–145)
WBC # BLD: 10.05 K/UL — SIGNIFICANT CHANGE UP (ref 3.8–10.5)
WBC # FLD AUTO: 10.05 K/UL — SIGNIFICANT CHANGE UP (ref 3.8–10.5)

## 2021-11-18 PROCEDURE — 84145 PROCALCITONIN (PCT): CPT

## 2021-11-18 PROCEDURE — 70547 MR ANGIOGRAPHY NECK W/O DYE: CPT

## 2021-11-18 PROCEDURE — 99239 HOSP IP/OBS DSCHRG MGMT >30: CPT

## 2021-11-18 PROCEDURE — 97162 PT EVAL MOD COMPLEX 30 MIN: CPT

## 2021-11-18 PROCEDURE — 80053 COMPREHEN METABOLIC PANEL: CPT

## 2021-11-18 PROCEDURE — 93306 TTE W/DOPPLER COMPLETE: CPT

## 2021-11-18 PROCEDURE — 82607 VITAMIN B-12: CPT

## 2021-11-18 PROCEDURE — 84484 ASSAY OF TROPONIN QUANT: CPT

## 2021-11-18 PROCEDURE — 85025 COMPLETE CBC W/AUTO DIFF WBC: CPT

## 2021-11-18 PROCEDURE — 36415 COLL VENOUS BLD VENIPUNCTURE: CPT

## 2021-11-18 PROCEDURE — 70544 MR ANGIOGRAPHY HEAD W/O DYE: CPT

## 2021-11-18 PROCEDURE — 84100 ASSAY OF PHOSPHORUS: CPT

## 2021-11-18 PROCEDURE — 84443 ASSAY THYROID STIM HORMONE: CPT

## 2021-11-18 PROCEDURE — 87040 BLOOD CULTURE FOR BACTERIA: CPT

## 2021-11-18 PROCEDURE — 83036 HEMOGLOBIN GLYCOSYLATED A1C: CPT

## 2021-11-18 PROCEDURE — 92610 EVALUATE SWALLOWING FUNCTION: CPT

## 2021-11-18 PROCEDURE — 70551 MRI BRAIN STEM W/O DYE: CPT

## 2021-11-18 PROCEDURE — C8929: CPT

## 2021-11-18 PROCEDURE — 86769 SARS-COV-2 COVID-19 ANTIBODY: CPT

## 2021-11-18 PROCEDURE — 99223 1ST HOSP IP/OBS HIGH 75: CPT | Mod: GC

## 2021-11-18 PROCEDURE — 83735 ASSAY OF MAGNESIUM: CPT

## 2021-11-18 PROCEDURE — 82553 CREATINE MB FRACTION: CPT

## 2021-11-18 PROCEDURE — 97166 OT EVAL MOD COMPLEX 45 MIN: CPT

## 2021-11-18 PROCEDURE — 80061 LIPID PANEL: CPT

## 2021-11-18 PROCEDURE — 82550 ASSAY OF CK (CPK): CPT

## 2021-11-18 RX ORDER — ALBUTEROL 90 UG/1
2 AEROSOL, METERED ORAL EVERY 6 HOURS
Refills: 0 | Status: DISCONTINUED | OUTPATIENT
Start: 2021-11-18 | End: 2021-11-22

## 2021-11-18 RX ORDER — ENOXAPARIN SODIUM 100 MG/ML
40 INJECTION SUBCUTANEOUS
Refills: 0 | Status: DISCONTINUED | OUTPATIENT
Start: 2021-11-18 | End: 2021-11-18

## 2021-11-18 RX ORDER — METOPROLOL TARTRATE 50 MG
12.5 TABLET ORAL EVERY 12 HOURS
Refills: 0 | Status: DISCONTINUED | OUTPATIENT
Start: 2021-11-18 | End: 2021-11-19

## 2021-11-18 RX ORDER — ATORVASTATIN CALCIUM 80 MG/1
40 TABLET, FILM COATED ORAL AT BEDTIME
Refills: 0 | Status: DISCONTINUED | OUTPATIENT
Start: 2021-11-18 | End: 2021-11-23

## 2021-11-18 RX ORDER — ENOXAPARIN SODIUM 100 MG/ML
40 INJECTION SUBCUTANEOUS DAILY
Refills: 0 | Status: DISCONTINUED | OUTPATIENT
Start: 2021-11-18 | End: 2021-11-19

## 2021-11-18 RX ORDER — ASPIRIN/CALCIUM CARB/MAGNESIUM 324 MG
325 TABLET ORAL DAILY
Refills: 0 | Status: DISCONTINUED | OUTPATIENT
Start: 2021-11-18 | End: 2021-11-23

## 2021-11-18 RX ORDER — PREGABALIN 225 MG/1
1000 CAPSULE ORAL DAILY
Refills: 0 | Status: DISCONTINUED | OUTPATIENT
Start: 2021-11-18 | End: 2021-11-23

## 2021-11-18 RX ADMIN — ENOXAPARIN SODIUM 50 MILLIGRAM(S): 100 INJECTION SUBCUTANEOUS at 05:59

## 2021-11-18 RX ADMIN — Medication 12.5 MILLIGRAM(S): at 21:52

## 2021-11-18 RX ADMIN — Medication 325 MILLIGRAM(S): at 11:02

## 2021-11-18 RX ADMIN — PREGABALIN 1000 MICROGRAM(S): 225 CAPSULE ORAL at 11:03

## 2021-11-18 NOTE — H&P ADULT - PROBLEM SELECTOR PLAN 2
TTE at [facility] revealing severe AS and mod to severe MR, no signs embolism  - cardiology aware, NPO after MN for JASON  - monitor on tele

## 2021-11-18 NOTE — PROGRESS NOTE ADULT - PROVIDER SPECIALTY LIST ADULT
Cardiology
Neurology
Pulmonology
Cardiology
Cardiology
Neurology
Pulmonology
Hospitalist
Hospitalist

## 2021-11-18 NOTE — PROGRESS NOTE ADULT - SUBJECTIVE AND OBJECTIVE BOX
Chief Complaint: AMS    Interval Events: No events overnight.    Review of Systems:  General: No fevers, chills, weight gain  Skin: No rashes, color changes  Cardiovascular: No chest pain, orthopnea  Respiratory: No shortness of breath, cough  Gastrointestinal: No nausea, abdominal pain  Genitourinary: No incontinence, pain with urination  Musculoskeletal: No pain, swelling, decreased range of motion  Neurological: No headache, weakness  Psychiatric: No depression, anxiety  Endocrine: No weight gain, increased thirst  All other systems are comprehensively negative.    Physical Exam:  Vital Signs Last 24 Hrs  T(C): 36.5 (18 Nov 2021 04:50), Max: 36.7 (17 Nov 2021 20:07)  T(F): 97.7 (18 Nov 2021 04:50), Max: 98.1 (17 Nov 2021 20:07)  HR: 73 (18 Nov 2021 04:50) (73 - 86)  BP: 156/87 (18 Nov 2021 04:50) (156/87 - 166/93)  BP(mean): --  RR: 18 (18 Nov 2021 04:50) (18 - 19)  SpO2: 91% (18 Nov 2021 04:50) (91% - 92%)  General: NAD  HEENT: MMM  Neck: No JVD, no carotid bruit  Lungs: CTAB  CV: RRR, nl S1/S2, no M/R/G  Abdomen: S/NT/ND, +BS  Extremities: No LE edema, no cyanosis  Neuro: AAOx3, non-focal  Skin: No rash    Labs:             11-18    142  |  106  |  10  ----------------------------<  96  3.9   |  29  |  0.51    Ca    9.3      18 Nov 2021 07:54  Phos  3.5     11-17  Mg     2.3     11-17    TPro  6.9  /  Alb  2.9<L>  /  TBili  0.4  /  DBili  x   /  AST  37  /  ALT  21  /  AlkPhos  88  11-18                        11.2   10.05 )-----------( 306      ( 18 Nov 2021 07:54 )             36.3       Telemetry: Sinus rhythm, atrial runs with aberrancy

## 2021-11-18 NOTE — H&P ADULT - ATTENDING COMMENTS
76 F with PMH significant for HTN, COPD/emphysema, osteoporosis c/b vertebral fx, psoriatic arthritis, MVP, severe AS, ulcerative colitis who presented to [place] on 11/15 for dizziness, nausea, and disorientation, found to have acute CVA with multiple areas of stroke in both hemispheres as well as within the posterior fossa suggesting embolic or thrombotic showering in the anterior and posterior circulation. Pt was deemed not a TPA candidate. Course was complicated by concurrent ACS/NSTEMI with trops peaking at 6623, CK/CKMB to 473/47.5. Started on therapeutic lovenox and ASA once cleared by neurology, transferred to [facility] for further management and possible cath. Plavix was not loaded due to concern for risk of hemorrhagic conversion.  Of note, pt presented to the ED one month prior with angina and cardiac cath was recommended, but pt left AMA.   Labs/imaging reviewed.  TTE with EF 65%, mod-severe MR, severe AS, normal LVSF with no WMA    Active problems:  Acute embolic CVA: pending JASON. On ASA as above. Seen by PT/OT at [place] who recommended dc home with no skilled PT needs. Speech pathologist recommended MBS but pt refused. Cont neuro precautions.  NSTEMI: On full dose lovenox. Cardiology consult for catherization. Monitor on tele.  COPD: Not in acute exacerbation.  Rest as above    [facility] Division of Hospital Medicine  Mallorie Vargas MD  Pager (M-F, 8A-5P): [phone]  Other Times:  [phone]

## 2021-11-18 NOTE — PROGRESS NOTE ADULT - SUBJECTIVE AND OBJECTIVE BOX
Neurology follow up note    GABRIELA [id number]      Interval History:    Patient feels ok no new complaints.    Allergies    caffeine (Blisters)  Gluten (Unknown)  penicillins (Hives)    Intolerances    lactose (Vomiting)      MEDICATIONS    ALBUTerol    90 MICROgram(s) HFA Inhaler 2 Puff(s) Inhalation every 6 hours PRN  aspirin enteric coated 325 milliGRAM(s) Oral daily  atorvastatin 40 milliGRAM(s) Oral at bedtime  cyanocobalamin 1000 MICROGram(s) Oral daily  enoxaparin Injectable 50 milliGRAM(s) SubCutaneous two times a day              Vital Signs Last 24 Hrs  T(C): 36.5 (18 Nov 2021 04:50), Max: 36.7 (17 Nov 2021 20:07)  T(F): 97.7 (18 Nov 2021 04:50), Max: 98.1 (17 Nov 2021 20:07)  HR: 73 (18 Nov 2021 04:50) (73 - 86)  BP: 156/87 (18 Nov 2021 04:50) (156/87 - 166/93)  BP(mean): --  RR: 18 (18 Nov 2021 04:50) (18 - 19)  SpO2: 91% (18 Nov 2021 04:50) (91% - 92%)      REVIEW OF SYSTEMS:  Constitutional:  At present, no fever, chills, or night sweats.  Head:  No headaches.  Eyes:  No double vision or blurry vision.  Ears:  No ringing in the ears.  Neck:  No neck pain.  Respiratory:  No shortness of breath.  Cardiovascular:  No chest pain.  Abdomen:  No nausea, vomiting, or abdominal pain.  Extremities/Neurological:  No numbness or tingling.  Musculoskeletal:  No joint pain.    PHYSICAL EXAMINATION:  HEENT:  Head:  Normocephalic, atraumatic.  Eyes:  No scleral icterus.  Ears:  Hearing bilaterally intact.  NECK:  Supple.  RESPIRATORY:  Good air entry bilaterally.  CARDIOVASCULAR:  S1 and S2 heard.  ABDOMEN:  Soft and nontender.  EXTREMITIES:  No clubbing or cyanosis was noted.      NEUROLOGIC:  The patient is awake and alert.  Able to say correct age and month.  Location was [facility]", year was 2021. Full visual fields.  Speech was fluent.  Smile was symmetric.  Motor:  Bilateral upper were 5/5, bilateral lower were 4+/5.  Finger-to-nose within normal limits.  Heel-knee-to-shin within normal limits.  No drift.                   LABS:  CBC Full  -  ( 18 Nov 2021 07:54 )  WBC Count : 10.05 K/uL  RBC Count : 4.32 M/uL  Hemoglobin : 11.2 g/dL  Hematocrit : 36.3 %  Platelet Count - Automated : 306 K/uL  Mean Cell Volume : 84.0 fl  Mean Cell Hemoglobin : 25.9 pg  Mean Cell Hemoglobin Concentration : 30.9 gm/dL  Auto Neutrophil # : 6.81 K/uL  Auto Lymphocyte # : 2.38 K/uL  Auto Monocyte # : 0.69 K/uL  Auto Eosinophil # : 0.11 K/uL  Auto Basophil # : 0.02 K/uL  Auto Neutrophil % : 67.7 %  Auto Lymphocyte % : 23.7 %  Auto Monocyte % : 6.9 %  Auto Eosinophil % : 1.1 %  Auto Basophil % : 0.2 %      11-18    142  |  106  |  10  ----------------------------<  96  3.9   |  29  |  0.51    Ca    9.3      18 Nov 2021 07:54  Phos  3.5     11-17  Mg     2.3     11-17    TPro  6.9  /  Alb  2.9<L>  /  TBili  0.4  /  DBili  x   /  AST  37  /  ALT  21  /  AlkPhos  88  11-18    Hemoglobin A1C:   Lipid Panel 11-17 @ 10:18  Total Cholesterol, Serum 186  LDL --  Triglycerides 106    LIVER FUNCTIONS - ( 18 Nov 2021 07:54 )  Alb: 2.9 g/dL / Pro: 6.9 g/dL / ALK PHOS: 88 U/L / ALT: 21 U/L / AST: 37 U/L / GGT: x           Vitamin B12         RADIOLOGY  ANALYSIS AND PLAN:  This is a 76-year-old with an episode of disorientation and ataxia.  For episode of disorientation and ataxia multiple areas of cerebrovascular accident, suspect possibly cardiac in nature.  The patient should undergo JASON.  The patient does have an elevated white blood cell count workup as needed.  We will place the patient on aspirin 325 once a day for now.  We will place the patient on high-dose statin.  Cardiology follow-up.  Telemetry evaluation to evaluate for underlying arrhythmia.  The patient may need to undergo a loop recorder as an outpatient.  Physical Therapy evaluation.  Fall precautions.  avoid hypotension if possible   full dose AC as per cardiology and medical team NSTEMI  awaiting transfer   SPOKE to DR IRMA NAGEL FOR NRUEOLOGY FOLLOW UP     Spoke with the spouse and the son in great detail.  The spouse's name is Jordan, telephone number is [phone].  Alternate number is [phone] 11/16 called today no response     Greater than 45 minutes of time was spent with the patient, plan of care, reviewing data, with greater than 50% of the visit was spent counseling and/or coordinating care with multidisciplinary healthcare team       Neurology follow up note    GABRIELA [id number]      Interval History:    Patient feels ok no new complaints.    Allergies    caffeine (Blisters)  Gluten (Unknown)  penicillins (Hives)    Intolerances    lactose (Vomiting)      MEDICATIONS    ALBUTerol    90 MICROgram(s) HFA Inhaler 2 Puff(s) Inhalation every 6 hours PRN  aspirin enteric coated 325 milliGRAM(s) Oral daily  atorvastatin 40 milliGRAM(s) Oral at bedtime  cyanocobalamin 1000 MICROGram(s) Oral daily  enoxaparin Injectable 50 milliGRAM(s) SubCutaneous two times a day              Vital Signs Last 24 Hrs  T(C): 36.5 (18 Nov 2021 04:50), Max: 36.7 (17 Nov 2021 20:07)  T(F): 97.7 (18 Nov 2021 04:50), Max: 98.1 (17 Nov 2021 20:07)  HR: 73 (18 Nov 2021 04:50) (73 - 86)  BP: 156/87 (18 Nov 2021 04:50) (156/87 - 166/93)  BP(mean): --  RR: 18 (18 Nov 2021 04:50) (18 - 19)  SpO2: 91% (18 Nov 2021 04:50) (91% - 92%)      REVIEW OF SYSTEMS:  Constitutional:  At present, no fever, chills, or night sweats.  Head:  No headaches.  Eyes:  No double vision or blurry vision.  Ears:  No ringing in the ears.  Neck:  No neck pain.  Respiratory:  No shortness of breath.  Cardiovascular:  No chest pain.  Abdomen:  No nausea, vomiting, or abdominal pain.  Extremities/Neurological:  No numbness or tingling.  Musculoskeletal:  No joint pain.    PHYSICAL EXAMINATION:  HEENT:  Head:  Normocephalic, atraumatic.  Eyes:  No scleral icterus.  Ears:  Hearing bilaterally intact.  NECK:  Supple.  RESPIRATORY:  Good air entry bilaterally.  CARDIOVASCULAR:  S1 and S2 heard.  ABDOMEN:  Soft and nontender.  EXTREMITIES:  No clubbing or cyanosis was noted.      NEUROLOGIC:  The patient is awake and alert.  Able to say correct age and month.  Location was [facility], year was 2021. Full visual fields.  Speech was fluent.  Smile was symmetric.  Motor:  Bilateral upper were 5/5, bilateral lower were 4+/5.  Finger-to-nose within normal limits.  Heel-knee-to-shin within normal limits.  No drift.                   LABS:  CBC Full  -  ( 18 Nov 2021 07:54 )  WBC Count : 10.05 K/uL  RBC Count : 4.32 M/uL  Hemoglobin : 11.2 g/dL  Hematocrit : 36.3 %  Platelet Count - Automated : 306 K/uL  Mean Cell Volume : 84.0 fl  Mean Cell Hemoglobin : 25.9 pg  Mean Cell Hemoglobin Concentration : 30.9 gm/dL  Auto Neutrophil # : 6.81 K/uL  Auto Lymphocyte # : 2.38 K/uL  Auto Monocyte # : 0.69 K/uL  Auto Eosinophil # : 0.11 K/uL  Auto Basophil # : 0.02 K/uL  Auto Neutrophil % : 67.7 %  Auto Lymphocyte % : 23.7 %  Auto Monocyte % : 6.9 %  Auto Eosinophil % : 1.1 %  Auto Basophil % : 0.2 %      11-18    142  |  106  |  10  ----------------------------<  96  3.9   |  29  |  0.51    Ca    9.3      18 Nov 2021 07:54  Phos  3.5     11-17  Mg     2.3     11-17    TPro  6.9  /  Alb  2.9<L>  /  TBili  0.4  /  DBili  x   /  AST  37  /  ALT  21  /  AlkPhos  88  11-18    Hemoglobin A1C:   Lipid Panel 11-17 @ 10:18  Total Cholesterol, Serum 186  LDL --  Triglycerides 106    LIVER FUNCTIONS - ( 18 Nov 2021 07:54 )  Alb: 2.9 g/dL / Pro: 6.9 g/dL / ALK PHOS: 88 U/L / ALT: 21 U/L / AST: 37 U/L / GGT: x           Vitamin B12         RADIOLOGY  ANALYSIS AND PLAN:  This is a 76-year-old with an episode of disorientation and ataxia.  For episode of disorientation and ataxia multiple areas of cerebrovascular accident, suspect possibly cardiac in nature.  The patient should undergo JASON.  The patient does have an elevated white blood cell count workup as needed.  We will place the patient on aspirin 325 once a day for now.  statin.  Cardiology follow-up.  Telemetry evaluation to evaluate for underlying arrhythmia.  The patient may need to undergo a loop recorder as an outpatient.  avoid hypotension if possible   full dose AC as per cardiology and medical team NSTEMI  awaiting transfer   SPOKE to DR ALANA NAGEL FOR NEUROLOGY FOLLOW UP      Spoke with the spouse and the son in great detail.  The spouse's name is Jordan, telephone number is [phone].  Alternate number is [phone] 11/16 called today no response     Greater than 45 minutes of time was spent with the patient, plan of care, reviewing data, with greater than 50% of the visit was spent counseling and/or coordinating care with multidisciplinary healthcare team

## 2021-11-18 NOTE — PROGRESS NOTE ADULT - SUBJECTIVE AND OBJECTIVE BOX
PULMONARY/CRITICAL CARE  DOS 21  Stable asymptomatic.   Doing well. Conversant, alert. No cp.   Pt. well known to me admitted for AMS.     Patient is a 76y old  Female who presents with a chief complaint of NSTEMI, r/o CVA (15 Nov 2021 22:25)    BRIEF HOSPITAL COURSE: ***   75 y/o F with pmhx significant for HTN, COPD/emphysema, osteoporosis, psoriatic arthritis, MVP, tachycardia hx, gastritis, vertebral fracture, ulcerative colitis, costal chondritis, pre-DM, who presents with dizziness, nausea, and disorientation that began after 8:30am this morning, around which time she was last known well. The patient is confused on exam today. Hx taken from son and  over the phone largely. Per family, patient was not herself today but was at baseline yesterday A&Ox3. The patient was unable to remember family names and the year, president earlier today per family. Per family, the patient called her friend and then  around 11am stating she was feeling lightheaded, lethargic and nausea at this time. When the  arrived home, patient was unsteady with gait, lightheaded and mildly confused. The patient's symptoms failed to improve, pt taken to UC, became more disoriented. Sent to ED for eval. Of note, patient was scheduled for a cardiac catheterization next week. Per family, TTE was done last week outpatient and were told further workup required.  Now alert, but somewhat confused. Has no complaints.   Events last 24 hours: ***    PAST MEDICAL & SURGICAL HISTORY:  Gastritis    Tachycardia    MVP (mitral valve prolapse)    Ulcerative colitis    Cigarette smoker  current    Psoriatic arthritis    Vertebral fracture, closed  Lumbar x 4    Osteoporosis    Varicose veins    Hyperparathyroidism    Costal chondritis    Prediabetes    Herniated disc, cervical  lumbar    Emphysema lung    COPD (chronic obstructive pulmonary disease)    Termination of pregnancy (fetus)    History of tonsillectomy    S/P parathyroidectomy        Allergies    caffeine (Blisters)  Gluten (Unknown)  penicillins (Hives)    Intolerances    lactose (Vomiting)    FAMILY HISTORY: former smoker.   Family history of Parkinson disease    Family history of colorectal cancer    Family history of diabetes mellitus    Family history of transitional cell carcinoma of bladder (Child)    Family history of melanoma (Child)          Medications:      ALBUTerol    90 MICROgram(s) HFA Inhaler 2 Puff(s) Inhalation every 6 hours PRN        aspirin enteric coated 81 milliGRAM(s) Oral daily        atorvastatin 40 milliGRAM(s) Oral at bedtime    sodium chloride 0.9%. 1000 milliLiter(s) IV Continuous <Continuous>                ICU Vital Signs Last 24 Hrs  T(C): 36.5 (2021 04:56), Max: 37.1 (15 Nov 2021 17:50)  T(F): 97.7 (2021 04:56), Max: 98.8 (15 Nov 2021 17:50)  HR: 78 (2021 04:56) (78 - 92)  BP: 147/83 (2021 04:56) (147/83 - 166/71)  BP(mean): --  ABP: --  ABP(mean): --  RR: 18 (2021 04:56) (16 - 20)  SpO2: 91% (2021 04:56) (91% - 99%)    Vital Signs Last 24 Hrs  T(C): 36.5 (2021 04:56), Max: 37.1 (15 Nov 2021 17:50)  T(F): 97.7 (2021 04:56), Max: 98.8 (15 Nov 2021 17:50)  HR: 78 (2021 04:56) (78 - 92)  BP: 147/83 (2021 04:56) (147/83 - 166/71)  BP(mean): --  RR: 18 (2021 04:56) (16 - 20)  SpO2: 91% (2021 04:56) (91% - 99%)        I&O's Detail    15 Nov 2021 07:01  -  2021 07:00  --------------------------------------------------------  IN:    sodium chloride 0.9%: 450 mL  Total IN: 450 mL    OUT:  Total OUT: 0 mL    Total NET: 450 mL            LABS:                        11.3   13.68 )-----------( 314      ( 2021 05:16 )             36.9     11-16    142  |  108  |  10  ----------------------------<  111<H>  4.0   |  28  |  0.55    Ca    8.7      2021 05:16  Phos  3.6       Mg     2.2         TPro  6.9  /  Alb  2.8<L>  /  TBili  0.5  /  DBili  x   /  AST  81<H>  /  ALT  20  /  AlkPhos  92            CAPILLARY BLOOD GLUCOSE      POCT Blood Glucose.: 119 mg/dL (15 Nov 2021 16:06)    PT/INR - ( 15 Nov 2021 16:20 )   PT: 13.6 sec;   INR: 1.13 ratio         PTT - ( 15 Nov 2021 16:20 )  PTT:32.5 sec  Urinalysis Basic - ( 15 Nov 2021 16:44 )    Color: Yellow / Appearance: Clear / S.015 / pH: x  Gluc: x / Ketone: Negative  / Bili: Negative / Urobili: Negative mg/dL   Blood: x / Protein: 30 mg/dL / Nitrite: Negative   Leuk Esterase: Negative / RBC: 0-2 /HPF / WBC 0-2   Sq Epi: x / Non Sq Epi: Negative / Bacteria: Negative      CULTURES:      Physical Examination:    General: No acute distress.  Elderly female sitting up comfortably in bed    HEENT: Pupils equal, reactive to light.  Symmetric.    PULM: Clear to auscultation bilaterally, no wheeze rhonchi rales, good excursion no change percussion    CVS: Regular rate and rhythm, no murmurs, rubs, or gallops    ABD: Soft, nondistended, nontender, normoactive bowel sounds, no masses    EXT: No edema, nontender calves    SKIN: Warm and well perfused, no rashes noted.    NEURO: Alert, oriented to time, place and person, interactive, nonfocal  Moves all ext    RADIOLOGY: ***rad< from: CT Chest No Cont (11.15.21 @ 17:19) >  EXAM:  CT ABDOMEN AND PELVIS                          EXAM:  CT CHEST                                  PROCEDURE DATE:  11/15/2021          INTERPRETATION:  CLINICAL INFORMATION: Dizziness for 10 days. Nausea. Leukocytosis.    COMPARISON: Outside whole-body PET/CT dated 2018, noncontrast CT of the abdomen and pelvis dated 2019.    CONTRAST/COMPLICATIONS:  IV Contrast: NONE  Oral Contrast: NONE  Complications: None reported at time of study completion    PROCEDURE:  CT of the Chest, Abdomen and Pelvis was performed.  Sagittal and coronal reformats were performed.    FINDINGS:  CHEST:  LUNGS AND LARGE AIRWAYS: Patent central airways. Moderate centrilobular emphysematous changes. Mild lingular atelectasis. Right lower lobe mucus plugging on image 113. No discrete pulmonary nodule or confluent airspace opacity is identified.  PLEURA: No pleural effusion.  VESSELS: Within normal limits.  HEART: Mitral annulus calcification. Heavy coronary artery calcification. Heart size is normal. No pericardial effusion.  MEDIASTINUM AND EVANGELINA: No lymphadenopathy.  CHEST WALL AND LOWER NECK: Within normal limits.    ABDOMEN AND PELVIS:  LIVER: Within normal limits.  BILE DUCTS: Normal caliber.  GALLBLADDER: Within normal limits.  SPLEEN: Within normal limits.  PANCREAS: Within normal limits.  ADRENALS: Stable low-attenuation thickening of the left adrenal gland. The right adrenal gland is unremarkable in morphology.  KIDNEYS/URETERS: Within normal limits.    BLADDER: Minimally distended.  REPRODUCTIVE ORGANS: Multiple calcified uterine fibroids. The adnexa are unremarkable by CT criteria.    BOWEL: No bowel obstruction.  PERITONEUM: No ascites.  VESSELS: Within normal limits.  RETROPERITONEUM/LYMPH NODES: No lymphadenopathy.  ABDOMINAL WALL: Within normal limits.  BONES: ORIF of intertrochanteric left femur fracture. Healed left sacral ala fracture. Moderate compression deformity of the inferior endplate of L3. Healed impacted right humeral neck fracture.    IMPRESSION: No evidence of acute infectious process in the thorax. No evidence of acute inflammatory or obstructive process in the abdomen and pelvis.    --- End of Report ---    < end of copied text >  < from: 12 Lead ECG (11.15.21 @ 20:17) >  Ventricular Rate 81 BPM    Atrial Rate 81 BPM    P-R Interval 294 ms    QRS Duration 72 ms    Q-T Interval 394 ms    QTC Calculation(Bazett) 457 ms    P Axis 60 degrees    R Axis 75 degrees    T Axis -79 degrees    Diagnosis Line Sinus rhythm with 1st degree AV block  Nonspecific ST and T wave abnormality  Abnormal ECG  When compared with ECG of 15-NOV-2021 15:56, (Unconfirmed)  T wave inversion now evident in Anterior leads  and anterolateral leads  Confirmed by DARWIN GILBERT MD ([id number]) on 2021 7:01:01 AM    < end of copied text >      < from: MR Head No Cont (21 @ 09:44) >  EXAM:  MR BRAIN                            PROCEDURE DATE:  2021          INTERPRETATION:  INDICATION:    Dizziness. TIA. Rule out stroke.  TECHNIQUE:  Multiplanar brain imaging was conducted. T1, T2 and FLAIR imaging was performed.  In addition, diffusion imaging, diffusion coefficient assessment (ADC) and T2* was incorporated . No contrast administered.  COMPARISON EXAMINATION:  CT 11/15/2021    FINDINGS:    HEMISPHERES: There are scattered foci of diffusion restriction in both hemispheres, suggesting embolic or thrombotic showering. There is involvement of both basal ganglia and white matter, without evidence of a large acute territorial infarct. Also noted are small white matter lesions and chronic ischemic changes in both hemispheres with volume loss.  VENTRICLES:  Midline with ex vacuo enlargement  POSTERIOR FOSSA:  Scattered areas of diffusion abnormality are also noted in the cerebellar hemispheres bilaterally.  EXTRA-AXIAL:  No mass or collections are depicted.  VASCULATURE:  The major vessels and venous sinuses are grossly patent.  SINUSES AND MASTOIDS:  Clear.  MISCELLANEOUS:  No orbital or pituitary abnormality noted.  No skull base lesion suggested.    IMPRESSION:    1)  Multiple small foci of diffusion restriction in both hemispheres as well as within the posterior fossa suggesting embolic or thrombotic showering in the anterior and posterior circulation. Further workup and assessment recommended.  2)  also noted are chronic ischemic changes in both hemispheres with atrophic change.    --- End of Report ---            LEONORA COYNE MD; Attending Radiologist  This document has been electronically signed. 2021 10:51AM    < end of copied text >

## 2021-11-18 NOTE — H&P ADULT - REASON FOR ADMISSION
Detail Level: Detailed Add 63750 Cpt? (Important Note: In 2017 The Use Of 68862 Is Being Tracked By Cms To Determine Future Global Period Reimbursement For Global Periods): yes CVA/NSTEMI w/u

## 2021-11-18 NOTE — H&P ADULT - HISTORY OF PRESENT ILLNESS
76F PMH tachycardia, MVP, pre-DM, HTN, COPD (emphysema), osteoporosis, psoriatic arthritis, gastritis, vertebral fx, UC, costochondritis presented to OSH ([place]) from  on 11/15 iso leathargy, nausea, lightheadedness/dizziness, progressive confusion, unsteady gait, LKN 08:30 that day (A&Ox3 bl). Pt w/ recent TTE and pending cardiac cath as outpt. At OSH MR head w/ foci of diffusion restriction c/f embolic/thrombotic phenomena, neuro c/s. Flower elevated, cardiology c/s c/f NSTEMI and potential cardiac emboli/thrombi, initated lipitor, full dose lovenox,  (per neuro recs), plavix held c/f hemorrhagic conversion iso multiple CVAs. Pt transferred to [facility] for JASON and potential cardiac cath.    [facility] Transfer Course  VS:   Labs:  Imaging:  Micro:  Received:    Admit to medicine for further eval and mgt 76F PMH tachycardia, MVP, pre-DM, HTN, COPD (emphysema), osteoporosis, psoriatic arthritis, gastritis, vertebral fx, UC, costochondritis presented to OSH ([place]) from  on 11/15 iso leathargy, nausea, lightheadedness/dizziness, progressive confusion, unsteady gait, LKN 08:30 that day (A&Ox3 bl). Pt w/ recent TTE and pending cardiac cath as outpt. At OSH MR head w/ foci of diffusion restriction c/f thromboembolic phenomena, neuro c/s. Flower elevated, cardiology c/s c/f NSTEMI and potential cardiac emboli/thrombi, initiated lipitor, full dose lovenox,  (per neuro recs), plavix held c/f hemorrhagic conversion iso multiple CVAs. Pt transferred to [facility] for JASON and potential cardiac cath.    [facility] Transfer Course  VS:   Labs:  Imaging:  Micro:  Received:    Admit to medicine for further eval and mgt 76F PMH tachycardia, MVP, pre-DM, HTN, COPD (emphysema), osteoporosis, psoriatic arthritis, gastritis, vertebral fx, UC, costochondritis presented to OSH ([place]) from  on 11/15 iso leathargy, nausea, lightheadedness/dizziness, progressive confusion, unsteady gait, LKN 08:30 that day (A&Ox3 bl). Pt w/ recent TTE and pending cardiac cath as outpt. At OSH MR head w/ foci of diffusion restriction c/f thromboembolic phenomena, neuro c/s. Flower elevated, cardiology c/s c/f NSTEMI and potential cardiac emboli/thrombi, initiated lipitor, full dose lovenox,  (per neuro recs), plavix held c/f hemorrhagic conversion iso multiple CVAs. Pt transferred to [facility] for JASON and potential cardiac cath.    [facility] Transfer Course      Admit to medicine for further eval and mgt 76F PMH tachycardia, MVP, pre-DM, HTN, COPD (emphysema), osteoporosis, psoriatic arthritis, gastritis, vertebral fx, UC, costochondritis presented to OSH ([place]) from  on 11/15 iso leathargy, nausea, lightheadedness/dizziness, progressive confusion, unsteady gait, LKN 08:30 that day (A&Ox3 bl). Pt w/ recent TTE and pending cardiac cath as outpt. At OSH MR head w/ foci of diffusion restriction c/f thromboembolic phenomena, neuro c/s. Flower elevated, cardiology c/s c/f NSTEMI and potential cardiac emboli/thrombi, initiated lipitor, full dose lovenox,  (per neuro recs), plavix held c/f hemorrhagic conversion iso multiple CVAs. Pt transferred to [facility] for JASON and potential cardiac cath.    VS: Afebrile, HR 70s-80s, hypertensive BP 130s-160s/80s-90s, RR 16-18, SpO2% 90-96 on RA  Labs: resolved leukocytosis 18.3 (11/15) -> 10.1 11/18, normocytic anemia (MCV 84) 11.2; resolved AST elevation 81 (11/16) -> 37;  (11/15) -> 6624 (11/16) -> 5757 (11/16),  -> 407, CKMB 47.5 -> 33.5   Imaging: CXR and CTCAP NC w/o urgent finding; CTH NC no acute findings; MRH NC multiple small foci diffusion restriction both hemispheres and w/i posterior fossa suggesting embolic or thrombotic showering in anterior/posterior circulation, chronic ischemic changes in b/l hemispheres and atrophic changes  76F PMH MVP, HTN, COPD (emphysema), osteoporosis, psoriatic arthritis, gastritis, vertebral fx, UC, costochondritis presented to OSH ([place]) from  on 11/15 iso leathargy, nausea, lightheadedness/dizziness, progressive confusion, unsteady gait, LKN 08:30 that day (A&Ox3 bl). Pt w/ recent TTE and pending cardiac cath as outpt. At OSH MR head w/ foci of diffusion restriction c/f thromboembolic phenomena, neuro c/s. Flower elevated, cardiology c/s c/f NSTEMI and potential cardiac emboli/thrombi, initiated lipitor, full dose lovenox,  (per neuro recs), plavix held c/f hemorrhagic conversion iso multiple CVAs. Pt transferred to [facility] for JASON and potential cardiac cath.    VS: Afebrile, HR 70s-80s, hypertensive BP 130s-160s/80s-90s, RR 16-18, SpO2% 90-96 on RA  Labs: resolved leukocytosis 18.3 (11/15) -> 10.1 11/18, normocytic anemia (MCV 84) 11.2; resolved AST elevation 81 (11/16) -> 37;  (11/15) -> 6624 (11/16) -> 5757 (11/16),  -> 407, CKMB 47.5 -> 33.5   Imaging: CXR and CTCAP NC w/o urgent finding; CTH NC no acute findings; MRH NC multiple small foci diffusion restriction both hemispheres and w/i posterior fossa suggesting embolic or thrombotic showering in anterior/posterior circulation, chronic ischemic changes in b/l hemispheres and atrophic changes; MR Angio Neck NC unremarkable; TTE EF 65% w/ increased LV wall thickness, dilated LA, mod to severe MR w/ functional MS, severe AS, mild pulm HTN  Micro: UA not c/f infxn, BCx and UCx 11/16 NGTD    Admit to medicine for further eval and mgt 76F PMH MVP, HTN, COPD, osteoporosis, psoriatic arthritis, gastritis, vertebral fx, UC (last flare years ago), costochondritis presented to OSH ([place]) from  on 11/15 iso leathargy, nausea, lightheadedness/dizziness, progressive confusion, unsteady gait, LKN 08:30 11/15 (A&Ox3 bl). Pt's  drove pt to urgent care; she became acutely disoriented on the way to urgent care (eg, didn't know where she was), urgent care recommended pt be brought to [facility] ER where she had CT head with c/f stroke, transferred to [facility] for MRI. MRI on 11/16 with foci of diffusion restriction c/f thromboembolic phenomena. On 11/17, pt's memory started to return and per  she is returning to baseline mental status. Additionally, while at OSH, Flower elevated, cardiology c/s with c/f NSTEMI and potential cardiac emboli/thrombi. She was initiated on lipitor, full dose Lovenox, , Plavix held given c/f hemorrhagic conversion iso multiple CVAs. Pt transferred to [facility] for JASON and potential cardiac cath. Currently the pt feels well and denies weakness, numbness or tingling, fever/chills, sore throat, cough, CP, SOB changed from baseline, abdominal discomfort, n/v/d/c, dysuria, hematuria, hematochezia.    VS: Afebrile, HR 70s-80s, hypertensive BP 130s-160s/80s-90s, RR 16-18, SpO2% 90-96 on RA  Labs: resolved leukocytosis 18.3 (11/15) -> 10.1 11/18, normocytic anemia (MCV 84) 11.2; resolved AST elevation 81 (11/16) -> 37; hsT 375 (11/15) -> 6624 (11/16) -> 5757 (11/16);  -> 407; CKMB 47.5 -> 33.5   Imaging: CXR and CTCAP NC w/o urgent finding; CTH NC no acute findings; MR head noncon multiple small foci diffusion restriction both hemispheres and w/i posterior fossa suggesting embolic or thrombotic showering in anterior/posterior circulation, chronic ischemic changes in b/l hemispheres and atrophic changes; MR Angio Neck noncon unremarkable; TTE EF 65% w/ increased LV wall thickness, dilated LA, mod to severe MR w/ functional MS, severe AS, mild pulm HTN  Micro: UA not c/f infxn, BCx and UCx 11/16 NGTD    Admit to medicine for further eval and mgt

## 2021-11-18 NOTE — CONSULT NOTE ADULT - ASSESSMENT
75 yo F w/ PMH HTN, COPD, severe AS, OA UC who presented initially to OSH with altered mental status, unsteady gait, and lightheadedness/dizziness found to have multiple infracts concerning for cardioembolism as well as NSTEMI of unclear etiology.     #NSTEMI   EKG non-ischemic. Echo did not show WMA. Unclear what is causing her elevated troponins, could be stress cardiomyopathy. Has no chest pain, SOB, anginal symptoms.   -Trend troponin to peak   -Can stop enoxaparin full dose after 48 hours   -Continue aspirin   -Continue atorvastatin   -Will consider cardiac cath non-urgent     #CVA   Echo showed severe AS and moderate to severe MR.  75 yo F w/ PMH HTN, COPD, severe AS, OA UC who presented initially to OSH with altered mental status, unsteady gait, and lightheadedness/dizziness found to have multiple infracts concerning for cardioembolism as well as NSTEMI of unclear etiology.     #NSTEMI   EKG non-ischemic. Echo did not show WMA. Unclear what is causing her elevated troponins, could be stress cardiomyopathy. Has no chest pain, SOB, anginal symptoms.   -Trend troponin to peak   -Can stop enoxaparin full dose after 48 hours   -Continue aspirin   -Continue atorvastatin   -Will consider cardiac cath non-urgent     #CVA   Echo showed severe AS and moderate to severe MR. No signs of embolism. Transferred here for JASON and possible ILR.   -NPO after midnight for JASON  75 yo F w/ PMH HTN, COPD, severe AS, OA UC who presented initially to OSH with altered mental status, unsteady gait, and lightheadedness/dizziness found to have multiple infracts concerning for cardioembolism as well as NSTEMI of unclear etiology.     #NSTEMI   EKG non-ischemic. Echo did not show WMA. Unclear what is causing her elevated troponins, could be stress cardiomyopathy. Has no chest pain, SOB, anginal symptoms.   -Can stop enoxaparin full dose after 48 hours   -Continue aspirin   -Continue atorvastatin   -Will consider cardiac cath non-urgent     #CVA   Echo showed severe AS and moderate to severe MR. No signs of embolism. Transferred here for JASON and possible ILR.   -NPO after midnight for JASON  77 yo F w/ PMH HTN, COPD, severe AS, OA UC who presented initially to OSH with altered mental status, unsteady gait, and lightheadedness/dizziness found to have multiple infracts concerning for cardioembolism as well as NSTEMI of unclear etiology.     #NSTEMI   EKG shows T wave inversion in the anterolateral leads. Echo did not show WMA. Unclear what is causing her elevated troponins, could be stress cardiomyopathy. Has no chest pain, SOB, anginal symptoms.   -Can stop enoxaparin full dose after 48 hours   -Continue aspirin   -Continue atorvastatin   -Will consider cardiac cath non-urgent     #CVA   Echo showed severe AS and moderate to severe MR. No signs of embolism. Transferred here for JASON and possible ILR.   -NPO after midnight for JASON  77 yo F w/ PMH HTN, COPD, severe AS, OA UC who presented initially to OSH with altered mental status, unsteady gait, and lightheadedness/dizziness found to have multiple infracts concerning for cardioembolism as well as NSTEMI of unclear etiology.     #NSTEMI   EKG shows T wave inversion in the anterolateral leads. Echo did not show WMA. Unclear what is causing her elevated troponins, could be stress cardiomyopathy. Has no chest pain, SOB, anginal symptoms.   -Can stop enoxaparin full dose after 48 hours   -Continue aspirin   -Continue atorvastatin   -Will consider cardiac cath non-urgent     #CVA   Echo showed severe AS and moderate to severe MR. No signs of embolism. Transferred here for JASON and possible ILR.   -NPO after midnight for JASON   -Tele

## 2021-11-18 NOTE — H&P ADULT - PROBLEM SELECTOR PLAN 4
- holding home ARB and BB c/f CVA allowing permissive HTN  - monitor BPs - cont to hold home ARB and BB c/f CVA allowing permissive HTN  - monitor BPs

## 2021-11-18 NOTE — H&P ADULT - PROBLEM SELECTOR PLAN 5
SpO2% adequate on RA, ctm  - c/w albuterol prn SpO2% adequate on RA, mild wheezing on exam  - c/w albuterol prn  - CTM

## 2021-11-18 NOTE — DISCHARGE NOTE NURSING/CASE MANAGEMENT/SOCIAL WORK - PATIENT PORTAL LINK FT
You can access the FollowMyHealth Patient Portal offered by [facility] by registering at the following website: [email or web address]. By joining [facility]’s FollowMyHealth portal, you will also be able to view your health information using other applications (apps) compatible with our system.

## 2021-11-18 NOTE — PROGRESS NOTE ADULT - ASSESSMENT
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  For JASON [place] and possible cardiac cath.     NSTEMI, but asymptomatic.   Hx stable COPD.  Suggest cardio, neuro fu.

## 2021-11-18 NOTE — CONSULT NOTE ADULT - ASSESSMENT
INCOMPLETE NOTE. Assessment: 75 yo RH female with a PMHx of MVP, HTN, COPD, osteoporosis, psoriatic arthritis, gastritis, vertebral fracture, UC, costochondritis, and now with acute ischemic stroke presents as a transfer from [place]. LKW 11/15 at 0830. Patient then became lethargic, nauseous, and complained of lightheadedness/dizziness. MRI Head showed multiple small foci of diffusion restriction in both hemispheres as well as within the posterior fossa. Patient also found to have NSTEMI. Patient was transferred to [facility] for further workup, including JASON.    Impression: AMS + ataxia. Patient with multiple small foci of diffusion restriction in both anterior and posterior circulation seen on MRI Head. Mechanism likely cardioembolic.    Recommendations:  [] Neuro checks Q4HR, Telemetry.  [] BP Goal: Normotension.  [] JASON.  [] No neurologic contraindication to c/w ASA 325MG PO QD, full dose AC as needed per cardiology.  [] Atorvastatin 80MG PO QHS. (Titrate for goal LDL < 70).  [x] A1c: 6.2, LDL: 131.  [] Patient will likely need ILR.  [] PT/OT evals.  [] Rest of care per primary team.    Case to be discussed with neurology attending Dr. Dukes. Assessment: 75 yo RH female with a PMHx of MVP, HTN, COPD, osteoporosis, psoriatic arthritis, gastritis, vertebral fracture, UC, costochondritis, and now with acute ischemic stroke presents as a transfer from [place]. LKW 11/15 at 0830. Patient then became lethargic, nauseous, and complained of lightheadedness/dizziness. MRI Head showed multiple small foci of diffusion restriction in both hemispheres as well as within the posterior fossa. Patient also found to have NSTEMI. Patient was transferred to [facility] for further workup, including JASON.    Impression: AMS + ataxia. Patient with multiple small foci of diffusion restriction in both anterior and posterior circulation seen on MRI Head. Mechanism likely cardioembolic.    Recommendations:  [] Neuro checks Q4HR, Telemetry.  [] BP Goal: Normotension.  [] JASON.  [] No neurologic contraindication to c/w ASA 325MG PO QD, full dose AC as needed per cardiology.  [] Atorvastatin 80MG PO QHS. (Titrate for goal LDL < 70).  [] B12 < 400, c/w B12 1000mcg PO QD.  [x] A1c: 6.2, LDL: 131.  [] Patient will likely need ILR.  [] PT/OT evals.  [] Rest of care per primary team.    Case to be discussed with neurology attending Dr. Dukes. Assessment: 77 yo RH female with a PMHx of MVP, HTN, COPD, osteoporosis, psoriatic arthritis, gastritis, vertebral fracture, UC, costochondritis, and now with acute ischemic stroke presents as a transfer from [place]. LKW 11/15 at 0830. Patient then became lethargic, nauseous, and complained of lightheadedness/dizziness. MRI Head showed multiple small foci of diffusion restriction in both hemispheres as well as within the posterior fossa. Patient also found to have NSTEMI. Patient was transferred to [facility] for further workup, including JASON.    Impression: AMS + ataxia. Patient with multiple small foci of diffusion restriction in both anterior and posterior circulation seen on MRI Head. Mechanism likely cardioembolic.    Recommendations:  [] Neuro checks Q4HR, Telemetry.  [] BP Goal: Normotension.  [] No neurologic contraindication to c/w ASA 325MG PO QD, full dose AC as needed per cardiology.  [] Atorvastatin 80MG PO QHS. (Titrate for goal LDL < 70).  [] B12 < 400, c/w B12 1000mcg PO QD.  [x] A1c: 6.2, LDL: 131.  [] Patient will likely need ILR.  [] PT/OT evals.  [] Rest of care per primary team.    Case to be discussed with neurology attending Dr. Dukes.

## 2021-11-18 NOTE — PROGRESS NOTE ADULT - ASSESSMENT
The patient is a 76 year old female with a history of HTN, COPD, UC, OA, severe AS who presents with confusion.    Plan:  - ECG with no evidence of ischemia or infarction  - Troponin trended up to 6623 - may be reflective of underlying neurologic event. Cannot exclude NSTEMI although no symptoms to suggest such at this time.  - Echo done last month with normal LV systolic function, severe AS, mitral stenosis  - Repeat echo largely unchanged  - Continue aspirin 81 mg daily  - Continue atorvastatin 40 mg daily  - Continue full dose enoxaparin for treatment of possible NSTEMI  - Permissive hypertension  - MRI head with multiple areas of infarct, concerning for embolic CVA  - Neurology follow-up  - Plan for transfer to [facility] for JASON +/- ILR. Would also consider cardiac catheterization given possible NSTEMI.

## 2021-11-18 NOTE — H&P ADULT - PROBLEM SELECTOR PLAN 3
TWI anterolateral leads, TTE at OSH w/o WMA, Flower downtrending c/f stress cardiomyopathy  - cardiology on board, appreciate recs  - stop lovenox after 48h  - c/w ASA  - c/w statin  - consider non-urgent cath  - tele as above TWI anterolateral leads, TTE at OSH w/o WMA, Flower downtrending c/f stress cardiomyopathy  - cardiology on board, appreciate recs  - stop Lovenox after 48hr  - c/w ASA, statin as above  - consider non-urgent cath  - tele as above

## 2021-11-18 NOTE — OCCUPATIONAL THERAPY INITIAL EVALUATION ADULT - GENERAL OBSERVATIONS, REHAB EVAL
Pt received OOB sitting in the chair with +telemonitor. Pt cooperative during evaluation and has been ambulating to the bathroom with no devices.

## 2021-11-18 NOTE — PROGRESS NOTE ADULT - PROBLEM SELECTOR PROBLEM 1
Cerebrovascular accident (CVA)
NSTEMI (non-ST elevation myocardial infarction)
NSTEMI (non-ST elevation myocardial infarction)
Cerebrovascular accident (CVA)

## 2021-11-18 NOTE — CONSULT NOTE ADULT - ATTENDING COMMENTS
76 year old woman transferred for further management of acute multi-embolic stroke, marked neurologic recovery, aortic stenosis and marked elevation of troponin. There is no history of chest pain, EKG not suggestive of ischemia and cardiac echo with no wall motion abnormality. Thus "NSTEMI" is not a supported diagnosis and would categorize as myocardial injury related to external stresses, including cerebral event. On exam has S2 which is soft and single and gr iii/vi mid to late peaking systolic murmur base to carotids and apex. Need TE echo to further evaluate aortic stenosis, seek left atrial thrombus, PFO and aortic atheroma.    To contact call Cardiology Fellow or Attending as listed on [email or web address] password: [email or web address].
Briefly   77 yo RH female with a MVP, HTN, COPD, osteoporosis, psoriatic arthritis, gastritis, vertebral fracture, UC, costochondritis, and now with acute ischemic stroke presents as a transfer from [place]. W 11/15 at 0830. Patient then became lethargic, nauseous, and complained of lightheadedness/dizziness. found to have emboilic strokes and NSTEMI tx to [facility] for further care.    MRI Head showed multiple small foci of diffusion restriction in both hemispheres as well as within the posterior fossa.   MRA H/N neg   LDL 92, A1c 6.2  trop in 5000s  b12 386  CT c a p neg   Impression: AMS + ataxia. Patient with multiple small foci of diffusion restriction in both anterior and posterior circulation seen on MRI Head. ESUS.     Recommendations:  - asa and statin for secondary stroke prevention  - no CI to AC if needed for cardiac  - JASON planned already today. would get ILR to detect occult AF   - b12 supplement   - telemetry  - PT/OT/SS/SLP, OOBC  - check FS, glucose control <180  - GI/DVT ppx  - Counseling on diet, exercise, and medication adherence was done  - Counseling on smoking cessation and alcohol consumption offered when appropriate.  - Pain assessed and judicious use of narcotics when appropriate was discussed.    - Stroke education given when appropriate.  - Importance of fall prevention discussed.   - Differential diagnosis and plan of care discussed with patient and/or family and primary team  - Thank you for allowing me to participate in the care of this patient. Call with questions.   Cheko Dukes MD  Vascular Neurology  Office: [phone]

## 2021-11-18 NOTE — H&P ADULT - NSHPPHYSICALEXAM_GEN_ALL_CORE
LOS:     VITALS:   T(C): 36.3 (11-18-21 @ 16:51), Max: 36.7 (11-17-21 @ 20:07)  HR: 86 (11-18-21 @ 16:51) (73 - 88)  BP: 167/92 (11-18-21 @ 16:51) (154/82 - 167/92)  RR: 18 (11-18-21 @ 16:51) (18 - 19)  SpO2: 96% (11-18-21 @ 16:51) (91% - 96%)    GENERAL: NAD, lying in bed comfortably  HEAD:  Atraumatic, Normocephalic  EYES: EOMI, PERRLA, conjunctiva and sclera clear  ENT: Moist mucous membranes  NECK: Supple, No JVD  CHEST/LUNG: very mild wheezing b/l; No rales, rhonchi, or rubs. Unlabored respirations  HEART: Regular rate and rhythm; +holosytolic murmur  ABDOMEN: Soft, nontender, nondistended, no rebound/guarding  EXTREMITIES:  2+ Peripheral Pulses, brisk capillary refill. No clubbing, cyanosis, or edema  NERVOUS SYSTEM:  A&Ox3, no focal deficits; 5/5 strength b/l upper and lower extremities; symmetric smile, normal facial sensation, tongue midline, normal CN XI function  SKIN: No rashes or lesions

## 2021-11-18 NOTE — CONSULT NOTE ADULT - SUBJECTIVE AND OBJECTIVE BOX
Patient seen and evaluated at bedside    Chief Complaint:    HPI:  76F PMH tachycardia, MVP, pre-DM, HTN, COPD (emphysema), osteoporosis, psoriatic arthritis, gastritis, vertebral fx, UC, costochondritis presented to OSH ([place]) from  on 11/15 iso leathargy, nausea, lightheadedness/dizziness, progressive confusion, unsteady gait, LKN 08:30 that day (A&Ox3 bl). Pt w/ recent TTE and pending cardiac cath as outpt. At OSH MR head w/ foci of diffusion restriction c/f thromboembolic phenomena, neuro c/s. Flower elevated, cardiology c/s c/f NSTEMI and potential cardiac emboli/thrombi, initiated lipitor, full dose lovenox,  (per neuro recs), plavix held c/f hemorrhagic conversion iso multiple CVAs. Pt transferred to [facility] for JASON and potential cardiac cath.    77 yo F w/ PMH HTN, COPD, severe AS, OA UC who presented initially to OSH with altered mental status, unsteady gait, and lightheadedness/dizziness. On MRI brain, found to have multiple areas of infarcts concerning for embolic phenomenon. Transferred to [facility] for JASON and possible ILR. Her course was also complicated by NSTEMI that she was treated with enoxaparin for as well as aspirin and atorvastatin. She follows with Brijesh Kebede her cardiologist. She denied any chest pain, SOB, palpitations, weakness, numbness, lightheadedness, dizziness. She denies hx of cardiac surgery. She denies smoking, drinking, drug use.     [facility] Transfer Course  VS:   Labs:  Imaging:  Micro:  Received:    Admit to medicine for further eval and mgt (2021 18:05)      PMHx:   Nicotine addiction    Bronchitis    Gastritis    Tachycardia    MVP (mitral valve prolapse)    Ulcerative colitis    Cigarette smoker    Psoriatic arthritis    Vertebral fracture, closed    Osteoporosis    Varicose veins    Hyperparathyroidism    Costal chondritis    Prediabetes    Herniated disc, cervical    Emphysema lung    COPD (chronic obstructive pulmonary disease)        PSHx:   Termination of pregnancy (fetus)    History of tonsillectomy    S/P parathyroidectomy        Allergies:  caffeine (Blisters)  Gluten (Unknown)  lactose (Vomiting)  penicillins (Hives)      Home Meds:    Current Medications:       FAMILY HISTORY:  Family history of Parkinson disease    Family history of colorectal cancer    Family history of diabetes mellitus    Family history of transitional cell carcinoma of bladder (Child)    Family history of melanoma (Child)        Social History:  Smoking History:  Alcohol Use:  Drug Use:    REVIEW OF SYSTEMS:  Constitutional:     [x ] negative [ ] fevers [ ] chills [ ] weight loss [ ] weight gain  HEENT:                  [x ] negative [ ] dry eyes [ ] eye irritation [ ] postnasal drip [ ] nasal congestion  CV:                         [ x] negative  [ ] chest pain [ ] orthopnea [ ] palpitations [ ] murmur  Resp:                     [x ] negative [ ] cough [ ] shortness of breath [ ] dyspnea [ ] wheezing [ ] sputum [ ]hemoptysis  GI:                          [ x] negative [ ] nausea [ ] vomiting [ ] diarrhea [ ] constipation [ ] abd pain [ ] dysphagia   :                        [ x] negative [ ] dysuria [ ] nocturia [ ] hematuria [ ] increased urinary frequency  Musculoskeletal: [x ] negative [ ] back pain [ ] myalgias [ ] arthralgias [ ] fracture  Skin:                       [ x] negative [ ] rash [ ] itch  Neurological:        [ x] negative [ ] headache [ ] dizziness [ ] syncope [ ] weakness [ ] numbness  Psychiatric:           [ x] negative [ ] anxiety [ ] depression  Endocrine:            [ x] negative [ ] diabetes [ ] thyroid problem  Heme/Lymph:      [ x] negative [ ] anemia [ ] bleeding problem  Allergic/Immune: [ x] negative [ ] itchy eyes [ ] nasal discharge [ ] hives [ ] angioedema    [ x] All other systems negative  [ ] Unable to assess ROS due to      Physical Exam:  T(F): 97.4 (11-18), Max: 98.1 (11-17)  HR: 86 (11-18) (73 - 88)  BP: 167/92 (11-18) (154/82 - 167/92)  RR: 18 (11-18)  SpO2: 96% (11-18)  GENERAL: No acute distress, well-developed  HEAD:  Atraumatic, Normocephalic  CHEST/LUNG: Clear to auscultation bilaterally; No wheeze, equal breath sounds bilaterally   HEART: Regular rate and rhythm; Notable systolic murmur   ABDOMEN: Soft, Nontender, Nondistended  EXTREMITIES:  No clubbing, cyanosis, or edema  PSYCH: Nl behavior, nl affect  NEUROLOGY: AAOx3, non-focal   SKIN: Normal color, No rashes or lesions  LINES:    Cardiovascular Diagnostic Testing:    ECG: Personally reviewed:    Echo: Personally reviewed:    Stress Testing:    Cath:    Imaging:    CXR: Personally reviewed    Labs: Personally reviewed                        11.2   10.05 )-----------( 306      ( 2021 07:54 )             36.3         142  |  106  |  10  ----------------------------<  96  3.9   |  29  |  0.51    Ca    9.3      2021 07:54  Phos  3.5       Mg     2.3         TPro  6.9  /  Alb  2.9<L>  /  TBili  0.4  /  DBili  x   /  AST  37  /  ALT  21  /  AlkPhos  88          Total Cholesterol: 186  LDL: --  HDL: 72  T      Thyroid Stimulating Hormone, Serum: 1.53 uIU/mL ( @ 05:16)

## 2021-11-18 NOTE — OCCUPATIONAL THERAPY INITIAL EVALUATION ADULT - PERTINENT HX OF CURRENT PROBLEM, REHAB EVAL
75 y/o female with PMH HTN, COPD/emphysema, osteoporosis, psoriatic arthritis, MVP, tachycardia hx, gastritis, vertebral fracture, ulcerative colitis, costal chondritis, pre-DM, who presented with dizziness, nausea, and disorientation. Pt admitted with dizziness and giddiness, possible NSTEMI and r/o CVA. Pt scheduled for t/f to [place] for +JASON/-ILR possible cardiac cath as per Dr. Driscoll in EMR. MR brain 11/16 +multiple small foci of diffusion restriction. 77 y/o female with PMH COPD, psoriatic arthritis, MVP, tachycardia hx, vertebral fx, ulcerative colitis, costal chondritis, pre-DM, who presented w/dizziness, nausea & disorientation. Pt admitted w/dizziness & giddiness, possible NSTEMI and r/o CVA. Pt scheduled for t/f to [place] for +JASON/-ILR possible cardiac cath as per Dr. Driscoll. MR brain 11/16: +multiple small foci of diffusion restriction in both hemispheres as well as within posterior fossa.

## 2021-11-18 NOTE — OCCUPATIONAL THERAPY INITIAL EVALUATION ADULT - ADDITIONAL COMMENTS
Pt lives in a house with no steps, +stall shower with built-in seat. Pt owns a rolling walker, cane and commode. Pt is right hand dominant. Pt performed functional ambulation in hospital room and in hallway with no devices at an independent level. Pt lives in a house with no steps, +stall shower with built-in seat. Pt owns a rolling walker, cane and commode. Pt is right hand dominant. Pt performed functional ambulation in hospital room and in hallway with no devices at an independent level. MR brain 11/16/21: Multiple small foci of diffusion restriction in both hemispheres as well as within the posterior fossa suggesting embolic or thrombotic showering in the anterior and posterior circulation.

## 2021-11-18 NOTE — H&P ADULT - NSHPOUTPATIENTPROVIDERS_GEN_ALL_CORE
PCP: Marlon White PCP: Marlon White  Cardiologist - Brijesh Kebede  Pulmonologist - Jose Howard  Orthopedist - Matt Sharma  Rheumatologist - Joseluis Chaptar  Pharmacy - [facility]

## 2021-11-18 NOTE — PATIENT PROFILE ADULT - FLU SEASON?
Problem: Prexisting or High Potential for Compromised Skin Integrity  Goal: Skin integrity is maintained or improved  Description  INTERVENTIONS:  - Identify patients at risk for skin breakdown  - Assess and monitor skin integrity  - Assess and monitor nutrition and hydration status  - Monitor labs   - Assess for incontinence   - Turn and reposition patient  - Assist with mobility/ambulation  - Relieve pressure over bony prominences  - Avoid friction and shearing  - Provide appropriate hygiene as needed including keeping skin clean and dry  - Evaluate need for skin moisturizer/barrier cream  - Collaborate with interdisciplinary team   - Patient/family teaching  - Consider wound care consult   Outcome: Progressing     Problem: PAIN - ADULT  Goal: Verbalizes/displays adequate comfort level or baseline comfort level  Description  Interventions:  - Encourage patient to monitor pain and request assistance  - Assess pain using appropriate pain scale  - Administer analgesics based on type and severity of pain and evaluate response  - Implement non-pharmacological measures as appropriate and evaluate response  - Consider cultural and social influences on pain and pain management  - Notify physician/advanced practitioner if interventions unsuccessful or patient reports new pain  Outcome: Progressing     Problem: SAFETY ADULT  Goal: Patient will remain free of falls  Description  INTERVENTIONS:  - Assess patient frequently for physical needs  -  Identify cognitive and physical deficits and behaviors that affect risk of falls    -  [place] fall precautions as indicated by assessment   - Educate patient/family on patient safety including physical limitations  - Instruct patient to call for assistance with activity based on assessment  - Modify environment to reduce risk of injury  - Consider OT/PT consult to assist with strengthening/mobility  Outcome: Progressing  Goal: Maintain or return to baseline ADL function  Description  INTERVENTIONS:  -  Assess patient's ability to carry out ADLs; assess patient's baseline for ADL function and identify physical deficits which impact ability to perform ADLs (bathing, care of mouth/teeth, toileting, grooming, dressing, etc )  - Assess/evaluate cause of self-care deficits   - Assess range of motion  - Assess patient's mobility; develop plan if impaired  - Assess patient's need for assistive devices and provide as appropriate  - Encourage maximum independence but intervene and supervise when necessary  - Involve family in performance of ADLs  - Assess for home care needs following discharge   - Consider OT consult to assist with ADL evaluation and planning for discharge  - Provide patient education as appropriate  Outcome: Progressing  Goal: Maintain or return mobility status to optimal level  Description  INTERVENTIONS:  - Assess patient's baseline mobility status (ambulation, transfers, stairs, etc )    - Identify cognitive and physical deficits and behaviors that affect mobility  - Identify mobility aids required to assist with transfers and/or ambulation (gait belt, sit-to-stand, lift, walker, cane, etc )  - [place] fall precautions as indicated by assessment  - Record patient progress and toleration of activity level on Mobility SBAR; progress patient to next Phase/Stage  - Instruct patient to call for assistance with activity based on assessment  - Consider rehabilitation consult to assist with strengthening/weightbearing, etc   Outcome: Progressing     Problem: DISCHARGE PLANNING  Goal: Discharge to home or other facility with appropriate resources  Description  INTERVENTIONS:  - Identify barriers to discharge w/patient and caregiver  - Arrange for needed discharge resources and transportation as appropriate  - Identify discharge learning needs (meds, wound care, etc )  - Arrange for interpretive services to assist at discharge as needed  - Refer to Case Management Department for coordinating discharge planning if the patient needs post-hospital services based on physician/advanced practitioner order or complex needs related to functional status, cognitive ability, or social support system  Outcome: Progressing     Problem: Knowledge Deficit  Goal: Patient/family/caregiver demonstrates understanding of disease process, treatment plan, medications, and discharge instructions  Description  Complete learning assessment and assess knowledge base    Interventions:  - Provide teaching at level of understanding  - Provide teaching via preferred learning methods  Outcome: Progressing Yes...

## 2021-11-18 NOTE — PROGRESS NOTE ADULT - PROBLEM SELECTOR PROBLEM 2
NSTEMI (non-ST elevation myocardial infarction)
Altered mental status
Altered mental status
NSTEMI (non-ST elevation myocardial infarction)

## 2021-11-18 NOTE — H&P ADULT - ASSESSMENT
76F PMH tachycardia, MVP, pre-DM, HTN, COPD (emphysema), osteoporosis, psoriatic arthritis, gastritis, vertebral fx, UC, costochondritis presented to OSH ([place]) iso AMS, MRI revealing multiple areas of diffusion restriction c/f  cardiac thromboembolic phenomena, in addition to c/f NSTEMI, transferred to [facility] for JASON and potential cardiac catheterization 76F PMH MVP, HTN, COPD (emphysema), osteoporosis, psoriatic arthritis, gastritis, vertebral fx, UC, costochondritis presented to OSH ([place]) iso AMS, MRI revealing multiple areas of diffusion restriction c/f  cardiac thromboembolic phenomena, in addition to c/f NSTEMI, transferred to [facility] for JASON and potential cardiac catheterization 76F PMH MVP, HTN, COPD, osteoporosis c/b L hip fracture s/p fixation, psoriatic arthritis, gastritis, vertebral fx, UC (last flare years ago), costochondritis presented to OSH ([place]) iso AMS, MRI revealing multiple areas of diffusion restriction c/f  cardiac thromboembolic phenomena, in addition to c/f NSTEMI, transferred to [facility] for JASON and potential cardiac catheterization.

## 2021-11-18 NOTE — CONSULT NOTE ADULT - SUBJECTIVE AND OBJECTIVE BOX
GABRIELA WHELAN  Female  MRN-[id number]    HPI: INCOMPLETE NOTE.  76F PMH MVP, HTN, COPD, osteoporosis, psoriatic arthritis, gastritis, vertebral fx, UC (last flare years ago), costochondritis presented to OSH ([place]) from  on 11/15 iso leathargy, nausea, lightheadedness/dizziness, progressive confusion, unsteady gait, LKN 08:30 11/15 (A&Ox3 bl). Pt's  drove pt to urgent care; she became acutely disoriented on the way to urgent care (eg, didn't know where she was), urgent care recommended pt be brought to [facility] ER where she had CT head with c/f stroke, transferred to [facility] for MRI. MRI on 11/16 with foci of diffusion restriction c/f thromboembolic phenomena. On 11/17, pt's memory started to return and per  she is returning to baseline mental status. Additionally, while at OSH, Flower elevated, cardiology c/s with c/f NSTEMI and potential cardiac emboli/thrombi. She was initiated on lipitor, full dose Lovenox, , Plavix held given c/f hemorrhagic conversion iso multiple CVAs. Pt transferred to [facility] for JASON and potential cardiac cath. Currently the pt feels well and denies weakness, numbness or tingling, fever/chills, sore throat, cough, CP, SOB changed from baseline, abdominal discomfort, n/v/d/c, dysuria, hematuria, hematochezia.    VS: Afebrile, HR 70s-80s, hypertensive BP 130s-160s/80s-90s, RR 16-18, SpO2% 90-96 on RA  Labs: resolved leukocytosis 18.3 (11/15) -> 10.1 11/18, normocytic anemia (MCV 84) 11.2; resolved AST elevation 81 (11/16) -> 37; hsT 375 (11/15) -> 6624 (11/16) -> 5757 (11/16);  -> 407; CKMB 47.5 -> 33.5   Imaging: CXR and CTCAP NC w/o urgent finding; CTH NC no acute findings; MR head noncon multiple small foci diffusion restriction both hemispheres and w/i posterior fossa suggesting embolic or thrombotic showering in anterior/posterior circulation, chronic ischemic changes in b/l hemispheres and atrophic changes; MR Angio Neck noncon unremarkable; TTE EF 65% w/ increased LV wall thickness, dilated LA, mod to severe MR w/ functional MS, severe AS, mild pulm HTN  Micro: UA not c/f infxn, BCx and UCx 11/16 NGTD    Admit to medicine for further eval and mgt (18 Nov 2021 18:05)      NIHSS:  MRS:     ROS: All negative except as mentioned in HPI.    PAST MEDICAL & SURGICAL HISTORY:  Gastritis    Tachycardia    MVP (mitral valve prolapse)    Ulcerative colitis    Cigarette smoker  current    Psoriatic arthritis    Vertebral fracture, closed  Lumbar x 4    Osteoporosis    Varicose veins    Hyperparathyroidism    Costal chondritis    Prediabetes    Herniated disc, cervical  lumbar    Emphysema lung    COPD (chronic obstructive pulmonary disease)    Termination of pregnancy (fetus)    History of tonsillectomy    S/P parathyroidectomy  2014      MEDICATIONS  (STANDING):  aspirin enteric coated 325 milliGRAM(s) Oral daily  atorvastatin 40 milliGRAM(s) Oral at bedtime  cyanocobalamin 1000 MICROGram(s) Oral daily  enoxaparin Injectable 40 milliGRAM(s) SubCutaneous daily  metoprolol tartrate 12.5 milliGRAM(s) Oral every 12 hours    MEDICATIONS  (PRN):  ALBUTerol    90 MICROgram(s) HFA Inhaler 2 Puff(s) Inhalation every 6 hours PRN Shortness of Breath and/or Wheezing    Allergies    caffeine (Blisters)  Gluten (Unknown)  penicillins (Hives)    Intolerances    lactose (Vomiting)      VITAL SIGNS:  T(F): 98.4  HR: 90  BP: 151/87  RR: 18  SpO2: 95%    General Exam:  Constitutional: Lying on stretcher, NAD.  Head: Normocephalic, atraumatic.  Extremities: No edema.    Exam:   MS: Oriented x3. Fluent. Follows crossed commands.   CN: VFF. EOMI. V1-V3 intact. Face symmetric. T/u midline.   Motor: Full strength throughout.   Sensory: Intact to LT throughout   Reflexes: 2+ throughout. Babinski absent bilaterally.   Coordination: No dysmetria on FNF or ataxia on HTS bilaterally   Gait: Deferred    LABS:                          11.2   10.05 )-----------( 306      ( 18 Nov 2021 07:54 )             36.3     11-18    142  |  106  |  10  ----------------------------<  96  3.9   |  29  |  0.51    Ca    9.3      18 Nov 2021 07:54  Phos  3.5     11-17  Mg     2.3     11-17    TPro  6.9  /  Alb  2.9<L>  /  TBili  0.4  /  DBili  x   /  AST  37  /  ALT  21  /  AlkPhos  88  11-18        RADIOLOGY:             GABRIELA WHELAN  Female  MRN-[id number]    HPI: 77 yo RH female with a PMHx of MVP, HTN, COPD, osteoporosis, psoriatic arthritis, gastritis, vertebral fracture, UC, costochondritis, and now with acute ischemic stroke presents as a transfer from [place] for further workup. History obtained from  at bedside. LKW 11/15 at 0830. Patient then      INCOMPLETE NOTE.  76F PMH MVP, HTN, COPD, osteoporosis, psoriatic arthritis, gastritis, vertebral fx, UC (last flare years ago), costochondritis presented to OSH ([place]) from  on 11/15 iso leathargy, nausea, lightheadedness/dizziness, progressive confusion, unsteady gait, LKN 08:30 11/15 (A&Ox3 bl). Pt's  drove pt to urgent care; she became acutely disoriented on the way to urgent care (eg, didn't know where she was), urgent care recommended pt be brought to [facility] ER where she had CT head with c/f stroke, transferred to [facility] for MRI. MRI on 11/16 with foci of diffusion restriction c/f thromboembolic phenomena. On 11/17, pt's memory started to return and per  she is returning to baseline mental status. Additionally, while at OSH, Flower elevated, cardiology c/s with c/f NSTEMI and potential cardiac emboli/thrombi. She was initiated on lipitor, full dose Lovenox, , Plavix held given c/f hemorrhagic conversion iso multiple CVAs. Pt transferred to [facility] for JASON and potential cardiac cath. Currently the pt feels well and denies weakness, numbness or tingling, fever/chills, sore throat, cough, CP, SOB changed from baseline, abdominal discomfort, n/v/d/c, dysuria, hematuria, hematochezia.    VS: Afebrile, HR 70s-80s, hypertensive BP 130s-160s/80s-90s, RR 16-18, SpO2% 90-96 on RA  Labs: resolved leukocytosis 18.3 (11/15) -> 10.1 11/18, normocytic anemia (MCV 84) 11.2; resolved AST elevation 81 (11/16) -> 37; hsT 375 (11/15) -> 6624 (11/16) -> 5757 (11/16);  -> 407; CKMB 47.5 -> 33.5   Imaging: CXR and CTCAP NC w/o urgent finding; CTH NC no acute findings; MR head noncon multiple small foci diffusion restriction both hemispheres and w/i posterior fossa suggesting embolic or thrombotic showering in anterior/posterior circulation, chronic ischemic changes in b/l hemispheres and atrophic changes; MR Angio Neck noncon unremarkable; TTE EF 65% w/ increased LV wall thickness, dilated LA, mod to severe MR w/ functional MS, severe AS, mild pulm HTN  Micro: UA not c/f infxn, BCx and UCx 11/16 NGTD    Admit to medicine for further eval and mgt (18 Nov 2021 18:05)      NIHSS:  MRS:     ROS: All negative except as mentioned in HPI.    PAST MEDICAL & SURGICAL HISTORY:  Gastritis    Tachycardia    MVP (mitral valve prolapse)    Ulcerative colitis    Cigarette smoker  current    Psoriatic arthritis    Vertebral fracture, closed  Lumbar x 4    Osteoporosis    Varicose veins    Hyperparathyroidism    Costal chondritis    Prediabetes    Herniated disc, cervical  lumbar    Emphysema lung    COPD (chronic obstructive pulmonary disease)    Termination of pregnancy (fetus)    History of tonsillectomy    S/P parathyroidectomy  2014      MEDICATIONS  (STANDING):  aspirin enteric coated 325 milliGRAM(s) Oral daily  atorvastatin 40 milliGRAM(s) Oral at bedtime  cyanocobalamin 1000 MICROGram(s) Oral daily  enoxaparin Injectable 40 milliGRAM(s) SubCutaneous daily  metoprolol tartrate 12.5 milliGRAM(s) Oral every 12 hours    MEDICATIONS  (PRN):  ALBUTerol    90 MICROgram(s) HFA Inhaler 2 Puff(s) Inhalation every 6 hours PRN Shortness of Breath and/or Wheezing    Allergies    caffeine (Blisters)  Gluten (Unknown)  penicillins (Hives)    Intolerances    lactose (Vomiting)      VITAL SIGNS:  T(F): 98.4  HR: 90  BP: 151/87  RR: 18  SpO2: 95%    General Exam:  Constitutional: Lying on stretcher, NAD.  Head: Normocephalic, atraumatic.  Extremities: No edema.    Exam:   MS: Oriented x3. Fluent. Follows crossed commands.   CN: VFF. EOMI. V1-V3 intact. Face symmetric. T/u midline.   Motor: Full strength throughout.   Sensory: Intact to LT throughout   Reflexes: 2+ throughout. Babinski absent bilaterally.   Coordination: No dysmetria on FNF or ataxia on HTS bilaterally   Gait: Deferred    LABS:                          11.2   10.05 )-----------( 306      ( 18 Nov 2021 07:54 )             36.3     11-18    142  |  106  |  10  ----------------------------<  96  3.9   |  29  |  0.51    Ca    9.3      18 Nov 2021 07:54  Phos  3.5     11-17  Mg     2.3     11-17    TPro  6.9  /  Alb  2.9<L>  /  TBili  0.4  /  DBili  x   /  AST  37  /  ALT  21  /  AlkPhos  88  11-18        RADIOLOGY:             GABRIELA WHELAN  Female  MRN-[id number]    HPI: 77 yo RH female with a PMHx of MVP, HTN, COPD, osteoporosis, psoriatic arthritis, gastritis, vertebral fracture, UC, costochondritis, and now with acute ischemic stroke presents as a transfer from [place] for further workup. History obtained from  at bedside. LKW 11/15 at 0830. Patient then became lethargic, nauseous, and complained of lightheadedness/dizziness.  initially took her to urgent care. She became progressively confused on the way there. Urgent care recommended the patient go to the hospital. Patient was sent to [place] ED where CTH was done, then patient was transferred to [place]. At [place], MRI Head showed multiple small foci of diffusion restriction in both hemispheres as well as within the posterior fossa. Patient also found to have NSTEMI. Patient was transferred to [facility] for further workup, including JASON. Patient unable to provide much history. States she finney not know why she is in the hospital. Denies HA, current dizziness, vision changes, speech changes, numbness/tingling, weakness.    NIHSS: 0  MRS: 0    ROS: All negative except as mentioned in HPI.    PAST MEDICAL & SURGICAL HISTORY:  Gastritis    Tachycardia    MVP (mitral valve prolapse)    Ulcerative colitis    Cigarette smoker  current    Psoriatic arthritis    Vertebral fracture, closed  Lumbar x 4    Osteoporosis    Varicose veins    Hyperparathyroidism    Costal chondritis    Prediabetes    Herniated disc, cervical  lumbar    Emphysema lung    COPD (chronic obstructive pulmonary disease)    Termination of pregnancy (fetus)    History of tonsillectomy    S/P parathyroidectomy  2014    MEDICATIONS  (STANDING):  aspirin enteric coated 325 milliGRAM(s) Oral daily  atorvastatin 40 milliGRAM(s) Oral at bedtime  cyanocobalamin 1000 MICROGram(s) Oral daily  enoxaparin Injectable 40 milliGRAM(s) SubCutaneous daily  metoprolol tartrate 12.5 milliGRAM(s) Oral every 12 hours    MEDICATIONS  (PRN):  ALBUTerol    90 MICROgram(s) HFA Inhaler 2 Puff(s) Inhalation every 6 hours PRN Shortness of Breath and/or Wheezing    Allergies    caffeine (Blisters)  Gluten (Unknown)  penicillins (Hives)    Intolerances    lactose (Vomiting)    Vital Signs Last 24 Hrs  T(C): 36.9 (18 Nov 2021 20:36), Max: 36.9 (18 Nov 2021 20:36)  T(F): 98.4 (18 Nov 2021 20:36), Max: 98.4 (18 Nov 2021 20:36)  HR: 90 (18 Nov 2021 20:36) (73 - 90)  BP: 151/87 (18 Nov 2021 20:36) (151/87 - 167/92)  RR: 18 (18 Nov 2021 20:36) (18 - 18)  SpO2: 95% (18 Nov 2021 20:36) (91% - 96%)    General Exam:  Constitutional: Lying in bed, NAD. Irritable.  Head: Normocephalic, atraumatic.  Extremities: No edema.    Neuro Exam:   MS: Alert, eyes open spontaneously. Oriented to self, age, location, month, year. Speech is fluent, not slurred. Able to name objects and repeat. Follows commands.  CN: PERRL. VFF. EOMI. V1-V3 intact. Face symmetric. Tongue midline.   Motor: All extremities antigravity without drift.   Sensory: Intact to LT throughout. No extinction.   Coordination: No dysmetria on FNF or on HTS bilaterally.  Gait: Deferred.    LABS:               11.2   10.05 )-----------( 306      ( 18 Nov 2021 07:54 )             36.3     11-18    142  |  106  |  10  ----------------------------<  96  3.9   |  29  |  0.51    Ca    9.3      18 Nov 2021 07:54  Phos  3.5     11-17  Mg     2.3     11-17    TPro  6.9  /  Alb  2.9<L>  /  TBili  0.4  /  DBili  x   /  AST  37  /  ALT  21  /  AlkPhos  88  11-18    RADIOLOGY:    -11/15 CTH: No acute hemorrhage, mass effect or extra-axial collections.  -11/16 MRI Head w/o: Multiple small foci of diffusion restriction in both hemispheres as well as within the posterior fossa suggesting embolic or thrombotic showering in the anterior and posterior circulation. Further workup and assessment recommended. Also noted are chronic ischemic changes in both hemispheres with atrophic change.  -11/16 MRA N: Unremarkable study.  -11/16 MRA H: Widely patent vasculature. There is fetal-type supply of the left posterior cerebral artery. Left P1 segment is absent.

## 2021-11-18 NOTE — H&P ADULT - PROBLEM SELECTOR PLAN 6
DVT ppx: chemical w/ lovenox as above  Diet: soft/bite sized w/ mildly thickened liquids per OSH S&S  Fall/aspiration precautions as above DVT ppx: Lovenox 50mg bid  Diet: soft/bite sized w/ mildly thickened liquids per OSH S&S  Dispo: pending PT recs    FULL CODE

## 2021-11-18 NOTE — H&P ADULT - PROBLEM SELECTOR PLAN 1
MR at OSH c/f multiple areas of difussion restriction c/f thromboembolic phenomena, c/f cardiac origin  - c/s neuro in am  - q4h neuro checks  - fall, aspiration precautions  - cardiac w/u as below MR at OSH c/f multiple areas of difussion restriction c/f thromboembolic phenomena, c/f cardiac origin  - c/s neuro in am  - q4h neuro checks  - fall, aspiration precautions  - cardiac w/u as below  - c/w Lovenox 50mg bid, ASA 325mg qd, atorvastatin 40mg qhs, hold Plavix given c/f hemorrhagic transformation

## 2021-11-18 NOTE — H&P ADULT - NSHPREVIEWOFSYSTEMS_GEN_ALL_CORE
REVIEW OF SYSTEMS:    CONSTITUTIONAL: No weakness, fevers or chills  EYES: No visual changes; no sclera icterics, no pain or drainage  ENT:  No vertigo or throat pain   NECK: No pain or stiffness  RESPIRATORY: No cough, hemoptysis; No shortness of breath  CARDIOVASCULAR: No chest pain or palpitations  GASTROINTESTINAL: No abdominal or epigastric pain. No nausea, vomiting, or hematemesis; No diarrhea or constipation. No hematochezia.  GENITOURINARY: No dysuria, frequency or hematuria  NEUROLOGICAL: No numbness or weakness  SKIN: No itching, rashes  Psych: No anxiety or depression

## 2021-11-18 NOTE — H&P ADULT - PROBLEM SELECTOR PLAN 7
1. Name of PCP: PCP: Marlon White  2. PCP Contacted on Admission [ ] Y [ ] N [ ] N/A  3. PCP Contacted on Discharge [ ] Y [ ] N [ ] N/A  4. Post discharge appointment Date and Location:  5. Summary of Handoff Given to PCP [ ] Y [ ] N

## 2021-11-18 NOTE — PATIENT PROFILE ADULT - NSPROPTRIGHTREPNAME_GEN_A__NUR
Treatment Number: 0 Post-Care Instructions: I reviewed with the patient in detail post-care instructions. Patient should avoid sun exposure and wear sun protection. There were no complications. Post Peel Care: After the procedure, the treatment area was washed with soap and water, and a post-peel cream was applied. Sun protection and post-care instructions were reviewed with the patient. Patient agreed to  10%. There were no complications.  Strength given: 10% given by: Melody Kennedy Consent: Prior to the procedure, written consent was obtained and risks were reviewed, including but not limited to: redness, peeling, blistering, pigmentary change, scarring, infection, and pain. Frost (0,1+,2+,3+,4+): 1+ Chemical Peel: 10% TCA Time (Mins): 1 Prep: The treated area was degreased with pre-peel cleanser, and vaseline was applied for protection of mucous membranes. Detail Level: Zone [place]

## 2021-11-18 NOTE — H&P ADULT - NSHPSOCIALHISTORY_GEN_ALL_CORE
Former 50-pack year smoker, denies EtOH use. Retired  retired, lives w/ . Former smoker (50 pack years), denies EtOH use. Retired  retired, lives w/  in [place].

## 2021-11-18 NOTE — H&P ADULT - NSHPLABSRESULTS_GEN_ALL_CORE
LABS:                        11.2   10.05 )-----------( 306      ( 18 Nov 2021 07:54 )             36.3     11-18    142  |  106  |  10  ----------------------------<  96  3.9   |  29  |  0.51    Ca    9.3      18 Nov 2021 07:54  Phos  3.5     11-17  Mg     2.3     11-17    TPro  6.9  /  Alb  2.9<L>  /  TBili  0.4  /  DBili  x   /  AST  37  /  ALT  21  /  AlkPhos  88  11-18      RADIOLOGY & ADDITIONAL TESTS:

## 2021-11-19 ENCOUNTER — TRANSCRIPTION ENCOUNTER (OUTPATIENT)
Age: 76
End: 2021-11-19

## 2021-11-19 LAB
ALBUMIN SERPL ELPH-MCNC: 3.6 G/DL — SIGNIFICANT CHANGE UP (ref 3.3–5)
ALP SERPL-CCNC: 92 U/L — SIGNIFICANT CHANGE UP (ref 40–120)
ALT FLD-CCNC: 17 U/L — SIGNIFICANT CHANGE UP (ref 10–45)
ANION GAP SERPL CALC-SCNC: 11 MMOL/L — SIGNIFICANT CHANGE UP (ref 5–17)
APTT BLD: 32.5 SEC — SIGNIFICANT CHANGE UP (ref 27.5–35.5)
AST SERPL-CCNC: 29 U/L — SIGNIFICANT CHANGE UP (ref 10–40)
BASOPHILS # BLD AUTO: 0.03 K/UL — SIGNIFICANT CHANGE UP (ref 0–0.2)
BASOPHILS NFR BLD AUTO: 0.3 % — SIGNIFICANT CHANGE UP (ref 0–2)
BILIRUB SERPL-MCNC: 0.3 MG/DL — SIGNIFICANT CHANGE UP (ref 0.2–1.2)
BUN SERPL-MCNC: 12 MG/DL — SIGNIFICANT CHANGE UP (ref 7–23)
CALCIUM SERPL-MCNC: 8.7 MG/DL — SIGNIFICANT CHANGE UP (ref 8.4–10.5)
CHLORIDE SERPL-SCNC: 102 MMOL/L — SIGNIFICANT CHANGE UP (ref 96–108)
CO2 SERPL-SCNC: 26 MMOL/L — SIGNIFICANT CHANGE UP (ref 22–31)
CREAT SERPL-MCNC: 0.51 MG/DL — SIGNIFICANT CHANGE UP (ref 0.5–1.3)
EOSINOPHIL # BLD AUTO: 0.11 K/UL — SIGNIFICANT CHANGE UP (ref 0–0.5)
EOSINOPHIL NFR BLD AUTO: 1.1 % — SIGNIFICANT CHANGE UP (ref 0–6)
GLUCOSE BLDC GLUCOMTR-MCNC: 102 MG/DL — HIGH (ref 70–99)
GLUCOSE BLDC GLUCOMTR-MCNC: 124 MG/DL — HIGH (ref 70–99)
GLUCOSE BLDC GLUCOMTR-MCNC: 85 MG/DL — SIGNIFICANT CHANGE UP (ref 70–99)
GLUCOSE SERPL-MCNC: 111 MG/DL — HIGH (ref 70–99)
HCT VFR BLD CALC: 35 % — SIGNIFICANT CHANGE UP (ref 34.5–45)
HGB BLD-MCNC: 11 G/DL — LOW (ref 11.5–15.5)
IMM GRANULOCYTES NFR BLD AUTO: 0.5 % — SIGNIFICANT CHANGE UP (ref 0–1.5)
INR BLD: 1.11 RATIO — SIGNIFICANT CHANGE UP (ref 0.88–1.16)
LYMPHOCYTES # BLD AUTO: 2.46 K/UL — SIGNIFICANT CHANGE UP (ref 1–3.3)
LYMPHOCYTES # BLD AUTO: 24.3 % — SIGNIFICANT CHANGE UP (ref 13–44)
MAGNESIUM SERPL-MCNC: 1.9 MG/DL — SIGNIFICANT CHANGE UP (ref 1.6–2.6)
MCHC RBC-ENTMCNC: 26.3 PG — LOW (ref 27–34)
MCHC RBC-ENTMCNC: 31.4 GM/DL — LOW (ref 32–36)
MCV RBC AUTO: 83.5 FL — SIGNIFICANT CHANGE UP (ref 80–100)
MONOCYTES # BLD AUTO: 0.7 K/UL — SIGNIFICANT CHANGE UP (ref 0–0.9)
MONOCYTES NFR BLD AUTO: 6.9 % — SIGNIFICANT CHANGE UP (ref 2–14)
NEUTROPHILS # BLD AUTO: 6.76 K/UL — SIGNIFICANT CHANGE UP (ref 1.8–7.4)
NEUTROPHILS NFR BLD AUTO: 66.9 % — SIGNIFICANT CHANGE UP (ref 43–77)
NRBC # BLD: 0 /100 WBCS — SIGNIFICANT CHANGE UP (ref 0–0)
PHOSPHATE SERPL-MCNC: 3.4 MG/DL — SIGNIFICANT CHANGE UP (ref 2.5–4.5)
PLATELET # BLD AUTO: 295 K/UL — SIGNIFICANT CHANGE UP (ref 150–400)
POTASSIUM SERPL-MCNC: 4 MMOL/L — SIGNIFICANT CHANGE UP (ref 3.5–5.3)
POTASSIUM SERPL-SCNC: 4 MMOL/L — SIGNIFICANT CHANGE UP (ref 3.5–5.3)
PROT SERPL-MCNC: 6.5 G/DL — SIGNIFICANT CHANGE UP (ref 6–8.3)
PROTHROM AB SERPL-ACNC: 13.3 SEC — SIGNIFICANT CHANGE UP (ref 10.6–13.6)
RBC # BLD: 4.19 M/UL — SIGNIFICANT CHANGE UP (ref 3.8–5.2)
RBC # FLD: 14.4 % — SIGNIFICANT CHANGE UP (ref 10.3–14.5)
SARS-COV-2 RNA SPEC QL NAA+PROBE: SIGNIFICANT CHANGE UP
SODIUM SERPL-SCNC: 139 MMOL/L — SIGNIFICANT CHANGE UP (ref 135–145)
WBC # BLD: 10.11 K/UL — SIGNIFICANT CHANGE UP (ref 3.8–10.5)
WBC # FLD AUTO: 10.11 K/UL — SIGNIFICANT CHANGE UP (ref 3.8–10.5)

## 2021-11-19 PROCEDURE — 99223 1ST HOSP IP/OBS HIGH 75: CPT | Mod: GC

## 2021-11-19 PROCEDURE — 93312 ECHO TRANSESOPHAGEAL: CPT | Mod: 26

## 2021-11-19 PROCEDURE — 93306 TTE W/DOPPLER COMPLETE: CPT | Mod: 26

## 2021-11-19 PROCEDURE — 99233 SBSQ HOSP IP/OBS HIGH 50: CPT | Mod: GC

## 2021-11-19 RX ORDER — METOPROLOL TARTRATE 50 MG
25 TABLET ORAL AT BEDTIME
Refills: 0 | Status: DISCONTINUED | OUTPATIENT
Start: 2021-11-19 | End: 2021-11-20

## 2021-11-19 RX ORDER — ENOXAPARIN SODIUM 100 MG/ML
40 INJECTION SUBCUTANEOUS DAILY
Refills: 0 | Status: DISCONTINUED | OUTPATIENT
Start: 2021-11-19 | End: 2021-11-19

## 2021-11-19 RX ADMIN — Medication 325 MILLIGRAM(S): at 11:34

## 2021-11-19 NOTE — PROGRESS NOTE ADULT - PROBLEM SELECTOR PLAN 2
TTE at OHS revealing severe AS and mod to severe MR, no signs embolism  - cardiology aware, NPO after MN for JASON  - monitor on tele TTE at OHS revealing severe AS and mod to severe MR, no signs embolism  JASON and ILD placement to occur today  - monitor on tele  - f/u JASON results

## 2021-11-19 NOTE — DISCHARGE NOTE PROVIDER - NSDCMRMEDTOKEN_GEN_ALL_CORE_FT
aspirin 325 mg oral delayed release tablet: 1 tab(s) orally once a day  atorvastatin 40 mg oral tablet: 1 tab(s) orally once a day (at bedtime)  cyanocobalamin 1000 mcg oral tablet: 1 tab(s) orally once a day  ProAir HFA 90 mcg/inh inhalation aerosol: 2 puff(s) inhaled every 6 hours, As Needed   aspirin 325 mg oral delayed release tablet: 1 tab(s) orally once a day  atorvastatin 40 mg oral tablet: 1 tab(s) orally once a day (at bedtime)  cyanocobalamin 1000 mcg oral tablet: 1 tab(s) orally once a day  losartan 25 mg oral tablet: 1 tab(s) orally once a day  Metoprolol Tartrate 25 mg oral tablet: 1 tab(s) orally once a day  ProAir HFA 90 mcg/inh inhalation aerosol: 2 puff(s) inhaled every 6 hours, As Needed

## 2021-11-19 NOTE — CONSULT NOTE ADULT - ASSESSMENT
76F PMH HTN, COPD (emphysema), psoriatic arthritis, UC, AS, prior history of tachycardia/NSVT on Metoprolol who presented to [facility] from Urgent Care on 11/15 c/o lethargy, nausea, lightheadedness, dizziness, progressive confusion, unsteady gait found to have acute CVA, MR head revealing multiple small foci of diffusion restriction in both anterior and posterior circulation, likely cardioembolic in origin. JASON performed today reveals PFO without shunt, no thrombus visualized, EF 77%. She has no personal history of AF and has been in NSR on telemetry this admission.     1) Acute  CVA   2) NSTEMI   3) PFO w/o shunt  4) Severe AS, severe MR, mod-severe TR    -On ASA and high intensity statin for CVA  -Patient agreeable to ILR implant  for detection of occult AF   -Metoprolol for history of NSVT   -Continue telemetry monitoring   -K >4, Mg >2   -Recommend structural heart for eval re: severe valvular disease

## 2021-11-19 NOTE — PROGRESS NOTE ADULT - ASSESSMENT
76F PMH MVP, HTN, COPD, osteoporosis c/b L hip fracture s/p fixation, psoriatic arthritis, gastritis, vertebral fx, UC (last flare years ago), costochondritis presented to OSH ([place]) iso AMS, MRI revealing multiple areas of diffusion restriction c/f  cardiac thromboembolic phenomena, in addition to c/f NSTEMI, transferred to [facility] for JASON and potential cardiac catheterization. 76F PMH MVP, HTN, COPD, osteoporosis c/b L hip fracture s/p fixation, psoriatic arthritis, gastritis, vertebral fx, UC (last flare years ago), costochondritis presented to OSH ([place]) iso AMS, MRI revealing multiple areas of diffusion restriction c/f  cardiac thromboembolic phenomena,  transferred to [facility] for JASON and ILD placement.

## 2021-11-19 NOTE — DISCHARGE NOTE PROVIDER - PROVIDER TOKENS
PROVIDER:[TOKEN:[[id number]]],PROVIDER:[TOKEN:[[id number]]],PROVIDER:[TOKEN:[[id number]]] PROVIDER:[TOKEN:[[id number]],FOLLOWUP:[1 week]],PROVIDER:[TOKEN:[[id number]],FOLLOWUP:[2 weeks]],PROVIDER:[TOKEN:[[id number]],FOLLOWUP:[2 weeks]]

## 2021-11-19 NOTE — CONSULT NOTE ADULT - SUBJECTIVE AND OBJECTIVE BOX
CHIEF COMPLAINT: CVA     HISTORY OF PRESENT ILLNESS:  76F PMH HTN, COPD (emphysema), psoriatic arthritis, UC, AS who presented to [facility] from Urgent Care on 11/15 c/o lethargy, nausea, lightheadedness, dizziness, progressive confusion, unsteady gait, LKN 08:30 that day with MR head w/ foci of diffusion restriction c/f thromboembolic phenomena. CE's marked elevated, TTE w/o WMA. Patient not c/o angina/CP, palpitations, SOB, STOUT.       Allergies    caffeine (Blisters)  Gluten (Unknown)  penicillins (Hives)    Intolerances    lactose (Vomiting)  	    MEDICATIONS:  aspirin enteric coated 325 milliGRAM(s) Oral daily  metoprolol tartrate 12.5 milliGRAM(s) Oral every 12 hours      ALBUTerol    90 MICROgram(s) HFA Inhaler 2 Puff(s) Inhalation every 6 hours PRN        atorvastatin 40 milliGRAM(s) Oral at bedtime    cyanocobalamin 1000 MICROGram(s) Oral daily      PAST MEDICAL & SURGICAL HISTORY:  Gastritis    Tachycardia    MVP (mitral valve prolapse)    Ulcerative colitis    Cigarette smoker  current    Psoriatic arthritis    Vertebral fracture, closed  Lumbar x 4    Osteoporosis    Varicose veins    Hyperparathyroidism    Costal chondritis    Prediabetes    Herniated disc, cervical  lumbar    Emphysema lung    COPD (chronic obstructive pulmonary disease)    Termination of pregnancy (fetus)    History of tonsillectomy    S/P parathyroidectomy  2014        FAMILY HISTORY:  Family history of Parkinson disease    Family history of colorectal cancer    Family history of diabetes mellitus    Family history of transitional cell carcinoma of bladder (Child)    Family history of melanoma (Child)        SOCIAL HISTORY:    [x ]Non-smoker  Denies EtOH/drug use       REVIEW OF SYSTEMS:  See HPI. Otherwise, 10 point ROS done and otherwise negative.    PHYSICAL EXAM:  T(C): 36.8 (11-19-21 @ 16:03), Max: 36.9 (11-18-21 @ 20:36)  HR: 82 (11-19-21 @ 16:03) (77 - 90)  BP: 152/83 (11-19-21 @ 16:03) (145/88 - 167/92)  RR: 18 (11-19-21 @ 16:03) (18 - 18)  SpO2: 94% (11-19-21 @ 16:03) (93% - 96%)  Wt(kg): --  I&O's Summary    18 Nov 2021 07:01  -  19 Nov 2021 07:00  --------------------------------------------------------  IN: 360 mL / OUT: 0 mL / NET: 360 mL        Appearance: Alert. NAD	  HEENT:   NC/AT	  Cardiovascular: +S1S2 RRR +systolic murmur  Respiratory: CTA B/L	  Gastrointestinal:  Soft, NT.ND., + BS	  Skin: No rashes	  Neurologic: Non-focal, MAEx4, B/L UE/LE 5/5 strength  Extremities: No edema BLE  Vascular: Peripheral pulses palpable 2+ bilaterally, warm and well perfused       LABS:	 	    CBC Full  -  ( 19 Nov 2021 05:51 )  WBC Count : 10.11 K/uL  Hemoglobin : 11.0 g/dL  Hematocrit : 35.0 %  Platelet Count - Automated : 295 K/uL  Mean Cell Volume : 83.5 fl  Mean Cell Hemoglobin : 26.3 pg  Mean Cell Hemoglobin Concentration : 31.4 gm/dL  Auto Neutrophil # : 6.76 K/uL  Auto Lymphocyte # : 2.46 K/uL  Auto Monocyte # : 0.70 K/uL  Auto Eosinophil # : 0.11 K/uL  Auto Basophil # : 0.03 K/uL  Auto Neutrophil % : 66.9 %  Auto Lymphocyte % : 24.3 %  Auto Monocyte % : 6.9 %  Auto Eosinophil % : 1.1 %  Auto Basophil % : 0.3 %    11-19    139  |  102  |  12  ----------------------------<  111<H>  4.0   |  26  |  0.51  11-18    142  |  106  |  10  ----------------------------<  96  3.9   |  29  |  0.51    Ca    8.7      19 Nov 2021 05:51  Ca    9.3      18 Nov 2021 07:54  Phos  3.4     11-19  Mg     1.9     11-19    TPro  6.5  /  Alb  3.6  /  TBili  0.3  /  DBili  x   /  AST  29  /  ALT  17  /  AlkPhos  92  11-19  TPro  6.9  /  Alb  2.9<L>  /  TBili  0.4  /  DBili  x   /  AST  37  /  ALT  21  /  AlkPhos  88  11-18      proBNP: Serum Pro-Brain Natriuretic Peptide (10.20.21 @ 12:56)    Serum Pro-Brain Natriuretic Peptide: 1778 pg/mL    Lipid Profile: Lipid Profile in AM (11.17.21 @ 10:18)    Cholesterol, Serum: 186 mg/dL    Triglycerides, Serum: 106 mg/dL    HDL Cholesterol, Serum: 72 mg/dL    Non HDL Cholesterol: 113: Patients Atherosclerotic Cardiovascular Disease (ASCVD) Risk  Optimal Level (mg/dL)  LDL Cholesterol (LDL-C)  All Patients                                < 100  ASCVD at Very High Risk1    < 70  Non-HDL Cholesterol (Non-HDL-C)  All Patients                       < 130  ASCVD at Very High Risk1   < 100  Non-HDL-Cholesterol (Non-HDL-C) is also a key target for cardiovascular  risk reduction.  Consider Familial Hypercholesterolemia when: LDL-C > 190 mg/dL or  Non-HDL-C > 220 mg/dL.  LDL-C calculation using the Friedewald equation is not provided when  triglycerides > 400 mg/dL, in which case we recommend repeating the test  after fasting, if it was not done before.  When triglycerides >150 mg/dL, calculated LDL-C is provided but maystill  be inaccurate (particularly when LDL-C < 70 mg/dL). It can be  recalculated off-line using other equations (e.g. Jordan SS, Joyce MJ,  Roberta CARLISLE, et al.JOHN 2013;310:2061 - 8).  1 WillWalker.,et al. "2019 AHA/ACC. . . guideline on the  management of blood cholesterol: a report of the American College of  Cardiology/American Heart Association Task Force on Clinical Practice  Guidelines." Circulation;139:e1082 - e1143.  These values apply only to persons 20 years and older.  Lipid Panel updated with new test, reference ranges and interpretive  comments effective 10-. mg/dL    LDL Cholesterol Calculated: 92 mg/dL      HgA1c: A1C with Estimated Average Glucose Result: 6.2: Method: Immunoassay       Reference Range                4.0-5.6%       High risk (prediabetic)        5.7-6.4%       Diabetic, diagnostic             >=6.5%       ADA diabetic treatment goal       <7.0%  The Hemoglobin A1c testing is NGSP-certified.Reference ranges are based  upon the 2010 recommendations of  the American Diabetes Association.  Interpretation may vary for children  and adolescents. % (11.17.21 @ 09:26)      TSH: Thyroid Stimulating Hormone, Serum (11.16.21 @ 05:16)    Thyroid Stimulating Hormone, Serum: 1.53 uIU/mL          CARDIAC MARKERS:  Troponin I, High Sensitivity (11.16.21 @ 13:35)    Troponin I, High Sensitivity Result: 5756.6: Serial measurements of high sensitivity troponin I (hs TnI) showing rapid  upward or downward changes suggest acute myocardial injury.  Please note  that a sustained elevation of hsTnI can be caused by renal disease,  chronic heart failure, sepsis, pulmonary embolism and other clinical  conditions. ng/L      TELEMETRY: 	  SR 70's   ECG:  	< from: 12 Lead ECG (11.15.21 @ 20:17) >  Ventricular Rate 81 BPM    Atrial Rate 81 BPM    P-R Interval 294 ms    QRS Duration 72 ms    Q-T Interval 394 ms    QTC Calculation(Bazett) 457 ms    P Axis 60 degrees    R Axis 75 degrees    T Axis -79 degrees    Diagnosis Line Sinus rhythm with 1st degree AV block  Nonspecific ST and T wave abnormality  Abnormal ECG    < end of copied text >    RADIOLOGY:  OTHER: 	    PREVIOUS DIAGNOSTIC TESTING:    Echocardiogram:    Catheterization:    Stress Test:  	  	  ASSESSMENT/PLAN: 	     CHIEF COMPLAINT: CVA     HISTORY OF PRESENT ILLNESS:  76F PMH HTN, COPD (emphysema), psoriatic arthritis, UC, AS who presented to [facility] from Urgent Care on 11/15 c/o lethargy, nausea, lightheadedness, dizziness, progressive confusion, unsteady gait, LKN 08:30 that day with MR head w/ foci of diffusion restriction c/f thromboembolic phenomena. CE's marked elevated, TTE w/o WMA. Patient not c/o angina/CP, palpitations, SOB, STOUT.       Allergies    caffeine (Blisters)  Gluten (Unknown)  penicillins (Hives)    Intolerances    lactose (Vomiting)  	    MEDICATIONS:  aspirin enteric coated 325 milliGRAM(s) Oral daily  metoprolol tartrate 12.5 milliGRAM(s) Oral every 12 hours      ALBUTerol    90 MICROgram(s) HFA Inhaler 2 Puff(s) Inhalation every 6 hours PRN        atorvastatin 40 milliGRAM(s) Oral at bedtime    cyanocobalamin 1000 MICROGram(s) Oral daily      PAST MEDICAL & SURGICAL HISTORY:  Gastritis    Tachycardia    MVP (mitral valve prolapse)    Ulcerative colitis    Cigarette smoker  current    Psoriatic arthritis    Vertebral fracture, closed  Lumbar x 4    Osteoporosis    Varicose veins    Hyperparathyroidism    Costal chondritis    Prediabetes    Herniated disc, cervical  lumbar    Emphysema lung    COPD (chronic obstructive pulmonary disease)    Termination of pregnancy (fetus)    History of tonsillectomy    S/P parathyroidectomy  2014        FAMILY HISTORY:  Family history of Parkinson disease    Family history of colorectal cancer    Family history of diabetes mellitus    Family history of transitional cell carcinoma of bladder (Child)    Family history of melanoma (Child)        SOCIAL HISTORY:    [x ]Non-smoker  Denies EtOH/drug use       REVIEW OF SYSTEMS:  See HPI. Otherwise, 10 point ROS done and otherwise negative.    PHYSICAL EXAM:  T(C): 36.8 (11-19-21 @ 16:03), Max: 36.9 (11-18-21 @ 20:36)  HR: 82 (11-19-21 @ 16:03) (77 - 90)  BP: 152/83 (11-19-21 @ 16:03) (145/88 - 167/92)  RR: 18 (11-19-21 @ 16:03) (18 - 18)  SpO2: 94% (11-19-21 @ 16:03) (93% - 96%)  Wt(kg): --  I&O's Summary    18 Nov 2021 07:01  -  19 Nov 2021 07:00  --------------------------------------------------------  IN: 360 mL / OUT: 0 mL / NET: 360 mL        Appearance: Alert. NAD	  HEENT:   NC/AT	  Cardiovascular: +S1S2 RRR +systolic murmur  Respiratory: CTA B/L	  Gastrointestinal:  Soft, NT.ND., + BS	  Skin: No rashes	  Neurologic: Non-focal, MAEx4, B/L UE/LE 5/5 strength  Extremities: No edema BLE  Vascular: Peripheral pulses palpable 2+ bilaterally, warm and well perfused       LABS:	 	    CBC Full  -  ( 19 Nov 2021 05:51 )  WBC Count : 10.11 K/uL  Hemoglobin : 11.0 g/dL  Hematocrit : 35.0 %  Platelet Count - Automated : 295 K/uL  Mean Cell Volume : 83.5 fl  Mean Cell Hemoglobin : 26.3 pg  Mean Cell Hemoglobin Concentration : 31.4 gm/dL  Auto Neutrophil # : 6.76 K/uL  Auto Lymphocyte # : 2.46 K/uL  Auto Monocyte # : 0.70 K/uL  Auto Eosinophil # : 0.11 K/uL  Auto Basophil # : 0.03 K/uL  Auto Neutrophil % : 66.9 %  Auto Lymphocyte % : 24.3 %  Auto Monocyte % : 6.9 %  Auto Eosinophil % : 1.1 %  Auto Basophil % : 0.3 %    11-19    139  |  102  |  12  ----------------------------<  111<H>  4.0   |  26  |  0.51  11-18    142  |  106  |  10  ----------------------------<  96  3.9   |  29  |  0.51    Ca    8.7      19 Nov 2021 05:51  Ca    9.3      18 Nov 2021 07:54  Phos  3.4     11-19  Mg     1.9     11-19    TPro  6.5  /  Alb  3.6  /  TBili  0.3  /  DBili  x   /  AST  29  /  ALT  17  /  AlkPhos  92  11-19  TPro  6.9  /  Alb  2.9<L>  /  TBili  0.4  /  DBili  x   /  AST  37  /  ALT  21  /  AlkPhos  88  11-18      proBNP: Serum Pro-Brain Natriuretic Peptide (10.20.21 @ 12:56)    Serum Pro-Brain Natriuretic Peptide: 1778 pg/mL    Lipid Profile: Lipid Profile in AM (11.17.21 @ 10:18)    Cholesterol, Serum: 186 mg/dL    Triglycerides, Serum: 106 mg/dL    HDL Cholesterol, Serum: 72 mg/dL    Non HDL Cholesterol: 113: Patients Atherosclerotic Cardiovascular Disease (ASCVD) Risk  Optimal Level (mg/dL)  LDL Cholesterol (LDL-C)  All Patients                                < 100  ASCVD at Very High Risk1    < 70  Non-HDL Cholesterol (Non-HDL-C)  All Patients                       < 130  ASCVD at Very High Risk1   < 100  Non-HDL-Cholesterol (Non-HDL-C) is also a key target for cardiovascular  risk reduction.  Consider Familial Hypercholesterolemia when: LDL-C > 190 mg/dL or  Non-HDL-C > 220 mg/dL.  LDL-C calculation using the Friedewald equation is not provided when  triglycerides > 400 mg/dL, in which case we recommend repeating the test  after fasting, if it was not done before.  When triglycerides >150 mg/dL, calculated LDL-C is provided but maystill  be inaccurate (particularly when LDL-C < 70 mg/dL). It can be  recalculated off-line using other equations (e.g. Jordan SS, Joyce MJ,  Roberta CARLISLE, et al.JOHN 2013;310:2061 - 8).  1 WillWalker.,et al. "2019 AHA/ACC. . . guideline on the  management of blood cholesterol: a report of the American College of  Cardiology/American Heart Association Task Force on Clinical Practice  Guidelines." Circulation;139:e1082 - e1143.  These values apply only to persons 20 years and older.  Lipid Panel updated with new test, reference ranges and interpretive  comments effective 10-. mg/dL    LDL Cholesterol Calculated: 92 mg/dL      HgA1c: A1C with Estimated Average Glucose Result: 6.2: Method: Immunoassay       Reference Range                4.0-5.6%       High risk (prediabetic)        5.7-6.4%       Diabetic, diagnostic             >=6.5%       ADA diabetic treatment goal       <7.0%  The Hemoglobin A1c testing is NGSP-certified.Reference ranges are based  upon the 2010 recommendations of  the American Diabetes Association.  Interpretation may vary for children  and adolescents. % (11.17.21 @ 09:26)      TSH: Thyroid Stimulating Hormone, Serum (11.16.21 @ 05:16)    Thyroid Stimulating Hormone, Serum: 1.53 uIU/mL          CARDIAC MARKERS:  Troponin I, High Sensitivity (11.16.21 @ 13:35)    Troponin I, High Sensitivity Result: 5756.6: Serial measurements of high sensitivity troponin I (hs TnI) showing rapid  upward or downward changes suggest acute myocardial injury.  Please note  that a sustained elevation of hsTnI can be caused by renal disease,  chronic heart failure, sepsis, pulmonary embolism and other clinical  conditions. ng/L      TELEMETRY: 	  SR 70's   ECG:  	< from: 12 Lead ECG (11.15.21 @ 20:17) >  Ventricular Rate 81 BPM    Atrial Rate 81 BPM    P-R Interval 294 ms    QRS Duration 72 ms    Q-T Interval 394 ms    QTC Calculation(Bazett) 457 ms    P Axis 60 degrees    R Axis 75 degrees    T Axis -79 degrees    Diagnosis Line Sinus rhythm with 1st degree AV block  Nonspecific ST and T wave abnormality  Abnormal ECG    < end of copied text >    RADIOLOGY:  OTHER: 	< from: MR Head No Cont (11.16.21 @ 09:44) >  FINDINGS:    HEMISPHERES: There are scattered foci of diffusion restriction in both hemispheres, suggesting embolic or thrombotic showering. There is involvement of both basal ganglia and white matter, without evidence of a large acute territorial infarct. Also noted are small white matter lesions and chronic ischemic changes in both hemispheres with volume loss.  VENTRICLES:  Midline with ex vacuo enlargement  POSTERIOR FOSSA:  Scattered areas of diffusion abnormality are also noted in the cerebellar hemispheres bilaterally.  EXTRA-AXIAL:  No mass or collections are depicted.  VASCULATURE:  The major vessels and venous sinuses are grossly patent.  SINUSES AND MASTOIDS:  Clear.  MISCELLANEOUS:  No orbital or pituitary abnormality noted.  No skull base lesion suggested.    IMPRESSION:    1)  Multiple small foci of diffusion restriction in both hemispheres as well as within the posterior fossa suggesting embolic or thrombotic showering in the anterior and posterior circulation. Further workup and assessment recommended.  2)  also noted are chronic ischemic changes in both hemispheres with atrophic change.    < end of copied text >        PREVIOUS DIAGNOSTIC TESTING:    Echocardiogram:    Catheterization:    Stress Test:  	  	  ASSESSMENT/PLAN: 	     CHIEF COMPLAINT: CVA     HISTORY OF PRESENT ILLNESS:  76F PMH HTN, COPD (emphysema), psoriatic arthritis, UC, AS, previously seen in the EP clinic by Dr. Bravo for brief NSVT/tachycardia for which she was started on Metroprolol who presented to [facility] from Urgent Care on 11/15 c/o lethargy, nausea, lightheadedness, dizziness, progressive confusion, unsteady gait, LKN 08:30 that day with MR head revealing multiple small foci of diffusion restriction in both anterior and posterior circulation, likely cardioembolic. On exam, she is without deficit. CE's marked elevated, TTE w/o WMA. Patient not c/o angina/CP, palpitations, SOB, STOUT. Denies dizziness, lightheadedness, syncope. She denies personal history of AF. JASON performed today reveals PFO without shunt, no thrombus visualized. EP consulted for ILR implant.       Allergies    caffeine (Blisters)  Gluten (Unknown)  penicillins (Hives)    Intolerances    lactose (Vomiting)  	    MEDICATIONS:  aspirin enteric coated 325 milliGRAM(s) Oral daily  metoprolol tartrate 12.5 milliGRAM(s) Oral every 12 hours      ALBUTerol    90 MICROgram(s) HFA Inhaler 2 Puff(s) Inhalation every 6 hours PRN        atorvastatin 40 milliGRAM(s) Oral at bedtime    cyanocobalamin 1000 MICROGram(s) Oral daily      PAST MEDICAL & SURGICAL HISTORY:  Gastritis    Tachycardia    MVP (mitral valve prolapse)    Ulcerative colitis    Cigarette smoker  current    Psoriatic arthritis    Vertebral fracture, closed  Lumbar x 4    Osteoporosis    Varicose veins    Hyperparathyroidism    Costal chondritis    Prediabetes    Herniated disc, cervical  lumbar    Emphysema lung    COPD (chronic obstructive pulmonary disease)    Termination of pregnancy (fetus)    History of tonsillectomy    S/P parathyroidectomy  2014        FAMILY HISTORY:  Family history of Parkinson disease    Family history of colorectal cancer    Family history of diabetes mellitus    Family history of transitional cell carcinoma of bladder (Child)    Family history of melanoma (Child)        SOCIAL HISTORY:    [x ]Non-smoker  Denies EtOH/drug use       REVIEW OF SYSTEMS:  See HPI. Otherwise, 10 point ROS done and otherwise negative.    PHYSICAL EXAM:  T(C): 36.8 (11-19-21 @ 16:03), Max: 36.9 (11-18-21 @ 20:36)  HR: 82 (11-19-21 @ 16:03) (77 - 90)  BP: 152/83 (11-19-21 @ 16:03) (145/88 - 167/92)  RR: 18 (11-19-21 @ 16:03) (18 - 18)  SpO2: 94% (11-19-21 @ 16:03) (93% - 96%)  Wt(kg): --  I&O's Summary    18 Nov 2021 07:01  -  19 Nov 2021 07:00  --------------------------------------------------------  IN: 360 mL / OUT: 0 mL / NET: 360 mL        Appearance: Alert. NAD	  HEENT:   NC/AT	  Cardiovascular: +S1S2 RRR +systolic murmur  Respiratory: CTA B/L	  Gastrointestinal:  Soft, NT.ND., + BS	  Skin: No rashes	  Neurologic: Non-focal, MAEx4, B/L UE/LE 5/5 strength  Extremities: No edema BLE  Vascular: Peripheral pulses palpable 2+ bilaterally, warm and well perfused       LABS:	 	    CBC Full  -  ( 19 Nov 2021 05:51 )  WBC Count : 10.11 K/uL  Hemoglobin : 11.0 g/dL  Hematocrit : 35.0 %  Platelet Count - Automated : 295 K/uL  Mean Cell Volume : 83.5 fl  Mean Cell Hemoglobin : 26.3 pg  Mean Cell Hemoglobin Concentration : 31.4 gm/dL  Auto Neutrophil # : 6.76 K/uL  Auto Lymphocyte # : 2.46 K/uL  Auto Monocyte # : 0.70 K/uL  Auto Eosinophil # : 0.11 K/uL  Auto Basophil # : 0.03 K/uL  Auto Neutrophil % : 66.9 %  Auto Lymphocyte % : 24.3 %  Auto Monocyte % : 6.9 %  Auto Eosinophil % : 1.1 %  Auto Basophil % : 0.3 %    11-19    139  |  102  |  12  ----------------------------<  111<H>  4.0   |  26  |  0.51  11-18    142  |  106  |  10  ----------------------------<  96  3.9   |  29  |  0.51    Ca    8.7      19 Nov 2021 05:51  Ca    9.3      18 Nov 2021 07:54  Phos  3.4     11-19  Mg     1.9     11-19    TPro  6.5  /  Alb  3.6  /  TBili  0.3  /  DBili  x   /  AST  29  /  ALT  17  /  AlkPhos  92  11-19  TPro  6.9  /  Alb  2.9<L>  /  TBili  0.4  /  DBili  x   /  AST  37  /  ALT  21  /  AlkPhos  88  11-18      proBNP: Serum Pro-Brain Natriuretic Peptide (10.20.21 @ 12:56)    Serum Pro-Brain Natriuretic Peptide: 1778 pg/mL    Lipid Profile: Lipid Profile in AM (11.17.21 @ 10:18)    Cholesterol, Serum: 186 mg/dL    Triglycerides, Serum: 106 mg/dL    HDL Cholesterol, Serum: 72 mg/dL    Non HDL Cholesterol: 113: Patients Atherosclerotic Cardiovascular Disease (ASCVD) Risk  Optimal Level (mg/dL)  LDL Cholesterol (LDL-C)  All Patients                                < 100  ASCVD at Very High Risk1    < 70  Non-HDL Cholesterol (Non-HDL-C)  All Patients                       < 130  ASCVD at Very High Risk1   < 100  Non-HDL-Cholesterol (Non-HDL-C) is also a key target for cardiovascular  risk reduction.  Consider Familial Hypercholesterolemia when: LDL-C > 190 mg/dL or  Non-HDL-C > 220 mg/dL.  LDL-C calculation using the Friedewald equation is not provided when  triglycerides > 400 mg/dL, in which case we recommend repeating the test  after fasting, if it was not done before.  When triglycerides >150 mg/dL, calculated LDL-C is provided but maystill  be inaccurate (particularly when LDL-C < 70 mg/dL). It can be  recalculated off-line using other equations (e.g. Jordan SS, Joyce MJ,  Roberta MB, et al.JOHN 2013;310:2061 - 8).  1 Walker Solis.,et al. "2019 AHA/ACC. . . guideline on the  management of blood cholesterol: a report of the American College of  Cardiology/American Heart Association Task Force on Clinical Practice  Guidelines." Circulation;139:e1082 - e1143.  These values apply only to persons 20 years and older.  Lipid Panel updated with new test, reference ranges and interpretive  comments effective 10-. mg/dL    LDL Cholesterol Calculated: 92 mg/dL      HgA1c: A1C with Estimated Average Glucose Result: 6.2: Method: Immunoassay       Reference Range                4.0-5.6%       High risk (prediabetic)        5.7-6.4%       Diabetic, diagnostic             >=6.5%       ADA diabetic treatment goal       <7.0%  The Hemoglobin A1c testing is NGSP-certified.Reference ranges are based  upon the 2010 recommendations of  the American Diabetes Association.  Interpretation may vary for children  and adolescents. % (11.17.21 @ 09:26)      TSH: Thyroid Stimulating Hormone, Serum (11.16.21 @ 05:16)    Thyroid Stimulating Hormone, Serum: 1.53 uIU/mL          CARDIAC MARKERS:  Troponin I, High Sensitivity (11.16.21 @ 13:35)    Troponin I, High Sensitivity Result: 5756.6: Serial measurements of high sensitivity troponin I (hs TnI) showing rapid  upward or downward changes suggest acute myocardial injury.  Please note  that a sustained elevation of hsTnI can be caused by renal disease,  chronic heart failure, sepsis, pulmonary embolism and other clinical  conditions. ng/L      TELEMETRY: 	  SR 70's   ECG:  	< from: 12 Lead ECG (11.15.21 @ 20:17) >  Ventricular Rate 81 BPM    Atrial Rate 81 BPM    P-R Interval 294 ms    QRS Duration 72 ms    Q-T Interval 394 ms    QTC Calculation(Bazett) 457 ms    P Axis 60 degrees    R Axis 75 degrees    T Axis -79 degrees    Diagnosis Line Sinus rhythm with 1st degree AV block  Nonspecific ST and T wave abnormality  Abnormal ECG    < end of copied text >    RADIOLOGY:  OTHER: 	< from: MR Head No Cont (11.16.21 @ 09:44) >  FINDINGS:    HEMISPHERES: There are scattered foci of diffusion restriction in both hemispheres, suggesting embolic or thrombotic showering. There is involvement of both basal ganglia and white matter, without evidence of a large acute territorial infarct. Also noted are small white matter lesions and chronic ischemic changes in both hemispheres with volume loss.  VENTRICLES:  Midline with ex vacuo enlargement  POSTERIOR FOSSA:  Scattered areas of diffusion abnormality are also noted in the cerebellar hemispheres bilaterally.  EXTRA-AXIAL:  No mass or collections are depicted.  VASCULATURE:  The major vessels and venous sinuses are grossly patent.  SINUSES AND MASTOIDS:  Clear.  MISCELLANEOUS:  No orbital or pituitary abnormality noted.  No skull base lesion suggested.    IMPRESSION:    1)  Multiple small foci of diffusion restriction in both hemispheres as well as within the posterior fossa suggesting embolic or thrombotic showering in the anterior and posterior circulation. Further workup and assessment recommended.  2)  also noted are chronic ischemic changes in both hemispheres with atrophic change.    < end of copied text >        PREVIOUS DIAGNOSTIC TESTING:    Echocardiogram: < from: Transesophageal Echocardiogram (11.19.21 @ 15:19) >  Dimensions:    Normal Values:  LA:    3.3    2.0 - 4.0 cm  Ao:     2.4    2.0 - 3.8 cm  SEPTUM: 1.5    0.6 - 1.2 cm  PWT:    1.1    0.6 - 1.1 cm  LVIDd:  3.4    3.0 - 5.6 cm  LVIDs:  2.2    1.8 - 4.0 cm  Derived variables:  LVMI: 107 g/m2  RWT: 0.64  Fractional short: 35 %  EF (Lopez Rule): 77 %Doppler Peak Velocity (m/sec):  AoV=2.7  ------------------------------------------------------------------------  Observations:  Mitral Valve: Mitral annular calcification and calcified  mitral leaflets.  Calcifications extend to the chordae.  There is a calcified cystic structure on  chordae. Severe  mitral regurgitation. Peak mitral valve gradient equals 14  mm Hg, mean transmitral valve gradient equals 7 mm Hg,  estimated mitral valve area equals 1.8 sqcm (by  planimetry).  Elevated gradients in part due to severe  regurgitation.  Aortic Valve/Aorta: Calcified trileaflet aortic valve with  decreased opening. Peak transaortic valve gradient equals  29 mm Hg, mean transaortic valve gradient equals 16 mm Hg,  estimated aortic valve area equals 1 sqcm (by continuity  equation), aortic valve velocity time integral equals 53  cm, consistent with moderate aortic stenosis. Peak left  ventricular outflow tract gradient equals 3 mm Hg, mean  gradient is equal to 1 mm Hg, LVOT velocity time integral  equals 17 cm.  Aortic Root: 2.4 cm.  LVOT diameter: 2 cm.  Focal calcifications within the aortic arch.  Left Atrium: Moderately dilated left atrium.  LA volume  index = 43 cc/m2.   No left atrial or left atrial appendage  thrombus.  Left Ventricle: Hyperdynamic left ventricular systolic  function. Concentric left ventricular hypertrophy.  Right Heart: Right atrial enlargement. Right ventricular  enlargement with normal right ventricular systolic  function. Normal tricuspid valve. Moderate-severe tricuspid  regurgitation. Normal pulmonic valve.  Pericardium/Pleura: Normal pericardium with no pericardial  effusion.  Hemodynamic: Estimated right ventricular systolic pressure  equals 56 mm Hg, assuming right atrial pressure equals 10  mm Hg, consistent with moderate pulmonary hypertension.  Color Doppler demonstrates evidence of a patent foramen  ovale.  No shunting of bubbles from right to left.  ------------------------------------------------------------------------  Conclusions:  1. Mitral annular calcification and calcified mitral  leaflets.  Calcifications extend to the chordae. There is a  calcified cystic structure on  chordae. Severe mitral  regurgitation. Peak mitral valve gradient equals 14 mm Hg,  mean transmitral valve gradient equals 7 mm Hg, estimated  mitral valve area equals 1.8 sqcm (by planimetry).  Elevated gradients in part due to severe regurgitation.  2. Calcified trileaflet aortic valve with decreased  opening. Peak transaortic valve gradientequals 29 mm Hg,  mean transaortic valve gradient equals 16 mm Hg, estimated  aortic valve area equals 1 sqcm (by continuity equation),  aortic valve velocity time integral equals 53 cm,  consistent with moderate aortic stenosis.  3. Moderately dilated left atrium.  LA volume index = 43  cc/m2.   No left atrial or left atrial appendage thrombus.  4. Concentric left ventricular hypertrophy.  5. Hyperdynamic left ventricular systolic function.  6. Right ventricular enlargement with normal right  ventricular systolic function.  7. Estimated pulmonary artery systolic pressure equals 56  mm Hg, assuming right atrial pressure equals 10 mm Hg,  consistent with moderate pulmonary pressures.  8. Color Doppler demonstrates evidence of a patent foramen  ovale.  No shunting of bubbles from right to left.    < end of copied text >

## 2021-11-19 NOTE — CONSULT NOTE ADULT - SUBJECTIVE AND OBJECTIVE BOX
76 year old female with severe COPD and known AS / MS presents with an acute Neuro event and was found to have multiple small cerebellar CVA's  At present her mental state is normal and does not have any motor deficits.  She has chronic STOUT but her level of dyspnea is no different than 2-3 months ago.  She denies chest pain, palpitations, or an irregular heat beat.  While in the hospital no AF has been identified    Meds:   mg qd             Atorvastatin 40 mg qd             Metoprolol 12.5 mg bid             Vitamin B-12  1000 mcg qd                   /88  HR 77 sinus  Distant BS  RR 3/6 systolic murmur with a decreased S2  No edema    EKG:  NSR first degree AV block    Echo - EF 65% with no wall motion abnormalities  est TRAMAINE 0.9 cm2  peak gradient 29 mm Hg  moderate-severe MR with a mitral gradient of 13 ( moderate-severe )  est PA pressure 40 mm Hg    WBC 10.11  Hgb 11.0  Hct 35.0  Plt 295K  BUN 12  Crt 0.51  Troponin  5756  6623  5609  2509  CPK  407  473    Imp:  New cerebellar infarcts that are suggestive of an embolic event in the setting of at least moderate MS.  There has been no documented AF  Rec:  [facility] - will call EP   JASON  In terms of the AS / MR,  the patient has severe COPD and her symptoms re: STOUT are no different than 3 months ago.  She would be at high risk  for an AVR / MVR and this work up should be deferred until after the acute CVA is clarified and stabilizes.  Finally,  I highly doubt a NSTEMI given the outlandish Troponin levels and the lack of EKG changes or focal wall motion abnormalities

## 2021-11-19 NOTE — DISCHARGE NOTE PROVIDER - NSDCCPTREATMENT_GEN_ALL_CORE_FT
PRINCIPAL PROCEDURE  Procedure: Transesophageal echocardiography (JASON)  Findings and Treatment:   ------------------------------------------------------------------------  PROCEDURE: Transesophageal and transthoracic  echocardiograms with 2-D, M-Mode and complete spectral and  color flow Doppler were performed.  Informed consent was  first obtained for JASON. The patient was sedated - see  anesthesia record.  The procedure was monitored with  automatic blood pressure monitoring, ECG tracings and pulse  oximetry.  The transesophageal probe was placed in the  esophagus posterior to the heart without complications.  INDICATION: Cerebral infarction, unspecified (I63.9)  ------------------------------------------------------------------------  Conclusions:  1. Mitral annular calcification and calcified mitral  leaflets.  Calcifications extend to the chordae. There is a  calcified cystic structure on  chordae. Severe mitral  regurgitation. Peak mitral valve gradient equals 14 mm Hg,  mean transmitral valve gradient equals 7 mm Hg, estimated  mitral valve area equals 1.8 sqcm (by planimetry).  Elevated gradients in part due to severe regurgitation.  2. Calcified trileaflet aortic valve with decreased  opening. Peak transaortic valve gradient equals 29 mm Hg,  mean transaortic valve gradient equals 16 mm Hg, estimated  aortic valve area equals 1 sqcm (by continuity equation),  aortic valve velocity time integral equals 53 cm,  consistent with moderate aortic stenosis.  3. Moderately dilated left atrium.  LA volume index = 43  cc/m2.   No left atrial or left atrial appendage thrombus.  4. Concentric left ventricular hypertrophy.  5. Hyperdynamic left ventricular systolic function.  6. Right ventricular enlargement with normal right  ventricular systolic function.  7. Estimated pulmonary artery systolic pressure equals 56  mm Hg, assuming right atrial pressure equals 10 mm Hg,  consistent with moderate pulmonary pressures.  8. Color Doppler demonstrates evidence of a patent foramen  ovale.  No shunting of bubbles from right to left.

## 2021-11-19 NOTE — DISCHARGE NOTE PROVIDER - NSDCFUADDAPPT_GEN_ALL_CORE_FT
Please see your primary care provider within the next week, your cardiologist in the next 1-2 weeks, and Dr. Dukes of neurology within the next 1-2 weeks. Please see your primary care provider within the next week, your cardiologist Dr. Kebede in the next 1-2 weeks, and Dr. Dukes of neurology within the next 1-2 weeks. Please see your cardiologist Dr. Kebede by 12/3/2021 and neurologist Dr. Dukes in 2021.

## 2021-11-19 NOTE — DISCHARGE NOTE PROVIDER - CARE PROVIDER_API CALL
Marlon White)  Internal Medicine  [address] [facility] [address]  [place] [address]  Phone: [phone]  Fax: [phone]  Follow Up Time:     Brijesh Kebede  CARDIOVASCULAR DISEASE  [address] [facility] [address]  [place] [id number]  Phone: [phone]  Fax: [phone]  Follow Up Time:     Cheko Dukes)  Neurology; Vascular Neurology  [address] [facility] [address]  [place] [address]  Phone: [phone]  Fax: [phone]  Follow Up Time:    Marlon White)  Internal Medicine  [address] [facility] [address]  [place] [address]  Phone: [phone]  Fax: [phone]  Follow Up Time: 1 week    Brijesh Kebede  CARDIOVASCULAR DISEASE  [address] [facility] [address]  [place] [id number]  Phone: [phone]  Fax: [phone]  Follow Up Time: 2 weeks    Cheko Dukes)  Neurology; Vascular Neurology  [address] [facility] [address]  [place] [address]  Phone: [phone]  Fax: [phone]  Follow Up Time: 2 weeks

## 2021-11-19 NOTE — PROGRESS NOTE ADULT - PROBLEM SELECTOR PLAN 6
DVT ppx: Lovenox 50mg bid  Diet: soft/bite sized w/ mildly thickened liquids per OSH S&S  Dispo: pending PT recs    FULL CODE

## 2021-11-19 NOTE — DISCHARGE NOTE PROVIDER - HOSPITAL COURSE
76F PMH MVP, HTN, COPD, osteoporosis, psoriatic arthritis, gastritis, vertebral fx, UC (last flare years ago), costochondritis presented to OSH ([place]) from  on 11/15 iso leathargy, nausea, lightheadedness/dizziness, progressive confusion, unsteady gait, LKN 08:30 11/15 (A&Ox3 bl). Pt's  drove pt to urgent care; she became acutely disoriented on the way to urgent care (eg, didn't know where she was), urgent care recommended pt be brought to [facility] ER where she had CT head with c/f stroke, transferred to [facility] for MRI. MRI on 11/16 with foci of diffusion restriction c/f thromboembolic phenomena. On 11/17, pt's memory started to return and per  she is returning to baseline mental status. Additionally, while at OSH, Flower elevated, cardiology c/s with c/f NSTEMI and potential cardiac emboli/thrombi. She was initiated on lipitor, full dose Lovenox, , Plavix held given c/f hemorrhagic conversion iso multiple CVAs. Pt transferred to [facility] for JASON and potential cardiac cath. Currently the pt feels well and denies weakness, numbness or tingling, fever/chills, sore throat, cough, CP, SOB changed from baseline, abdominal discomfort, n/v/d/c, dysuria, hematuria, hematochezia.    VS: Afebrile, HR 70s-80s, hypertensive BP 130s-160s/80s-90s, RR 16-18, SpO2% 90-96 on RA  Labs: resolved leukocytosis 18.3 (11/15) -> 10.1 11/18, normocytic anemia (MCV 84) 11.2; resolved AST elevation 81 (11/16) -> 37; hsT 375 (11/15) -> 6624 (11/16) -> 5757 (11/16);  -> 407; CKMB 47.5 -> 33.5   Imaging: CXR and CTCAP NC w/o urgent finding; CTH NC no acute findings; MR head noncon multiple small foci diffusion restriction both hemispheres and w/i posterior fossa suggesting embolic or thrombotic showering in anterior/posterior circulation, chronic ischemic changes in b/l hemispheres and atrophic changes; MR Angio Neck noncon unremarkable; TTE EF 65% w/ increased LV wall thickness, dilated LA, mod to severe MR w/ functional MS, severe AS, mild pulm HTN  Micro: UA not c/f infxn, BCx and UCx 11/16 NGTD    Admit to medicine for further eval and mgt    Cardiology c/s, recs to dc lovenox (post 24h), and pt underwent JASON revealing _____. Plan for loop recorder and cardiac cath revealing ______.     Pt HD stable, ready for discharge w/ close PCP, cardiology, and neurology f/u. Pt is a 75 yo woman w/MVP, HTN, COPD, osteoporosis, psoriatic arthritis, gastritis, vertebral fx, UC (last flare years ago), costochondritis who presented to OSH ([place]) from  on 11/15  w/lethargy, nausea, lightheadedness/dizziness, progressive confusion, unsteady gait, LKN 08:30 11/15 (A&Ox3 bl). Pt's  drove pt to urgent care; she became acutely disoriented on the way to urgent care (eg, didn't know where she was), urgent care recommended pt be brought to [facility] ER where she had CT head with c/f stroke, transferred to [facility] for MRI. MRI on 11/16 with foci of diffusion restriction c/f thromboembolic phenomena. On 11/17, pt's memory started to return and per  she is returning to baseline mental status. Additionally, while at OSH, Flower elevated, cardiology c/s with c/f NSTEMI and potential cardiac emboli/thrombi. She was initiated on lipitor, full dose Lovenox, , Plavix held given c/f hemorrhagic conversion iso multiple CVAs. Pt transferred to [facility] for JASON and potential cardiac cath. Currently the pt feels well and denies weakness, numbness or tingling, fever/chills, sore throat, cough, CP, SOB changed from baseline, abdominal discomfort, n/v/d/c, dysuria, hematuria, hematochezia.    VS: Afebrile, HR 70s-80s, hypertensive BP 130s-160s/80s-90s, RR 16-18, SpO2% 90-96 on RA  Labs: resolved leukocytosis 18.3 (11/15) -> 10.1 11/18, normocytic anemia (MCV 84) 11.2; resolved AST elevation 81 (11/16) -> 37; hsT 375 (11/15) -> 6624 (11/16) -> 5757 (11/16);  -> 407; CKMB 47.5 -> 33.5   Imaging: CXR and CTCAP NC w/o urgent finding; CTH NC no acute findings; MR head noncon multiple small foci diffusion restriction both hemispheres and w/i posterior fossa suggesting embolic or thrombotic showering in anterior/posterior circulation, chronic ischemic changes in b/l hemispheres and atrophic changes; MR Angio Neck noncon unremarkable; TTE EF 65% w/ increased LV wall thickness, dilated LA, mod to severe MR w/ functional MS, severe AS, mild pulm HTN  Micro: UA not c/f infxn, BCx and UCx 11/16 NGTD    Admit to medicine for further eval and mgt    Cardiology c/s, recs to dc lovenox (post 24h), and pt underwent JASON revealing _____. Plan for loop recorder and cardiac cath revealing ______.     Pt HD stable, ready for discharge w/ close PCP, cardiology, and neurology f/u. Pt is a 77 yo woman w/MVP, HTN, COPD, osteoporosis, psoriatic arthritis, gastritis, vertebral fx, UC (last flare years ago), costochondritis who presented to OSH ([place]) from  on 11/15  w/lethargy, nausea, lightheadedness/dizziness, progressive confusion, unsteady gait, LKN 08:30 11/15 (A&Ox3 bl). Pt's  drove pt to urgent care; she became acutely disoriented on the way to urgent care (eg, didn't know where she was), urgent care recommended pt be brought to [facility] ER where she had CT head with c/f stroke, transferred to [facility] for MRI. MRI on 11/16 with foci of diffusion restriction c/f thromboembolic phenomena. On 11/17, pt's memory started to return and per  she is returning to baseline mental status. Additionally, while at OSH, Flower elevated, cardiology c/s with c/f NSTEMI and potential cardiac emboli/thrombi. She was initiated on Lipitor, full dose Lovenox (discontinued 2 d later), , Plavix held given c/f hemorrhagic conversion iso multiple CVAs. Pt transferred to [facility] for JASON.  Pt with no complaints on arrival to [facility] and felt back to her baseline. Her vitals on arrival to [facility] were wnl.   Her labs: from OSH revealed resolved leukocytosis 18.3 (11/15) -> 10.1 11/18; normocytic anemia (MCV 84) 11.2; resolved AST elevation 81 (11/16) -> 37; hsT 375 (11/15) -> 6624 (11/16) -> 5757 (11/16);  -> 407; CKMB 47.5 -> 33.5.  Review of imaging from OSH revealed CXR and CT C/A/P noncon w/o urgent finding; CTH noncon w/no acute findings; MR head noncon multiple small foci diffusion restriction both hemispheres and w/i posterior fossa suggesting embolic or thrombotic showering in anterior/posterior circulation, chronic ischemic changes in b/l hemispheres and atrophic changes; MR Angio Neck noncon unremarkable; TTE EF 65% w/ increased LV wall thickness, dilated LA, mod to severe MR w/ functional MS, severe AS, mild pulm HTN.    Cardiology c/s, recs to dc lovenox (post 24h), and pt underwent JASON revealing _____. Plan for loop recorder and cardiac cath revealing ______.     Pt HD stable, ready for discharge w/ close PCP, cardiology, and neurology f/u. Pt is a 75 yo woman w/MVP, HTN, COPD, osteoporosis, psoriatic arthritis, gastritis, vertebral fx, UC (last flare years ago), costochondritis who presented to OSH ([place]) from  on 11/15 w/lethargy, nausea, lightheadedness/dizziness, progressive confusion, unsteady gait, LKN 08:30 11/15 (A&Ox3 bl). She became acutely disoriented on the way to  (eg, didn't know where she was), urgent care recommended pt be brought to [facility] ER where she had CT head with c/f stroke, transferred to [facility] for MRI. MRI on 11/16 with foci of diffusion restriction c/f thromboembolic phenomena. On 11/17, pt's memory started to return and per  she is returning to baseline mental status. Additionally, while at OSH, Flower elevated, cardiology c/s with c/f NSTEMI and potential cardiac emboli/thrombi. She was initiated on Lipitor, full dose Lovenox (discontinued 2 d later), , Plavix held given c/f hemorrhagic conversion iso multiple CVAs. Pt transferred to [facility] for JASON.  Pt with no complaints on arrival to [facility] and felt back to her baseline. Her vitals on arrival to [facility] were wnl. Her labs: from OSH revealed resolved leukocytosis; normocytic anemia; resolved AST elevation; hsT 375 (11/15) -> 6624 (11/16) -> 5757 (11/16);  -> 407; CKMB 47.5 -> 33.5. Review of imaging from OSH revealed CTH noncon w/no acute findings; MR head noncon multiple small foci diffusion restriction both hemispheres and w/i posterior fossa suggesting embolic or thrombotic showering in anterior/posterior circulation, chronic ischemic changes in b/l hemispheres and atrophic changes; MR Angio Neck noncon unremarkable; TTE EF 65% w/ increased LV wall thickness, dilated LA, mod to severe MR w/ functional MS, severe AS, mild pulm HTN. Cardiology swas c/s at [facility], they recommended to dc Lovenox (post 24h), and pt underwent JASON revealing severe MR, LA dilation, moderate AS, LVH, hyperdynamic LVSF, evidence of PFO w/o R to L shunting. Prior to d/c, pt had a Loop recorder placed. She was d/c home with Lipitor, ASA, and Plavix. Pt HD stable, ready for discharge w/ close PCP, cardiology, and neurology f/u.          Pt is a 75 yo woman w/MVP, HTN, COPD, osteoporosis, psoriatic arthritis, gastritis, vertebral fx, UC (last flare years ago), costochondritis who presented to OSH ([place]) from  on 11/15 w/lethargy, nausea, lightheadedness/dizziness, progressive confusion, unsteady gait, LKN 08:30 11/15 (A&Ox3 bl). She became acutely disoriented on the way to  (eg, didn't know where she was), urgent care recommended pt be brought to [facility] ER where she had CT head with c/f stroke, transferred to [facility] for MRI. MRI on 11/16 with foci of diffusion restriction c/f thromboembolic phenomena. On 11/17, pt's memory started to return and per  she is returning to baseline mental status. Additionally, while at OSH, Flower elevated, cardiology c/s with c/f NSTEMI and potential cardiac emboli/thrombi. She was initiated on Lipitor, full dose Lovenox (discontinued 2 d later), , Plavix held given c/f hemorrhagic conversion iso multiple CVAs. Pt transferred to [facility] for JASON.  Pt with no complaints on arrival to [facility] and felt back to her baseline. Her vitals on arrival to [facility] were wnl. Her labs: from OSH revealed resolved leukocytosis; normocytic anemia; resolved AST elevation; hsT 375 (11/15) -> 6624 (11/16) -> 5757 (11/16);  -> 407; CKMB 47.5 -> 33.5. Review of imaging from OSH revealed CTH noncon w/no acute findings; MR head noncon multiple small foci diffusion restriction both hemispheres and w/i posterior fossa suggesting embolic or thrombotic showering in anterior/posterior circulation, chronic ischemic changes in b/l hemispheres and atrophic changes; MR Angio Neck noncon unremarkable; TTE EF 65% w/ increased LV wall thickness, dilated LA, mod to severe MR w/ functional MS, severe AS, mild pulm HTN. Cardiology swas c/s at [facility], they recommended to dc Lovenox (post 24h), and pt underwent JASON revealing severe MR, LA dilation, moderate AS, LVH, hyperdynamic LVSF, evidence of PFO w/o R to L shunting. Cardiology saw the pt and determined NSTEMI was unlikely given the unusually elevated troponin levels and the lack of EKG changes or focal wall motion abnormalities. Prior to d/c, EP saw the pt and she had a Loop recorder placed. She was d/c home with Lipitor, ASA, and Plavix. She was HD stable, and ready for discharge w/ close PCP, cardiology, and neurology f/u.          Pt is a 77 yo woman w/MVP, HTN, COPD, osteoporosis, psoriatic arthritis, gastritis, vertebral fx, UC (last flare years ago), costochondritis who presented to OSH ([place]) from  on 11/15 w/lethargy, nausea, lightheadedness/dizziness, progressive confusion, unsteady gait, LKN 08:30 11/15 (A&Ox3 bl). She became acutely disoriented on the way to  (eg, didn't know where she was), urgent care recommended pt be brought to [facility] ER where she had CT head with c/f stroke, transferred to [facility] for MRI. MRI on 11/16 with foci of diffusion restriction c/f thromboembolic phenomena. On 11/17, pt's memory started to return and per  she is returning to baseline mental status. Additionally, while at OSH, Flower elevated, cardiology c/s with c/f NSTEMI and potential cardiac emboli/thrombi. She was initiated on Lipitor, full dose Lovenox (discontinued 2 d later), , Plavix held given c/f hemorrhagic conversion iso multiple CVAs. Pt transferred to [facility] for JASON.  Pt with no complaints on arrival to [facility] and felt back to her baseline. Her vitals on arrival to [facility] were wnl. Her labs: from OSH revealed resolved leukocytosis; normocytic anemia; resolved AST elevation; hsT 375 (11/15) -> 6624 (11/16) -> 5757 (11/16);  -> 407; CKMB 47.5 -> 33.5. Review of imaging from OSH revealed CTH noncon w/no acute findings; MR head noncon multiple small foci diffusion restriction both hemispheres and within posterior fossa suggesting embolic or thrombotic showering in anterior/posterior circulation, chronic ischemic changes in b/l hemispheres and atrophic changes; MR Angio Neck noncon unremarkable; TTE EF 65% w/ increased LV wall thickness, dilated LA, mod to severe MR w/ functional MS, severe AS, mild pulm HTN. Cardiology swas c/s at [facility], they recommended to dc Lovenox (post 24h), and pt underwent JASON revealing severe MR, LA dilation, moderate AS, LVH, hyperdynamic LVSF, evidence of PFO w/o R to L shunting. Cardiology saw the pt and determined NSTEMI was unlikely given the unusually elevated troponin levels and the lack of EKG changes or focal wall motion abnormalities. Prior to d/c, EP saw the pt and she had a Loop recorder placed. She was d/c home with Lipitor, ASA, and Plavix. She was HD stable, and ready for discharge w/ close PCP, cardiology, and neurology f/u.          Pt is a 75 yo woman w/MVP, HTN, COPD, osteoporosis, psoriatic arthritis, gastritis, vertebral fx, UC (last flare years ago), costochondritis who presented to OSH ([place]) from  on 11/15 w/lethargy, nausea, lightheadedness/dizziness, progressive confusion, unsteady gait, LKN 08:30 11/15 (A&Ox3 bl). She became acutely disoriented on the way to  (eg, didn't know where she was), urgent care recommended pt be brought to [facility] ER where she had CT head with c/f stroke, transferred to [facility] for MRI. MRI on 11/16 with foci of diffusion restriction c/f thromboembolic phenomena. On 11/17, pt's memory started to return and per  she is returning to baseline mental status. Additionally, while at OSH, Flower elevated, cardiology c/s with c/f NSTEMI and potential cardiac emboli/thrombi. She was initiated on Lipitor, full dose Lovenox (discontinued 2 d later), , Plavix held given c/f hemorrhagic conversion iso multiple CVAs. Pt transferred to [facility] for JASON.  Pt with no complaints on arrival to [facility] and felt back to her baseline. Her vitals on arrival to [facility] were wnl. Her labs: from OSH revealed resolved leukocytosis; normocytic anemia; resolved AST elevation; hsT 375 (11/15) -> 6624 (11/16) -> 5757 (11/16);  -> 407; CKMB 47.5 -> 33.5. Review of imaging from OSH revealed CTH noncon w/no acute findings; MR head noncon multiple small foci diffusion restriction both hemispheres and within posterior fossa suggesting embolic or thrombotic showering in anterior/posterior circulation, chronic ischemic changes in b/l hemispheres and atrophic changes; MR Angio Neck noncon unremarkable; TTE EF 65% w/ increased LV wall thickness, dilated LA, mod to severe MR w/ functional MS, severe AS, mild pulm HTN. Cardiology was c/s at [facility], they recommended to dc full dose AC Lovenox, and pt underwent JASON revealing severe MR, LA dilation, moderate AS, LVH, hyperdynamic LVSF, evidence of PFO w/o R to L shunting. Cardiology saw the pt and determined NSTEMI was unlikely given the unusually elevated troponin levels and the lack of EKG changes or focal wall motion abnormalities. Prior to d/c, EP saw the pt and she had a Loop recorder placed. Given abnormal findings on JASON, pt's home cardiologist recommended pt remain in hospital for further eval by structural cardiology team for possible cath and mitral clip give. She was d/c home with Lipitor, ASA, and Plavix. She was HD stable, and ready for discharge w/ close PCP, cardiology, and neurology f/u.          Pt is a 75 yo woman w/MVP, HTN, COPD, osteoporosis, psoriatic arthritis, gastritis, vertebral fx, UC (last flare years ago), costochondritis who presented to OSH ([place]) from  on 11/15 w/lethargy, nausea, lightheadedness/dizziness, progressive confusion, unsteady gait, LKN 08:30 11/15 (A&Ox3 bl). She became acutely disoriented on the way to  (eg, didn't know where she was), urgent care recommended pt be brought to [facility] ER where she had CT head with c/f stroke, transferred to [facility] for MRI. MRI on 11/16 with foci of diffusion restriction c/f thromboembolic phenomena. On 11/17, pt's memory started to return and per  she is returning to baseline mental status. Additionally, while at OSH, Flower elevated, cardiology c/s with c/f NSTEMI and potential cardiac emboli/thrombi. She was initiated on Lipitor, full dose Lovenox (discontinued 2 d later), , Plavix held given c/f hemorrhagic conversion iso multiple CVAs. Pt transferred to [facility] for JASON.  Pt with no complaints on arrival to [facility] and felt back to her baseline. Her vitals on arrival to [facility] were wnl. Her labs: from OSH revealed resolved leukocytosis; normocytic anemia; resolved AST elevation; hsT 375 (11/15) -> 6624 (11/16) -> 5757 (11/16);  -> 407; CKMB 47.5 -> 33.5. Review of imaging from OSH revealed CTH noncon w/no acute findings; MR head noncon multiple small foci diffusion restriction both hemispheres and within posterior fossa suggesting embolic or thrombotic showering in anterior/posterior circulation, chronic ischemic changes in b/l hemispheres and atrophic changes; MR Angio Neck noncon unremarkable; TTE EF 65% w/ increased LV wall thickness, dilated LA, mod to severe MR w/ functional MS, severe AS, mild pulm HTN. Cardiology was c/s at [facility], they recommended to dc full dose AC Lovenox, and pt underwent JASON revealing severe MR, LA dilation, moderate AS, LVH, hyperdynamic LVSF, evidence of PFO w/o R to L shunting. Cardiology saw the pt and determined NSTEMI was unlikely given the unusually elevated troponin levels and the lack of EKG changes or focal wall motion abnormalities. Prior to d/c, EP saw the pt and she had a Loop recorder placed. Given abnormal findings on JASON, pt's home cardiologist recommended pt remain in hospital for cath (results normal) and mitral clip (Dr. Leon and patient decided no appropriate at this time). She was d/c home with Lipitor, ASA, and Plavix. She was HD stable, and ready for discharge w/ close PCP, cardiology, and neurology f/u.

## 2021-11-19 NOTE — PROGRESS NOTE ADULT - SUBJECTIVE AND OBJECTIVE BOX
***INCOMPLETE***    Mann Booker, PGY-1  Internal Medicine  Pager: [phone], [facility]: [phone]    PROGRESS NOTE:     SUBJECTIVE / OVERNIGHT EVENTS: No acute events overnight. ROS negative for fever, chills, cough, SOB, chest pain, nausea, vomiting, diarrhea, constipation, dysuria.    MEDICATIONS  (STANDING):  aspirin enteric coated 325 milliGRAM(s) Oral daily  atorvastatin 40 milliGRAM(s) Oral at bedtime  cyanocobalamin 1000 MICROGram(s) Oral daily  enoxaparin Injectable 40 milliGRAM(s) SubCutaneous daily  metoprolol tartrate 12.5 milliGRAM(s) Oral every 12 hours    MEDICATIONS  (PRN):  ALBUTerol    90 MICROgram(s) HFA Inhaler 2 Puff(s) Inhalation every 6 hours PRN Shortness of Breath and/or Wheezing      CAPILLARY BLOOD GLUCOSE      POCT Blood Glucose.: 102 mg/dL (19 Nov 2021 06:57)  POCT Blood Glucose.: 124 mg/dL (19 Nov 2021 00:15)    I&O's Summary    18 Nov 2021 07:01  -  19 Nov 2021 07:00  --------------------------------------------------------  IN: 360 mL / OUT: 0 mL / NET: 360 mL        PHYSICAL EXAM:  Vital Signs Last 24 Hrs  T(C): 36.9 (19 Nov 2021 04:07), Max: 36.9 (18 Nov 2021 20:36)  T(F): 98.4 (19 Nov 2021 04:07), Max: 98.4 (18 Nov 2021 20:36)  HR: 77 (19 Nov 2021 04:07) (77 - 90)  BP: 145/88 (19 Nov 2021 04:07) (145/88 - 167/92)  BP(mean): --  RR: 18 (19 Nov 2021 04:07) (18 - 18)  SpO2: 93% (19 Nov 2021 04:07) (93% - 96%)    CONSTITUTIONAL: NAD, well-developed  RESPIRATORY: Normal respiratory effort; lungs are clear to auscultation bilaterally  CARDIOVASCULAR: Regular rate and rhythm, normal S1 and S2, no murmur/rub/gallop; No lower extremity edema; radial pulses are 2+ bilaterally  ABDOMEN: Nontender to palpation, no rebound/guarding, nondistended, bowel soudns present  MUSCLOSKELETAL: no clubbing or cyanosis of digits; no joint swelling   PSYCH: A+O to person, place, and time; affect appropriate    LABS:                        11.0   10.11 )-----------( 295      ( 19 Nov 2021 05:51 )             35.0     11-19    139  |  102  |  12  ----------------------------<  111<H>  4.0   |  26  |  0.51    Ca    8.7      19 Nov 2021 05:51  Phos  3.4     11-19  Mg     1.9     11-19    TPro  6.5  /  Alb  3.6  /  TBili  0.3  /  DBili  x   /  AST  29  /  ALT  17  /  AlkPhos  92  11-19    PT/INR - ( 19 Nov 2021 05:51 )   PT: 13.3 sec;   INR: 1.11 ratio         PTT - ( 19 Nov 2021 05:51 )  PTT:32.5 sec          Culture - Blood (collected 16 Nov 2021 17:09)  Source: .Blood Blood  Preliminary Report (17 Nov 2021 18:01):    No growth to date.    Culture - Blood (collected 16 Nov 2021 17:09)  Source: .Blood Blood  Preliminary Report (17 Nov 2021 18:01):    No growth to date.         Mann Booker, PGY-1  Internal Medicine  Pager: [phone], [facility]: [phone]    PROGRESS NOTE:     SUBJECTIVE / OVERNIGHT EVENTS: No acute events overnight. Pt continues to feel at her baseline, has no acute complaints.  ROS negative for fever, chills, cough, SOB, chest pain, nausea, vomiting, diarrhea, constipation, dysuria.    MEDICATIONS  (STANDING):  aspirin enteric coated 325 milliGRAM(s) Oral daily  atorvastatin 40 milliGRAM(s) Oral at bedtime  cyanocobalamin 1000 MICROGram(s) Oral daily  enoxaparin Injectable 40 milliGRAM(s) SubCutaneous daily  metoprolol tartrate 12.5 milliGRAM(s) Oral every 12 hours    MEDICATIONS  (PRN):  ALBUTerol    90 MICROgram(s) HFA Inhaler 2 Puff(s) Inhalation every 6 hours PRN Shortness of Breath and/or Wheezing      CAPILLARY BLOOD GLUCOSE      POCT Blood Glucose.: 102 mg/dL (19 Nov 2021 06:57)  POCT Blood Glucose.: 124 mg/dL (19 Nov 2021 00:15)    I&O's Summary    18 Nov 2021 07:01  -  19 Nov 2021 07:00  --------------------------------------------------------  IN: 360 mL / OUT: 0 mL / NET: 360 mL        PHYSICAL EXAM:  Vital Signs Last 24 Hrs  T(C): 36.9 (19 Nov 2021 04:07), Max: 36.9 (18 Nov 2021 20:36)  T(F): 98.4 (19 Nov 2021 04:07), Max: 98.4 (18 Nov 2021 20:36)  HR: 77 (19 Nov 2021 04:07) (77 - 90)  BP: 145/88 (19 Nov 2021 04:07) (145/88 - 167/92)  BP(mean): --  RR: 18 (19 Nov 2021 04:07) (18 - 18)  SpO2: 93% (19 Nov 2021 04:07) (93% - 96%)    GENERAL: NAD, lying in bed comfortably  HEAD:  Atraumatic, Normocephalic  EYES: EOMI, PERRLA, conjunctiva and sclera clear  ENT: Moist mucous membranes  NECK: Supple, No JVD  CHEST/LUNG: CTAB; No rales, rhonchi, or rubs. Unlabored respirations  HEART: Regular rate and rhythm; +holosytolic murmur  ABDOMEN: Soft, nontender, nondistended, no rebound/guarding  EXTREMITIES:  2+ Peripheral Pulses, brisk capillary refill. No clubbing, cyanosis, or edema  NERVOUS SYSTEM:  A&Ox3, no focal deficits appreciated  SKIN: No rashes or lesions appreciated    LABS:                        11.0   10.11 )-----------( 295      ( 19 Nov 2021 05:51 )             35.0     11-19    139  |  102  |  12  ----------------------------<  111<H>  4.0   |  26  |  0.51    Ca    8.7      19 Nov 2021 05:51  Phos  3.4     11-19  Mg     1.9     11-19    TPro  6.5  /  Alb  3.6  /  TBili  0.3  /  DBili  x   /  AST  29  /  ALT  17  /  AlkPhos  92  11-19    PT/INR - ( 19 Nov 2021 05:51 )   PT: 13.3 sec;   INR: 1.11 ratio         PTT - ( 19 Nov 2021 05:51 )  PTT:32.5 sec          Culture - Blood (collected 16 Nov 2021 17:09)  Source: .Blood Blood  Preliminary Report (17 Nov 2021 18:01):    No growth to date.    Culture - Blood (collected 16 Nov 2021 17:09)  Source: .Blood Blood  Preliminary Report (17 Nov 2021 18:01):    No growth to date.

## 2021-11-19 NOTE — DISCHARGE NOTE PROVIDER - NSDCCAREPROVSEEN_GEN_ALL_CORE_FT
[facility] Team 2  Dr. Mallorie Chavez [facility] Team 2  Dr. Mallorie Chavez [facility] Team 2

## 2021-11-19 NOTE — PROGRESS NOTE ADULT - PROBLEM SELECTOR PLAN 1
MR at OSH c/f multiple areas of difussion restriction c/f thromboembolic phenomena, c/f cardiac origin  - c/s neuro in am  - q4h neuro checks  - fall, aspiration precautions  - cardiac w/u as below  - c/w Lovenox 50mg bid, ASA 325mg qd, atorvastatin 40mg qhs, hold Plavix given c/f hemorrhagic transformation MR at OSH c/f multiple areas of diffusion restriction c/f thromboembolic phenomena, c/f cardiac origin  - c/s neuro in am  - q4h neuro checks  - fall, aspiration precautions  - cardiac w/u as below  - c/w Lovenox 50mg bid, ASA 325mg qd, atorvastatin 40mg qhs, hold Plavix given c/f hemorrhagic transformation

## 2021-11-19 NOTE — DISCHARGE NOTE PROVIDER - NSDCCPCAREPLAN_GEN_ALL_CORE_FT
PRINCIPAL DISCHARGE DIAGNOSIS  Diagnosis: Stroke  Assessment and Plan of Treatment: You presented to the hospital, out of concern for confusion, and were found to have multiple areas of your brain which were damaged due to low blood flow. This was concerning for clots which may have traveled from your heart to your brain, therefore you underwent a transesophageal echocardiam (JASON) which is essentially a procedure where a camera is advanced down your throat and an ultrasound machine is able to visualize the structures of your heart with better clarity. This test revealed ____.   You will be discharge home to follow up closely with your outpatient doctor, a cardiologist, and a neurologist.  Please return to the hospital if you experience progressive confusion, difficulty walking, severe headache, vision changes, numbness, weakness, tingling, or any other alarming symptoms.       PRINCIPAL DISCHARGE DIAGNOSIS  Diagnosis: Stroke  Assessment and Plan of Treatment: You presented to the hospital, out of concern for confusion, and were found to have multiple areas of your brain which were damaged due to low blood flow. This was concerning for clots which may have traveled from your heart to your brain. Therefore you underwent a "transesophageal echocardiam (JASON)" which is a procedure where a camera is advanced down your throat and an ultrasound machine is able to visualize the structures of your heart with better clarity. This test revealed no source of blood clots in your heart.  While you were here, a special type of cardiologist called an "electrophysiologist" placed a device called a Loop recorder under your skin. This device will monitor your heart to determine if you have a condition that might have predisposed you to having a blood clot that traveled from your heart to your brain.  You will be discharge home to follow up closely with your outpatient doctor, a cardiologist, and a neurologist. You will need to start taking 2 medications: Lipitor, and aspirin.  Please return to the hospital if you experience progressive confusion, difficulty walking, severe headache, vision changes, numbness, weakness, tingling, or any other alarming symptoms.       PRINCIPAL DISCHARGE DIAGNOSIS  Diagnosis: Stroke  Assessment and Plan of Treatment: You presented to the hospital with confusion and were found to have multiple areas of your brain which were damaged due to low blood flow. This was concerning for clots which may have traveled from your heart to your brain. Therefore you underwent a "transesophageal echocardiam (JASON)" which is a procedure where a camera is advanced down your throat and an ultrasound machine is able to visualize the structures of your heart with better clarity. This test revealed no source of blood clots in your heart.  While you were here, a special type of cardiologist called an "electrophysiologist" placed a device called a Loop recorder under your skin. This device will monitor your heart to determine if you have a condition that might have predisposed you to having a blood clot that traveled from your heart to your brain. It did reveal that some of your heart valves and stiff and leaky, so you were seen by another type of heart specialist called a "structural cardiologist". They determined that the best intervention for you would be _______.  You will be discharge home to follow up closely with your outpatient doctor, a cardiologist, and a neurologist. You will need to continue/start taking these medications: ___________  Please return to the hospital if you experience progressive confusion, difficulty walking, severe headache, vision changes, numbness, weakness, tingling, or any other alarming symptoms.       PRINCIPAL DISCHARGE DIAGNOSIS  Diagnosis: Stroke  Assessment and Plan of Treatment: You presented to the hospital with confusion and were found to have multiple areas of your brain which were damaged due to low blood flow. This was concerning for clots which may have traveled from your heart to your brain. Therefore you underwent a "transesophageal echocardiam (JASON)" which is a procedure where a camera is advanced down your throat and an ultrasound machine is able to visualize the structures of your heart with better clarity. This test revealed no source of blood clots in your heart.  While you were here, a cardiologist placed a device called a loop recorder under your skin. This device will monitor your heart to determine if you have a condition that might have predisposed you to having a blood clot that traveled from your heart to your brain. It did reveal that some of your heart valves and stiff and leaky, but you and another cardiologist discussed that a procedure such as a mitral clip was not appropriate at this time. You underwent a procedure ("catheterization") that allowed cardiologists to examine the blood vessels feeding your heart, and they looked normal.  You will be discharge home to follow up closely with your outpatient doctor, a cardiologist, and a neurologist.   Please return to the hospital if you experience progressive confusion, difficulty walking, severe headache, vision changes, numbness, weakness, tingling, or any other alarming symptoms.

## 2021-11-19 NOTE — PROGRESS NOTE ADULT - PROBLEM SELECTOR PLAN 3
TWI anterolateral leads, TTE at OSH w/o WMA, Flower downtrending c/f stress cardiomyopathy  - cardiology on board, appreciate recs  - stop Lovenox after 48hr  - c/w ASA, statin as above  - consider non-urgent cath  - tele as above TWI anterolateral leads, TTE at OSH w/o WMA, Flower downtrending c/f stress cardiomyopathy  - c/w ASA, statin  - consider non-urgent cath  - tele as above  - cardiology help appreciated

## 2021-11-20 PROCEDURE — 33285 INSJ SUBQ CAR RHYTHM MNTR: CPT

## 2021-11-20 PROCEDURE — 99221 1ST HOSP IP/OBS SF/LOW 40: CPT

## 2021-11-20 RX ORDER — VANCOMYCIN HCL 1 G
500 VIAL (EA) INTRAVENOUS ONCE
Refills: 0 | Status: COMPLETED | OUTPATIENT
Start: 2021-11-20 | End: 2021-11-20

## 2021-11-20 RX ORDER — METOPROLOL TARTRATE 50 MG
25 TABLET ORAL DAILY
Refills: 0 | Status: DISCONTINUED | OUTPATIENT
Start: 2021-11-20 | End: 2021-11-23

## 2021-11-20 RX ORDER — LOSARTAN POTASSIUM 100 MG/1
25 TABLET, FILM COATED ORAL DAILY
Refills: 0 | Status: DISCONTINUED | OUTPATIENT
Start: 2021-11-20 | End: 2021-11-20

## 2021-11-20 RX ORDER — LOSARTAN POTASSIUM 100 MG/1
25 TABLET, FILM COATED ORAL DAILY
Refills: 0 | Status: DISCONTINUED | OUTPATIENT
Start: 2021-11-20 | End: 2021-11-23

## 2021-11-20 RX ORDER — HEPARIN SODIUM 5000 [USP'U]/ML
5000 INJECTION INTRAVENOUS; SUBCUTANEOUS EVERY 8 HOURS
Refills: 0 | Status: DISCONTINUED | OUTPATIENT
Start: 2021-11-20 | End: 2021-11-22

## 2021-11-20 RX ORDER — ASPIRIN/CALCIUM CARB/MAGNESIUM 324 MG
1 TABLET ORAL
Qty: 30 | Refills: 0
Start: 2021-11-20 | End: 2021-12-19

## 2021-11-20 RX ORDER — ATORVASTATIN CALCIUM 80 MG/1
1 TABLET, FILM COATED ORAL
Qty: 30 | Refills: 0
Start: 2021-11-20 | End: 2021-12-19

## 2021-11-20 RX ADMIN — Medication 25 MILLIGRAM(S): at 19:09

## 2021-11-20 RX ADMIN — Medication 325 MILLIGRAM(S): at 12:39

## 2021-11-20 RX ADMIN — LOSARTAN POTASSIUM 25 MILLIGRAM(S): 100 TABLET, FILM COATED ORAL at 10:13

## 2021-11-20 RX ADMIN — ATORVASTATIN CALCIUM 40 MILLIGRAM(S): 80 TABLET, FILM COATED ORAL at 22:18

## 2021-11-20 RX ADMIN — Medication 100 MILLIGRAM(S): at 17:54

## 2021-11-20 NOTE — PROGRESS NOTE ADULT - PROBLEM SELECTOR PLAN 3
TWI anterolateral leads, TTE at OSH w/o WMA, Flower downtrending c/f stress cardiomyopathy  - c/w ASA, statin  - consider non-urgent cath  - tele as above  - cardiology help appreciated TWI anterolateral leads, TTE at OSH w/o WMA, Flower downtrending c/f stress cardiomyopathy  - c/w ASA, statin  - consider non-urgent cath  - monitor on tele  - cardiology help appreciated, low concern for NSTEMI given lack of symptoms or EKG changes

## 2021-11-20 NOTE — PROGRESS NOTE ADULT - PROBLEM SELECTOR PLAN 1
MR at OSH c/f multiple areas of diffusion restriction c/f thromboembolic phenomena, c/f cardiac origin  - c/s neuro in am  - q4h neuro checks  - fall, aspiration precautions  - cardiac w/u as below  - c/w Lovenox 50mg bid, ASA 325mg qd, atorvastatin 40mg qhs, hold Plavix given c/f hemorrhagic transformation MRI at OSH c/f multiple areas of diffusion restriction c/f thromboembolic phenomena, c/f cardiac origin  - q4h neuro checks  - fall, aspiration precautions  - cardiac w/u as below  - c/w Lovenox 50mg bid, ASA 325mg qd, atorvastatin 40mg qhs, hold Plavix given c/f hemorrhagic transformation

## 2021-11-20 NOTE — SWALLOW BEDSIDE ASSESSMENT ADULT - SLP PERTINENT HISTORY OF CURRENT PROBLEM
Imaging: CXR and CTCAP NC w/o urgent finding; CTH NC no acute findings; MR head noncon multiple small foci diffusion restriction both hemispheres and w/i posterior fossa suggesting embolic or thrombotic showering in anterior/posterior circulation, chronic ischemic changes in b/l hemispheres and atrophic changes; MR Angio Neck noncon unremarkable; TTE EF 65% w/ increased LV wall thickness, dilated LA, mod to severe MR w/ functional MS, severe AS, mild pulm HTN. Micro: UA not c/f infxn, BCx and UCx 11/16 NGTD. Admit to medicine for further eval and mgt.

## 2021-11-20 NOTE — SWALLOW BEDSIDE ASSESSMENT ADULT - SWALLOW EVAL: RECOMMENDED DIET
[Very Good] : ~his/her~  mood as very good [0] : 2) Feeling down, depressed, or hopeless: Not at all (0) [Reviewed and Updated] :  reviewed and updated [Fully functional (bathing, dressing, toileting, transferring, walking, feeding)] : Fully functional (bathing, dressing, toileting, transferring, walking, feeding) [Fully functional (using the telephone, shopping, preparing meals, housekeeping, doing laundry, using] : Fully functional and needs no help or supervision to perform IADLs (using the telephone, shopping, preparing meals, housekeeping, doing laundry, using transportation, managing medications and managing finances) Upgrade to Regular texture diet [Patient declined discussion] : Patient declined discussion [] : No [de-identified] : social [[id number]] : 0 [Change in mental status noted] : No change in mental status noted

## 2021-11-20 NOTE — PROGRESS NOTE ADULT - ASSESSMENT
77 yo RH female with a MVP, HTN, COPD, osteoporosis, psoriatic arthritis, gastritis, vertebral fracture, UC, costochondritis, and now with acute ischemic stroke presents as a transfer from [place]. LKW 11/15 at 0830. Patient then became lethargic, nauseous, and complained of lightheadedness/dizziness. found to have emboilic strokes and NSTEMI tx to [facility] for further care.    MRI Head showed multiple small foci of diffusion restriction in both hemispheres as well as within the posterior fossa.   MRA H/N neg   LDL 92, A1c 6.2  trop in 5000s  b12 386  CT c a p neg   JASON as above with mod AS. no cardiac source   Impression: AMS + ataxia. Patient with multiple small foci of diffusion restriction in both anterior and posterior circulation seen on MRI Head. ESUS.   now back to baseline     Recommendations:  - asa and statin for secondary stroke prevention  - no CI to AC if needed for cardiac  - would get ILR to detect occult AF   - b12 supplement   - telemetry  - PT/OT/SS/SLP, OOBC  - check FS, glucose control <180  - GI/DVT ppx  - Counseling on diet, exercise, and medication adherence was done  - Counseling on smoking cessation and alcohol consumption offered when appropriate.  - Pain assessed and judicious use of narcotics when appropriate was discussed.    - Stroke education given when appropriate.  - Importance of fall prevention discussed.   - Differential diagnosis and plan of care discussed with patient and/or family and primary team  - Thank you for allowing me to participate in the care of this patient. Call with questions.   - d/c planning today. quick outpt f/u   Cheko Dukes MD  Vascular Neurology  Office: [phone] .

## 2021-11-20 NOTE — SWALLOW BEDSIDE ASSESSMENT ADULT - PHARYNGEAL PHASE
audible swallows with thin liquids; palpable hyolaryngeal elevation/excursion; no overt s/s laryngeal penetration/aspiration

## 2021-11-20 NOTE — PROGRESS NOTE ADULT - PROBLEM SELECTOR PLAN 5
SpO2% adequate on RA, mild wheezing on exam  - c/w albuterol prn  - CTM SpO2% adequate on RA  - c/w albuterol prn  - CTM

## 2021-11-20 NOTE — SWALLOW BEDSIDE ASSESSMENT ADULT - ORAL PHASE
trace lingual residue after solids, with good awareness, and reswallows for clearance/Within functional limits

## 2021-11-20 NOTE — SWALLOW BEDSIDE ASSESSMENT ADULT - SWALLOW EVAL: PATIENT/FAMILY GOALS STATEMENT
Pt denies any h/o  of dysphagia. Pt and  verbalizing displeasure with current dysphagia diet. Pt asking for regular water.

## 2021-11-20 NOTE — PROGRESS NOTE ADULT - PROBLEM SELECTOR PLAN 4
- cont to hold home ARB and BB c/f CVA allowing permissive HTN  - monitor BPs - c/w home losartan 25mg qd and metoprolol 25mg qd

## 2021-11-20 NOTE — PROGRESS NOTE ADULT - SUBJECTIVE AND OBJECTIVE BOX
24H hour events: NSR 80's bpm     MEDICATIONS:  aspirin enteric coated 325 milliGRAM(s) Oral daily  losartan 25 milliGRAM(s) Oral daily  metoprolol succinate ER 25 milliGRAM(s) Oral at bedtime      ALBUTerol    90 MICROgram(s) HFA Inhaler 2 Puff(s) Inhalation every 6 hours PRN        atorvastatin 40 milliGRAM(s) Oral at bedtime    cyanocobalamin 1000 MICROGram(s) Oral daily      REVIEW OF SYSTEMS:  See HPI, otherwise ROS negative.    PHYSICAL EXAM:  T(C): 36.4 (11-20-21 @ 11:47), Max: 37.1 (11-20-21 @ 05:33)  HR: 84 (11-20-21 @ 11:47) (78 - 84)  BP: 131/80 (11-20-21 @ 11:47) (131/80 - 167/97)  RR: 18 (11-20-21 @ 11:47) (16 - 18)  SpO2: 93% (11-20-21 @ 11:47) (93% - 95%)  Wt(kg): --  I&O's Summary      Appearance: Alert in NAD   Cardiovascular: +S1S2 RRR no m/g/r  Respiratory: CTA B/L	  Gastrointestinal:  Soft, NT. ND. +BS	  Skin: chest wall c/d/i w/o hematoma, ILR site with overlying dressing   Neurologic: Non-focal, MEDINA x4, B/L UE and LE 5/5 strength   Extremities: No edema BLE  Vascular: Peripheral pulses palpable 2+ bilaterally, warm and well perfused       LABS:	 	    CBC Full  -  ( 19 Nov 2021 05:51 )  WBC Count : 10.11 K/uL  Hemoglobin : 11.0 g/dL  Hematocrit : 35.0 %  Platelet Count - Automated : 295 K/uL  Mean Cell Volume : 83.5 fl  Mean Cell Hemoglobin : 26.3 pg  Mean Cell Hemoglobin Concentration : 31.4 gm/dL  Auto Neutrophil # : 6.76 K/uL  Auto Lymphocyte # : 2.46 K/uL  Auto Monocyte # : 0.70 K/uL  Auto Eosinophil # : 0.11 K/uL  Auto Basophil # : 0.03 K/uL  Auto Neutrophil % : 66.9 %  Auto Lymphocyte % : 24.3 %  Auto Monocyte % : 6.9 %  Auto Eosinophil % : 1.1 %  Auto Basophil % : 0.3 %    11-19    139  |  102  |  12  ----------------------------<  111<H>  4.0   |  26  |  0.51    Ca    8.7      19 Nov 2021 05:51  Phos  3.4     11-19  Mg     1.9     11-19    TPro  6.5  /  Alb  3.6  /  TBili  0.3  /  DBili  x   /  AST  29  /  ALT  17  /  AlkPhos  92  11-19      proBNP: Serum Pro-Brain Natriuretic Peptide: 1778 pg/mL (10.20.21 @ 12:56)  TSH: Thyroid Stimulating Hormone, Serum (11.16.21 @ 05:16)    Thyroid Stimulating Hormone, Serum: 1.53 uIU/mL    CARDIAC MARKERS: Troponin I, High Sensitivity Result: 5756.6: Serial measurements of high sensitivity troponin I (hs TnI) showing rapid  upward or downward changes suggest acute myocardial injury.  Please note  that a sustained elevation of hsTnI can be caused by renal disease,  chronic heart failure, sepsis, pulmonary embolism and other clinical  conditions. ng/L (11.16.21 @ 13:35)    TELEMETRY: SR 70-80's   	    ECG:  	< from: 12 Lead ECG (11.15.21 @ 20:17) >  Ventricular Rate 81 BPM    Atrial Rate 81 BPM    P-R Interval 294 ms    QRS Duration 72 ms    Q-T Interval 394 ms    QTC Calculation(Bazett) 457 ms    P Axis 60 degrees    R Axis 75 degrees    T Axis -79 degrees    Diagnosis Line Sinus rhythm with 1st degree AV block  Nonspecific ST and T wave abnormality  Abnormal ECG  When compared with ECG of 15-NOV-2021 15:56, (Unconfirmed)  T wave inversion now evident in Anterior leads  and anterolateral leads    < end of copied text >      JASON:  < from: Transesophageal Echocardiogram (11.19.21 @ 15:19) >  PROCEDURE: Transesophageal and transthoracic  echocardiograms with 2-D, M-Mode and complete spectral and  color flow Doppler were performed.  Informed consent was  first obtained for JASON. The patient was sedated - see  anesthesia record.  The procedure was monitored with  automatic blood pressure monitoring, ECG tracings and pulse  oximetry.  The transesophageal probe was placed in the  esophagus posterior to the heart without complications.  INDICATION: Cerebral infarction, unspecified (I63.9)  ------------------------------------------------------------------------  Dimensions:    Normal Values:  LA:    3.3    2.0 - 4.0 cm  Ao:     2.4    2.0 - 3.8 cm  SEPTUM: 1.5    0.6 - 1.2 cm  PWT:    1.1    0.6 - 1.1 cm  LVIDd:  3.4    3.0 - 5.6 cm  LVIDs:  2.2    1.8 - 4.0 cm  Derived variables:  LVMI: 107 g/m2  RWT: 0.64  Fractional short: 35 %  EF (Lopez Rule): 77 %Doppler Peak Velocity (m/sec):  AoV=2.7  ------------------------------------------------------------------------  Observations:  Mitral Valve: Mitral annular calcification and calcified  mitral leaflets.  Calcifications extend to the chordae.  There is a calcified cystic structure on  chordae. Severe  mitral regurgitation. Peak mitral valve gradient equals 14  mm Hg, mean transmitral valve gradient equals 7 mm Hg,  estimated mitral valve area equals 1.8 sqcm (by  planimetry).  Elevated gradients in part due to severe  regurgitation.  Aortic Valve/Aorta: Calcified trileaflet aortic valve with  decreased opening. Peak transaortic valve gradient equals  29 mm Hg, mean transaortic valve gradient equals 16 mm Hg,  estimated aortic valve area equals 1 sqcm (by continuity  equation), aortic valve velocity time integral equals 53  cm, consistent with moderate aortic stenosis. Peak left  ventricular outflow tract gradient equals 3 mm Hg, mean  gradient is equal to 1 mm Hg, LVOT velocity time integral  equals 17 cm.  Aortic Root: 2.4 cm.  LVOT diameter: 2 cm.  Focal calcifications within the aortic arch.  Left Atrium: Moderately dilated left atrium.  LA volume  index = 43 cc/m2.   No left atrial or left atrial appendage  thrombus.  Left Ventricle: Hyperdynamic left ventricular systolic  function. Concentric left ventricular hypertrophy.  Right Heart: Right atrial enlargement. Right ventricular  enlargement with normal right ventricular systolic  function. Normal tricuspid valve. Moderate-severe tricuspid  regurgitation. Normal pulmonic valve.  Pericardium/Pleura: Normal pericardium with no pericardial  effusion.  Hemodynamic: Estimated right ventricular systolic pressure  equals 56 mm Hg, assuming right atrial pressure equals 10  mm Hg, consistent with moderate pulmonary hypertension.  Color Doppler demonstrates evidence of a patent foramen  ovale.  No shunting of bubbles from right to left.  ------------------------------------------------------------------------  Conclusions:  1. Mitral annular calcification and calcified mitral  leaflets.  Calcifications extend to the chordae. There is a  calcified cystic structure on  chordae. Severe mitral  regurgitation. Peak mitral valve gradient equals 14 mm Hg,  mean transmitral valve gradient equals 7 mm Hg, estimated  mitral valve area equals 1.8 sqcm (by planimetry).  Elevated gradients in part due to severe regurgitation.  2. Calcified trileaflet aortic valve with decreased  opening. Peak transaortic valve gradientequals 29 mm Hg,  mean transaortic valve gradient equals 16 mm Hg, estimated  aortic valve area equals 1 sqcm (by continuity equation),  aortic valve velocity time integral equals 53 cm,  consistent with moderate aortic stenosis.  3. Moderately dilated left atrium.  LA volume index = 43  cc/m2.   No left atrial or left atrial appendage thrombus.  4. Concentric left ventricular hypertrophy.  5. Hyperdynamic left ventricular systolic function.  6. Right ventricular enlargement with normal right  ventricular systolic function.  7. Estimated pulmonary artery systolic pressure equals 56  mm Hg, assuming right atrial pressure equals 10 mm Hg,  consistent with moderate pulmonary pressures.  8. Color Doppler demonstrates evidence of a patent foramen  ovale.  No shunting of bubbles from right to left.  *** No previous Echo exam.    < end of copied text >    	  MR HEAD:  < from: MR Head No Cont (11.16.21 @ 09:44) >  INTERPRETATION:  INDICATION:    Dizziness. TIA. Rule out stroke.  TECHNIQUE:  Multiplanar brain imaging was conducted. T1, T2 and FLAIR imaging was performed.  In addition, diffusion imaging, diffusion coefficient assessment (ADC) and T2* was incorporated . No contrast administered.  COMPARISON EXAMINATION:  CT 11/15/2021    FINDINGS:    HEMISPHERES: There are scattered foci of diffusion restriction in both hemispheres, suggesting embolic or thrombotic showering. There is involvement of both basal ganglia and white matter, without evidence of a large acute territorial infarct. Also noted are small white matter lesions and chronic ischemic changes in both hemispheres with volume loss.  VENTRICLES:  Midline with ex vacuo enlargement  POSTERIOR FOSSA:  Scattered areas of diffusion abnormality are also noted in the cerebellar hemispheres bilaterally.  EXTRA-AXIAL:  No mass or collections are depicted.  VASCULATURE:  The major vessels and venous sinuses are grossly patent.  SINUSES AND MASTOIDS:  Clear.  MISCELLANEOUS:  No orbital or pituitary abnormality noted.  No skull base lesion suggested.    IMPRESSION:    1)  Multiple small foci of diffusion restriction in both hemispheres as well as within the posterior fossa suggesting embolic or thrombotic showering in the anterior and posterior circulation. Further workup and assessment recommended.  2)  also noted are chronic ischemic changes in both hemispheres with atrophic change.    < end of copied text >

## 2021-11-20 NOTE — SWALLOW BEDSIDE ASSESSMENT ADULT - H & P REVIEW
76F PMH MVP, HTN, COPD, osteoporosis, psoriatic arthritis, gastritis, vertebral fx, UC (last flare years ago), costochondritis presented to OSH ([place]) from  on 11/15 iso leathargy, nausea, lightheadedness/dizziness, progressive confusion, unsteady gait, LKN 08:30 11/15 (A&Ox3 bl). Pt's  drove pt to urgent care; she became acutely disoriented on the way (eg, didn't know where she was), urgent care recommended pt be brought to [facility] ER where she had CTH with c/f stroke, Pt was deemed not a TPA candidate, transferred to [facility] for MRI. MRI 11/16 with foci of diffusion restriction c/f thromboembolic phenomena. On 11/17, pt's memory started to return and per  she is returning to baseline mental status. Additionally, while at OSH, Flower elevated, cardiology c/s with c/f NSTEMI and potential cardiac emboli/thrombi. She was initiated on lipitor, full dose Lovenox, , Plavix held given c/f hemorrhagic conversion iso multiple CVAs. Pt transferred to [facility] for JASON and potential cardiac cath. VS: Afebrile, HR 70s-80s, hypertensive BP 130s-160s/80s-90s, RR 16-18, SpO2% 90-96 on RA. Labs: resolved leukocytosis 18.3 (11/15) -> 10.1 11/18, normocytic anemia (MCV 84) 11.2; resolved AST elevation 81 (11/16) -> 37; hsT 375 (11/15) -> 6624 (11/16) -> 5757 (11/16);  -> 407; CKMB 47.5 -> 33.5./yes

## 2021-11-20 NOTE — PROGRESS NOTE ADULT - ASSESSMENT
76F PMH MVP, HTN, COPD, osteoporosis c/b L hip fracture s/p fixation, psoriatic arthritis, gastritis, vertebral fx, UC (last flare years ago), costochondritis presented to OSH ([place]) iso AMS, MRI revealing multiple areas of diffusion restriction c/f  cardiac thromboembolic phenomena,  transferred to [facility] for JASON and ILD placement.

## 2021-11-20 NOTE — CONSULT NOTE ADULT - SUBJECTIVE AND OBJECTIVE BOX
HPI:  76F PMH MVP, HTN, COPD, osteoporosis, psoriatic arthritis, gastritis, vertebral fx, UC (last flare years ago), costochondritis presented to OSH ([place]) from UC on 11/15 iso leathargy, nausea, lightheadedness/dizziness, progressive confusion, unsteady gait, LKN 08:30 11/15 (A&Ox3 bl). Pt's  drove pt to urgent care; she became acutely disoriented on the way to urgent care (eg, didn't know where she was), urgent care recommended pt be brought to [facility] ER where she had CT head with c/f stroke, transferred to [facility] for MRI. MRI on 11/16 with foci of diffusion restriction c/f thromboembolic phenomena. On 11/17, pt's memory started to return and per  she is returning to baseline mental status. Additionally, while at OSH, Flower elevated, cardiology c/s with c/f NSTEMI and potential cardiac emboli/thrombi. She was initiated on lipitor, full dose Lovenox, , Plavix held given c/f hemorrhagic conversion iso multiple CVAs. Pt transferred to [facility] for JASON and potential cardiac cath. Currently the pt feels well and denies weakness, numbness or tingling, fever/chills, sore throat, cough, CP, SOB changed from baseline, abdominal discomfort, n/v/d/c, dysuria, hematuria, hematochezia. JASON was  performed on 11/19 EF 77%, severe MR, moderate AS.  Underwent loop recorder placement on 11/20/2021.  Dr. Leon/structural heart team consulted for mitral clip/TAVR evaluation given severe MR/mod AS        Past Medical History  Nicotine addiction    Bronchitis    Gastritis    Tachycardia    MVP (mitral valve prolapse)    Ulcerative colitis    Cigarette smoker  current    Psoriatic arthritis    Vertebral fracture, closed  Lumbar x 4    Osteoporosis    Varicose veins    Hyperparathyroidism    Costal chondritis    Prediabetes    Herniated disc, cervical  lumbar    Emphysema lung    COPD (chronic obstructive pulmonary disease)        Past Surgical History  Termination of pregnancy (fetus)    History of tonsillectomy    S/P parathyroidectomy  2014      MEDICATIONS  (STANDING):  aspirin enteric coated 325 milliGRAM(s) Oral daily  atorvastatin 40 milliGRAM(s) Oral at bedtime  cyanocobalamin 1000 MICROGram(s) Oral daily  heparin   Injectable 5000 Unit(s) SubCutaneous every 8 hours  losartan 25 milliGRAM(s) Oral daily  metoprolol tartrate 25 milliGRAM(s) Oral daily    MEDICATIONS  (PRN):  ALBUTerol    90 MICROgram(s) HFA Inhaler 2 Puff(s) Inhalation every 6 hours PRN Shortness of Breath and/or Wheezing      Vital Signs Last 24 Hrs  T(C): 36.4 (11-20-21 @ 11:47), Max: 37.1 (11-20-21 @ 05:33)  T(F): 97.5 (11-20-21 @ 11:47), Max: 98.7 (11-20-21 @ 05:33)  HR: 84 (11-20-21 @ 11:47) (80 - 84)  BP: 131/80 (11-20-21 @ 11:47) (131/80 - 167/97)  RR: 18 (11-20-21 @ 11:47) (18 - 18)  SpO2: 93% (11-20-21 @ 11:47) (93% - 95%)           Daily     Daily   Admit Wt: Drug Dosing Weight  Height (cm): 154.9 (19 Nov 2021 16:03)  Weight (kg): 44.6 (19 Nov 2021 16:03)  BMI (kg/m2): 18.6 (19 Nov 2021 16:03)  BSA (m2): 1.4 (19 Nov 2021 16:03)    Allergies: caffeine (Blisters)  Gluten (Unknown)  lactose (Vomiting)  penicillins (Hives)      SOCIAL HISTORY:  Smoker: [ ] Yes  [X] No                Former smoker 1PPD   ETOH use: [ ] Yes  [X] No             Pt denies  Ilicit Drug use:  [ ] Yes  [X] No     Pt denies    FAMILY HISTORY:  Family history of Parkinson disease    Family history of colorectal cancer    Family history of diabetes mellitus    Family history of transitional cell carcinoma of bladder (Child)    Family history of melanoma (Child)      Subjective- Patient denies chest pain or shortness of breath.  States she feels well.  She is eager to go home.     Review of Systems  GENERAL:  no weakness, fatigue, fevers or chills  NEURO: no dizziness, numbness, tingling or weakness  SKIN: no itching, burning, rashes, or lesions   HEENT: no visual changes;  no headache, no vertigo, no recent colds  RESPIRATORY: mild shortness of breath patient attributes to her hx of COPD, no cough, sputum, wheezing  CARDIOVASCULAR:  no chest pain,  or palpitations  GI: no abd pain. no N/V/D.  PERIPHERAL VASCULAR: no swelling, no tenderness, no erythema    PHYSICAL EXAM  General: Well nourished, well developed, NAD.                                              Neuro: Normal exam oriented to person/place & time with no focal motor or sensory  deficits.                    Eyes: Normal exam of conjunctiva & lids, pupils equally reactive.   ENT: Normal exam of nasal/oral mucosa with absence of cyanosis.   Neck: Normal exam of jugular veins, trachea & thyroid.   Chest: +left anterior chest wall dressing that is c/d/i from loop recorder placement. Normal lung exam with good air movement absence of wheezes, rales, or rhonchi.                                                                         CV:  Auscultation: normal S1S2, RRR   Carotids: No Bruits[X]  Abdominal Aorta: normal [X] nonpalpable[X]                                                                         GI: Normal exam of abdomen with no noted masses or tenderness. +[id number]                                                                                            Extremities: Normal no evidence of cyanosis or deformity, Edema: none  Lower Extremity Pulses: Right[+2DP] Left[+2DP] Varicosities[none]  SKIN : Normal exam to inspection & palpation. No rashes/lesions/wounds noted on bilateral groins.                                                            LABS:                        11.0   10.11 )-----------( 295      ( 19 Nov 2021 05:51 )             35.0     11-19    139  |  102  |  12  ----------------------------<  111<H>  4.0   |  26  |  0.51    Ca    8.7      19 Nov 2021 05:51  Phos  3.4     11-19  Mg     1.9     11-19    TPro  6.5  /  Alb  3.6  /  TBili  0.3  /  DBili  x   /  AST  29  /  ALT  17  /  AlkPhos  92  11-19    PT/INR - ( 19 Nov 2021 05:51 )   PT: 13.3 sec;   INR: 1.11 ratio         PTT - ( 19 Nov 2021 05:51 )  PTT:32.5 sec         JASON:    Patient name: GABRIELA WHELAN  YOB: 1945   Age: 76 (F)   MR#: [id number]  Study Date: 11/19/2021  Location: [address] [place], [place] [id number]: [facility]  Study quality: Technically good  Referring Physician: Roberto Gordon MD  Blood Pressure: 193/85 mmHg  Height: 155 cm  Weight: 44 kg  BSA: 1.4 m2  ------------------------------------------------------------------------  PROCEDURE: Transesophageal and transthoracic  echocardiograms with 2-D, M-Mode and complete spectral and  color flow Doppler were performed.  Informed consent was  first obtained for JASON. The patient was sedated - see  anesthesia record.  The procedure was monitored with  automatic blood pressure monitoring, ECG tracings and pulse  oximetry.  The transesophageal probe was placed in the  esophagus posterior to the heart without complications.  INDICATION: Cerebral infarction, unspecified (I63.9)  ------------------------------------------------------------------------  Dimensions:    Normal Values:  LA:    3.3    2.0 - 4.0 cm  Ao:     2.4    2.0 - 3.8 cm  SEPTUM: 1.5    0.6 - 1.2 cm  PWT:    1.1    0.6 - 1.1 cm  LVIDd:  3.4    3.0 - 5.6 cm  LVIDs:  2.2    1.8 - 4.0 cm  Derived variables:  LVMI: 107 g/m2  RWT: 0.64  Fractional short: 35 %  EF (Lopez Rule): 77 %Doppler Peak Velocity (m/sec):  AoV=2.7  ------------------------------------------------------------------------  Observations:  Mitral Valve: Mitral annular calcification and calcified  mitral leaflets.  Calcifications extend to the chordae.  There is a calcified cystic structure on  chordae. Severe  mitral regurgitation. Peak mitral valve gradient equals 14  mm Hg, mean transmitral valve gradient equals 7 mm Hg,  estimated mitral valve area equals 1.8 sqcm (by  planimetry).  Elevated gradients in part due to severe  regurgitation.  Aortic Valve/Aorta: Calcified trileaflet aortic valve with  decreased opening. Peak transaortic valve gradient equals  29 mm Hg, mean transaortic valve gradient equals 16 mm Hg,  estimated aortic valve area equals 1 sqcm (by continuity  equation), aortic valve velocity time integral equals 53  cm, consistent with moderate aortic stenosis. Peak left  ventricular outflow tract gradient equals 3 mm Hg, mean  gradient is equal to 1 mm Hg, LVOT velocity time integral  equals 17 cm.  Aortic Root: 2.4 cm.  LVOT diameter: 2 cm.  Focal calcifications within the aortic arch.  Left Atrium: Moderately dilated left atrium.  LA volume  index = 43 cc/m2.   No left atrial or left atrial appendage  thrombus.  Left Ventricle: Hyperdynamic left ventricular systolic  function. Concentric left ventricular hypertrophy.  Right Heart: Right atrial enlargement. Right ventricular  enlargement with normal right ventricular systolic  function. Normal tricuspid valve. Moderate-severe tricuspid  regurgitation. Normal pulmonic valve.  Pericardium/Pleura: Normal pericardium with no pericardial  effusion.  Hemodynamic: Estimated right ventricular systolic pressure  equals 56 mm Hg, assuming right atrial pressure equals 10  mm Hg, consistent with moderate pulmonary hypertension.  Color Doppler demonstrates evidence of a patent foramen  ovale.  No shunting of bubbles from right to left.  ------------------------------------------------------------------------  Conclusions:  1. Mitral annular calcification and calcified mitral  leaflets.  Calcifications extend to the chordae. There is a  calcified cystic structure on  chordae. Severe mitral  regurgitation. Peak mitral valve gradient equals 14 mm Hg,  mean transmitral valve gradient equals 7 mm Hg, estimated  mitral valve area equals 1.8 sqcm (by planimetry).  Elevated gradients in part due to severe regurgitation.  2. Calcified trileaflet aortic valve with decreased  opening. Peak transaortic valve gradientequals 29 mm Hg,  mean transaortic valve gradient equals 16 mm Hg, estimated  aortic valve area equals 1 sqcm (by continuity equation),  aortic valve velocity time integral equals 53 cm,  consistent with moderate aortic stenosis.  3. Moderately dilated left atrium.  LA volume index = 43  cc/m2.   No left atrial or left atrial appendage thrombus.  4. Concentric left ventricular hypertrophy.  5. Hyperdynamic left ventricular systolic function.  6. Right ventricular enlargement with normal right  ventricular systolic function.  7. Estimated pulmonary artery systolic pressure equals 56  mm Hg, assuming right atrial pressure equals 10 mm Hg,  consistent with moderate pulmonary pressures.  8. Color Doppler demonstrates evidence of a patent foramen  ovale.  No shunting of bubbles from right to left.  *** No previous Echo exam.

## 2021-11-20 NOTE — SWALLOW BEDSIDE ASSESSMENT ADULT - SWALLOW EVAL: DIAGNOSIS
77 yo female admitted from OSH with MRI notable for  multiple small foci diffusion restriction both hemispheres and w/i posterior fossa suggesting embolic or thrombotic showering in anterior/posterior circulation, chronic ischemic changes in b/l hemispheres and atrophic changes. Pt currently presents with evidence of a mild oropharyngeal dysphagia that appears somewhat improved from evaluation at [facility], currently notable for mildly prolonged mastication for solids, suspected incoordination of swallow sequence with audible swallows with thin liquids, palpable hyolaryngeal elevation/excursion, and no overt s/s laryngeal penetration/aspiration observed during evaluation.

## 2021-11-20 NOTE — PROGRESS NOTE ADULT - ASSESSMENT
76F PMH HTN, COPD (emphysema), psoriatic arthritis, UC, AS, prior history of tachycardia/NSVT on Metoprolol who presented to [facility] from Urgent Care on 11/15 c/o lethargy, nausea, lightheadedness, dizziness, progressive confusion, unsteady gait found to have acute CVA, MR head revealing multiple small foci of diffusion restriction in both anterior and posterior circulation, likely cardioembolic in origin. JASON performed reveals PFO without shunt, no thrombus visualized, EF 77%. JASON  She has no personal history of AF and has been in NSR on telemetry this admission. She underwent bedside ILR implant this AM w/o complication and is pending Structural Heart team for evaluation of findings on JASON.     1) Acute  CVA   2) NSTEMI   3) PFO w/o shunt  4) Severe AS, severe MR, mod-severe TR    -On ASA and high intensity statin for CVA  -S/p ILR implantation this AM. Will need wound check follow up appointment in 10-14 days at EP clinic  -Metoprolol for history of NSVT   -Continue telemetry monitoring   -K >4, Mg >2   -Structural Heart to evaluate valvular disease     No further EP intervention, EP to sign off. Reconsult PRN     Patient seen and discussed with Dr. Barnhart     [phone]

## 2021-11-20 NOTE — PROGRESS NOTE ADULT - SUBJECTIVE AND OBJECTIVE BOX
***INCOMPLETE***    Mann Booker, PGY-1  Internal Medicine  Pager: [phone], [facility]: [phone]    PROGRESS NOTE:     SUBJECTIVE / OVERNIGHT EVENTS: No acute events overnight. ROS negative for fever, chills, cough, SOB, chest pain, nausea, vomiting, diarrhea, constipation, dysuria.    MEDICATIONS  (STANDING):  aspirin enteric coated 325 milliGRAM(s) Oral daily  atorvastatin 40 milliGRAM(s) Oral at bedtime  cyanocobalamin 1000 MICROGram(s) Oral daily  metoprolol succinate ER 25 milliGRAM(s) Oral at bedtime    MEDICATIONS  (PRN):  ALBUTerol    90 MICROgram(s) HFA Inhaler 2 Puff(s) Inhalation every 6 hours PRN Shortness of Breath and/or Wheezing      CAPILLARY BLOOD GLUCOSE      POCT Blood Glucose.: 85 mg/dL (19 Nov 2021 12:17)    I&O's Summary    18 Nov 2021 07:01  -  19 Nov 2021 07:00  --------------------------------------------------------  IN: 360 mL / OUT: 0 mL / NET: 360 mL        PHYSICAL EXAM:  Vital Signs Last 24 Hrs  T(C): 37.1 (20 Nov 2021 05:33), Max: 37.1 (20 Nov 2021 05:33)  T(F): 98.7 (20 Nov 2021 05:33), Max: 98.7 (20 Nov 2021 05:33)  HR: 80 (20 Nov 2021 05:33) (78 - 82)  BP: 153/89 (20 Nov 2021 05:33) (151/77 - 167/89)  BP(mean): --  RR: 18 (20 Nov 2021 05:33) (16 - 18)  SpO2: 94% (20 Nov 2021 05:33) (94% - 95%)    CONSTITUTIONAL: NAD, well-developed  RESPIRATORY: Normal respiratory effort; lungs are clear to auscultation bilaterally  CARDIOVASCULAR: Regular rate and rhythm, normal S1 and S2, no murmur/rub/gallop; No lower extremity edema; radial pulses are 2+ bilaterally  ABDOMEN: Nontender to palpation, no rebound/guarding, nondistended, bowel soudns present  MUSCLOSKELETAL: no clubbing or cyanosis of digits; no joint swelling   PSYCH: A+O to person, place, and time; affect appropriate    LABS:                        11.0   10.11 )-----------( 295      ( 19 Nov 2021 05:51 )             35.0     11-19    139  |  102  |  12  ----------------------------<  111<H>  4.0   |  26  |  0.51    Ca    8.7      19 Nov 2021 05:51  Phos  3.4     11-19  Mg     1.9     11-19    TPro  6.5  /  Alb  3.6  /  TBili  0.3  /  DBili  x   /  AST  29  /  ALT  17  /  AlkPhos  92  11-19    PT/INR - ( 19 Nov 2021 05:51 )   PT: 13.3 sec;   INR: 1.11 ratio         PTT - ( 19 Nov 2021 05:51 )  PTT:32.5 sec             Mann Bookre, PGY-1  Internal Medicine  Pager: [phone], [facility]: [phone]    PROGRESS NOTE:     SUBJECTIVE / OVERNIGHT EVENTS: No acute events overnight. Pt continues to feel well, though upset over being hospitalized. ROS negative for fever, chills, cough, SOB, chest pain, nausea, vomiting, diarrhea, constipation, dysuria.    MEDICATIONS  (STANDING):  aspirin enteric coated 325 milliGRAM(s) Oral daily  atorvastatin 40 milliGRAM(s) Oral at bedtime  cyanocobalamin 1000 MICROGram(s) Oral daily  metoprolol succinate ER 25 milliGRAM(s) Oral at bedtime    MEDICATIONS  (PRN):  ALBUTerol    90 MICROgram(s) HFA Inhaler 2 Puff(s) Inhalation every 6 hours PRN Shortness of Breath and/or Wheezing      CAPILLARY BLOOD GLUCOSE      POCT Blood Glucose.: 85 mg/dL (19 Nov 2021 12:17)    I&O's Summary    18 Nov 2021 07:01  -  19 Nov 2021 07:00  --------------------------------------------------------  IN: 360 mL / OUT: 0 mL / NET: 360 mL        PHYSICAL EXAM:  Vital Signs Last 24 Hrs  T(C): 37.1 (20 Nov 2021 05:33), Max: 37.1 (20 Nov 2021 05:33)  T(F): 98.7 (20 Nov 2021 05:33), Max: 98.7 (20 Nov 2021 05:33)  HR: 80 (20 Nov 2021 05:33) (78 - 82)  BP: 153/89 (20 Nov 2021 05:33) (151/77 - 167/89)  BP(mean): --  RR: 18 (20 Nov 2021 05:33) (16 - 18)  SpO2: 94% (20 Nov 2021 05:33) (94% - 95%)    GENERAL: NAD, lying in bed comfortably  HEAD:  Atraumatic, Normocephalic  EYES: EOMI, PERRLA, conjunctiva and sclera clear  ENT: Moist mucous membranes  NECK: Supple, No JVD  CHEST/LUNG: CTAB; No rales, rhonchi, or rubs. Unlabored respirations  HEART: Regular rate and rhythm  ABDOMEN: Soft, nontender, nondistended, no rebound/guarding  EXTREMITIES:  2+ Peripheral Pulses, brisk capillary refill. No clubbing, cyanosis, or edema  NERVOUS SYSTEM:  A&Ox3, no focal deficits appreciated  SKIN: No rashes or lesions appreciated    LABS:                        11.0   10.11 )-----------( 295      ( 19 Nov 2021 05:51 )             35.0     11-19    139  |  102  |  12  ----------------------------<  111<H>  4.0   |  26  |  0.51    Ca    8.7      19 Nov 2021 05:51  Phos  3.4     11-19  Mg     1.9     11-19    TPro  6.5  /  Alb  3.6  /  TBili  0.3  /  DBili  x   /  AST  29  /  ALT  17  /  AlkPhos  92  11-19    PT/INR - ( 19 Nov 2021 05:51 )   PT: 13.3 sec;   INR: 1.11 ratio         PTT - ( 19 Nov 2021 05:51 )  PTT:32.5 sec

## 2021-11-20 NOTE — PROGRESS NOTE ADULT - PROBLEM SELECTOR PLAN 6
DVT ppx: Lovenox 50mg bid  Diet: soft/bite sized w/ mildly thickened liquids per OSH S&S  Dispo: pending PT recs    FULL CODE DVT ppx: HSQ  Diet: soft/bite sized w/ mildly thickened liquids per OSH S&S  Dispo: pending PT recs    FULL CODE DVT ppx: HSQ  Diet: soft/bite sized w/ mildly thickened liquids per OSH S&S; requested additional S&S as pt did not like mildly thickened liquids however pt refused on 11/20  Dispo: pending PT recs    FULL CODE DVT ppx: HSQ  Diet: DASH/TLC  Dispo: pending PT recs    FULL CODE

## 2021-11-20 NOTE — PROGRESS NOTE ADULT - ASSESSMENT
#COPD  #HTN  #CVA: currently in sr, no af noted, on asa and high dose statin  #markedly elevated troponin, no ekg changes or symptoms.   #valvular disease: as above, JASON showed severe mr with moderate ms, moderate AS, Moderate pulmonary htn, PFO without documented flow.  Loop recorder being implanted now.  Discussed in detail with Dr Leon, awaits structural consult.  Based on structural consult, consider cath Monday.  Discussed in detail with patient, , son, and staff.

## 2021-11-20 NOTE — SWALLOW BEDSIDE ASSESSMENT ADULT - COMMENTS
Per Medicine: Active problems: Acute embolic CVA: pending JASON. On ASA as above. Seen by PT/OT at [place] who recommended dc home with no skilled PT needs. Speech pathologist recommended MBS but pt refused. Cont neuro precautions. NSTEMI: TWI anterolateral leads, TTE at OSH w/o WMA, Flower downtrending c/f stress cardiomyopathy, On full dose lovenox. Cardiology consult for catherization. Monitor on tele. COPD: Not in acute exacerbation.    Per Neuro: Pt now with acute ischemic stroke presents as a transfer from [place]. LKW 11/15 at 0830. Patient then became lethargic, nauseous, and complained of lightheadedness/dizziness. MRI Head showed multiple small foci of diffusion restriction in both hemispheres as well as within the posterior fossa. Patient also found to have NSTEMI. Pt transferred to [facility] for further workup, including JASON. NIHSS 0, MRS 0. Impression: AMS + ataxia. Patient with multiple small foci of diffusion restriction in both anterior and posterior circulation seen on MRI Head. Mechanism likely cardioembolic.    SWALLOW HX:   [place] 11/16: Clinical swallow assessment completed 11/16, at which time pt was recommended soft and bite sized with mildly thick liquids. Pt reported she has been noncompliant with mildly thick liquids and "will not drink them" despite provided rationale. MBS was recommended to objectively determine safest and least restrictive PO diet. Pt refused exam despite encouragement, explanation of rationale and multiple attempts.

## 2021-11-20 NOTE — SWALLOW BEDSIDE ASSESSMENT ADULT - ASR SWALLOW RECOMMEND DIAG
Not clinically indicated at this time. However, if concerned for silent aspiration, can consider MBS, if Pt willing to participate.

## 2021-11-20 NOTE — PROGRESS NOTE ADULT - SUBJECTIVE AND OBJECTIVE BOX
SUBJECTIVE: Asymptomatic.  at bedside.  	  MEDICATIONS:  losartan 25 milliGRAM(s) Oral daily  metoprolol succinate ER 25 milliGRAM(s) Oral at bedtime  ALBUTerol    90 MICROgram(s) HFA Inhaler 2 Puff(s) Inhalation every 6 hours PRN  atorvastatin 40 milliGRAM(s) Oral at bedtime  aspirin enteric coated 325 milliGRAM(s) Oral daily  cyanocobalamin 1000 MICROGram(s) Oral daily      REVIEW OF SYSTEMS:    CONSTITUTIONAL: No fever, weight loss, or fatigue  EYES: No eye pain, visual disturbances, or discharge  NECK: No pain or stiffness  RESPIRATORY: No cough, wheezing, chills or hemoptysis; No Shortness of Breath  CARDIOVASCULAR: No chest pain, palpitations, dizziness, or leg swelling  GASTROINTESTINAL: No abdominal or epigastric pain. No nausea, vomiting, or hematemesis; No diarrhea or constipation. No melena or hematochezia.  GENITOURINARY: No dysuria, frequency, hematuria, or incontinence  NEUROLOGICAL: No headaches, memory loss, loss of strength, numbness, or tremors  SKIN: No itching, burning, rashes, or lesions   LYMPH Nodes: No enlarged glands  MUSCULOSKELETAL: No joint pain or swelling; No muscle, back, or extremity pain  All other review of systems are negative.  	      PHYSICAL EXAM:  T(C): 37.1 (11-20-21 @ 05:33), Max: 37.1 (11-20-21 @ 05:33)  HR: 80 (11-20-21 @ 05:33) (78 - 82)  BP: 153/89 (11-20-21 @ 05:33) (151/77 - 167/89)  RR: 18 (11-20-21 @ 05:33) (16 - 18)  SpO2: 94% (11-20-21 @ 05:33) (94% - 95%)  Wt(kg): --  I&O's Summary    Height (cm): 154.9 (11-19 @ 16:03)  Weight (kg): 44.6 (11-19 @ 16:03)  BMI (kg/m2): 18.6 (11-19 @ 16:03)  BSA (m2): 1.4 (11-19 @ 16:03)    PHYSICAL EXAM    Appearance: Normal	  HEENT:   Normal oral mucosa, PERRL, EOMI	  NECK: Soft and supple, No LAD, No JVD  Cardiovascular: Regular Rate and Rhythm, Normal S1 S2, 3/6 sowmya  Respiratory: Lungs clear to auscultation	  Extremities: No clubbing, cyanosis or edema      TELEMETRY: 	      LABS:	 	               11.0   10.11 )-----------( 295      ( 19 Nov 2021 05:51 )             35.0     11-19    139  |  102  |  12  ----------------------------<  111<H>  4.0   |  26  |  0.51    Ca    8.7      19 Nov 2021 05:51  Phos  3.4     11-19  Mg     1.9     11-19    TPro  6.5  /  Alb  3.6  /  TBili  0.3  /  DBili  x   /  AST  29  /  ALT  17  /  AlkPhos  92  11-19    JASON yesterday:  Conclusions:  1. Mitral annular calcification and calcified mitral  leaflets.  Calcifications extend to the chordae. There is a  calcified cystic structure on  chordae. Severe mitral  regurgitation. Peak mitral valve gradient equals 14 mm Hg,  mean transmitral valve gradient equals 7 mm Hg, estimated  mitral valve area equals 1.8 sqcm (by planimetry).  Elevated gradients in part due to severe regurgitation.  2. Calcified trileaflet aortic valve with decreased  opening. Peak transaortic valve gradient equals 29 mm Hg,  mean transaortic valve gradient equals 16 mm Hg, estimated  aortic valve area equals 1 sqcm (by continuity equation),  aortic valve velocity time integral equals 53 cm,  consistent with moderate aortic stenosis.  3. Moderately dilated left atrium.  LA volume index = 43  cc/m2.   No left atrial or left atrial appendage thrombus.  4. Concentric left ventricular hypertrophy.  5. Hyperdynamic left ventricular systolic function.  6. Right ventricular enlargement with normal right  ventricular systolic function.  7. Estimated pulmonary artery systolic pressure equals 56  mm Hg, assuming right atrial pressure equals 10 mm Hg,  consistent with moderate pulmonary pressures.  8. Color Doppler demonstrates evidence of a patent foramen  ovale.  No shunting of bubbles from right to left.  *** No previous Echo exam.  ------------------------------------------------------------------------  Confirmed on  11/19/2021 - 17:04:55 by KARLA Holt

## 2021-11-20 NOTE — CONSULT NOTE ADULT - ASSESSMENT
76F PMH HTN, COPD (emphysema), psoriatic arthritis, UC has not had a flare up recently, AS, prior history of tachycardia/NSVT on Metoprolol who presented to [facility] from [facility] on 11/15 c/o lethargy, nausea, lightheadedness, dizziness, progressive confusion, unsteady gait found to have acute CVA follow up MR head revealing multiple small foci of diffusion restriction in both anterior and posterior circulation, likely cardioembolic in origin. JASON performed on 11/19 revealed severe MR, mod AS,  PFO without shunt, no thrombus visualized, EF 77%. JASON.  Currently NSR on tele.  Structural heart team/Dr. Leon consulted for severe MR/mod AS possible mitral clip workup.  Case discussed with Dr. Leon who will see patient in AM.     Plan: Structural heart team to follow. Dr. Leon to see patient in AM. Will review JASON imaging.  Possible workup for Mitral Clip given severe MR.  Continue rest of care per primary team   used

## 2021-11-20 NOTE — PROGRESS NOTE ADULT - PROBLEM SELECTOR PLAN 2
TTE at [facility] revealing severe AS and mod to severe MR, no signs embolism  JASON and ILD placement to occur today  - monitor on tele  - f/u JASON results TTE at [facility] revealing severe AS and mod to severe MR, no signs embolism  JASON does not reveal source of emboli though pt noted to have small PFO  s/p ILD placement  To be seen by structural cards over the weekend for further evaluation per her cardiologist's recommendation

## 2021-11-21 LAB
CULTURE RESULTS: SIGNIFICANT CHANGE UP
SPECIMEN SOURCE: SIGNIFICANT CHANGE UP

## 2021-11-21 PROCEDURE — 93970 EXTREMITY STUDY: CPT | Mod: 26

## 2021-11-21 RX ADMIN — PREGABALIN 1000 MICROGRAM(S): 225 CAPSULE ORAL at 12:26

## 2021-11-21 RX ADMIN — HEPARIN SODIUM 5000 UNIT(S): 5000 INJECTION INTRAVENOUS; SUBCUTANEOUS at 05:48

## 2021-11-21 RX ADMIN — Medication 25 MILLIGRAM(S): at 17:42

## 2021-11-21 RX ADMIN — Medication 325 MILLIGRAM(S): at 12:26

## 2021-11-21 RX ADMIN — HEPARIN SODIUM 5000 UNIT(S): 5000 INJECTION INTRAVENOUS; SUBCUTANEOUS at 14:45

## 2021-11-21 RX ADMIN — ATORVASTATIN CALCIUM 40 MILLIGRAM(S): 80 TABLET, FILM COATED ORAL at 21:30

## 2021-11-21 RX ADMIN — LOSARTAN POTASSIUM 25 MILLIGRAM(S): 100 TABLET, FILM COATED ORAL at 05:47

## 2021-11-21 NOTE — PROGRESS NOTE ADULT - PROBLEM SELECTOR PLAN 2
TTE at [facility] revealing severe AS and mod to severe MR, no signs embolism  JASON does not reveal source of emboli though pt noted to have small PFO  s/p ILD placement  To be seen by structural cards over the weekend for further evaluation per her cardiologist's recommendation

## 2021-11-21 NOTE — PROGRESS NOTE ADULT - ASSESSMENT
#COPD  #HTN  #s/p CVA: currently in sr, no af noted, on asa and high dose statin  #markedly elevated troponin, no ekg changes or symptoms.   #valvular disease: as above, JASON showed severe mr with moderate ms, moderate AS, Moderate pulmonary htn, PFO without documented flow.  Loop recorder implanted.  Seen yesterday by structural heart NP, awaits consult with Dr Leon today.  Based on structural consult, consider cath Monday.

## 2021-11-21 NOTE — PROGRESS NOTE ADULT - PROBLEM SELECTOR PLAN 1
MRI at OSH c/f multiple areas of diffusion restriction c/f thromboembolic phenomena, c/f cardiac origin  - q4h neuro checks  - fall, aspiration precautions  - cardiac w/u as below  - neuro on board  - c/w ASA 325mg qd, atorvastatin 40mg qhs

## 2021-11-21 NOTE — PROGRESS NOTE ADULT - SUBJECTIVE AND OBJECTIVE BOX
SUBJECTIVE: Asymptomatic.  at bedside.  	  MEDICATIONS:  losartan 25 milliGRAM(s) Oral daily  metoprolol tartrate 25 milliGRAM(s) Oral daily  ALBUTerol    90 MICROgram(s) HFA Inhaler 2 Puff(s) Inhalation every 6 hours PRN  atorvastatin 40 milliGRAM(s) Oral at bedtime  aspirin enteric coated 325 milliGRAM(s) Oral daily  cyanocobalamin 1000 MICROGram(s) Oral daily  heparin   Injectable 5000 Unit(s) SubCutaneous every 8 hours      REVIEW OF SYSTEMS:    CONSTITUTIONAL: No fever, weight loss, or fatigue  EYES: No eye pain, visual disturbances, or discharge  NECK: No pain or stiffness  RESPIRATORY: No cough, wheezing, chills or hemoptysis; No Shortness of Breath  CARDIOVASCULAR: No chest pain, palpitations, dizziness, or leg swelling  GASTROINTESTINAL: No abdominal or epigastric pain. No nausea, vomiting, or hematemesis; No diarrhea or constipation. No melena or hematochezia.  GENITOURINARY: No dysuria, frequency, hematuria, or incontinence  NEUROLOGICAL: No headaches, memory loss, loss of strength, numbness, or tremors  SKIN: No itching, burning, rashes, or lesions   LYMPH Nodes: No enlarged glands  MUSCULOSKELETAL: No joint pain or swelling; No muscle, back, or extremity pain  All other review of systems are negative.  	    PHYSICAL EXAM:  T(C): 36.9 (11-21-21 @ 05:25), Max: 36.9 (11-20-21 @ 20:24)  HR: 70 (11-21-21 @ 05:25) (70 - 94)  BP: 152/84 (11-21-21 @ 05:25) (131/80 - 167/97)  RR: 18 (11-21-21 @ 05:25) (18 - 18)  SpO2: 94% (11-21-21 @ 05:25) (93% - 94%)  Wt(kg): --  I&O's Summary    20 Nov 2021 07:01  -  21 Nov 2021 07:00  --------------------------------------------------------  IN: 440 mL / OUT: 0 mL / NET: 440 mL          PHYSICAL EXAM    Appearance: Normal	  HEENT:   Normal oral mucosa, PERRL, EOMI	  NECK: Soft and supple, No LAD, No JVD  Cardiovascular: Regular Rate and Rhythm, Normal S1 S2, 3/6 sowmya  Respiratory: Lungs clear to auscultation	  Extremities: No clubbing, cyanosis or edema      TELEMETRY: 	  sr 70-80

## 2021-11-21 NOTE — PROGRESS NOTE ADULT - SUBJECTIVE AND OBJECTIVE BOX
Henok Chavez MD PGY-2  Internal Medicine Resident    CHIEF COMPLAINT: Patient is a 76y old  Female who presents with a chief complaint of CVA/NSTEMI w/u (20 Nov 2021 19:04)      INTERVAL HPI/OVERNIGHT EVENTS: TANIA ON, hypertensive BP 130s-150s/70s-80s, otherwise VSS wnl.     MEDICATIONS (STANDING):  aspirin enteric coated 325 milliGRAM(s) Oral daily  atorvastatin 40 milliGRAM(s) Oral at bedtime  cyanocobalamin 1000 MICROGram(s) Oral daily  heparin   Injectable 5000 Unit(s) SubCutaneous every 8 hours  losartan 25 milliGRAM(s) Oral daily  metoprolol tartrate 25 milliGRAM(s) Oral daily    MEDICATIONS  (PRN):  ALBUTerol    90 MICROgram(s) HFA Inhaler 2 Puff(s) Inhalation every 6 hours PRN      REVIEW OF SYSTEMS:      T(F): 98.4 (11-21-21 @ 05:25), Max: 98.4 (11-20-21 @ 20:24)  HR: 70 (11-21-21 @ 05:25) (70 - 94)  BP: 152/84 (11-21-21 @ 05:25) (131/80 - 167/97)  RR: 18 (11-21-21 @ 05:25) (18 - 18)  SpO2: 94% (11-21-21 @ 05:25) (93% - 94%)  Wt(kg): --  CAPILLARY BLOOD GLUCOSE        I&O's Summary    20 Nov 2021 07:01  -  21 Nov 2021 07:00  --------------------------------------------------------  IN: 440 mL / OUT: 0 mL / NET: 440 mL        PHYSICAL EXAM:      LABS:                  RADIOLOGY & ADDITIONAL TESTS:       Henok Chavez MD PGY-2  Internal Medicine Resident    CHIEF COMPLAINT: Patient is a 76y old  Female who presents with a chief complaint of CVA/NSTEMI w/u (20 Nov 2021 19:04)      INTERVAL HPI/OVERNIGHT EVENTS: TANIA ON, hypertensive BP 130s-150s/70s-80s, otherwise VSS wnl. Pt awake upon arrival to room, pleasant to interview. She reports mild, diffuse, abd pain ON which she attributes to hunger pain, and is now resolved. Reports urinary frequency at bl, and otherwise she denies complaints.     MEDICATIONS (STANDING):  aspirin enteric coated 325 milliGRAM(s) Oral daily  atorvastatin 40 milliGRAM(s) Oral at bedtime  cyanocobalamin 1000 MICROGram(s) Oral daily  heparin   Injectable 5000 Unit(s) SubCutaneous every 8 hours  losartan 25 milliGRAM(s) Oral daily  metoprolol tartrate 25 milliGRAM(s) Oral daily    MEDICATIONS  (PRN):  ALBUTerol    90 MICROgram(s) HFA Inhaler 2 Puff(s) Inhalation every 6 hours PRN      REVIEW OF SYSTEMS:  CONSTITUTIONAL: No fever or chills  EYES: No visual disturbances or eye pain  ENMT: No sinus or throat pain  RESPIRATORY: No shortness of breath or cough  CARDIOVASCULAR: No chest pain, palpitations, or dizziness  GASTROINTESTINAL: No diarrhea or constipation. No melena or hematochezia. +mild, diffuse abd pain resolved as per HPI  GENITOURINARY: No dysuria or hematuria +frequency at bl  NEUROLOGICAL: No headaches, loss of strength, numbness, or tremors  MUSCULOSKELETAL: No joint pain or swelling; No muscle, back, or extremity pain        T(F): 98.4 (11-21-21 @ 05:25), Max: 98.4 (11-20-21 @ 20:24)  HR: 70 (11-21-21 @ 05:25) (70 - 94)  BP: 152/84 (11-21-21 @ 05:25) (131/80 - 167/97)  RR: 18 (11-21-21 @ 05:25) (18 - 18)  SpO2: 94% (11-21-21 @ 05:25) (93% - 94%)  Wt(kg): --  CAPILLARY BLOOD GLUCOSE        I&O's Summary    20 Nov 2021 07:01  -  21 Nov 2021 07:00  --------------------------------------------------------  IN: 440 mL / OUT: 0 mL / NET: 440 mL        PHYSICAL EXAM:  GENERAL: NAD, well-groomed, well-developed, pleasant to interview  HEAD: Atraumatic, Normocephalic  EYES: PERRL, conjunctiva and sclera clear  ENMT: No tonsillar erythema, exudates, or enlargement; Moist mucous membranes  NECK: Supple  NERVOUS SYSTEM: Alert & Oriented X3, Good concentration; Motor Strength 5/5 B/L upper and lower extremities +CN II-XII grossly intact, no pronator drift, sensation intact UE and LE  CHEST/LUNG: Clear to auscultation bilaterally; No rales, rhonchi, wheezing, or rubs  HEART: Regular rate and rhythm +systolic ejection murmur best auscultated in L parasternal border 2nd intercostal space   ABDOMEN: Soft, Nontender, Nondistended; Bowel sounds present  EXTREMITIES: 2+ Peripheral Pulses, No edema        LABS:                  RADIOLOGY & ADDITIONAL TESTS:

## 2021-11-21 NOTE — PHYSICAL THERAPY INITIAL EVALUATION ADULT - PRECAUTIONS/LIMITATIONS, REHAB EVAL
(continued from above) Additionally, while at OSH, Flower elevated, cardiology c/s with c/f NSTEMI and potential cardiac emboli/thrombi. Pt was initiated on lipitor, full dose Lovenox, , Plavix held given c/f hemorrhagic conversion iso multiple CVAs. Pt transferred to [facility] for JASON and potential cardiac cath. 11/19/21 JASON showing EF 77%, severe MR, moderate AS.  Underwent loop recorder placement on 11/20/2021.  Dr. Leon/structural heart team consulted for mitral clip/TAVR evaluation given severe MR/mod AS./cardiac precautions/fall precautions

## 2021-11-21 NOTE — PROGRESS NOTE ADULT - SUBJECTIVE AND OBJECTIVE BOX
Neurology Progress Note    S: Patient seen and examined. No new events overnight. patient denied CP, SOB, HA or pain. now on regular diet     Medication:  MEDICATIONS  (STANDING):  aspirin enteric coated 325 milliGRAM(s) Oral daily  atorvastatin 40 milliGRAM(s) Oral at bedtime  cyanocobalamin 1000 MICROGram(s) Oral daily  heparin   Injectable 5000 Unit(s) SubCutaneous every 8 hours  losartan 25 milliGRAM(s) Oral daily  metoprolol tartrate 25 milliGRAM(s) Oral daily    MEDICATIONS  (PRN):  ALBUTerol    90 MICROgram(s) HFA Inhaler 2 Puff(s) Inhalation every 6 hours PRN Shortness of Breath and/or Wheezing      Vitals:  Vital Signs Last 24 Hrs  T(C): 37.1 (11-21-21 @ 11:46), Max: 37.1 (11-21-21 @ 11:46)  T(F): 98.7 (11-21-21 @ 11:46), Max: 98.7 (11-21-21 @ 11:46)  HR: 79 (11-21-21 @ 11:46) (70 - 94)  BP: 142/82 (11-21-21 @ 11:46) (132/79 - 152/84)  BP(mean): --  RR: 18 (11-21-21 @ 11:46) (18 - 18)  SpO2: 96% (11-21-21 @ 11:46) (93% - 96%)      General Exam:   General Appearance: Appropriately dressed and in no acute distress       Head: Normocephalic, atraumatic and no dysmorphic features  Ear, Nose, and Throat: Moist mucous membranes  CVS: S1S2+  Resp: No SOB, no wheeze or rhonchi  Abd: soft NTND  Extremities: No edema, no cyanosis  Skin: No bruises, no rashes     Neurological Exam:  Mental Status: Awake, alert and oriented x 3.  Able to follow simple and complex verbal commands. Able to name and repeat. fluent speech. No obvious aphasia or dysarthria noted.   Cranial Nerves: PERRL, EOMI, VFFC, sensation V1-V3 intact,  no obvious facial asymmetry , equal elevation of palate, scm/trap 5/5, tongue is midline on protrusion. no obvious papilledema on fundoscopic exam. Hearing is grossly intact.   Motor: Normal bulk, tone and strength throughout. Fine finger movements were intact and symmetric. no tremors or drift noted.    Sensation: Intact to light touch and pinprick throughout. no right/left confusion. no extinction to tactile on DSS.   Reflexes: 1+ throughout at biceps, brachioradialis, triceps, patellars and ankles bilaterally and equal. No clonus. R toe and L toe were both downgoing.  Coordination: No dysmetria on FNF or HKS  Gait: deferred     I personally reviewed the below data/images/labs:      no new labs     -11/15 CTH: No acute hemorrhage, mass effect or extra-axial collections.  -11/16 MRI Head w/o: Multiple small foci of diffusion restriction in both hemispheres as well as within the posterior fossa suggesting embolic or thrombotic showering in the anterior and posterior circulation. Further workup and assessment recommended. Also noted are chronic ischemic changes in both hemispheres with atrophic change.  -11/16 MRA N: Unremarkable study.  -11/16 MRA H: Widely patent vasculature. There is fetal-type supply of the left posterior cerebral artery. Left P1 segment is absent.    < from: Transesophageal Echocardiogram (11.19.21 @ 15:19) >    Patient name: GABRIELA WHELAN  YOB: 1945   Age: 76 (F)   MR#: [id number]  Study Date: 11/19/2021  Location: [address] [place], [place] [id number]: Domi Saini RDCS  Study quality: Technically good  Referring Physician: Roberto Gordon MD  Blood Pressure: 193/85 mmHg  Height: 155 cm  Weight: 44 kg  BSA: 1.4 m2  ------------------------------------------------------------------------  PROCEDURE: Transesophageal and transthoracic  echocardiograms with 2-D, M-Mode and complete spectral and  color flow Doppler were performed.  Informed consent was  first obtained for JASON. The patient was sedated - see  anesthesia record.  The procedure was monitored with  automatic blood pressure monitoring, ECG tracings and pulse  oximetry.  The transesophageal probe was placed in the  esophagus posterior to the heart without complications.  INDICATION: Cerebral infarction, unspecified (I63.9)  ------------------------------------------------------------------------  Dimensions:    Normal Values:  LA:    3.3    2.0 - 4.0 cm  Ao:     2.4    2.0 - 3.8 cm  SEPTUM: 1.5    0.6 - 1.2 cm  PWT:    1.1    0.6 - 1.1 cm  LVIDd:  3.4    3.0 - 5.6 cm  LVIDs:  2.2    1.8 - 4.0 cm  Derived variables:  LVMI: 107 g/m2  RWT: 0.64  Fractional short: 35 %  EF (Lopez Rule): 77 %Doppler Peak Velocity (m/sec):  AoV=2.7  ------------------------------------------------------------------------  Observations:  Mitral Valve: Mitral annular calcification and calcified  mitral leaflets.  Calcifications extend to the chordae.  There is a calcified cystic structure on  chordae. Severe  mitral regurgitation. Peak mitral valve gradient equals 14  mm Hg, mean transmitral valve gradient equals 7 mm Hg,  estimated mitral valve area equals 1.8 sqcm (by  planimetry).  Elevated gradients in part due to severe  regurgitation.  Aortic Valve/Aorta: Calcified trileaflet aortic valve with  decreased opening. Peak transaortic valve gradient equals  29 mm Hg, mean transaortic valve gradient equals 16 mm Hg,  estimated aortic valve area equals 1 sqcm (by continuity  equation), aortic valve velocity time integral equals 53  cm, consistent with moderate aortic stenosis. Peak left  ventricular outflow tract gradient equals 3 mm Hg, mean  gradient is equal to 1 mm Hg, LVOT velocity time integral  equals 17 cm.  Aortic Root: 2.4 cm.  LVOT diameter: 2 cm.  Focal calcifications within the aortic arch.  Left Atrium: Moderately dilated left atrium.  LA volume  index = 43 cc/m2.   No left atrial or left atrial appendage  thrombus.  Left Ventricle: Hyperdynamic left ventricular systolic  function. Concentric left ventricular hypertrophy.  Right Heart: Right atrial enlargement. Right ventricular  enlargement with normal right ventricular systolic  function. Normal tricuspid valve. Moderate-severe tricuspid  regurgitation. Normal pulmonic valve.  Pericardium/Pleura: Normal pericardium with no pericardial  effusion.  Hemodynamic: Estimated right ventricular systolic pressure  equals 56 mm Hg, assuming right atrial pressure equals 10  mm Hg, consistent with moderate pulmonary hypertension.  Color Doppler demonstrates evidence of a patent foramen  ovale.  No shunting of bubbles from right to left.  ------------------------------------------------------------------------  Conclusions:  1. Mitral annular calcification and calcified mitral  leaflets.  Calcifications extend to the chordae. There is a  calcified cystic structure on  chordae. Severe mitral  regurgitation. Peak mitral valve gradient equals 14 mm Hg,  mean transmitral valve gradient equals 7 mm Hg, estimated  mitral valve area equals 1.8 sqcm (by planimetry).  Elevated gradients in part due to severe regurgitation.  2. Calcified trileaflet aortic valve with decreased  opening. Peak transaortic valve gradientequals 29 mm Hg,  mean transaortic valve gradient equals 16 mm Hg, estimated  aortic valve area equals 1 sqcm (by continuity equation),  aortic valve velocity time integral equals 53 cm,  consistent with moderate aortic stenosis.  3. Moderately dilated left atrium.  LA volume index = 43  cc/m2.   No left atrial or left atrial appendage thrombus.  4. Concentric left ventricular hypertrophy.  5. Hyperdynamic left ventricular systolic function.  6. Right ventricular enlargement with normal right  ventricular systolic function.  7. Estimated pulmonary artery systolic pressure equals 56  mm Hg, assuming right atrial pressure equals 10 mm Hg,  consistent with moderate pulmonary pressures.  8. Color Doppler demonstrates evidence of a patent foramen  ovale.  No shunting of bubbles from right to left.  *** No previous Echo exam.  ------------------------------------------------------------------------  Confirmed on  11/19/2021 - 17:04:55 by KARLA Holt  ------------------------------------------------------------------------    < end of copied text >

## 2021-11-21 NOTE — PHYSICAL THERAPY INITIAL EVALUATION ADULT - LIVES WITH, PROFILE
Patient lives with spouse in private house with  no steps to enter. Prior to admission pt independent with functional mobility, including ambulation with cane/spouse Patient lives with spouse in private house with  no steps to enter. Prior to admission pt independent with functional mobility, including ambulation with cane. Pt reports she also owns RW from previous admission.  Pt and spouse report multiple falls at home this year, reportedly tripped over objects, and had received home PT services to address this in the past./spouse

## 2021-11-21 NOTE — PROGRESS NOTE ADULT - ASSESSMENT
75 yo RH female with a MVP, HTN, COPD, osteoporosis, psoriatic arthritis, gastritis, vertebral fracture, UC, costochondritis, and now with acute ischemic stroke presents as a transfer from [place]. LKW 11/15 at 0830. Patient then became lethargic, nauseous, and complained of lightheadedness/dizziness. found to have emboilic strokes and NSTEMI tx to [facility] for further care.    MRI Head showed multiple small foci of diffusion restriction in both hemispheres as well as within the posterior fossa.   MRA H/N neg   LDL 92, A1c 6.2  trop in 5000s  b12 386  CT c a p neg   JASON as above with mod AS. no cardiac source + PFO   s/p ILR   Impression: AMS + ataxia. Patient with multiple small foci of diffusion restriction in both anterior and posterior circulation seen on MRI Head. ESUS.   now back to baseline     Recommendations:  - check LE dopplers give + PFO   - possible cardiac Cath 11/22 per cardio   - asa and statin for secondary stroke prevention  - no CI to AC if needed for cardiac  - b12 supplement   - telemetry  - PT/OT/SS/SLP, OOBC  - check FS, glucose control <180  - GI/DVT ppx  - Counseling on diet, exercise, and medication adherence was done  - Counseling on smoking cessation and alcohol consumption offered when appropriate.  - Pain assessed and judicious use of narcotics when appropriate was discussed.    - Stroke education given when appropriate.  - Importance of fall prevention discussed.   - Differential diagnosis and plan of care discussed with patient and/or family and primary team  - Thank you for allowing me to participate in the care of this patient. Call with questions.   Cheko Dukes MD  Vascular Neurology  Office: [phone] .

## 2021-11-21 NOTE — PHYSICAL THERAPY INITIAL EVALUATION ADULT - PERTINENT HX OF CURRENT PROBLEM, REHAB EVAL
76F PMH HTN, COPD (emphysema), psoriatic arthritis, UC has not had a flare up recently, AS, prior history of tachycardia/NSVT on Metoprolol; initially presented to [facility] from [facility] on 11/15 c/o lethargy, nausea, lightheadedness, dizziness, progressive confusion, and unsteady gait, found to have acute CVA with MR head revealing multiple small foci of diffusion restriction in both anterior and posterior circulation, likely cardioembolic in origin.

## 2021-11-21 NOTE — PROGRESS NOTE ADULT - ASSESSMENT
76F PMH MVP, HTN, COPD, osteoporosis c/b L hip fracture s/p fixation, psoriatic arthritis, gastritis, vertebral fx, UC (last flare years ago), costochondritis presented to OSH ([place]) iso AMS, MRI revealing multiple areas of diffusion restriction c/f  cardiac thromboembolic phenomena,  transferred to [facility] for JASON and ILD placement, undergoing structural cardiology w/u

## 2021-11-21 NOTE — PROGRESS NOTE ADULT - PROBLEM SELECTOR PLAN 3
TWI anterolateral leads, TTE at OSH w/o WMA, Flower downtrending c/f stress cardiomyopathy  - c/w ASA, statin  - consider non-urgent cath  - monitor on tele  - cardiology help appreciated, low concern for NSTEMI given lack of symptoms or EKG changes

## 2021-11-22 LAB
ANION GAP SERPL CALC-SCNC: 12 MMOL/L — SIGNIFICANT CHANGE UP (ref 5–17)
APTT BLD: 31.6 SEC — SIGNIFICANT CHANGE UP (ref 27.5–35.5)
BUN SERPL-MCNC: 13 MG/DL — SIGNIFICANT CHANGE UP (ref 7–23)
CALCIUM SERPL-MCNC: 9.4 MG/DL — SIGNIFICANT CHANGE UP (ref 8.4–10.5)
CHLORIDE SERPL-SCNC: 104 MMOL/L — SIGNIFICANT CHANGE UP (ref 96–108)
CO2 SERPL-SCNC: 25 MMOL/L — SIGNIFICANT CHANGE UP (ref 22–31)
CREAT SERPL-MCNC: 0.55 MG/DL — SIGNIFICANT CHANGE UP (ref 0.5–1.3)
GLUCOSE SERPL-MCNC: 104 MG/DL — HIGH (ref 70–99)
HCT VFR BLD CALC: 37.3 % — SIGNIFICANT CHANGE UP (ref 34.5–45)
HGB BLD-MCNC: 11.7 G/DL — SIGNIFICANT CHANGE UP (ref 11.5–15.5)
INR BLD: 1.09 RATIO — SIGNIFICANT CHANGE UP (ref 0.88–1.16)
MAGNESIUM SERPL-MCNC: 2 MG/DL — SIGNIFICANT CHANGE UP (ref 1.6–2.6)
MCHC RBC-ENTMCNC: 27.1 PG — SIGNIFICANT CHANGE UP (ref 27–34)
MCHC RBC-ENTMCNC: 31.4 GM/DL — LOW (ref 32–36)
MCV RBC AUTO: 86.3 FL — SIGNIFICANT CHANGE UP (ref 80–100)
NRBC # BLD: 0 /100 WBCS — SIGNIFICANT CHANGE UP (ref 0–0)
PHOSPHATE SERPL-MCNC: 3.7 MG/DL — SIGNIFICANT CHANGE UP (ref 2.5–4.5)
PLATELET # BLD AUTO: 312 K/UL — SIGNIFICANT CHANGE UP (ref 150–400)
POTASSIUM SERPL-MCNC: 4.1 MMOL/L — SIGNIFICANT CHANGE UP (ref 3.5–5.3)
POTASSIUM SERPL-SCNC: 4.1 MMOL/L — SIGNIFICANT CHANGE UP (ref 3.5–5.3)
PROTHROM AB SERPL-ACNC: 13 SEC — SIGNIFICANT CHANGE UP (ref 10.6–13.6)
RBC # BLD: 4.32 M/UL — SIGNIFICANT CHANGE UP (ref 3.8–5.2)
RBC # FLD: 14.6 % — HIGH (ref 10.3–14.5)
SODIUM SERPL-SCNC: 141 MMOL/L — SIGNIFICANT CHANGE UP (ref 135–145)
WBC # BLD: 12.31 K/UL — HIGH (ref 3.8–10.5)
WBC # FLD AUTO: 12.31 K/UL — HIGH (ref 3.8–10.5)

## 2021-11-22 PROCEDURE — 99232 SBSQ HOSP IP/OBS MODERATE 35: CPT

## 2021-11-22 PROCEDURE — 93454 CORONARY ARTERY ANGIO S&I: CPT | Mod: 26

## 2021-11-22 PROCEDURE — 99152 MOD SED SAME PHYS/QHP 5/>YRS: CPT

## 2021-11-22 RX ADMIN — Medication 25 MILLIGRAM(S): at 17:45

## 2021-11-22 RX ADMIN — HEPARIN SODIUM 5000 UNIT(S): 5000 INJECTION INTRAVENOUS; SUBCUTANEOUS at 06:18

## 2021-11-22 RX ADMIN — Medication 325 MILLIGRAM(S): at 15:40

## 2021-11-22 RX ADMIN — ATORVASTATIN CALCIUM 40 MILLIGRAM(S): 80 TABLET, FILM COATED ORAL at 21:16

## 2021-11-22 RX ADMIN — LOSARTAN POTASSIUM 25 MILLIGRAM(S): 100 TABLET, FILM COATED ORAL at 06:18

## 2021-11-22 NOTE — PROVIDER CONTACT NOTE (OTHER) - RECOMMENDATIONS
Educated pt on importance of following diet as ordered. Pt verbally understands.
administer metoprolol?
provider made aware
Patient has been taking lopressor at 1800 everyday, reschedule all lopressor daily to 1800?

## 2021-11-22 NOTE — PROGRESS NOTE ADULT - ASSESSMENT
77 yo RH female with a MVP, HTN, COPD, osteoporosis, psoriatic arthritis, gastritis, vertebral fracture, UC, costochondritis, and now with acute ischemic stroke presents as a transfer from [place]. LKW 11/15 at 0830. Patient then became lethargic, nauseous, and complained of lightheadedness/dizziness. found to have emboilic strokes and NSTEMI tx to [facility] for further care.    MRI Head showed multiple small foci of diffusion restriction in both hemispheres as well as within the posterior fossa.   MRA H/N neg   LDL 92, A1c 6.2  trop in 5000s  b12 386  CT c a p neg   JASON as above with mod AS. no cardiac source + PFO   s/p ILR   Impression: AMS + ataxia. Patient with multiple small foci of diffusion restriction in both anterior and posterior circulation seen on MRI Head. ESUS.   now back to baseline     Recommendations:  - check LE dopplers give + PFO   - possible cardiac Cath 11/22 per cardio today   - asa and statin for secondary stroke prevention  - no CI to AC if needed for cardiac  - b12 supplement   - telemetry  - PT/OT/SS/SLP, OOBC  - check FS, glucose control <180  - GI/DVT ppx  - Counseling on diet, exercise, and medication adherence was done  - Counseling on smoking cessation and alcohol consumption offered when appropriate.  - Pain assessed and judicious use of narcotics when appropriate was discussed.    - Stroke education given when appropriate.  - Importance of fall prevention discussed.   - Differential diagnosis and plan of care discussed with patient and/or family and primary team  - Thank you for allowing me to participate in the care of this patient. Call with questions.   Cheko Dukes MD  Vascular Neurology  Office: [phone] .

## 2021-11-22 NOTE — PROGRESS NOTE ADULT - SUBJECTIVE AND OBJECTIVE BOX
Resident: Yolette Donis, PGY1, Medicine, Pager [phone]) [phone] ([facility])    24-Hour Events and Subjective:  - tele:     Hospital Meds:  MEDICATIONS  (STANDING):  aspirin enteric coated 325 milliGRAM(s) Oral daily  atorvastatin 40 milliGRAM(s) Oral at bedtime  cyanocobalamin 1000 MICROGram(s) Oral daily  heparin   Injectable 5000 Unit(s) SubCutaneous every 8 hours  losartan 25 milliGRAM(s) Oral daily  metoprolol tartrate 25 milliGRAM(s) Oral daily  MEDICATIONS  (PRN):  ALBUTerol    90 MICROgram(s) HFA Inhaler 2 Puff(s) Inhalation every 6 hours PRN Shortness of Breath and/or Wheezing    Allergies:  caffeine (Blisters)  Gluten (Unknown)  penicillins (Hives)  Intolerances:  lactose (Vomiting)    Vitals:  T(F): 97.7 (22 Nov 2021 05:02), Max: 99.3 (21 Nov 2021 20:58)  HR: 73 (22 Nov 2021 05:02) (73 - 100)  BP: 155/85 (22 Nov 2021 05:02) (126/74 - 155/89)  RR: 19 (22 Nov 2021 05:02) (18 - 19)  SpO2: 95% (22 Nov 2021 05:02) (95% - 97%)    I&O: I&O's Summary  21 Nov 2021 07:01  -  22 Nov 2021 07:00  IN: 440 mL / OUT: 0 mL / NET: 440 mL    Physical Exam:   Con: NAD  HEENT: NCAT, no conjunctival injection, no scleral icterus, PERRL, EOMI, moist oral mucosa  Neck: no JVD, supple, no LAD, no thyromegaly  Resp: normal WOB at rest, CTAB  CV: RRR, S1 and S2, systolic ejection murmur best auscultated in L parasternal border 2nd intercostal space, no rubs, no gallops, 2+ radial pulses  Abd: nondistended, bowel sounds, soft, nontender, no rebound, no involuntary guarding, no organomegaly  Ext: no clubbing, warm hands and feet, no edema  Neuro: AOx4  MSK: spontaneous movement of all 4 extremities, full and equal strength, no joint swelling  Psych: appropriate mood and affect  Skin: no rashes    Labs:                         11.7   12.31H<-10.11 )-----------( 312      ( 22 Nov 2021 06:56 )             37.3     PT/INR - ( 22 Nov 2021 06:56 )   PT: 13.0 sec;   INR: 1.09 ratio    PTT - ( 22 Nov 2021 06:56 )  PTT:31.6 sec    11-22  141  |  104  |  13  ----------------------------<  104<H>  4.1   |  25  |  0.55  Ca    9.4      22 Nov 2021 06:56  Phos  3.7     11-22  Mg     2.0     11-22    Studies and Radiology: Resident: Yolette Donis, PGY1, Medicine, Pager [phone]) [phone] ([facility])    24-Hour Events and Subjective:  - tele: NSR  - frustrated by waiting for cardiac intervention    Hospital Meds:  MEDICATIONS  (STANDING):  aspirin enteric coated 325 milliGRAM(s) Oral daily  atorvastatin 40 milliGRAM(s) Oral at bedtime  cyanocobalamin 1000 MICROGram(s) Oral daily  heparin   Injectable 5000 Unit(s) SubCutaneous every 8 hours  losartan 25 milliGRAM(s) Oral daily  metoprolol tartrate 25 milliGRAM(s) Oral daily  MEDICATIONS  (PRN):  ALBUTerol    90 MICROgram(s) HFA Inhaler 2 Puff(s) Inhalation every 6 hours PRN Shortness of Breath and/or Wheezing    Allergies:  caffeine (Blisters)  Gluten (Unknown)  penicillins (Hives)  Intolerances:  lactose (Vomiting)    Vitals:  T(F): 97.7 (22 Nov 2021 05:02), Max: 99.3 (21 Nov 2021 20:58)  HR: 73 (22 Nov 2021 05:02) (73 - 100)  BP: 155/85 (22 Nov 2021 05:02) (126/74 - 155/89)  RR: 19 (22 Nov 2021 05:02) (18 - 19)  SpO2: 95% (22 Nov 2021 05:02) (95% - 97%)    I&O: I&O's Summary  21 Nov 2021 07:01  -  22 Nov 2021 07:00  IN: 440 mL / OUT: 0 mL / NET: 440 mL    Physical Exam:   Con: NAD  HEENT: NCAT, no conjunctival injection, no scleral icterus, PERRL, EOMI  Neck: supple  Resp: normal WOB at rest, CTAB anteriorly  CV: RRR, S1 and S2, systolic ejection murmur best auscultated in L parasternal border 2nd intercostal space, no rubs, no gallops, 2+ radial pulses  Abd: nondistended, bowel sounds, soft, nontender, no rebound, no involuntary guarding, no organomegaly  Ext: no clubbing, warm hands and feet, no edema  Neuro: AOx4  MSK: spontaneous movement of all 4 extremities, full and equal strength, no joint swelling  Psych: appropriate mood and affect  Skin: no rashes    Labs:                         11.7   12.31H<-10.11 )-----------( 312      ( 22 Nov 2021 06:56 )             37.3     PT/INR - ( 22 Nov 2021 06:56 )   PT: 13.0 sec;   INR: 1.09 ratio    PTT - ( 22 Nov 2021 06:56 )  PTT:31.6 sec    11-22  141  |  104  |  13  ----------------------------<  104<H>  4.1   |  25  |  0.55  Ca    9.4      22 Nov 2021 06:56  Phos  3.7     11-22  Mg     2.0     11-22    Studies and Radiology: Resident: Yolette Donis, PGY1, Medicine, Pager [phone]) [phone] ([facility])    24-Hour Events and Subjective:  - tele: NSR  - frustrated by waiting for cardiac intervention    Hospital Meds:  MEDICATIONS  (STANDING):  aspirin enteric coated 325 milliGRAM(s) Oral daily  atorvastatin 40 milliGRAM(s) Oral at bedtime  cyanocobalamin 1000 MICROGram(s) Oral daily  heparin   Injectable 5000 Unit(s) SubCutaneous every 8 hours  losartan 25 milliGRAM(s) Oral daily  metoprolol tartrate 25 milliGRAM(s) Oral daily  MEDICATIONS  (PRN):  ALBUTerol    90 MICROgram(s) HFA Inhaler 2 Puff(s) Inhalation every 6 hours PRN Shortness of Breath and/or Wheezing    Allergies:  caffeine (Blisters)  Gluten (Unknown)  penicillins (Hives)  Intolerances:  lactose (Vomiting)    Vitals:  T(F): 97.7 (22 Nov 2021 05:02), Max: 99.3 (21 Nov 2021 20:58)  HR: 73 (22 Nov 2021 05:02) (73 - 100)  BP: 155/85 (22 Nov 2021 05:02) (126/74 - 155/89)  RR: 19 (22 Nov 2021 05:02) (18 - 19)  SpO2: 95% (22 Nov 2021 05:02) (95% - 97%)    I&O: I&O's Summary  21 Nov 2021 07:01  -  22 Nov 2021 07:00  IN: 440 mL / OUT: 0 mL / NET: 440 mL    Physical Exam:   Con: NAD  HEENT: NCAT, no conjunctival injection, no scleral icterus, PERRL, EOMI  Neck: supple  Resp: normal WOB at rest, CTAB anteriorly  CV: RRR, S1 and S2, systolic ejection murmur best auscultated in L parasternal border 2nd intercostal space, no rubs, no gallops, 2+ radial pulses  Abd: nondistended, bowel sounds, soft, nontender  Ext: warm hands, no BLE edema  Neuro: AOx4    Labs:                         11.7   12.31H<-10.11 )-----------( 312      ( 22 Nov 2021 06:56 )             37.3     11-22  141  |  104  |  13  ----------------------------<  104<H>  4.1   |  25  |  0.55  Ca    9.4      22 Nov 2021 06:56  Phos  3.7     11-22  Mg     2.0     11-22    Studies and Radiology:

## 2021-11-22 NOTE — PROGRESS NOTE ADULT - SUBJECTIVE AND OBJECTIVE BOX
Structural Heart Team    Ms Lopez was seen and examined in the [facility] while awaiting cardiac catheterization.  Her  and son are at the bedside.  She was admitted with CVA and found to have elevated troponins and severe MR.  She denies chest pain/pressure, sob and dizziness.          REVIEW OF SYSTEMS:    CONSTITUTIONAL: No weakness, fevers or chills  EYES/ENT: No visual changes;  No vertigo or throat pain   NECK: No pain or stiffness  RESPIRATORY: No cough, wheezing, hemoptysis; No shortness of breath  CARDIOVASCULAR: No chest pain or palpitations  GASTROINTESTINAL: No abdominal or epigastric pain. No nausea, vomiting, or hematemesis; No diarrhea or constipation. No melena or hematochezia.  GENITOURINARY: No dysuria, frequency or hematuria  NEUROLOGICAL: No numbness or weakness  SKIN: No itching, rashes      Allergies    caffeine (Blisters)  Gluten (Unknown)  penicillins (Hives)    Intolerances    lactose (Vomiting)    Vital Signs Last 24 Hrs  T(C): 36.5 (22 Nov 2021 10:41), Max: 37.4 (21 Nov 2021 20:58)  T(F): 97.7 (22 Nov 2021 10:41), Max: 99.3 (21 Nov 2021 20:58)  HR: 83 (22 Nov 2021 10:41) (72 - 100)  BP: 156/62 (22 Nov 2021 10:41) (126/74 - 156/62)  BP(mean): --  RR: 16 (22 Nov 2021 10:41) (16 - 19)  SpO2: 100% (22 Nov 2021 10:41) (95% - 100%)    MEDICATIONS  (STANDING):  aspirin enteric coated 325 milliGRAM(s) Oral daily  atorvastatin 40 milliGRAM(s) Oral at bedtime  cyanocobalamin 1000 MICROGram(s) Oral daily  heparin   Injectable 5000 Unit(s) SubCutaneous every 8 hours  losartan 25 milliGRAM(s) Oral daily  metoprolol tartrate 25 milliGRAM(s) Oral daily      Exam-  General: NAD, WDWN, appropriate affect  Cor: s1s2, RRR, II/VI systolic murmur   Pulm: Clear, no wheezes, rales, or rhonchi, no use of accessory muscles  Gastrointestinal: soft, nontender, nondistended, +bowel sounds  Extremities: no edema, 2+ DP pulses  Neuro: A&Ox3, nonfocal                          11.7   12.31 )-----------( 312      ( 22 Nov 2021 06:56 )             37.3   11-22    141  |  104  |  13  ----------------------------<  104<H>  4.1   |  25  |  0.55    Ca    9.4      22 Nov 2021 06:56  Phos  3.7     11-22  Mg     2.0     11-22    PT/INR - ( 22 Nov 2021 06:56 )   PT: 13.0 sec;   INR: 1.09 ratio         PTT - ( 22 Nov 2021 06:56 )  PTT:31.6 sec  I&O's Summary    21 Nov 2021 07:01  -  22 Nov 2021 07:00  --------------------------------------------------------  IN: 440 mL / OUT: 0 mL / NET: 440 mL    22 Nov 2021 07:01  -  22 Nov 2021 13:07  --------------------------------------------------------  IN: 0 mL / OUT: 0 mL / NET: 0 mL      < from: Transesophageal Echocardiogram (11.19.21 @ 15:19) >  LA:    3.3    2.0 - 4.0 cm  Ao:     2.4    2.0 - 3.8 cm  SEPTUM: 1.5    0.6 - 1.2 cm  PWT:    1.1    0.6 - 1.1 cm  LVIDd:  3.4    3.0 - 5.6 cm  LVIDs:  2.2    1.8 - 4.0 cm  Derived variables:  LVMI: 107 g/m2  RWT: 0.64  Fractional short: 35 %  EF (Lopez Rule): 77 %Doppler Peak Velocity (m/sec):  AoV=2.7  ------------------------------------------------------------------------  Observations:  Mitral Valve: Mitral annular calcification and calcified  mitral leaflets.  Calcifications extend to the chordae.  There is a calcified cystic structure on  chordae. Severe  mitral regurgitation. Peak mitral valve gradient equals 14  mm Hg, mean transmitral valve gradient equals 7 mm Hg,  estimated mitral valve area equals 1.8 sqcm (by  planimetry).  Elevated gradients in part due to severe  regurgitation.  Aortic Valve/Aorta: Calcified trileaflet aortic valve with  decreased opening. Peak transaortic valve gradient equals  29 mm Hg, mean transaortic valve gradient equals 16 mm Hg,  estimated aortic valve area equals 1 sqcm (by continuity  equation), aortic valve velocity time integral equals 53  cm, consistent with moderate aortic stenosis. Peak left  ventricular outflow tract gradient equals 3 mm Hg, mean  gradient is equal to 1 mm Hg, LVOT velocity time integral  equals 17 cm.  Aortic Root: 2.4 cm.  LVOT diameter: 2 cm.  Focal calcifications within the aortic arch.  Left Atrium: Moderately dilated left atrium.  LA volume  index = 43 cc/m2.   No left atrial or left atrial appendage  thrombus.  Left Ventricle: Hyperdynamic left ventricular systolic  function. Concentric left ventricular hypertrophy.  Right Heart: Right atrial enlargement. Right ventricular  enlargement with normal right ventricular systolic  function. Normal tricuspid valve. Moderate-severe tricuspid  regurgitation. Normal pulmonic valve.  Pericardium/Pleura: Normal pericardium with no pericardial  effusion.  Hemodynamic: Estimated right ventricular systolic pressure  equals 56 mm Hg, assuming right atrial pressure equals 10  mm Hg, consistent with moderate pulmonary hypertension.  Color Doppler demonstrates evidence of a patent foramen  ovale.  No shunting of bubbles from right to left.  ------------------------------------------------------------------------  Conclusions:  1. Mitral annular calcification and calcified mitral  leaflets.  Calcifications extend to the chordae. There is a  calcified cystic structure on  chordae. Severe mitral  regurgitation. Peak mitral valve gradient equals 14 mm Hg,  mean transmitral valve gradient equals 7 mm Hg, estimated  mitral valve area equals 1.8 sqcm (by planimetry).  Elevated gradients in part due to severe regurgitation.  2. Calcified trileaflet aortic valve with decreased  opening. Peak transaortic valve gradientequals 29 mm Hg,  mean transaortic valve gradient equals 16 mm Hg, estimated  aortic valve area equals 1 sqcm (by continuity equation),  aortic valve velocity time integral equals 53 cm,  consistent with moderate aortic stenosis.  3. Moderately dilated left atrium.  LA volume index = 43  cc/m2.   No left atrial or left atrial appendage thrombus.  4. Concentric left ventricular hypertrophy.  5. Hyperdynamic left ventricular systolic function.  6. Right ventricular enlargement with normal right  ventricular systolic function.  7. Estimated pulmonary artery systolic pressure equals 56  mm Hg, assuming right atrial pressure equals 10 mm Hg,  consistent with moderate pulmonary pressures.  8. Color Doppler demonstrates evidence of a patent foramen  ovale.  No shunting of bubbles from right to left.    < end of copied text >          Assessment/Plan:  76F PMH HTN, COPD (emphysema), psoriatic arthritis, UC has not had a flare up recently, AS, prior history of tachycardia/NSVT on Metoprolol who presented to [facility] from [facility] on 11/15 c/o lethargy, nausea, lightheadedness, dizziness, progressive confusion, unsteady gait found to have acute CVA follow up MR head revealing multiple small foci of diffusion restriction in both anterior and posterior circulation, likely cardioembolic in origin.   - now s/p ILR to monitor for occult Afib  - pending cardiac cath today  - largely asymptomatic from a valvular standpoint (denies anginal/syncopal symptoms as well as exertional dyspnea; attributes sob to COPD)  - Dr. Leon will review echocardiograms and cardiac cath and discuss treatment options with patient and her family    GUTIERREZ Skinner  [phone]       independent ADL  works as RN  no smoking/etoh/drug use

## 2021-11-22 NOTE — PROVIDER CONTACT NOTE (OTHER) - REASON
pt refused metoprolol
Patient refused heparin subq. Patient requested to take lopressor at 1800
Refused am labs and refusing to follow thick liquid diet
pt npo but due for metoprolol

## 2021-11-22 NOTE — PROGRESS NOTE ADULT - ASSESSMENT
#COPD  #HTN  #s/p CVA: currently in sr, no af noted, on asa and high dose statin  #markedly elevated troponin, no ekg changes or symptoms.   #valvular disease: as above, JASON showed severe mr with moderate ms, moderate AS, Moderate pulmonary htn, PFO without documented flow.  Loop recorder implanted.  Awaiting cardiac cath today for previously elevated troponins

## 2021-11-22 NOTE — PROGRESS NOTE ADULT - ASSESSMENT
76F, MVP, HTN, obstructive lung disease, osteoporosis c/b L hip fracture s/p fixation, psoriatic arthritis, gastritis, vertebral fx, UC (last flare yrs ago), costochondritis, nicotine addiction, hyperPTH s/p parathyroidectomy, preDM, herniated disc.  Presented to [place] with AMS. MRI c/f CVAs 2/2 cardiac thromboemboli. Course c/b NSTEMI.  Transferred to NS for JASON and inductive loop detector placement.  Pending Structural Card eval for mitral clip and cath.    76F, MVP, HTN, obstructive lung disease, osteoporosis c/b L hip fracture s/p fixation, psoriatic arthritis, gastritis, vertebral fx, UC (last flare yrs ago), costochondritis, nicotine addiction, hyperPTH s/p parathyroidectomy, preDM, herniated disc.  Presented to [place] with AMS. MRI c/f CVAs 2/2 cardiac thromboemboli. Course c/b NSTEMI.  Transferred to NS for JASON and inductive loop detector placement. Pending cath. 76F, MVP, HTN, obstructive lung disease, osteoporosis c/b L hip fracture s/p fixation, psoriatic arthritis, gastritis, vertebral fx, UC (last flare yrs ago), costochondritis, nicotine addiction, hyperPTH s/p parathyroidectomy, preDM, herniated disc.  Presented to [place] with AMS. MRI c/f CVAs 2/2 cardiac thromboemboli. Course c/b NSTEMI.  Transferred to NS for JASON and inductive loop detector placement. S/p normal cath.

## 2021-11-22 NOTE — PROGRESS NOTE ADULT - PROBLEM SELECTOR PLAN 4
- VTE ppx: heparin  - diet: DASH/TLC  - PT rec no PT  - [facility] [phone]  - PCP Marlon White - VTE ppx: heparin  - diet: DASH/TLC  - PT rec no PT  - private Dr. Gramajo [phone], [facility] [phone]  - PCP Marlon White

## 2021-11-22 NOTE — PROGRESS NOTE ADULT - SUBJECTIVE AND OBJECTIVE BOX
Neurology Progress Note    S: Patient seen and examined. No new events overnight. patient denied CP, SOB, HA or pain. now on regular diet cath today?     Medication:  MEDICATIONS  (STANDING):  aspirin enteric coated 325 milliGRAM(s) Oral daily  atorvastatin 40 milliGRAM(s) Oral at bedtime  cyanocobalamin 1000 MICROGram(s) Oral daily  heparin   Injectable 5000 Unit(s) SubCutaneous every 8 hours  losartan 25 milliGRAM(s) Oral daily  metoprolol tartrate 25 milliGRAM(s) Oral daily    MEDICATIONS  (PRN):  ALBUTerol    90 MICROgram(s) HFA Inhaler 2 Puff(s) Inhalation every 6 hours PRN Shortness of Breath and/or Wheezing      Vitals:  Vital Signs Last 24 Hrs  T(C): 36.5 (11-22-21 @ 10:41), Max: 37.4 (11-21-21 @ 20:58)  T(F): 97.7 (11-22-21 @ 10:41), Max: 99.3 (11-21-21 @ 20:58)  HR: 83 (11-22-21 @ 10:41) (72 - 100)  BP: 156/62 (11-22-21 @ 10:41) (126/74 - 156/62)  BP(mean): --  RR: 16 (11-22-21 @ 10:41) (16 - 19)  SpO2: 100% (11-22-21 @ 10:41) (95% - 100%)          General Exam:   General Appearance: Appropriately dressed and in no acute distress       Head: Normocephalic, atraumatic and no dysmorphic features  Ear, Nose, and Throat: Moist mucous membranes  CVS: S1S2+  Resp: No SOB, no wheeze or rhonchi  Abd: soft NTND  Extremities: No edema, no cyanosis  Skin: No bruises, no rashes     Neurological Exam:  Mental Status: Awake, alert and oriented x 3.  Able to follow simple and complex verbal commands. Able to name and repeat. fluent speech. No obvious aphasia or dysarthria noted.   Cranial Nerves: PERRL, EOMI, VFFC, sensation V1-V3 intact,  no obvious facial asymmetry , equal elevation of palate, scm/trap 5/5, tongue is midline on protrusion. no obvious papilledema on fundoscopic exam. Hearing is grossly intact.   Motor: Normal bulk, tone and strength throughout. Fine finger movements were intact and symmetric. no tremors or drift noted.    Sensation: Intact to light touch and pinprick throughout. no right/left confusion. no extinction to tactile on DSS.   Reflexes: 1+ throughout at biceps, brachioradialis, triceps, patellars and ankles bilaterally and equal. No clonus. R toe and L toe were both downgoing.  Coordination: No dysmetria on FNF or HKS  Gait: deferred     I personally reviewed the below data/images/labs:    CBC Full  -  ( 22 Nov 2021 06:56 )  WBC Count : 12.31 K/uL  RBC Count : 4.32 M/uL  Hemoglobin : 11.7 g/dL  Hematocrit : 37.3 %  Platelet Count - Automated : 312 K/uL  Mean Cell Volume : 86.3 fl  Mean Cell Hemoglobin : 27.1 pg  Mean Cell Hemoglobin Concentration : 31.4 gm/dL  Auto Neutrophil # : x  Auto Lymphocyte # : x  Auto Monocyte # : x  Auto Eosinophil # : x  Auto Basophil # : x  Auto Neutrophil % : x  Auto Lymphocyte % : x  Auto Monocyte % : x  Auto Eosinophil % : x  Auto Basophil % : x      11-22    141  |  104  |  13  ----------------------------<  104<H>  4.1   |  25  |  0.55    Ca    9.4      22 Nov 2021 06:56  Phos  3.7     11-22  Mg     2.0     11-22        -11/15 CTH: No acute hemorrhage, mass effect or extra-axial collections.  -11/16 MRI Head w/o: Multiple small foci of diffusion restriction in both hemispheres as well as within the posterior fossa suggesting embolic or thrombotic showering in the anterior and posterior circulation. Further workup and assessment recommended. Also noted are chronic ischemic changes in both hemispheres with atrophic change.  -11/16 MRA N: Unremarkable study.  -11/16 MRA H: Widely patent vasculature. There is fetal-type supply of the left posterior cerebral artery. Left P1 segment is absent.    < from: Transesophageal Echocardiogram (11.19.21 @ 15:19) >    Patient name: GABRIELA WHELAN  YOB: 1945   Age: 76 (F)   MR#: [id number]  Study Date: 11/19/2021  Location: [address] [place], [place] [id number]: [facility]  Study quality: Technically good  Referring Physician: Roberto Gordon MD  Blood Pressure: 193/85 mmHg  Height: 155 cm  Weight: 44 kg  BSA: 1.4 m2  ------------------------------------------------------------------------  PROCEDURE: Transesophageal and transthoracic  echocardiograms with 2-D, M-Mode and complete spectral and  color flow Doppler were performed.  Informed consent was  first obtained for JASON. The patient was sedated - see  anesthesia record.  The procedure was monitored with  automatic blood pressure monitoring, ECG tracings and pulse  oximetry.  The transesophageal probe was placed in the  esophagus posterior to the heart without complications.  INDICATION: Cerebral infarction, unspecified (I63.9)  ------------------------------------------------------------------------  Dimensions:    Normal Values:  LA:    3.3    2.0 - 4.0 cm  Ao:     2.4    2.0 - 3.8 cm  SEPTUM: 1.5    0.6 - 1.2 cm  PWT:    1.1    0.6 - 1.1 cm  LVIDd:  3.4    3.0 - 5.6 cm  LVIDs:  2.2    1.8 - 4.0 cm  Derived variables:  LVMI: 107 g/m2  RWT: 0.64  Fractional short: 35 %  EF (Lopez Rule): 77 %Doppler Peak Velocity (m/sec):  AoV=2.7  ------------------------------------------------------------------------  Observations:  Mitral Valve: Mitral annular calcification and calcified  mitral leaflets.  Calcifications extend to the chordae.  There is a calcified cystic structure on  chordae. Severe  mitral regurgitation. Peak mitral valve gradient equals 14  mm Hg, mean transmitral valve gradient equals 7 mm Hg,  estimated mitral valve area equals 1.8 sqcm (by  planimetry).  Elevated gradients in part due to severe  regurgitation.  Aortic Valve/Aorta: Calcified trileaflet aortic valve with  decreased opening. Peak transaortic valve gradient equals  29 mm Hg, mean transaortic valve gradient equals 16 mm Hg,  estimated aortic valve area equals 1 sqcm (by continuity  equation), aortic valve velocity time integral equals 53  cm, consistent with moderate aortic stenosis. Peak left  ventricular outflow tract gradient equals 3 mm Hg, mean  gradient is equal to 1 mm Hg, LVOT velocity time integral  equals 17 cm.  Aortic Root: 2.4 cm.  LVOT diameter: 2 cm.  Focal calcifications within the aortic arch.  Left Atrium: Moderately dilated left atrium.  LA volume  index = 43 cc/m2.   No left atrial or left atrial appendage  thrombus.  Left Ventricle: Hyperdynamic left ventricular systolic  function. Concentric left ventricular hypertrophy.  Right Heart: Right atrial enlargement. Right ventricular  enlargement with normal right ventricular systolic  function. Normal tricuspid valve. Moderate-severe tricuspid  regurgitation. Normal pulmonic valve.  Pericardium/Pleura: Normal pericardium with no pericardial  effusion.  Hemodynamic: Estimated right ventricular systolic pressure  equals 56 mm Hg, assuming right atrial pressure equals 10  mm Hg, consistent with moderate pulmonary hypertension.  Color Doppler demonstrates evidence of a patent foramen  ovale.  No shunting of bubbles from right to left.  ------------------------------------------------------------------------  Conclusions:  1. Mitral annular calcification and calcified mitral  leaflets.  Calcifications extend to the chordae. There is a  calcified cystic structure on  chordae. Severe mitral  regurgitation. Peak mitral valve gradient equals 14 mm Hg,  mean transmitral valve gradient equals 7 mm Hg, estimated  mitral valve area equals 1.8 sqcm (by planimetry).  Elevated gradients in part due to severe regurgitation.  2. Calcified trileaflet aortic valve with decreased  opening. Peak transaortic valve gradientequals 29 mm Hg,  mean transaortic valve gradient equals 16 mm Hg, estimated  aortic valve area equals 1 sqcm (by continuity equation),  aortic valve velocity time integral equals 53 cm,  consistent with moderate aortic stenosis.  3. Moderately dilated left atrium.  LA volume index = 43  cc/m2.   No left atrial or left atrial appendage thrombus.  4. Concentric left ventricular hypertrophy.  5. Hyperdynamic left ventricular systolic function.  6. Right ventricular enlargement with normal right  ventricular systolic function.  7. Estimated pulmonary artery systolic pressure equals 56  mm Hg, assuming right atrial pressure equals 10 mm Hg,  consistent with moderate pulmonary pressures.  8. Color Doppler demonstrates evidence of a patent foramen  ovale.  No shunting of bubbles from right to left.  *** No previous Echo exam.  ------------------------------------------------------------------------  Confirmed on  11/19/2021 - 17:04:55 by KARLA Holt  ------------------------------------------------------------------------    < end of copied text >

## 2021-11-22 NOTE — PROVIDER CONTACT NOTE (OTHER) - ACTION/TREATMENT ORDERED:
provider acknowledged
MD vernon.
administer metoprolol
Provider ordered okay to reschedule lopressor to 1800. Continue to monitor.

## 2021-11-22 NOTE — PROGRESS NOTE ADULT - SUBJECTIVE AND OBJECTIVE BOX
SUBJECTIVE: Asymptomatic.  and son at bedside.  Awaiting cardiac cath  	  MEDICATIONS  (STANDING):  aspirin enteric coated 325 milliGRAM(s) Oral daily  atorvastatin 40 milliGRAM(s) Oral at bedtime  cyanocobalamin 1000 MICROGram(s) Oral daily  heparin   Injectable 5000 Unit(s) SubCutaneous every 8 hours  losartan 25 milliGRAM(s) Oral daily  metoprolol tartrate 25 milliGRAM(s) Oral daily    MEDICATIONS  (PRN):        REVIEW OF SYSTEMS:    CONSTITUTIONAL: No fever, weight loss, or fatigue  EYES: No eye pain, visual disturbances, or discharge  NECK: No pain or stiffness  RESPIRATORY: No cough, wheezing, chills or hemoptysis; No Shortness of Breath  CARDIOVASCULAR: No chest pain, palpitations, dizziness, or leg swelling  GASTROINTESTINAL: No abdominal or epigastric pain. No nausea, vomiting, or hematemesis; No diarrhea or constipation. No melena or hematochezia.  GENITOURINARY: No dysuria, frequency, hematuria, or incontinence  NEUROLOGICAL: No headaches, memory loss, loss of strength, numbness, or tremors  SKIN: No itching, burning, rashes, or lesions   LYMPH Nodes: No enlarged glands  MUSCULOSKELETAL: No joint pain or swelling; No muscle, back, or extremity pain  All other review of systems are negative.  	    PHYSICAL EXAM:  T(F): 97.7 (11-22-21 @ 10:41), Max: 99.3 (11-21-21 @ 20:58)  HR: 83 (11-22-21 @ 10:41) (72 - 100)  BP: 156/62 (11-22-21 @ 10:41) (126/74 - 156/62)  RR: 16 (11-22-21 @ 10:41) (16 - 19)  SpO2: 100% (11-22-21 @ 10:41) (95% - 100%)  Wt(kg): --  ,   I&O's Summary    21 Nov 2021 07:01  -  22 Nov 2021 07:00  --------------------------------------------------------  IN: 440 mL / OUT: 0 mL / NET: 440 mL    22 Nov 2021 07:01  - 22 Nov 2021 12:16  --------------------------------------------------------  IN: 0 mL / OUT: 0 mL / NET: 0 mL          PHYSICAL EXAM    Appearance: Normal	  HEENT:   Normal oral mucosa, PERRL, EOMI	  NECK: Soft and supple, No LAD, No JVD  Cardiovascular: Regular Rate and Rhythm, Normal S1 S2, 3/6 sowmya  Respiratory: Lungs clear to auscultation	  Extremities: No clubbing, cyanosis or edema      TELEMETRY: 	  sr 70-80

## 2021-11-22 NOTE — PROVIDER CONTACT NOTE (OTHER) - ASSESSMENT
No signs/symptoms of coughing or aspiration with swallowing.
VSS, pt maintained npo in prep for cath
Patient A&Ox4. Patient denies chest pain, SOB, and lightheadedness. Patient's vitals are stable.
VSS, pt states she takes losartan 25mg in the am and metoprolol 25mg in the pm. dose not wish to stray from that schedule

## 2021-11-22 NOTE — PROVIDER CONTACT NOTE (OTHER) - SITUATION
Patient refused heparin subq. Patient requested to take lopressor at 1800
Refused am labs and refusing to follow thick liquid diet.
pt npo but due for metoprolol
pt refused metoprolol

## 2021-11-22 NOTE — PROGRESS NOTE ADULT - PROBLEM SELECTOR PLAN 1
MRI at OSH showed multiple areas of diffusion restriction c/f thromboembolic phenomena with c/f cardiac origin. BLE Duplex and MRA Neck NC unremarkable. JASON didn't reveal source of emboli but revealed small PFO.  - Q4H neuro checks  - aspiration precautions  - ASA and atorvastatin  - B12  - s/p ILD placement  - appreciate Neuro recs MRI at OSH showed multiple areas of diffusion restriction c/f thromboembolic phenomena with c/f cardiac origin. BLE Duplex and MRA Neck NC unremarkable. JASON didn't reveal source of emboli but revealed small PFO.  - Q4H neuro checks  - aspiration precautions  - fall precautions  - ASA  - atorvastatin  - B12  - s/p ILD placement  - appreciate Neuro recs

## 2021-11-22 NOTE — PROGRESS NOTE ADULT - PROBLEM SELECTOR PLAN 2
TWI in anterolateral leads. Cardiac enzymes downtrending. TTE at OSH with LVEF 65%, thick LV wall, dilated LA, mod-severe MR, functional MS, severe AS, mild pHTN, small PFO.  - ASA and statin  - tele  - appreciate Card recs TWI in anterolateral leads. Cardiac enzymes downtrending. TTE at OSH with LVEF 65%, thick LV wall, dilated LA, mod-severe MR, functional MS, severe AS, mild pHTN, small PFO.  - ASA and statin  - tele  - appreciate private Card Dr. Daniel recs: cath 11/22  - appreciate Structural Card (Dr. Leon) recs: pending note saying no mitral intervention TWI in anterolateral leads. Cardiac enzymes downtrending. TTE at OSH with LVEF 65%, thick LV wall, dilated LA, mod-severe MR, functional MS, severe AS, mild pHTN, small PFO. Cath normal 11/22.  - ASA and statin  - tele  - appreciate private Card Dr. Daniel recs  - appreciate Structural Card (Dr. Leon) recs: pending note saying no mitral intervention TWI in anterolateral leads. Cardiac enzymes downtrending. TTE at OSH with LVEF 65%, thick LV wall, dilated LA, mod-severe MR, functional MS, severe AS, mild pHTN, small PFO. Cath normal 11/22.  - ASA and statin  - tele  - appreciate private Card Dr. Daniel recs: s/p normal cath 11/22  - appreciate Structural Card (Dr. Leon) recs: no mitral intervention

## 2021-11-22 NOTE — PROVIDER CONTACT NOTE (OTHER) - BACKGROUND
NSTEMI and CVA workup
Patient admitted for altered mental status
NSTEMI and CVA workup
Admit for AMS, hx of CVA, prior hospital recommendation for thick liquids, pt refused mbs at other hospital.

## 2021-11-23 ENCOUNTER — TRANSCRIPTION ENCOUNTER (OUTPATIENT)
Age: 76
End: 2021-11-23

## 2021-11-23 VITALS
RESPIRATION RATE: 18 BRPM | OXYGEN SATURATION: 96 % | HEART RATE: 78 BPM | DIASTOLIC BLOOD PRESSURE: 69 MMHG | SYSTOLIC BLOOD PRESSURE: 123 MMHG | TEMPERATURE: 98 F

## 2021-11-23 LAB
ANION GAP SERPL CALC-SCNC: 12 MMOL/L — SIGNIFICANT CHANGE UP (ref 5–17)
BASOPHILS # BLD AUTO: 0.03 K/UL — SIGNIFICANT CHANGE UP (ref 0–0.2)
BASOPHILS NFR BLD AUTO: 0.3 % — SIGNIFICANT CHANGE UP (ref 0–2)
BUN SERPL-MCNC: 15 MG/DL — SIGNIFICANT CHANGE UP (ref 7–23)
CALCIUM SERPL-MCNC: 9.5 MG/DL — SIGNIFICANT CHANGE UP (ref 8.4–10.5)
CHLORIDE SERPL-SCNC: 101 MMOL/L — SIGNIFICANT CHANGE UP (ref 96–108)
CO2 SERPL-SCNC: 26 MMOL/L — SIGNIFICANT CHANGE UP (ref 22–31)
CREAT SERPL-MCNC: 0.56 MG/DL — SIGNIFICANT CHANGE UP (ref 0.5–1.3)
EOSINOPHIL # BLD AUTO: 0.13 K/UL — SIGNIFICANT CHANGE UP (ref 0–0.5)
EOSINOPHIL NFR BLD AUTO: 1.2 % — SIGNIFICANT CHANGE UP (ref 0–6)
GLUCOSE SERPL-MCNC: 131 MG/DL — HIGH (ref 70–99)
HCT VFR BLD CALC: 37.8 % — SIGNIFICANT CHANGE UP (ref 34.5–45)
HGB BLD-MCNC: 11.7 G/DL — SIGNIFICANT CHANGE UP (ref 11.5–15.5)
IMM GRANULOCYTES NFR BLD AUTO: 1.2 % — SIGNIFICANT CHANGE UP (ref 0–1.5)
LYMPHOCYTES # BLD AUTO: 2.73 K/UL — SIGNIFICANT CHANGE UP (ref 1–3.3)
LYMPHOCYTES # BLD AUTO: 24.9 % — SIGNIFICANT CHANGE UP (ref 13–44)
MCHC RBC-ENTMCNC: 26.6 PG — LOW (ref 27–34)
MCHC RBC-ENTMCNC: 31 GM/DL — LOW (ref 32–36)
MCV RBC AUTO: 85.9 FL — SIGNIFICANT CHANGE UP (ref 80–100)
MONOCYTES # BLD AUTO: 0.7 K/UL — SIGNIFICANT CHANGE UP (ref 0–0.9)
MONOCYTES NFR BLD AUTO: 6.4 % — SIGNIFICANT CHANGE UP (ref 2–14)
NEUTROPHILS # BLD AUTO: 7.23 K/UL — SIGNIFICANT CHANGE UP (ref 1.8–7.4)
NEUTROPHILS NFR BLD AUTO: 66 % — SIGNIFICANT CHANGE UP (ref 43–77)
NRBC # BLD: 0 /100 WBCS — SIGNIFICANT CHANGE UP (ref 0–0)
PLATELET # BLD AUTO: 287 K/UL — SIGNIFICANT CHANGE UP (ref 150–400)
POTASSIUM SERPL-MCNC: 4 MMOL/L — SIGNIFICANT CHANGE UP (ref 3.5–5.3)
POTASSIUM SERPL-SCNC: 4 MMOL/L — SIGNIFICANT CHANGE UP (ref 3.5–5.3)
RBC # BLD: 4.4 M/UL — SIGNIFICANT CHANGE UP (ref 3.8–5.2)
RBC # FLD: 14.6 % — HIGH (ref 10.3–14.5)
SODIUM SERPL-SCNC: 139 MMOL/L — SIGNIFICANT CHANGE UP (ref 135–145)
WBC # BLD: 10.95 K/UL — HIGH (ref 3.8–10.5)
WBC # FLD AUTO: 10.95 K/UL — HIGH (ref 3.8–10.5)

## 2021-11-23 PROCEDURE — 83735 ASSAY OF MAGNESIUM: CPT

## 2021-11-23 PROCEDURE — 84100 ASSAY OF PHOSPHORUS: CPT

## 2021-11-23 PROCEDURE — 93970 EXTREMITY STUDY: CPT

## 2021-11-23 PROCEDURE — 87040 BLOOD CULTURE FOR BACTERIA: CPT

## 2021-11-23 PROCEDURE — 71250 CT THORAX DX C-: CPT | Mod: MA

## 2021-11-23 PROCEDURE — 80048 BASIC METABOLIC PNL TOTAL CA: CPT

## 2021-11-23 PROCEDURE — 85025 COMPLETE CBC W/AUTO DIFF WBC: CPT

## 2021-11-23 PROCEDURE — 83605 ASSAY OF LACTIC ACID: CPT

## 2021-11-23 PROCEDURE — 87635 SARS-COV-2 COVID-19 AMP PRB: CPT

## 2021-11-23 PROCEDURE — 99152 MOD SED SAME PHYS/QHP 5/>YRS: CPT

## 2021-11-23 PROCEDURE — 93312 ECHO TRANSESOPHAGEAL: CPT

## 2021-11-23 PROCEDURE — C1764: CPT

## 2021-11-23 PROCEDURE — 85610 PROTHROMBIN TIME: CPT

## 2021-11-23 PROCEDURE — 92610 EVALUATE SWALLOWING FUNCTION: CPT

## 2021-11-23 PROCEDURE — 97161 PT EVAL LOW COMPLEX 20 MIN: CPT

## 2021-11-23 PROCEDURE — 85730 THROMBOPLASTIN TIME PARTIAL: CPT

## 2021-11-23 PROCEDURE — C1769: CPT

## 2021-11-23 PROCEDURE — 80053 COMPREHEN METABOLIC PANEL: CPT

## 2021-11-23 PROCEDURE — 93454 CORONARY ARTERY ANGIO S&I: CPT

## 2021-11-23 PROCEDURE — 84484 ASSAY OF TROPONIN QUANT: CPT

## 2021-11-23 PROCEDURE — 82962 GLUCOSE BLOOD TEST: CPT

## 2021-11-23 PROCEDURE — 70450 CT HEAD/BRAIN W/O DYE: CPT | Mod: MA

## 2021-11-23 PROCEDURE — 85027 COMPLETE CBC AUTOMATED: CPT

## 2021-11-23 PROCEDURE — C1894: CPT

## 2021-11-23 PROCEDURE — U0003: CPT

## 2021-11-23 PROCEDURE — 99285 EMERGENCY DEPT VISIT HI MDM: CPT | Mod: 25

## 2021-11-23 PROCEDURE — 71045 X-RAY EXAM CHEST 1 VIEW: CPT

## 2021-11-23 PROCEDURE — C1887: CPT

## 2021-11-23 PROCEDURE — 93306 TTE W/DOPPLER COMPLETE: CPT

## 2021-11-23 PROCEDURE — 87086 URINE CULTURE/COLONY COUNT: CPT

## 2021-11-23 PROCEDURE — 81001 URINALYSIS AUTO W/SCOPE: CPT

## 2021-11-23 PROCEDURE — 33285 INSJ SUBQ CAR RHYTHM MNTR: CPT

## 2021-11-23 PROCEDURE — 96360 HYDRATION IV INFUSION INIT: CPT

## 2021-11-23 PROCEDURE — 36415 COLL VENOUS BLD VENIPUNCTURE: CPT

## 2021-11-23 PROCEDURE — 74176 CT ABD & PELVIS W/O CONTRAST: CPT | Mod: MA

## 2021-11-23 PROCEDURE — 93005 ELECTROCARDIOGRAM TRACING: CPT

## 2021-11-23 RX ORDER — ALBUTEROL 90 UG/1
2 AEROSOL, METERED ORAL
Qty: 1 | Refills: 0
Start: 2021-11-23 | End: 2021-12-22

## 2021-11-23 RX ORDER — METOPROLOL TARTRATE 50 MG
1 TABLET ORAL
Qty: 0 | Refills: 0 | DISCHARGE

## 2021-11-23 RX ORDER — HEPARIN SODIUM 5000 [USP'U]/ML
5000 INJECTION INTRAVENOUS; SUBCUTANEOUS EVERY 8 HOURS
Refills: 0 | Status: DISCONTINUED | OUTPATIENT
Start: 2021-11-23 | End: 2021-11-23

## 2021-11-23 RX ORDER — LOSARTAN POTASSIUM 100 MG/1
1 TABLET, FILM COATED ORAL
Qty: 30 | Refills: 0
Start: 2021-11-23 | End: 2021-12-22

## 2021-11-23 RX ORDER — LOSARTAN POTASSIUM 100 MG/1
1 TABLET, FILM COATED ORAL
Qty: 0 | Refills: 0 | DISCHARGE

## 2021-11-23 RX ORDER — PREGABALIN 225 MG/1
1 CAPSULE ORAL
Qty: 30 | Refills: 0
Start: 2021-11-23 | End: 2021-12-22

## 2021-11-23 RX ORDER — ASPIRIN/CALCIUM CARB/MAGNESIUM 324 MG
1 TABLET ORAL
Qty: 30 | Refills: 0
Start: 2021-11-23 | End: 2021-12-22

## 2021-11-23 RX ORDER — METOPROLOL TARTRATE 50 MG
1 TABLET ORAL
Qty: 30 | Refills: 0
Start: 2021-11-23 | End: 2021-12-22

## 2021-11-23 RX ORDER — ATORVASTATIN CALCIUM 80 MG/1
1 TABLET, FILM COATED ORAL
Qty: 30 | Refills: 0
Start: 2021-11-23 | End: 2021-12-22

## 2021-11-23 RX ORDER — ALBUTEROL 90 UG/1
2 AEROSOL, METERED ORAL
Qty: 0 | Refills: 0 | DISCHARGE

## 2021-11-23 RX ADMIN — Medication 325 MILLIGRAM(S): at 11:18

## 2021-11-23 RX ADMIN — PREGABALIN 1000 MICROGRAM(S): 225 CAPSULE ORAL at 11:18

## 2021-11-23 RX ADMIN — LOSARTAN POTASSIUM 25 MILLIGRAM(S): 100 TABLET, FILM COATED ORAL at 05:53

## 2021-11-23 NOTE — PROGRESS NOTE ADULT - PROBLEM SELECTOR PROBLEM 2
NSTEMI (non-ST elevation myocardial infarction)
Cardioembolic stroke
NSTEMI (non-ST elevation myocardial infarction)
Cardioembolic stroke
Cardioembolic stroke

## 2021-11-23 NOTE — PROGRESS NOTE ADULT - PROBLEM SELECTOR PLAN 4
- VTE ppx: heparin  - diet: DASH/TLC  - PT rec no PT  - private Dr. Gramajo [phone], [facility] [phone]  - PCP Marlon White

## 2021-11-23 NOTE — PROGRESS NOTE ADULT - PROBLEM SELECTOR PROBLEM 3
Hypertension
Hypertension
NSTEMI (non-ST elevation myocardial infarction)

## 2021-11-23 NOTE — PROGRESS NOTE ADULT - SUBJECTIVE AND OBJECTIVE BOX
Neurology Progress Note    S: Patient seen and examined. No new events overnight. patient denied CP, SOB, HA or pain. cath normal d/c plan     Medication:  MEDICATIONS  (STANDING):  aspirin enteric coated 325 milliGRAM(s) Oral daily  atorvastatin 40 milliGRAM(s) Oral at bedtime  cyanocobalamin 1000 MICROGram(s) Oral daily  heparin   Injectable 5000 Unit(s) SubCutaneous every 8 hours  losartan 25 milliGRAM(s) Oral daily  metoprolol tartrate 25 milliGRAM(s) Oral daily    MEDICATIONS  (PRN):      Vitals:  Vital Signs Last 24 Hrs  T(C): 36.4 (11-23-21 @ 10:42), Max: 37.1 (11-22-21 @ 19:22)  T(F): 97.5 (11-23-21 @ 10:42), Max: 98.7 (11-22-21 @ 19:22)  HR: 79 (11-23-21 @ 10:42) (69 - 96)  BP: 137/79 (11-23-21 @ 10:42) (118/63 - 156/76)  BP(mean): 72 (11-22-21 @ 16:55) (72 - 101)  RR: 18 (11-23-21 @ 10:42) (16 - 18)  SpO2: 96% (11-23-21 @ 10:42) (93% - 100%)          General Exam:   General Appearance: Appropriately dressed and in no acute distress       Head: Normocephalic, atraumatic and no dysmorphic features  Ear, Nose, and Throat: Moist mucous membranes  CVS: S1S2+  Resp: No SOB, no wheeze or rhonchi  Abd: soft NTND  Extremities: No edema, no cyanosis  Skin: No bruises, no rashes     Neurological Exam:  Mental Status: Awake, alert and oriented x 3.  Able to follow simple and complex verbal commands. Able to name and repeat. fluent speech. No obvious aphasia or dysarthria noted.   Cranial Nerves: PERRL, EOMI, VFFC, sensation V1-V3 intact,  no obvious facial asymmetry , equal elevation of palate, scm/trap 5/5, tongue is midline on protrusion. no obvious papilledema on fundoscopic exam. Hearing is grossly intact.   Motor: Normal bulk, tone and strength throughout. Fine finger movements were intact and symmetric. no tremors or drift noted.    Sensation: Intact to light touch and pinprick throughout. no right/left confusion. no extinction to tactile on DSS.   Reflexes: 1+ throughout at biceps, brachioradialis, triceps, patellars and ankles bilaterally and equal. No clonus. R toe and L toe were both downgoing.  Coordination: No dysmetria on FNF or HKS  Gait: deferred     I personally reviewed the below data/images/labs:    CBC Full  -  ( 23 Nov 2021 06:50 )  WBC Count : 10.95 K/uL  RBC Count : 4.40 M/uL  Hemoglobin : 11.7 g/dL  Hematocrit : 37.8 %  Platelet Count - Automated : 287 K/uL  Mean Cell Volume : 85.9 fl  Mean Cell Hemoglobin : 26.6 pg  Mean Cell Hemoglobin Concentration : 31.0 gm/dL  Auto Neutrophil # : 7.23 K/uL  Auto Lymphocyte # : 2.73 K/uL  Auto Monocyte # : 0.70 K/uL  Auto Eosinophil # : 0.13 K/uL  Auto Basophil # : 0.03 K/uL  Auto Neutrophil % : 66.0 %  Auto Lymphocyte % : 24.9 %  Auto Monocyte % : 6.4 %  Auto Eosinophil % : 1.2 %  Auto Basophil % : 0.3 %    11-23    139  |  101  |  15  ----------------------------<  131<H>  4.0   |  26  |  0.56    Ca    9.5      23 Nov 2021 06:50  Phos  3.7     11-22  Mg     2.0     11-22        -11/15 CTH: No acute hemorrhage, mass effect or extra-axial collections.  -11/16 MRI Head w/o: Multiple small foci of diffusion restriction in both hemispheres as well as within the posterior fossa suggesting embolic or thrombotic showering in the anterior and posterior circulation. Further workup and assessment recommended. Also noted are chronic ischemic changes in both hemispheres with atrophic change.  -11/16 MRA N: Unremarkable study.  -11/16 MRA H: Widely patent vasculature. There is fetal-type supply of the left posterior cerebral artery. Left P1 segment is absent.    < from: Transesophageal Echocardiogram (11.19.21 @ 15:19) >    Patient name: GABRIELA WHELAN  YOB: 1945   Age: 76 (F)   MR#: [id number]  Study Date: 11/19/2021  Location: [address] [place], [place] [id number]: Domi Saini RDCS  Study quality: Technically good  Referring Physician: Roberto Gordon MD  Blood Pressure: 193/85 mmHg  Height: 155 cm  Weight: 44 kg  BSA: 1.4 m2  ------------------------------------------------------------------------  PROCEDURE: Transesophageal and transthoracic  echocardiograms with 2-D, M-Mode and complete spectral and  color flow Doppler were performed.  Informed consent was  first obtained for JASON. The patient was sedated - see  anesthesia record.  The procedure was monitored with  automatic blood pressure monitoring, ECG tracings and pulse  oximetry.  The transesophageal probe was placed in the  esophagus posterior to the heart without complications.  INDICATION: Cerebral infarction, unspecified (I63.9)  ------------------------------------------------------------------------  Dimensions:    Normal Values:  LA:    3.3    2.0 - 4.0 cm  Ao:     2.4    2.0 - 3.8 cm  SEPTUM: 1.5    0.6 - 1.2 cm  PWT:    1.1    0.6 - 1.1 cm  LVIDd:  3.4    3.0 - 5.6 cm  LVIDs:  2.2    1.8 - 4.0 cm  Derived variables:  LVMI: 107 g/m2  RWT: 0.64  Fractional short: 35 %  EF (Lopez Rule): 77 %Doppler Peak Velocity (m/sec):  AoV=2.7  ------------------------------------------------------------------------  Observations:  Mitral Valve: Mitral annular calcification and calcified  mitral leaflets.  Calcifications extend to the chordae.  There is a calcified cystic structure on  chordae. Severe  mitral regurgitation. Peak mitral valve gradient equals 14  mm Hg, mean transmitral valve gradient equals 7 mm Hg,  estimated mitral valve area equals 1.8 sqcm (by  planimetry).  Elevated gradients in part due to severe  regurgitation.  Aortic Valve/Aorta: Calcified trileaflet aortic valve with  decreased opening. Peak transaortic valve gradient equals  29 mm Hg, mean transaortic valve gradient equals 16 mm Hg,  estimated aortic valve area equals 1 sqcm (by continuity  equation), aortic valve velocity time integral equals 53  cm, consistent with moderate aortic stenosis. Peak left  ventricular outflow tract gradient equals 3 mm Hg, mean  gradient is equal to 1 mm Hg, LVOT velocity time integral  equals 17 cm.  Aortic Root: 2.4 cm.  LVOT diameter: 2 cm.  Focal calcifications within the aortic arch.  Left Atrium: Moderately dilated left atrium.  LA volume  index = 43 cc/m2.   No left atrial or left atrial appendage  thrombus.  Left Ventricle: Hyperdynamic left ventricular systolic  function. Concentric left ventricular hypertrophy.  Right Heart: Right atrial enlargement. Right ventricular  enlargement with normal right ventricular systolic  function. Normal tricuspid valve. Moderate-severe tricuspid  regurgitation. Normal pulmonic valve.  Pericardium/Pleura: Normal pericardium with no pericardial  effusion.  Hemodynamic: Estimated right ventricular systolic pressure  equals 56 mm Hg, assuming right atrial pressure equals 10  mm Hg, consistent with moderate pulmonary hypertension.  Color Doppler demonstrates evidence of a patent foramen  ovale.  No shunting of bubbles from right to left.  ------------------------------------------------------------------------  Conclusions:  1. Mitral annular calcification and calcified mitral  leaflets.  Calcifications extend to the chordae. There is a  calcified cystic structure on  chordae. Severe mitral  regurgitation. Peak mitral valve gradient equals 14 mm Hg,  mean transmitral valve gradient equals 7 mm Hg, estimated  mitral valve area equals 1.8 sqcm (by planimetry).  Elevated gradients in part due to severe regurgitation.  2. Calcified trileaflet aortic valve with decreased  opening. Peak transaortic valve gradientequals 29 mm Hg,  mean transaortic valve gradient equals 16 mm Hg, estimated  aortic valve area equals 1 sqcm (by continuity equation),  aortic valve velocity time integral equals 53 cm,  consistent with moderate aortic stenosis.  3. Moderately dilated left atrium.  LA volume index = 43  cc/m2.   No left atrial or left atrial appendage thrombus.  4. Concentric left ventricular hypertrophy.  5. Hyperdynamic left ventricular systolic function.  6. Right ventricular enlargement with normal right  ventricular systolic function.  7. Estimated pulmonary artery systolic pressure equals 56  mm Hg, assuming right atrial pressure equals 10 mm Hg,  consistent with moderate pulmonary pressures.  8. Color Doppler demonstrates evidence of a patent foramen  ovale.  No shunting of bubbles from right to left.  *** No previous Echo exam.  ------------------------------------------------------------------------  Confirmed on  11/19/2021 - 17:04:55 by KARLA Holt  ------------------------------------------------------------------------    < end of copied text >

## 2021-11-23 NOTE — PROGRESS NOTE ADULT - ASSESSMENT
75 yo RH female with a MVP, HTN, COPD, osteoporosis, psoriatic arthritis, gastritis, vertebral fracture, UC, costochondritis, and now with acute ischemic stroke presents as a transfer from [place]. W 11/15 at 0830. Patient then became lethargic, nauseous, and complained of lightheadedness/dizziness. found to have emboilic strokes and NSTEMI tx to [facility] for further care.    MRI Head showed multiple small foci of diffusion restriction in both hemispheres as well as within the posterior fossa.   MRA H/N neg   LDL 92, A1c 6.2  trop in 5000s  b12 386  CT c a p neg   JASON as above with mod AS. no cardiac source + PFO   s/p ILR   cath unremarkable   Impression: AMS + ataxia. Patient with multiple small foci of diffusion restriction in both anterior and posterior circulation seen on MRI Head. ESUS.   now back to baseline     Recommendations:  - check LE dopplers give + PFO   - asa and statin for secondary stroke prevention  - b12 supplement   - telemetry  - PT/OT/SS/SLP, OOBC  - check FS, glucose control <180  - GI/DVT ppx  - Counseling on diet, exercise, and medication adherence was done  - Counseling on smoking cessation and alcohol consumption offered when appropriate.  - Pain assessed and judicious use of narcotics when appropriate was discussed.    - Stroke education given when appropriate.  - Importance of fall prevention discussed.   - Differential diagnosis and plan of care discussed with patient and/or family and primary team  - Thank you for allowing me to participate in the care of this patient. Call with questions.   - d/c plan today   Cheko Dukes MD  Vascular Neurology  Office: [phone] .   77 yo RH female with a MVP, HTN, COPD, osteoporosis, psoriatic arthritis, gastritis, vertebral fracture, UC, costochondritis, and now with acute ischemic stroke presents as a transfer from [place]. W 11/15 at 0830. Patient then became lethargic, nauseous, and complained of lightheadedness/dizziness. found to have emboilic strokes and NSTEMI tx to [facility] for further care.    MRI Head showed multiple small foci of diffusion restriction in both hemispheres as well as within the posterior fossa.   MRA H/N neg   LDL 92, A1c 6.2  trop in 5000s  b12 386  CT c a p neg   JSAON as above with mod AS. no cardiac source + PFO   s/p ILR   cath unremarkable   LE doppler neg  ROPE score 4  Impression: AMS + ataxia. Patient with multiple small foci of diffusion restriction in both anterior and posterior circulation seen on MRI Head. ESUS.   now back to baseline     Recommendations:  - asa and statin for secondary stroke prevention  - b12 supplement   - monitor ILR for few months. would hold off PFO closure given ROPE score 4.  can consider in future after several months of cardiac monitoring   - telemetry  - PT/OT/SS/SLP, OOBC  - check FS, glucose control <180  - GI/DVT ppx  - Counseling on diet, exercise, and medication adherence was done  - Counseling on smoking cessation and alcohol consumption offered when appropriate.  - Pain assessed and judicious use of narcotics when appropriate was discussed.    - Stroke education given when appropriate.  - Importance of fall prevention discussed.   - Differential diagnosis and plan of care discussed with patient and/or family and primary team  - Thank you for allowing me to participate in the care of this patient. Call with questions.   - d/c plan today   Cheko Dukes MD  Vascular Neurology  Office: [phone] .

## 2021-11-23 NOTE — PROGRESS NOTE ADULT - PROBLEM SELECTOR PLAN 2
TWI in anterolateral leads. Cardiac enzymes downtrending. TTE at OSH with LVEF 65%, thick LV wall, dilated LA, mod-severe MR, functional MS, severe AS, mild pHTN, small PFO. Cath normal 11/22.  - ASA and statin  - tele  - appreciate private Card Dr. Daniel recs: s/p normal cath 11/22  - appreciate Structural Card (Dr. Leon) recs: no mitral intervention TWI in anterolateral leads. Cardiac enzymes downtrending. TTE at OSH with LVEF 65%, thick LV wall, dilated LA, mod-severe MR, functional MS, severe AS, mild pHTN, small PFO. Cath normal 11/22.  - ASA and statin  - tele  - appreciate private Card Dr. Daniel recs: s/p normal cath 11/22, will increase losartan outpt for afterload reduction, f/u with office 1wk after discharge  - appreciate Structural Card (Dr. Leon) recs: no mitral intervention

## 2021-11-23 NOTE — PROGRESS NOTE ADULT - ATTENDING COMMENTS
76F PMH HTN, COPD (emphysema), psoriatic arthritis, UC, AS, prior history of tachycardia/NSVT on Metoprolol who presented to [facility] from Urgent Care on 11/15 c/o lethargy, nausea, lightheadedness, dizziness, progressive confusion, unsteady gait found to have acute CVA, MR head revealing multiple small foci of diffusion restriction in both anterior and posterior circulation, likely cardioembolic in origin. JASON performed reveals PFO without shunt, no thrombus visualized, EF 77%. JASON  She has no personal history of AF and has been in NSR on telemetry this admission. She underwent bedside ILR implant this AM w/o complication and is pending Structural Heart team for evaluation of findings on JASON.
The Structural Heart team was asked to evaluate in the setting of a JASON with severe MR. I have reviewed the images in detail with Dr Aaliyah Trammell, Director of Structural Heart Echo. The JASON demonstrates severe MR with severe MAC, chordal and leaflet calcification, and an MV area <3, all of which would preclude our ability to consider CISCO with a MitraClip. Based on the imaging, I do not think she would be a candidate anatomically for TMVR either. Her AS appears moderate, with an TRAMAINE of 1, and not warranting intervention at this time. As such, I am recommending optimized GDMT at this time. I have discussed my impressions and plan in detail with the patient, her , and Dr Kebede.  Kofi Leon MD
for LT HT Cath today  fu Cards rec    [facility]  
76 F with PMH significant for HTN, COPD/emphysema, osteoporosis c/b vertebral fx, psoriatic arthritis, MVP, severe AS, ulcerative colitis who presented to [place] on 11/15 for dizziness, nausea, and disorientation, found to have acute CVA with multiple areas of stroke in both hemispheres as well as within the posterior fossa suggesting embolic or thrombotic showering in the anterior and posterior circulation. Pt was deemed not a TPA candidate. Course was complicated by concurrent ACS/NSTEMI with trops peaking at 6623, CK/CKMB to 473/47.5, s/p lovenox. TTE with preserved EF, no WMA.  Seen and examined at bedside. No acute events. Pt has no neurologic deficits on exam.   structural HT consult pending  possible cath Monday    plan d/w resident team  [facility]  [phone]
[facility]  [phone]
for LT HT Cath today  fu Cards rec    [facility]  
76 F with PMH significant for HTN, COPD/emphysema, osteoporosis c/b vertebral fx, psoriatic arthritis, MVP, severe AS, ulcerative colitis who presented to [place] on 11/15 for dizziness, nausea, and disorientation, found to have acute CVA with multiple areas of stroke in both hemispheres as well as within the posterior fossa suggesting embolic or thrombotic showering in the anterior and posterior circulation. Pt was deemed not a TPA candidate. Course was complicated by concurrent ACS/NSTEMI with trops peaking at 6623, CK/CKMB to 473/47.5, s/p lovenox. TTE with preserved EF, no WMA.  Seen and examined at bedside. No acute events. Pt has no neurologic deficits on exam.   Pending JASON per cardiology today to evaluate for embolic source  If negative, will need ILR prior to d/c  Repeat SLP as pt is requesting regular diet  F/u cardiology, Re: plan for inpatient vs outpatient cath  Rest as above    [facility] Division of Hospital Medicine  Mallorie Vargas MD  Pager (M-F, 8A-5P): [phone]  Other Times:  [phone].

## 2021-11-23 NOTE — PROGRESS NOTE ADULT - PROBLEM SELECTOR PLAN 1
MRI at OSH showed multiple areas of diffusion restriction c/f thromboembolic phenomena with c/f cardiac origin. BLE Duplex and MRA Neck NC unremarkable. JASON didn't reveal source of emboli but revealed small PFO.  - Q4H neuro checks  - aspiration precautions  - fall precautions  - ASA  - atorvastatin  - B12  - s/p ILD placement  - appreciate Neuro recs

## 2021-11-23 NOTE — PROGRESS NOTE ADULT - ASSESSMENT
76F, MVP, HTN, obstructive lung disease, osteoporosis c/b L hip fracture s/p fixation, psoriatic arthritis, gastritis, vertebral fx, UC (last flare yrs ago), costochondritis, nicotine addiction, hyperPTH s/p parathyroidectomy, preDM, herniated disc.  Presented to [place] with AMS. MRI c/f CVAs 2/2 cardiac thromboemboli. Course c/b NSTEMI.  Transferred to [facility] for JASON and inductive loop detector placement. S/p normal cath.

## 2021-11-23 NOTE — DISCHARGE NOTE NURSING/CASE MANAGEMENT/SOCIAL WORK - PATIENT PORTAL LINK FT
You can access the FollowMyHealth Patient Portal offered by [facility] by registering at the following website: [email or web address]. By joining [employer]’s FollowMyHealth portal, you will also be able to view your health information using other applications (apps) compatible with our system.

## 2021-11-23 NOTE — PROGRESS NOTE ADULT - ASSESSMENT
#COPD  #HTN  #s/p CVA: currently in sr, no af noted, on asa and high dose statin. Has ILR, which we will begin follow up of.  #markedly elevated troponin, normal coronaries by cath yesterday.  #valvular disease: JASON showed severe mr with moderate ms, moderate AS, Moderate pulmonary htn, PFO without documented flow. MV not amenable to percutaneous procedure.  On losartan, plan to titrate up as outpatient for afterload reduction.   Discussed with family in detail.  Plan to see in office one week post discharge.  I  have reached out to neurology Dr Dukes to discuss further as well.

## 2021-11-23 NOTE — PROGRESS NOTE ADULT - SUBJECTIVE AND OBJECTIVE BOX
SUBJECTIVE: Asymptomatic.  at bedside. Anxious for discharge. Cath yesterday normal coronaries. Case reviewed with Dr Leon structural heart. As far as mitral valve is concerned, she is not a candidate for percutaneous mitral procedures; she may well be TAVr candidate if as worsens. Currently asymptomatic.  	  MEDICATIONS:  losartan 25 milliGRAM(s) Oral daily  metoprolol tartrate 25 milliGRAM(s) Oral daily  atorvastatin 40 milliGRAM(s) Oral at bedtime  aspirin enteric coated 325 milliGRAM(s) Oral daily  cyanocobalamin 1000 MICROGram(s) Oral daily  heparin   Injectable 5000 Unit(s) SubCutaneous every 8 hours      REVIEW OF SYSTEMS:    CONSTITUTIONAL: No fever, weight loss, or fatigue  EYES: No eye pain, visual disturbances, or discharge  NECK: No pain or stiffness  RESPIRATORY: No cough, wheezing, chills or hemoptysis; No Shortness of Breath  CARDIOVASCULAR: No chest pain, palpitations, dizziness, or leg swelling  GASTROINTESTINAL: No abdominal or epigastric pain. No nausea, vomiting, or hematemesis; No diarrhea or constipation. No melena or hematochezia.  GENITOURINARY: No dysuria, frequency, hematuria, or incontinence  NEUROLOGICAL: No headaches, memory loss, loss of strength, numbness, or tremors  SKIN: No itching, burning, rashes, or lesions   LYMPH Nodes: No enlarged glands  MUSCULOSKELETAL: No joint pain or swelling; No muscle, back, or extremity pain  All other review of systems are negative.  	      PHYSICAL EXAM:  T(C): 36.3 (11-23-21 @ 05:31), Max: 37.1 (11-22-21 @ 10:28)  HR: 69 (11-23-21 @ 05:31) (69 - 96)  BP: 149/81 (11-23-21 @ 05:31) (118/63 - 156/76)  RR: 18 (11-23-21 @ 05:31) (16 - 18)  SpO2: 93% (11-23-21 @ 05:31) (93% - 100%)  Wt(kg): --  I&O's Summary    22 Nov 2021 07:01  -  23 Nov 2021 07:00  --------------------------------------------------------  IN: 440 mL / OUT: 0 mL / NET: 440 mL          PHYSICAL EXAM    Appearance: Normal	  HEENT:   Normal oral mucosa, PERRL, EOMI	  NECK: Soft and supple, No LAD, No JVD  Cardiovascular: Regular Rate and Rhythm, Normal S1s2 3/6 sowmya  Respiratory: Lungs clear to auscultation	  Extremities: No clubbing, cyanosis or edema      TELEMETRY: 	  sr 60-90    LABS:	 	                 11.7   10.95 )-----------( 287      ( 23 Nov 2021 06:50 )             37.8     11-23    139  |  101  |  15  ----------------------------<  131<H>  4.0   |  26  |  0.56    Ca    9.5      23 Nov 2021 06:50  Phos  3.7     11-22  Mg     2.0     11-22

## 2021-11-23 NOTE — PROGRESS NOTE ADULT - SUBJECTIVE AND OBJECTIVE BOX
Resident: Yolette Donis, PGY1, Medicine, Pager [phone]) [phone] ([facility])    24-Hour Events and Subjective:  - tele:   -     Hospital Meds:  MEDICATIONS  (STANDING):  aspirin enteric coated 325 milliGRAM(s) Oral daily  atorvastatin 40 milliGRAM(s) Oral at bedtime  cyanocobalamin 1000 MICROGram(s) Oral daily  losartan 25 milliGRAM(s) Oral daily  metoprolol tartrate 25 milliGRAM(s) Oral daily    Allergies:  caffeine (Blisters)  Gluten (Unknown)  penicillins (Hives)  Intolerances:  lactose (Vomiting)    Vitals:  T(F): 97.4 (23 Nov 2021 05:31), Max: 98.8 (22 Nov 2021 10:28)  HR: 69 (23 Nov 2021 05:31) (69 - 96)  BP: 149/81 (23 Nov 2021 05:31) (118/63 - 156/76)  BP(mean): 72 (22 Nov 2021 16:55) (72 - 101)  RR: 18 (23 Nov 2021 05:31) (16 - 18)  SpO2: 93% (23 Nov 2021 05:31) (93% - 100%)    I&O: I&O's Summary  22 Nov 2021 07:01  -  23 Nov 2021 06:02  IN: 340 mL / OUT: 0 mL / NET: 340 mL    Physical Exam:   Con: NAD  HEENT: NCAT, no conjunctival injection, no scleral icterus, PERRL, EOMI  Neck: supple  Resp: normal WOB at rest, CTAB anteriorly  CV: RRR, S1 and S2, systolic ejection murmur best auscultated in L parasternal border 2nd intercostal space, no rubs, no gallops, 2+ radial pulses  Abd: nondistended, bowel sounds, soft, nontender  Ext: warm hands, no BLE edema  Neuro: AOx4    Labs:                         11.7   12.31H<-10.11 )-----------( 312      ( 22 Nov 2021 06:56 )             37.3     11-22  141  |  104  |  13  ----------------------------<  104<H>  4.1   |  25  |  0.55  Ca    9.4      22 Nov 2021 06:56  Phos  3.7     11-22  Mg     2.0     11-22    Studies and Radiology: Resident: Yolette Donis, PGY1, Medicine, Pager [phone] ([facility])    24-Hour Events and Subjective:  - tele:   - denies bleeding    Hospital Meds:  MEDICATIONS  (STANDING):  aspirin enteric coated 325 milliGRAM(s) Oral daily  atorvastatin 40 milliGRAM(s) Oral at bedtime  cyanocobalamin 1000 MICROGram(s) Oral daily  heparin  losartan 25 milliGRAM(s) Oral daily  metoprolol tartrate 25 milliGRAM(s) Oral daily    Allergies:  caffeine (Blisters)  Gluten (Unknown)  penicillins (Hives)  Intolerances:  lactose (Vomiting)    Vitals:  T(F): 97.4 (23 Nov 2021 05:31), Max: 98.8 (22 Nov 2021 10:28)  HR: 69 (23 Nov 2021 05:31) (69 - 96)  BP: 149/81 (23 Nov 2021 05:31) (118/63 - 156/76)  BP(mean): 72 (22 Nov 2021 16:55) (72 - 101)  RR: 18 (23 Nov 2021 05:31) (16 - 18)  SpO2: 93% (23 Nov 2021 05:31) (93% - 100%)    I&O: I&O's Summary  22 Nov 2021 07:01  -  23 Nov 2021 06:02  IN: 340 mL / OUT: 0 mL / NET: 340 mL    Physical Exam:   Con: NAD  HEENT: NCAT, no conjunctival injection, no scleral icterus, PERRL, EOMI  Neck: supple  Resp: normal WOB at rest, CTAB anteriorly  CV: RRR, S1 and S2, systolic ejection murmur best auscultated in L parasternal border 2nd intercostal space, no rubs, no gallops, 2+ radial pulses  Abd: nondistended, bowel sounds, soft, nontender  Ext: warm hands, no BLE edema  Neuro: AOx4    Labs:                         11.7   12.31H<-10.11 )-----------( 312      ( 22 Nov 2021 06:56 )             37.3     11-22  141  |  104  |  13  ----------------------------<  104<H>  4.1   |  25  |  0.55  Ca    9.4      22 Nov 2021 06:56  Phos  3.7     11-22  Mg     2.0     11-22    Studies and Radiology: Resident: Yolette Donis, PGY1, Medicine, Pager [phone] ([facility])    24-Hour Events and Subjective:  - tele: NSR  - denies bleeding  - feels normal    Hospital Meds:  MEDICATIONS  (STANDING):  aspirin enteric coated 325 milliGRAM(s) Oral daily  atorvastatin 40 milliGRAM(s) Oral at bedtime  cyanocobalamin 1000 MICROGram(s) Oral daily  heparin  losartan 25 milliGRAM(s) Oral daily  metoprolol tartrate 25 milliGRAM(s) Oral daily    Allergies:  caffeine (Blisters)  Gluten (Unknown)  penicillins (Hives)  Intolerances:  lactose (Vomiting)    Vitals:  T(F): 97.4 (23 Nov 2021 05:31), Max: 98.8 (22 Nov 2021 10:28)  HR: 69 (23 Nov 2021 05:31) (69 - 96)  BP: 149/81 (23 Nov 2021 05:31) (118/63 - 156/76)  BP(mean): 72 (22 Nov 2021 16:55) (72 - 101)  RR: 18 (23 Nov 2021 05:31) (16 - 18)  SpO2: 93% (23 Nov 2021 05:31) (93% - 100%)    I&O: I&O's Summary  22 Nov 2021 07:01  -  23 Nov 2021 06:02  IN: 340 mL / OUT: 0 mL / NET: 340 mL    Physical Exam:   Con: NAD  HEENT: NCAT, no conjunctival injection, no scleral icterus, PERRL, EOMI  Neck: supple  Resp: normal WOB at rest, CTAB anteriorly  CV: RRR, S1 and S2, systolic ejection murmur, no rubs, no gallops, 2+ radial pulses  Abd: nondistended, bowel sounds, soft, nontender  Ext: warm hands, no BLE edema  Neuro: AOx4    Labs:                         11.7   10.95H )-----------( 287      ( 23 Nov 2021 06:50 )             37.8                         11.7   12.31H<-10.11 )-----------( 312      ( 22 Nov 2021 06:56 )             37.3     11-22  141  |  104  |  13  ----------------------------<  104<H>  4.1   |  25  |  0.55  Ca    9.4      22 Nov 2021 06:56  Phos  3.7     11-22  Mg     2.0     11-22    Studies and Radiology: Resident: Yolette Donis, PGY1, Medicine, Pager [phone] ([facility])    24-Hour Events and Subjective:  - tele: NSR  - denies bleeding  - feels normal    Hospital Meds:  MEDICATIONS  (STANDING):  aspirin enteric coated 325 milliGRAM(s) Oral daily  atorvastatin 40 milliGRAM(s) Oral at bedtime  cyanocobalamin 1000 MICROGram(s) Oral daily  heparin  losartan 25 milliGRAM(s) Oral daily  metoprolol tartrate 25 milliGRAM(s) Oral daily    Allergies:  caffeine (Blisters)  Gluten (Unknown)  penicillins (Hives)  Intolerances:  lactose (Vomiting)    Vitals:  T(F): 97.4 (23 Nov 2021 05:31), Max: 98.8 (22 Nov 2021 10:28)  HR: 69 (23 Nov 2021 05:31) (69 - 96)  BP: 149/81 (23 Nov 2021 05:31) (118/63 - 156/76)  BP(mean): 72 (22 Nov 2021 16:55) (72 - 101)  RR: 18 (23 Nov 2021 05:31) (16 - 18)  SpO2: 93% (23 Nov 2021 05:31) (93% - 100%)    I&O: I&O's Summary  22 Nov 2021 07:01  -  23 Nov 2021 06:02  IN: 340 mL / OUT: 0 mL / NET: 340 mL    Physical Exam:   Con: NAD  HEENT: NCAT, no conjunctival injection, no scleral icterus, PERRL, EOMI  Neck: supple  Resp: normal WOB at rest, CTAB anteriorly  CV: RRR, S1 and S2, systolic ejection murmur, no rubs, no gallops, 2+ radial pulses  Abd: nondistended, bowel sounds, soft, nontender  Ext: warm hands, no BLE edema  Neuro: AOx4    Labs:                         11.7   10.95H )-----------( 287      ( 23 Nov 2021 06:50 )             37.8                         11.7   12.31H<-10.11 )-----------( 312      ( 22 Nov 2021 06:56 )             37.3     11-23  139  |  101  |  15  ----------------------------<  131<H>  4.0   |  26  |  0.56  Ca    9.5      23 Nov 2021 06:50  Phos  3.7     11-22  Mg     2.0     11-22    Studies and Radiology:

## 2021-12-06 ENCOUNTER — APPOINTMENT (OUTPATIENT)
Dept: ELECTROPHYSIOLOGY | Facility: CLINIC | Age: 76
End: 2021-12-06

## 2021-12-16 ENCOUNTER — APPOINTMENT (OUTPATIENT)
Dept: ELECTROPHYSIOLOGY | Facility: CLINIC | Age: 76
End: 2021-12-16
Payer: MEDICARE

## 2021-12-16 ENCOUNTER — NON-APPOINTMENT (OUTPATIENT)
Age: 76
End: 2021-12-16

## 2021-12-16 PROCEDURE — G2066: CPT

## 2021-12-16 PROCEDURE — 93298 REM INTERROG DEV EVAL SCRMS: CPT

## 2022-01-01 NOTE — ED ADULT NURSE NOTE - EXTENSIONS OF SELF_ADULT
Attendance at Delivery    Reason for attendance   Oxygen Needed no  Positive Pressure Needed no  Baby Vigorous yes  Evidence of Meconium no      APGARs 8-9. Pt presented to warmer and warmed, dried, stimulated and suctioned as indicated. Pt maintaining appropriate SpO2 reading on RA. No other respiratory intervention indicated at this time. Pt stable and left with L&D nurse.   None

## 2022-01-18 ENCOUNTER — APPOINTMENT (OUTPATIENT)
Dept: ELECTROPHYSIOLOGY | Facility: CLINIC | Age: 77
End: 2022-01-18

## 2022-04-17 NOTE — PROGRESS NOTE ADULT - PROVIDER SPECIALTY LIST ADULT
Cardiology
Electrophysiology
Internal Medicine
Neurology
Neurology
Structural Heart
Cardiology
Neurology
Neurology
Internal Medicine
Pleural effusion

## 2022-06-21 NOTE — H&P ADULT - NSHPPHYSICALEXAM_GEN_ALL_CORE
q1h flap checks GENERAL: NAD, well-groomed, well-developed  HEAD:  Atraumatic, Normocephalic  EYES: EOMI, PERRLA, conjunctiva and sclera clear  ENMT: No tonsillar erythema, exudates, or enlargement; Moist mucous membranes, No lesions  NECK: Supple, No JVD, Normal thyroid  CHEST/LUNG: Clear to percussion bilaterally; No rales, rhonchi, wheezing, or rubs  HEART: Regular rate and rhythm; No murmurs, rubs, or gallops  ABDOMEN: Soft, Nontender, Nondistended; Bowel sounds present  EXTREMITIES:  Left lower ext externally rotated.  2+ Peripheral Pulses, No clubbing, cyanosis, or edema  NERVOUS SYSTEM:  Alert & Oriented X3, Good concentration; Motor Strength 5/5 B/L upper extremities; DTRs 2+ intact and symmetric  SKIN: No rashes or lesions

## 2022-11-10 NOTE — ED ADULT NURSE NOTE - NSIMPLEMENTINTERV_GEN_ALL_ED
Pt stable for discharge.   
Pt states that she has a hx of arthritis of her right knee but today the pain in her right knee go worse.  
Implemented All Universal Safety Interventions:  [place] to call system. Call bell, personal items and telephone within reach. Instruct patient to call for assistance. Room bathroom lighting operational. Non-slip footwear when patient is off stretcher. Physically safe environment: no spills, clutter or unnecessary equipment. Stretcher in lowest position, wheels locked, appropriate side rails in place.

## 2023-03-07 ENCOUNTER — NON-APPOINTMENT (OUTPATIENT)
Age: 78
End: 2023-03-07

## 2023-04-04 ENCOUNTER — OUTPATIENT (OUTPATIENT)
Dept: OUTPATIENT SERVICES | Facility: HOSPITAL | Age: 78
LOS: 1 days | End: 2023-04-04
Payer: MEDICARE

## 2023-04-04 ENCOUNTER — APPOINTMENT (OUTPATIENT)
Dept: CARDIOTHORACIC SURGERY | Facility: CLINIC | Age: 78
End: 2023-04-04
Payer: MEDICARE

## 2023-04-04 ENCOUNTER — NON-APPOINTMENT (OUTPATIENT)
Age: 78
End: 2023-04-04

## 2023-04-04 VITALS
SYSTOLIC BLOOD PRESSURE: 141 MMHG | RESPIRATION RATE: 18 BRPM | HEIGHT: 61 IN | WEIGHT: 110 LBS | OXYGEN SATURATION: 96 % | TEMPERATURE: 98.2 F | BODY MASS INDEX: 20.77 KG/M2 | HEART RATE: 67 BPM | DIASTOLIC BLOOD PRESSURE: 83 MMHG

## 2023-04-04 VITALS
BODY MASS INDEX: 20.77 KG/M2 | SYSTOLIC BLOOD PRESSURE: 141 MMHG | HEART RATE: 67 BPM | WEIGHT: 110 LBS | OXYGEN SATURATION: 96 % | HEIGHT: 61 IN | DIASTOLIC BLOOD PRESSURE: 83 MMHG | RESPIRATION RATE: 18 BRPM

## 2023-04-04 DIAGNOSIS — I65.29 OCCLUSION AND STENOSIS OF UNSPECIFIED CAROTID ARTERY: ICD-10-CM

## 2023-04-04 DIAGNOSIS — Z86.39 PERSONAL HISTORY OF OTHER ENDOCRINE, NUTRITIONAL AND METABOLIC DISEASE: ICD-10-CM

## 2023-04-04 DIAGNOSIS — L40.9 PSORIASIS, UNSPECIFIED: ICD-10-CM

## 2023-04-04 DIAGNOSIS — E78.5 HYPERLIPIDEMIA, UNSPECIFIED: ICD-10-CM

## 2023-04-04 DIAGNOSIS — I35.0 NONRHEUMATIC AORTIC (VALVE) STENOSIS: ICD-10-CM

## 2023-04-04 DIAGNOSIS — R06.09 OTHER FORMS OF DYSPNEA: ICD-10-CM

## 2023-04-04 DIAGNOSIS — K51.90 ULCERATIVE COLITIS, UNSPECIFIED, W/OUT COMPLICATIONS: ICD-10-CM

## 2023-04-04 DIAGNOSIS — J44.9 CHRONIC OBSTRUCTIVE PULMONARY DISEASE, UNSPECIFIED: ICD-10-CM

## 2023-04-04 DIAGNOSIS — E89.2 POSTPROCEDURAL HYPOPARATHYROIDISM: Chronic | ICD-10-CM

## 2023-04-04 DIAGNOSIS — Z87.891 PERSONAL HISTORY OF NICOTINE DEPENDENCE: ICD-10-CM

## 2023-04-04 DIAGNOSIS — F17.200 NICOTINE DEPENDENCE, UNSPECIFIED, UNCOMPLICATED: ICD-10-CM

## 2023-04-04 DIAGNOSIS — Z98.89 OTHER SPECIFIED POSTPROCEDURAL STATES: Chronic | ICD-10-CM

## 2023-04-04 DIAGNOSIS — Z86.73 PERSONAL HISTORY OF TRANSIENT ISCHEMIC ATTACK (TIA), AND CEREBRAL INFARCTION W/OUT RESIDUAL DEFICITS: ICD-10-CM

## 2023-04-04 DIAGNOSIS — Z91.041 RADIOGRAPHIC DYE ALLERGY STATUS: ICD-10-CM

## 2023-04-04 DIAGNOSIS — Z33.2 ENCOUNTER FOR ELECTIVE TERMINATION OF PREGNANCY: Chronic | ICD-10-CM

## 2023-04-04 PROCEDURE — 93356 MYOCRD STRAIN IMG SPCKL TRCK: CPT

## 2023-04-04 PROCEDURE — 99215 OFFICE O/P EST HI 40 MIN: CPT

## 2023-04-04 PROCEDURE — 99214 OFFICE O/P EST MOD 30 MIN: CPT

## 2023-04-04 PROCEDURE — 76377 3D RENDER W/INTRP POSTPROCES: CPT

## 2023-04-04 PROCEDURE — 76377 3D RENDER W/INTRP POSTPROCES: CPT | Mod: 26

## 2023-04-04 PROCEDURE — 93306 TTE W/DOPPLER COMPLETE: CPT

## 2023-04-04 PROCEDURE — 93306 TTE W/DOPPLER COMPLETE: CPT | Mod: 26

## 2023-04-04 RX ORDER — ASPIRIN 325 MG/1
325 TABLET, FILM COATED ORAL DAILY
Refills: 0 | Status: ACTIVE | COMMUNITY

## 2023-04-04 RX ORDER — DIPHENHYDRAMINE HCL 25 MG/1
25 TABLET ORAL ONCE
Qty: 1 | Refills: 2 | Status: ACTIVE | COMMUNITY
Start: 2023-04-04 | End: 1900-01-01

## 2023-04-04 RX ORDER — FUROSEMIDE 20 MG/1
20 TABLET ORAL EVERY OTHER DAY
Refills: 0 | Status: ACTIVE | COMMUNITY

## 2023-04-04 RX ORDER — CHROMIUM 200 MCG
TABLET ORAL
Refills: 0 | Status: DISCONTINUED | COMMUNITY
End: 2023-04-04

## 2023-04-04 RX ORDER — PREDNISONE 50 MG/1
50 TABLET ORAL
Qty: 3 | Refills: 2 | Status: ACTIVE | COMMUNITY
Start: 2023-04-04 | End: 1900-01-01

## 2023-04-04 RX ORDER — IPRATROPIUM BROMIDE 21 UG/1
0.03 SPRAY NASAL 3 TIMES DAILY
Qty: 1 | Refills: 2 | Status: DISCONTINUED | COMMUNITY
Start: 2018-07-11 | End: 2023-04-04

## 2023-04-04 NOTE — DATA REVIEWED
[FreeTextEntry1] : Echo: 10/31/22\par LVEF 68%, TRAMAINE 0.8 sqcm, mGr 20 mmHg, pGr 37 mmHg, AoV 3 m/s, moderate MS, mitral mGr 5 mmHg

## 2023-04-04 NOTE — END OF VISIT
[FreeTextEntry3] : I, Dr. Toby Tillman, personally performed the evaluation and management (E/M) services for this new patient.  That E/M includes conducting the initial examination, assessing all conditions, and establishing the plan of care.  Today, Aida Gloria NP was here to observe my evaluation and management services for this patient to be followed going forward.\par

## 2023-04-04 NOTE — HISTORY OF PRESENT ILLNESS
[FreeTextEntry1] : Ms. Lopez is a 77 year old female referred by Dr. Kebede for evaluation of aortic stenosis. Her past medical history includes ulcerative colitis, COPD, HTN, HLD, and psoriatic arthritis. 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 She has had five falls over the past year she reports due to loss of balance. Her most recent fall was last week, she fell asleep at her kitchen table and fell off of the chair. She was previously evaluated for NSVT by Dr. Bravo in 2019 who recommended BP control and conservative medical management. She reports Dr. Kebede has been following her AS and MR for many years. She notes her visit today was prompted by a change in her echocardiogram. \par \par She tries to remain active though due to her falls is afraid to walk alone. She has noticed a progression of fatigue at least for the past year associated with increasing exertional dyspnea (NYHA II) now with minimal exertion. She has no angina, PND, orthopnea, or edema. Her appetite is good. She remains independent in all ADLs. \par \par She is currently on a Prednisone taper, originally prescribed by her pulmonologist and was continued by her rheumatologist. \par

## 2023-04-04 NOTE — PHYSICAL EXAM
[General Appearance - Alert] : alert [Sclera] : the sclera and conjunctiva were normal [Jugular Venous Distention Increased] : there was no jugular-venous distention [] : no respiratory distress [Respiration, Rhythm And Depth] : normal respiratory rhythm and effort [Bibasilar Rales/Crackles] : bibasilar rales [II] : a grade 2 [Breast Appearance] : normal in appearance [Bowel Sounds] : normal bowel sounds [Cervical Lymph Nodes Enlarged Posterior Bilaterally] : posterior cervical [No CVA Tenderness] : no ~M costovertebral angle tenderness [Involuntary Movements] : no involuntary movements were seen [Skin Color & Pigmentation] : normal skin color and pigmentation [No Focal Deficits] : no focal deficits [Oriented To Time, Place, And Person] : oriented to person, place, and time [Right Carotid Bruit] : no bruit heard over the right carotid [Left Carotid Bruit] : no bruit heard over the left carotid

## 2023-04-04 NOTE — REVIEW OF SYSTEMS
[Feeling Tired] : feeling tired [SOB on Exertion] : shortness of breath during exertion [Joint Swelling] : joint swelling [Joint Stiffness] : joint stiffness [Eye Pain] : no eye pain [Earache] : no earache [Palpitations] : no palpitations [Abdominal Pain] : no abdominal pain [Dizziness] : no dizziness [Anxiety] : no anxiety [Easy Bleeding] : no tendency for easy bleeding

## 2023-04-04 NOTE — ASSESSMENT
[FreeTextEntry1] : Ms. Whelan is a 77 year old female referred by Dr. Kebede for evaluation of aortic stenosis. Her past medical history includes ulcerative colitis, COPD, HTN, HLD, and psoriatic arthritis. 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 She has had five falls over the past year she reports due to loss of balance. Her most recent fall was last week, she fell asleep at her kitchen table and fell off of the chair. She was previously evaluated for NSVT by Dr. Bravo in 2019 who recommended BP control and conservative medical management. She reports Dr. Kebede has been following her AS and MR for many years. She notes her visit today was prompted by a change in her echocardiogram. \par \par I had the pleasure of evaluating your patient,Ms. GABRIELA WHELAN who is a 77 year old female with heart failure symptoms of dyspnea on exertion and fatigue (NYHA class II).\par \par Ms. GABRIELA WHELAN has severe AS with pG 53 mmHg and mG 30 mmHg,   TRAMAINE of 0.8 cm²  DVI of .27 . Her ventricle is hyperdynamic and would benefit from a beta Blocker.   She is a low risk for surgical aortic valve replacement.  She has class 2 symptoms with STOUT.  \par \par The risks and benefits of both SAVR and SAMEER have been explained and the patient would like to proceed with further workup and consideration for SAMEER by the structural heart team.  I explained the risks of vascular complication, 10-15% chance of requiring a pacemaker post procedure and possible paravalvular leakage. \par \par She will require CT scan and catheterization before finalizing plans for the procedure. The patient was also made aware of the possibility of using conscious sedation or general anesthesia during the procedure.  Once completed, all imaging will be reviewed by the Heart Team and an optimal treatment strategy will be recommended.\par \par Plan:\par 1) Lab work scheduled for today CBC, CMP and Pro BNP\par 2) Structural CT scan without coronaries \par 3) Cardiac Catherization to evaluate coronary anatomy \par \par \par

## 2023-04-05 LAB
ALBUMIN SERPL ELPH-MCNC: 4.8 G/DL
ALP BLD-CCNC: 94 U/L
ALT SERPL-CCNC: 17 U/L
ANION GAP SERPL CALC-SCNC: 17 MMOL/L
AST SERPL-CCNC: 22 U/L
BASOPHILS # BLD AUTO: 0.1 K/UL
BASOPHILS NFR BLD AUTO: 0.5 %
BILIRUB SERPL-MCNC: 0.3 MG/DL
BUN SERPL-MCNC: 25 MG/DL
CALCIUM SERPL-MCNC: 10.4 MG/DL
CHLORIDE SERPL-SCNC: 99 MMOL/L
CO2 SERPL-SCNC: 26 MMOL/L
CREAT SERPL-MCNC: 0.8 MG/DL
EGFR: 75 ML/MIN/1.73M2
EOSINOPHIL # BLD AUTO: 0.05 K/UL
EOSINOPHIL NFR BLD AUTO: 0.2 %
GLUCOSE SERPL-MCNC: 85 MG/DL
HCT VFR BLD CALC: 41 %
HGB BLD-MCNC: 12.6 G/DL
IMM GRANULOCYTES NFR BLD AUTO: 1.5 %
LYMPHOCYTES # BLD AUTO: 2.2 K/UL
LYMPHOCYTES NFR BLD AUTO: 10.4 %
MAN DIFF?: NORMAL
MCHC RBC-ENTMCNC: 27.6 PG
MCHC RBC-ENTMCNC: 30.7 GM/DL
MCV RBC AUTO: 89.9 FL
MONOCYTES # BLD AUTO: 0.34 K/UL
MONOCYTES NFR BLD AUTO: 1.6 %
NEUTROPHILS # BLD AUTO: 18.23 K/UL
NEUTROPHILS NFR BLD AUTO: 85.8 %
NT-PROBNP SERPL-MCNC: 658 PG/ML
PLATELET # BLD AUTO: 333 K/UL
POTASSIUM SERPL-SCNC: 5.3 MMOL/L
PROT SERPL-MCNC: 7.4 G/DL
RBC # BLD: 4.56 M/UL
RBC # FLD: 16.8 %
SODIUM SERPL-SCNC: 142 MMOL/L
WBC # FLD AUTO: 21.24 K/UL

## 2023-04-11 PROBLEM — I35.0 AORTIC STENOSIS, SEVERE: Status: ACTIVE | Noted: 2023-04-04

## 2023-04-11 PROBLEM — Z91.041 ALLERGIC TO IV CONTRAST: Status: ACTIVE | Noted: 2023-04-04

## 2023-04-11 PROBLEM — R06.09 DOE (DYSPNEA ON EXERTION): Status: ACTIVE | Noted: 2023-04-11

## 2023-04-11 NOTE — CARDIOLOGY SUMMARY
[de-identified] : None today; 3/15/23: NSR 80 [de-identified] : 10/31/22\par LVEF 68%, TRAMAINE 0.8 sqcm, mGr 20 mmHg, pGr 37 mmHg, AoV 3 m/s, moderate MS, mitral mGr 5 mmHg

## 2023-04-11 NOTE — PHYSICAL EXAM
[Well Developed] : well developed [Normal Rate] : normal [Rhythm Regular] : regular [II] : a grade 2 [III] : a grade 3 [No Pitting Edema] : no pitting edema present [Soft] : abdomen soft [Non Tender] : non-tender [Normal Gait] : normal gait [No Edema] : no edema [Normal] : alert and oriented, normal memory [de-identified] : bibasilar rales

## 2023-04-11 NOTE — HISTORY OF PRESENT ILLNESS
[FreeTextEntry1] : Mrs. Lopez is a 77 year old female referred by Dr. Brijesh Kebede for evaluation of aortic stenosis. Her past medical history includes ulcerative colitis, COPD, HTN, HLD, and psoriatic arthritis. She suffered a CVA in 11/2021 with residual memory and visual impairments. She has had five falls over the past year that she reports are due to loss of balance. Her most recent fall was last week when she fell asleep at her kitchen table and fell off of her chair. She was previously evaluated for sinus tachycardia by Dr. Bravo in 2019 who recommended optimization of her HTN regimen. She reports that Dr. Kebede has been following her AS and MR for many years. She notes her visit today was prompted by a change in her echocardiogram. \par \par She tries to remain active though due to her falls is afraid to walk alone. She has noticed a progression of fatigue at least for the past year associated with increasing exertional dyspnea (NYHA II), now with minimal exertion. She has no angina, PND, orthopnea, or edema. Her appetite is good. She remains independent in her ADLs. She is currently on a Prednisone taper, originally prescribed by her Pulmonologist and continued by her Rheumatologist. She has an allergy to IV contrast. \par \par A repeat echocardiogram today was reviewed with Dr Aaliyah Trammell in detail and demonstrates severe AS with an TRAMAINE of 0.91 sqcm, peak and mean gradients of 57 and 30 mmHg, a peak velocity of 3.8 m/s, and a DVI of 0.27; she has calcific mitral disease with a mean MV gradient of 5mmHg and mild MR. She has hyperdynamic LV function with a mean intracavitary gradient of 33mmHg.\par \par She notes that Dr Kebede has been following her AS for some time and "he is a very conservative preston" but she is unsure what prompted her referral at this time. She notes "he is treating me with medications" in an attempt to "keep my condition stable" for as long as possible. She is experiencing "very few symptoms" such as "occasional breath problems" with "walking long periods of time" and notes significant arthritis has also contributed to her progressive functional decline. She has no angina or syncope. She has "dizziness occasionally" and notes "my balance is off from the strokes" and notes the etiology of the previous events are unknown. SHe notes some residual memory loss and visual deficits from the prior stroke, in addition to her balance issues, as noted above. Her  describes the sequela as "cognitive problems" with "difficult analyzing situations" at times (in his impression).

## 2023-04-11 NOTE — DISCUSSION/SUMMARY
[FreeTextEntry1] : Mrs. Lopez presents for evaluation of severe aortic stenosis with recent progression of dyspnea on exertion and functional decline.  She has significant comorbidities and would likely be at markedly elevated risk for surgical AVR. As such, Dr. Tillman and I are in agreement that she is an appropriate candidate for SAMEER consideration.  I discussed with her the potential risks and benefits of the procedure in detail including death, stroke, vascular complications, bleeding, infection, PVL, and the possible need for a permanent pacemaker.  \par \par She had an angiogram in November 2021 that demonstrated normal coronary arteries and does not need to be repeated; I will discuss this with Dr. Tillman as well.  From my perspective, she may proceed with scheduling her cardiac CT and be given a tentative date for SAMEER.  Once completed, all imaging will be reviewed by the heart team and an optimal treatment strategy recommended.  I will notify Dr. Kebede of our impressions and plan.  She reports an allergy to IV contrast and as such will be premedicated with prednisone prior to all contrast based procedures and exams. I have answered all of her and her 's questions in detail and we are in agreement with this management strategy.

## 2023-04-11 NOTE — REVIEW OF SYSTEMS
[Feeling Fatigued] : feeling fatigued [Blurry Vision] : blurred vision [Dyspnea on exertion] : dyspnea during exertion [Chest Discomfort] : chest discomfort [Lower Ext Edema] : lower extremity edema [Joint Pain] : joint pain [Rash] : rash [Dizziness] : dizziness [Memory Lapses Or Loss] : memory lapses or loss [Negative] : Heme/Lymph [Fever] : no fever [Chills] : no chills [Palpitations] : no palpitations [Orthopnea] : no orthopnea [PND] : no PND [Abdominal Pain] : no abdominal pain [Change in Appetite] : no change in appetite [FreeTextEntry5] : c [FreeTextEntry9] : psoriatic arthritis

## 2023-04-26 RX ORDER — PREDNISONE 50 MG/1
50 TABLET ORAL
Qty: 3 | Refills: 2 | Status: ACTIVE | COMMUNITY
Start: 2023-04-26 | End: 1900-01-01

## 2023-04-27 ENCOUNTER — RESULT REVIEW (OUTPATIENT)
Age: 78
End: 2023-04-27

## 2023-04-27 ENCOUNTER — APPOINTMENT (OUTPATIENT)
Dept: CARDIOLOGY | Facility: CLINIC | Age: 78
End: 2023-04-27

## 2023-04-27 ENCOUNTER — OUTPATIENT (OUTPATIENT)
Dept: OUTPATIENT SERVICES | Facility: HOSPITAL | Age: 78
LOS: 1 days | End: 2023-04-27
Payer: MEDICARE

## 2023-04-27 DIAGNOSIS — Z00.00 ENCOUNTER FOR GENERAL ADULT MEDICAL EXAMINATION WITHOUT ABNORMAL FINDINGS: ICD-10-CM

## 2023-04-27 DIAGNOSIS — Z98.89 OTHER SPECIFIED POSTPROCEDURAL STATES: Chronic | ICD-10-CM

## 2023-04-27 DIAGNOSIS — I35.0 NONRHEUMATIC AORTIC (VALVE) STENOSIS: ICD-10-CM

## 2023-04-27 DIAGNOSIS — Z33.2 ENCOUNTER FOR ELECTIVE TERMINATION OF PREGNANCY: Chronic | ICD-10-CM

## 2023-04-27 DIAGNOSIS — E89.2 POSTPROCEDURAL HYPOPARATHYROIDISM: Chronic | ICD-10-CM

## 2023-04-27 PROCEDURE — G1004: CPT

## 2023-04-27 PROCEDURE — 75572 CT HRT W/3D IMAGE: CPT | Mod: 26,ME

## 2023-04-27 PROCEDURE — 75572 CT HRT W/3D IMAGE: CPT | Mod: ME

## 2023-05-09 ENCOUNTER — NON-APPOINTMENT (OUTPATIENT)
Age: 78
End: 2023-05-09

## 2023-05-09 NOTE — CHART NOTE - NSCHARTNOTEFT_GEN_A_CORE
Prednisone Rx sent to pt's pharmacy [facility] in [place]  50mg 1am 5/10, 2nd dose at 8am and bring 3rd dose to hospital

## 2023-05-10 ENCOUNTER — TRANSCRIPTION ENCOUNTER (OUTPATIENT)
Age: 78
End: 2023-05-10

## 2023-05-10 ENCOUNTER — OUTPATIENT (OUTPATIENT)
Dept: OUTPATIENT SERVICES | Facility: HOSPITAL | Age: 78
LOS: 1 days | End: 2023-05-10
Payer: MEDICARE

## 2023-05-10 VITALS
TEMPERATURE: 98 F | HEIGHT: 61 IN | RESPIRATION RATE: 73 BRPM | WEIGHT: 110.01 LBS | HEART RATE: 73 BPM | OXYGEN SATURATION: 98 % | DIASTOLIC BLOOD PRESSURE: 59 MMHG | SYSTOLIC BLOOD PRESSURE: 108 MMHG

## 2023-05-10 VITALS
RESPIRATION RATE: 15 BRPM | SYSTOLIC BLOOD PRESSURE: 147 MMHG | HEART RATE: 71 BPM | DIASTOLIC BLOOD PRESSURE: 65 MMHG | OXYGEN SATURATION: 97 %

## 2023-05-10 DIAGNOSIS — Z33.2 ENCOUNTER FOR ELECTIVE TERMINATION OF PREGNANCY: Chronic | ICD-10-CM

## 2023-05-10 DIAGNOSIS — I35.0 NONRHEUMATIC AORTIC (VALVE) STENOSIS: ICD-10-CM

## 2023-05-10 DIAGNOSIS — Z98.89 OTHER SPECIFIED POSTPROCEDURAL STATES: Chronic | ICD-10-CM

## 2023-05-10 DIAGNOSIS — E89.2 POSTPROCEDURAL HYPOPARATHYROIDISM: Chronic | ICD-10-CM

## 2023-05-10 LAB
ANION GAP SERPL CALC-SCNC: 16 MMOL/L — SIGNIFICANT CHANGE UP (ref 5–17)
BUN SERPL-MCNC: 22 MG/DL — SIGNIFICANT CHANGE UP (ref 7–23)
CALCIUM SERPL-MCNC: 10.2 MG/DL — SIGNIFICANT CHANGE UP (ref 8.4–10.5)
CHLORIDE SERPL-SCNC: 99 MMOL/L — SIGNIFICANT CHANGE UP (ref 96–108)
CO2 SERPL-SCNC: 25 MMOL/L — SIGNIFICANT CHANGE UP (ref 22–31)
CREAT SERPL-MCNC: 0.7 MG/DL — SIGNIFICANT CHANGE UP (ref 0.5–1.3)
EGFR: 87 ML/MIN/1.73M2 — SIGNIFICANT CHANGE UP
GLUCOSE BLDC GLUCOMTR-MCNC: 136 MG/DL — HIGH (ref 70–99)
GLUCOSE SERPL-MCNC: 129 MG/DL — HIGH (ref 70–99)
HCT VFR BLD CALC: 38.8 % — SIGNIFICANT CHANGE UP (ref 34.5–45)
HGB BLD-MCNC: 12.2 G/DL — SIGNIFICANT CHANGE UP (ref 11.5–15.5)
MAGNESIUM SERPL-MCNC: 2.2 MG/DL — SIGNIFICANT CHANGE UP (ref 1.6–2.6)
MCHC RBC-ENTMCNC: 26.6 PG — LOW (ref 27–34)
MCHC RBC-ENTMCNC: 31.4 GM/DL — LOW (ref 32–36)
MCV RBC AUTO: 84.7 FL — SIGNIFICANT CHANGE UP (ref 80–100)
NRBC # BLD: 0 /100 WBCS — SIGNIFICANT CHANGE UP (ref 0–0)
PLATELET # BLD AUTO: 290 K/UL — SIGNIFICANT CHANGE UP (ref 150–400)
POTASSIUM SERPL-MCNC: 4.6 MMOL/L — SIGNIFICANT CHANGE UP (ref 3.5–5.3)
POTASSIUM SERPL-SCNC: 4.6 MMOL/L — SIGNIFICANT CHANGE UP (ref 3.5–5.3)
RBC # BLD: 4.58 M/UL — SIGNIFICANT CHANGE UP (ref 3.8–5.2)
RBC # FLD: 15.4 % — HIGH (ref 10.3–14.5)
SODIUM SERPL-SCNC: 140 MMOL/L — SIGNIFICANT CHANGE UP (ref 135–145)
WBC # BLD: 11.29 K/UL — HIGH (ref 3.8–10.5)
WBC # FLD AUTO: 11.29 K/UL — HIGH (ref 3.8–10.5)

## 2023-05-10 PROCEDURE — 99152 MOD SED SAME PHYS/QHP 5/>YRS: CPT

## 2023-05-10 PROCEDURE — 82962 GLUCOSE BLOOD TEST: CPT

## 2023-05-10 PROCEDURE — 85027 COMPLETE CBC AUTOMATED: CPT

## 2023-05-10 PROCEDURE — 80048 BASIC METABOLIC PNL TOTAL CA: CPT

## 2023-05-10 PROCEDURE — 93005 ELECTROCARDIOGRAM TRACING: CPT

## 2023-05-10 PROCEDURE — C1894: CPT

## 2023-05-10 PROCEDURE — 83735 ASSAY OF MAGNESIUM: CPT

## 2023-05-10 PROCEDURE — 93010 ELECTROCARDIOGRAM REPORT: CPT

## 2023-05-10 PROCEDURE — C1769: CPT

## 2023-05-10 PROCEDURE — 93454 CORONARY ARTERY ANGIO S&I: CPT

## 2023-05-10 PROCEDURE — 93454 CORONARY ARTERY ANGIO S&I: CPT | Mod: 26

## 2023-05-10 PROCEDURE — C1887: CPT

## 2023-05-10 RX ORDER — DIPHENHYDRAMINE HCL 50 MG
50 CAPSULE ORAL ONCE
Refills: 0 | Status: COMPLETED | OUTPATIENT
Start: 2023-05-10 | End: 2023-05-10

## 2023-05-10 RX ORDER — FAMOTIDINE 10 MG/ML
20 INJECTION INTRAVENOUS ONCE
Refills: 0 | Status: COMPLETED | OUTPATIENT
Start: 2023-05-10 | End: 2023-05-10

## 2023-05-10 RX ADMIN — Medication 50 MILLIGRAM(S): at 13:56

## 2023-05-10 RX ADMIN — FAMOTIDINE 20 MILLIGRAM(S): 10 INJECTION INTRAVENOUS at 13:56

## 2023-05-10 NOTE — ASU PATIENT PROFILE, ADULT - FALL HARM RISK - UNIVERSAL INTERVENTIONS
Bed in lowest position, wheels locked, appropriate side rails in place/Call bell, personal items and telephone in reach/Instruct patient to call for assistance before getting out of bed or chair/Non-slip footwear when patient is out of bed/[place] to call system/Physically safe environment - no spills, clutter or unnecessary equipment/Purposeful Proactive Rounding/Room/bathroom lighting operational, light cord in reach

## 2023-05-10 NOTE — ASU DISCHARGE PLAN (ADULT/PEDIATRIC) - CARE PROVIDER_API CALL
Kofi Leon)  Interventional Cardiology  [address] [place] [address]  Phone: [phone]  Fax: [phone]  Follow Up Time: 2 weeks

## 2023-05-10 NOTE — H&P CARDIOLOGY - WEIGHT IN LBS
6y4m old  male pt with no significant PMHx brought by father to ED for fever tmax 105.4F x today.  Few weeks ago pt developed cough and cold sx, and was dx'd Influenza A. Sx were resolving. Few days ago pt developed worsening cough and cold sx again. Yesterday was c/o throat pain, abd pain and foot pain along with cold sx.  Saw PMD, RVP showed + for Influenza B, so rx Motrin, Prednisolone and Azithromycin as precaution. Mother gave Ibuprofen and other rx at 6pm with cranberry juice and water. Afterwards, had pain, and pointed to the kidney. Parents gave water, then pt went to lie down. Then had abd pain again, then vomited to minimal relief. Mother notes dark urine and urinary frequency.  Had two episode of UO. Denies any diarrhea. Vaccines UTD. NKDA. 89 6y4m old  male pt with no significant PMHx brought by father to ED for fever tmax 105.4F x today.  Few weeks ago pt developed cough and cold sx, and was dx'd Influenza A. Sx were resolving. Few days ago pt developed worsening cough and cold sx again. Yesterday was c/o throat pain, abd pain and foot pain along with cold sx.  Saw PMD, RVP showed + for Influenza B, so rx Motrin, Prednisolone and Azithromycin as precaution. Mother gave Ibuprofen and other rx at 6pm with cranberry juice and water. Afterwards, had abdominal pain. Parents gave water, then pt went to lie down. Then had abd pain again, then vomited to minimal relief. Mother notes dark urine and urinary frequency.  Had two episode of UO. Denies any diarrhea. Vaccines UTD. NKDA.

## 2023-05-10 NOTE — ASU DISCHARGE PLAN (ADULT/PEDIATRIC) - NS MD DC FALL RISK RISK
For information on Fall & Injury Prevention, visit: [email or web address] OR  [email or web address] OR  https://www.cdc.gov/steadi/patient.html

## 2023-05-10 NOTE — ASU DISCHARGE PLAN (ADULT/PEDIATRIC) - ASU DC SPECIAL INSTRUCTIONSFT

## 2023-05-10 NOTE — ASU PATIENT PROFILE, ADULT - AS SC BRADEN MOBILITY
We would like to see you back per your calender.     When you are in need of a refill, please call your pharmacy and they will send us a request.     If you have any questions please call [phone]    Other instructions:  none       (4) no limitation

## 2023-05-10 NOTE — H&P CARDIOLOGY - HISTORY OF PRESENT ILLNESS
78F with PMH of ulcerative colitis, COPD, HTN, HLD, and psoriatic arthritis, CVA in 2021 with residual memory and visual impairments, NSVT, severe AS presents for LHC. Reports progression of fatigue at least for the past year associated with increasing exertional dyspnea (NYHA II), now with minimal exertion. She has no angina, PND, orthopnea, or edema. Her appetite is good. She remains independent in her ADLs. She is currently on a Prednisone taper, originally prescribed by her Pulmonologist and continued by her Rheumatologist. She has an allergy to IV contrast. Patient is now referred by Dr. Leon to Dr. Galeana for LHC today.     Cards: Dr. Brijesh Kebede  CTSx: Dr. Tillman and Edward  < from: TTE W or WO Ultrasound Enhancing Agent (04.04.23 @ 10:39) >   1. Normal left ventricular cavity size. Moderate left ventricular hypertrophy.The left ventricular systolic function is hyperdynamic with an intra-cavitary gradient of 33 mmHg and an ejection fraction is 78%. There are no regional wall motion abnormalities seen.   2. The aortic valve appears trileaflet with reduced systolic excursion. There is severe aortic stenosis. The peak transaortic velocity is 3.79 m/s, peak transaortic gradient is 57.3 mmHg and mean transaortic gradient is 30.0 mmHg with an LVOT/aortic valve VTI ratio of 0.27. The aortic valve area is estimated at 0.91 cm² by the continuity equation. There is no evidence of aortic regurgitation.   3. There is calcific mitral annular and leaflet disease with bulky calcification of the posterrior mitral annulus which encases much of the posterior leaflet and projects into the left ventricle. The peak/mean gradients= 13/5mmHg (HR= 73bpm). There is mild mitral regurgitation.   4. No pericardial effusion seen.   5. Compared to the transesophageal echocardiogram performed on 11/19/2021 (prior report), the aortic stenosis is now severe.    < end of copied text >   78F with PMH of ulcerative colitis, COPD, HTN, HLD, and psoriatic arthritis, CVA in 2021 with residual memory and visual impairments, NSVT, severe AS presents for [facility]. Reports progression of fatigue at least for the past year associated with increasing exertional dyspnea (NYHA II), now with minimal exertion. She has no angina, PND, orthopnea, or edema. Her appetite is good. She remains independent in her ADLs. She is currently on a Prednisone taper, originally prescribed by her Pulmonologist and continued by her Rheumatologist. She has an allergy to IV contrast. Patient is now referred by Dr. Leon to Dr. Galeana for [facility] for preop SAMEER.     Cards: Dr. Brijesh Kebede  CTSx: Dr. Tillman and Edward  < from: TTE W or WO Ultrasound Enhancing Agent (04.04.23 @ 10:39) >   1. Normal left ventricular cavity size. Moderate left ventricular hypertrophy.The left ventricular systolic function is hyperdynamic with an intra-cavitary gradient of 33 mmHg and an ejection fraction is 78%. There are no regional wall motion abnormalities seen.   2. The aortic valve appears trileaflet with reduced systolic excursion. There is severe aortic stenosis. The peak transaortic velocity is 3.79 m/s, peak transaortic gradient is 57.3 mmHg and mean transaortic gradient is 30.0 mmHg with an LVOT/aortic valve VTI ratio of 0.27. The aortic valve area is estimated at 0.91 cm² by the continuity equation. There is no evidence of aortic regurgitation.   3. There is calcific mitral annular and leaflet disease with bulky calcification of the posterrior mitral annulus which encases much of the posterior leaflet and projects into the left ventricle. The peak/mean gradients= 13/5mmHg (HR= 73bpm). There is mild mitral regurgitation.   4. No pericardial effusion seen.   5. Compared to the transesophageal echocardiogram performed on 11/19/2021 (prior report), the aortic stenosis is now severe.    < end of copied text >   78F with PMH of ulcerative colitis, COPD, HTN, HLD, and psoriatic arthritis, CVA in 2021 with residual memory and visual impairments, NSVT, severe AS presents for [facility]. Reports progression of fatigue at least for the past year associated with increasing exertional dyspnea (NYHA II), now with minimal exertion. Repeat TTE with severe AS now and planning to have SAMEER. She has an allergy to IV contrast. Patient is now referred by Dr. Leon to Dr. Galeana for [facility] for today.    Cards: Dr. Brijesh Kebede  CTSx: Dr. Tillman and Edward  < from: TTE W or WO Ultrasound Enhancing Agent (04.04.23 @ 10:39) >   1. Normal left ventricular cavity size. Moderate left ventricular hypertrophy.The left ventricular systolic function is hyperdynamic with an intra-cavitary gradient of 33 mmHg and an ejection fraction is 78%. There are no regional wall motion abnormalities seen.   2. The aortic valve appears trileaflet with reduced systolic excursion. There is severe aortic stenosis. The peak transaortic velocity is 3.79 m/s, peak transaortic gradient is 57.3 mmHg and mean transaortic gradient is 30.0 mmHg with an LVOT/aortic valve VTI ratio of 0.27. The aortic valve area is estimated at 0.91 cm² by the continuity equation. There is no evidence of aortic regurgitation.   3. There is calcific mitral annular and leaflet disease with bulky calcification of the posterrior mitral annulus which encases much of the posterior leaflet and projects into the left ventricle. The peak/mean gradients= 13/5mmHg (HR= 73bpm). There is mild mitral regurgitation.   4. No pericardial effusion seen.   5. Compared to the transesophageal echocardiogram performed on 11/19/2021 (prior report), the aortic stenosis is now severe.    < end of copied text >

## 2023-05-11 ENCOUNTER — APPOINTMENT (OUTPATIENT)
Dept: ULTRASOUND IMAGING | Facility: HOSPITAL | Age: 78
End: 2023-05-11

## 2023-05-12 ENCOUNTER — OUTPATIENT (OUTPATIENT)
Dept: OUTPATIENT SERVICES | Facility: HOSPITAL | Age: 78
LOS: 1 days | End: 2023-05-12
Payer: MEDICARE

## 2023-05-12 VITALS
HEIGHT: 61 IN | RESPIRATION RATE: 18 BRPM | TEMPERATURE: 98 F | DIASTOLIC BLOOD PRESSURE: 83 MMHG | OXYGEN SATURATION: 97 % | HEART RATE: 72 BPM | SYSTOLIC BLOOD PRESSURE: 156 MMHG | WEIGHT: 111.99 LBS

## 2023-05-12 DIAGNOSIS — I35.0 NONRHEUMATIC AORTIC (VALVE) STENOSIS: ICD-10-CM

## 2023-05-12 DIAGNOSIS — Z33.2 ENCOUNTER FOR ELECTIVE TERMINATION OF PREGNANCY: Chronic | ICD-10-CM

## 2023-05-12 DIAGNOSIS — E89.2 POSTPROCEDURAL HYPOPARATHYROIDISM: Chronic | ICD-10-CM

## 2023-05-12 DIAGNOSIS — Z98.89 OTHER SPECIFIED POSTPROCEDURAL STATES: Chronic | ICD-10-CM

## 2023-05-12 DIAGNOSIS — J44.9 CHRONIC OBSTRUCTIVE PULMONARY DISEASE, UNSPECIFIED: ICD-10-CM

## 2023-05-12 DIAGNOSIS — Z01.818 ENCOUNTER FOR OTHER PREPROCEDURAL EXAMINATION: ICD-10-CM

## 2023-05-12 DIAGNOSIS — Z29.9 ENCOUNTER FOR PROPHYLACTIC MEASURES, UNSPECIFIED: ICD-10-CM

## 2023-05-12 DIAGNOSIS — Z87.898 PERSONAL HISTORY OF OTHER SPECIFIED CONDITIONS: ICD-10-CM

## 2023-05-12 LAB
ANION GAP SERPL CALC-SCNC: 13 MMOL/L — SIGNIFICANT CHANGE UP (ref 5–17)
BLD GP AB SCN SERPL QL: NEGATIVE — SIGNIFICANT CHANGE UP
BUN SERPL-MCNC: 34 MG/DL — HIGH (ref 7–23)
CALCIUM SERPL-MCNC: 9.4 MG/DL — SIGNIFICANT CHANGE UP (ref 8.4–10.5)
CHLORIDE SERPL-SCNC: 103 MMOL/L — SIGNIFICANT CHANGE UP (ref 96–108)
CO2 SERPL-SCNC: 26 MMOL/L — SIGNIFICANT CHANGE UP (ref 22–31)
CREAT SERPL-MCNC: 0.89 MG/DL — SIGNIFICANT CHANGE UP (ref 0.5–1.3)
EGFR: 66 ML/MIN/1.73M2 — SIGNIFICANT CHANGE UP
GLUCOSE SERPL-MCNC: 93 MG/DL — SIGNIFICANT CHANGE UP (ref 70–99)
HCT VFR BLD CALC: 37.7 % — SIGNIFICANT CHANGE UP (ref 34.5–45)
HGB BLD-MCNC: 12 G/DL — SIGNIFICANT CHANGE UP (ref 11.5–15.5)
MCHC RBC-ENTMCNC: 27.1 PG — SIGNIFICANT CHANGE UP (ref 27–34)
MCHC RBC-ENTMCNC: 31.8 GM/DL — LOW (ref 32–36)
MCV RBC AUTO: 85.1 FL — SIGNIFICANT CHANGE UP (ref 80–100)
NRBC # BLD: 0 /100 WBCS — SIGNIFICANT CHANGE UP (ref 0–0)
NT-PROBNP SERPL-SCNC: 435 PG/ML — HIGH (ref 0–300)
PLATELET # BLD AUTO: 302 K/UL — SIGNIFICANT CHANGE UP (ref 150–400)
POTASSIUM SERPL-MCNC: 3.8 MMOL/L — SIGNIFICANT CHANGE UP (ref 3.5–5.3)
POTASSIUM SERPL-SCNC: 3.8 MMOL/L — SIGNIFICANT CHANGE UP (ref 3.5–5.3)
RBC # BLD: 4.43 M/UL — SIGNIFICANT CHANGE UP (ref 3.8–5.2)
RBC # FLD: 15.5 % — HIGH (ref 10.3–14.5)
RH IG SCN BLD-IMP: POSITIVE — SIGNIFICANT CHANGE UP
SODIUM SERPL-SCNC: 142 MMOL/L — SIGNIFICANT CHANGE UP (ref 135–145)
WBC # BLD: 16.52 K/UL — HIGH (ref 3.8–10.5)
WBC # FLD AUTO: 16.52 K/UL — HIGH (ref 3.8–10.5)

## 2023-05-12 PROCEDURE — 83880 ASSAY OF NATRIURETIC PEPTIDE: CPT

## 2023-05-12 PROCEDURE — 86900 BLOOD TYPING SEROLOGIC ABO: CPT

## 2023-05-12 PROCEDURE — 85027 COMPLETE CBC AUTOMATED: CPT

## 2023-05-12 PROCEDURE — 83036 HEMOGLOBIN GLYCOSYLATED A1C: CPT

## 2023-05-12 PROCEDURE — 86901 BLOOD TYPING SEROLOGIC RH(D): CPT

## 2023-05-12 PROCEDURE — 80048 BASIC METABOLIC PNL TOTAL CA: CPT

## 2023-05-12 PROCEDURE — 86850 RBC ANTIBODY SCREEN: CPT

## 2023-05-12 PROCEDURE — 87641 MR-STAPH DNA AMP PROBE: CPT

## 2023-05-12 PROCEDURE — 71046 X-RAY EXAM CHEST 2 VIEWS: CPT

## 2023-05-12 PROCEDURE — 36415 COLL VENOUS BLD VENIPUNCTURE: CPT

## 2023-05-12 PROCEDURE — G0463: CPT

## 2023-05-12 PROCEDURE — 87640 STAPH A DNA AMP PROBE: CPT

## 2023-05-12 PROCEDURE — 71046 X-RAY EXAM CHEST 2 VIEWS: CPT | Mod: 26

## 2023-05-12 PROCEDURE — 86923 COMPATIBILITY TEST ELECTRIC: CPT

## 2023-05-12 RX ORDER — SODIUM CHLORIDE 9 MG/ML
3 INJECTION INTRAMUSCULAR; INTRAVENOUS; SUBCUTANEOUS EVERY 8 HOURS
Refills: 0 | Status: DISCONTINUED | OUTPATIENT
Start: 2023-05-18 | End: 2023-05-18

## 2023-05-12 RX ORDER — LIDOCAINE HCL 20 MG/ML
0.2 VIAL (ML) INJECTION ONCE
Refills: 0 | Status: DISCONTINUED | OUTPATIENT
Start: 2023-05-18 | End: 2023-05-18

## 2023-05-12 RX ORDER — VANCOMYCIN HCL 1 G
750 VIAL (EA) INTRAVENOUS ONCE
Refills: 0 | Status: DISCONTINUED | OUTPATIENT
Start: 2023-05-18 | End: 2023-05-18

## 2023-05-12 RX ORDER — CHLORHEXIDINE GLUCONATE 213 G/1000ML
1 SOLUTION TOPICAL ONCE
Refills: 0 | Status: DISCONTINUED | OUTPATIENT
Start: 2023-05-18 | End: 2023-05-18

## 2023-05-12 NOTE — H&P PST ADULT - ASSESSMENT
Special Stains Stage 1 - Results: Base On Clearance Noted Above DASI ACTIVITIES: LIMITED PHYSICAL ACTIVITIES DUE TO SOB ON EXERTION, DOES OWN ADL'S  DASI SCORE: 3.59  MALLAMPATI: 2  PATIENT DENIES LOOSE OR BROKEN TOOTH    CAPRINI SCORE [CLOT updated 18]    AGE RELATED RISK FACTORS                                                       MOBILITY RELATED FACTORS  [ ] Age 41-60 years                                            (1 Point)                    [ ] Bed rest                                                        (1 Point)  [ ] Age: 61-74 years                                           (2 Points)                  [ ] Plaster cast                                                   (2 Points)  [3 ] Age= 75 years                                              (3 Points)                    [ ] Bed bound for more than 72 hours                 (2 Points)    DISEASE RELATED RISK FACTORS                                               GENDER SPECIFIC FACTORS  [ ] Edema in the lower extremities                       (1 Point)              [ ] Pregnancy                                                     (1 Point)  [ ] Varicose veins                                               (1 Point)                     [ ] Post-partum < 6 weeks                                   (1 Point)             [ ] BMI > 25 Kg/m2                                            (1 Point)                     [ ] Hormonal therapy  or oral contraception          (1 Point)                 [ ] Sepsis (in the previous month)                        (1 Point)               [ ] History of pregnancy complications                 (1 point)  [ ] Pneumonia or serious lung disease                                               [ ] Unexplained or recurrent                     (1 Point)           (in the previous month)                               (1 Point)  [ ] Abnormal pulmonary function test                     (1 Point)                 SURGERY RELATED RISK FACTORS  [ ] Acute myocardial infarction                              (1 Point)               [ ]  Section                                             (1 Point)  [ ] Congestive heart failure (in the previous month)  (1 Point)      [ ] Minor surgery                                                  (1 Point)   [ ] Inflammatory bowel disease                             (1 Point)               [ ] Arthroscopic surgery                                        (2 Points)  [ ] Central venous access                                      (2 Points)                [2 ] General surgery lasting more than 45 minutes (2 points)  [ ] Present or previous malignancy                     (2 Points)                [ ] Elective arthroplasty                                         (5 points)    [ ] Stroke (in the previous month)                          (5 Points)                                                                                                                                                           HEMATOLOGY RELATED FACTORS                                                 TRAUMA RELATED RISK FACTORS  [ ] Prior episodes of VTE                                     (3 Points)                [ ] Fracture of the hip, pelvis, or leg                       (5 Points)  [ ] Positive family history for VTE                         (3 Points)             [ ] Acute spinal cord injury (in the previous month)  (5 Points)  [ ] Prothrombin 78273 A                                     (3 Points)               [ ] Paralysis  (less than 1 month)                             (5 Points)  [ ] Factor V Leiden                                             (3 Points)                  [ ] Multiple Trauma within 1 month                        (5 Points)  [ ] Lupus anticoagulants                                     (3 Points)                                                           [ ] Anticardiolipin antibodies                               (3 Points)                                                       [ ] High homocysteine in the blood                      (3 Points)                                             [ ] Other congenital or acquired thrombophilia      (3 Points)                                                [ ] Heparin induced thrombocytopenia                  (3 Points)                                     Total Score [5]

## 2023-05-12 NOTE — H&P PST ADULT - NS PRO AD ANY ON CHART
Problem: Goal Outcome Summary  Goal: Goal Outcome Summary  Outcome: No Change  Pt alert in am, lethargic from noon to 1400, alert for last hour of shift. Up w/1-2 assist with leg brace for RLE. Incontinent x3, pt states too tired to ambulate to BSC. BG 91, 264, MD changed diabetic medications. Will continue to monitor.       No

## 2023-05-12 NOTE — H&P PST ADULT - HISTORY OF PRESENT ILLNESS
78 year old female with non rheumatic aortic valve stenosis, c/o worsening SOB on exertion, CVA 11/2021 (with memory loss residual), loop recorder in place, MVP, tachycardiac, costal chondritis,  emphysema/ COPD (no recent exacerbation), lung nodule, former smoker 20pk/yrs, psoriasis, HTN, hyperlipidemia, gastritis, ulcerative colitis, lumbar and cervical herniated discs, parathyroidectomy 2014, osteoporosis, prediabetes, presents for preop testing  78 year old female with non rheumatic aortic valve stenosis, c/o worsening SOB on exertion, CVA 11/2021 (with memory loss residual), loop recorder in place, MVP, tachycardiac, costal chondritis,  emphysema/ COPD (no recent exacerbation), lung nodule, former smoker 20pk/yrs, psoriasis, HTN, hyperlipidemia, gastritis, ulcerative colitis, lumbar and cervical herniated discs, parathyroidectomy 2014, osteoporosis, prediabetes, presents for preop testing for scheduled TAVR on 5/18/2023.

## 2023-05-12 NOTE — H&P PST ADULT - PROBLEM SELECTOR PLAN 1
Plan- scheduled for TAVR  preop instruction provided to both patient and her  in written and verbally, both verbalized understanding   fs on admit  pt to continue asa  patient to hold losartan and metoprolol and furosemide on DOS

## 2023-05-12 NOTE — H&P PST ADULT - NSICDXPROCEDURE_GEN_ALL_CORE_FT
PROCEDURES:  TAVR, percutaneous 12-May-2023 17:44:17 nonrheumatic aortic valve stenosis Guanakito Clements

## 2023-05-12 NOTE — H&P PST ADULT - NSICDXPASTMEDICALHX_GEN_ALL_CORE_FT
PAST MEDICAL HISTORY:  Cigarette smoker current    COPD (chronic obstructive pulmonary disease)     Costal chondritis     Emphysema lung     Gastritis     Herniated disc, cervical lumbar    History of memory loss     Hyperparathyroidism     MVP (mitral valve prolapse)     Osteoporosis     Prediabetes     Psoriatic arthritis     Stroke     Tachycardia     Ulcerative colitis     Varicose veins     Vertebral fracture, closed Lumbar x 4     PAST MEDICAL HISTORY:  Cigarette smoker current    COPD (chronic obstructive pulmonary disease)     Costal chondritis     Emphysema lung     Gastritis     Herniated disc, cervical lumbar    History of memory loss     Hyperparathyroidism     MVP (mitral valve prolapse)     Nonrheumatic aortic valve stenosis     Osteoporosis     Prediabetes     Psoriatic arthritis     Stroke     Tachycardia     Ulcerative colitis     Varicose veins     Vertebral fracture, closed Lumbar x 4

## 2023-05-13 LAB
A1C WITH ESTIMATED AVERAGE GLUCOSE RESULT: 6 % — HIGH (ref 4–5.6)
ESTIMATED AVERAGE GLUCOSE: 126 MG/DL — HIGH (ref 68–114)
MRSA PCR RESULT.: SIGNIFICANT CHANGE UP
S AUREUS DNA NOSE QL NAA+PROBE: SIGNIFICANT CHANGE UP

## 2023-05-17 PROBLEM — Z87.898 PERSONAL HISTORY OF OTHER SPECIFIED CONDITIONS: Chronic | Status: ACTIVE | Noted: 2023-05-12

## 2023-05-17 PROBLEM — I35.0 NONRHEUMATIC AORTIC (VALVE) STENOSIS: Chronic | Status: ACTIVE | Noted: 2023-05-12

## 2023-05-17 PROBLEM — I63.9 CEREBRAL INFARCTION, UNSPECIFIED: Chronic | Status: ACTIVE | Noted: 2023-05-12

## 2023-05-18 ENCOUNTER — APPOINTMENT (OUTPATIENT)
Dept: CARDIOTHORACIC SURGERY | Facility: HOSPITAL | Age: 78
End: 2023-05-18

## 2023-05-18 ENCOUNTER — NON-APPOINTMENT (OUTPATIENT)
Age: 78
End: 2023-05-18

## 2023-05-18 ENCOUNTER — OUTPATIENT (OUTPATIENT)
Dept: INPATIENT UNIT | Facility: HOSPITAL | Age: 78
LOS: 1 days | End: 2023-05-18
Payer: MEDICARE

## 2023-05-18 VITALS
HEIGHT: 61 IN | OXYGEN SATURATION: 100 % | HEART RATE: 98 BPM | RESPIRATION RATE: 18 BRPM | DIASTOLIC BLOOD PRESSURE: 72 MMHG | SYSTOLIC BLOOD PRESSURE: 147 MMHG | WEIGHT: 117.95 LBS | TEMPERATURE: 98 F

## 2023-05-18 VITALS
WEIGHT: 117.95 LBS | HEART RATE: 98 BPM | DIASTOLIC BLOOD PRESSURE: 72 MMHG | RESPIRATION RATE: 18 BRPM | TEMPERATURE: 98 F | OXYGEN SATURATION: 100 % | HEIGHT: 61 IN | SYSTOLIC BLOOD PRESSURE: 147 MMHG

## 2023-05-18 DIAGNOSIS — I35.0 NONRHEUMATIC AORTIC (VALVE) STENOSIS: ICD-10-CM

## 2023-05-18 DIAGNOSIS — Z98.89 OTHER SPECIFIED POSTPROCEDURAL STATES: Chronic | ICD-10-CM

## 2023-05-18 DIAGNOSIS — E89.2 POSTPROCEDURAL HYPOPARATHYROIDISM: Chronic | ICD-10-CM

## 2023-05-18 DIAGNOSIS — Z33.2 ENCOUNTER FOR ELECTIVE TERMINATION OF PREGNANCY: Chronic | ICD-10-CM

## 2023-05-18 LAB — GLUCOSE BLDC GLUCOMTR-MCNC: 150 MG/DL — HIGH (ref 70–99)

## 2023-05-18 PROCEDURE — 82962 GLUCOSE BLOOD TEST: CPT

## 2023-05-18 DEVICE — LIGATING CLIPS WECK HORIZON SMALL-WIDE (RED) 24: Type: IMPLANTABLE DEVICE | Status: FUNCTIONAL

## 2023-05-18 DEVICE — INTRODUCER PERCUTANEOUS INSERTION KIT: Type: IMPLANTABLE DEVICE | Status: FUNCTIONAL

## 2023-05-18 DEVICE — KIT A-LINE 1LUM 20G X 12CM SAFE KIT: Type: IMPLANTABLE DEVICE | Status: FUNCTIONAL

## 2023-05-18 DEVICE — CHEST DRAIN THORACIC ARGYLE PVC 28FR STRAIGHT: Type: IMPLANTABLE DEVICE | Status: FUNCTIONAL

## 2023-05-18 DEVICE — LIGATING CLIPS WECK HORIZON MEDIUM (BLUE) 24: Type: IMPLANTABLE DEVICE | Status: FUNCTIONAL

## 2023-05-18 DEVICE — LIGATING CLIPS WECK HORIZON LARGE (ORANGE) 6: Type: IMPLANTABLE DEVICE | Status: FUNCTIONAL

## 2023-05-18 RX ORDER — BUDESONIDE AND FORMOTEROL FUMARATE DIHYDRATE 160; 4.5 UG/1; UG/1
2 AEROSOL RESPIRATORY (INHALATION)
Refills: 0 | DISCHARGE

## 2023-05-18 NOTE — PATIENT PROFILE ADULT - ARRIVAL FROM
r rib cage pain radiating to r mid back since yesterday worse when she breaths, denies trauma or injury, no cp, no rash noted Home

## 2023-05-19 ENCOUNTER — APPOINTMENT (OUTPATIENT)
Dept: CT IMAGING | Facility: IMAGING CENTER | Age: 78
End: 2023-05-19
Payer: MEDICARE

## 2023-05-19 ENCOUNTER — OUTPATIENT (OUTPATIENT)
Dept: OUTPATIENT SERVICES | Facility: HOSPITAL | Age: 78
LOS: 1 days | End: 2023-05-19
Payer: MEDICARE

## 2023-05-19 DIAGNOSIS — Z98.89 OTHER SPECIFIED POSTPROCEDURAL STATES: Chronic | ICD-10-CM

## 2023-05-19 DIAGNOSIS — Z33.2 ENCOUNTER FOR ELECTIVE TERMINATION OF PREGNANCY: Chronic | ICD-10-CM

## 2023-05-19 DIAGNOSIS — Z00.8 ENCOUNTER FOR OTHER GENERAL EXAMINATION: ICD-10-CM

## 2023-05-19 PROCEDURE — 74176 CT ABD & PELVIS W/O CONTRAST: CPT | Mod: MH

## 2023-05-19 PROCEDURE — 74176 CT ABD & PELVIS W/O CONTRAST: CPT | Mod: 26,MH

## 2023-05-23 ENCOUNTER — APPOINTMENT (OUTPATIENT)
Dept: CARDIOTHORACIC SURGERY | Facility: CLINIC | Age: 78
End: 2023-05-23

## 2023-06-13 NOTE — H&P CARDIOLOGY - PRESSURE ULCER(S)
no ***INCOMPLETE***    61 y.o male with asthma, HIV, ?AIDS (has taken his HIV meds in 2 months, follows at [facility]), [facility] s/p multiple complaints  Per the chart, pt was waiting for donut and coffee that he ordered from a truck outside the hospital when he had a ? syncopal episode with head strike. As he is walking to the ED, he had a misstep and heard a pop in his ankle. He admitted to increased SOB and productive cough. No fever, chest pain, LE edema, palpitations  In the ER, initial vitals with /83, HR 83, R 16, T 98, O2 96%. Labs significant for WBC 3.13, H/H 10/31.2, K 3.4, Mag 1.4, BNP 3422, Alcohol level 80, trop 64.2-->73.4. EKG  CTH without acute infarct, CH chest with mild pulmonary edema but negative for PE or pneumonia. Xray tibia/fibula/ankle:  no fracture or dislocation,  no discrete lytic or blastic lesions  He received Lasix 40mg IV x 1, duoneb, potassium, magnesium repletion, Ketorolac and tylenol and transferred to [facility] for further management and telemetry ***INCOMPLETE***    61M, undomiciled, non compliant and poor historian, h/o cocaine use and PMHx of AF (non compliant w/ Eliquis), HFpEF, severe MR and HIV/AIDS (non compliant w/ HAART), presented to [facility] after ? syncopal episode and c/o ankle pain.     y.o male with asthma, HIV, ?AIDS (has taken his HIV meds in 2 months, follows at [facility]), presented to [facility] s/p multiple complaints  Per the chart, pt was waiting for donut and coffee that he ordered from a truck outside the hospital when he had a ? syncopal episode with head strike. As he is walking to the ED, he had a misstep and heard a pop in his ankle. He admitted to increased SOB and productive cough. No fever, chest pain, LE edema, palpitations  In the ER, initial vitals with /83, HR 83, R 16, T 98, O2 96%. Labs significant for WBC 3.13, H/H 10/31.2, K 3.4, Mag 1.4, BNP 3422, Alcohol level 80, trop 64.2-->73.4. EKG  CTH without acute infarct, CH chest with mild pulmonary edema but negative for PE or pneumonia. Xray tibia/fibula/ankle:  no fracture or dislocation,  no discrete lytic or blastic lesions  He received Lasix 40mg IV x 1, duoneb, potassium, magnesium repletion, Ketorolac and tylenol and transferred to [facility] for further management and telemetry ***INCOMPLETE***    61M, undomiciled, non compliant and poor historian, h/o cocaine use and PMHx of AF (non compliant w/ Eliquis), HFpEF, severe MR and HIV/AIDS (non compliant w/ HAART), presented to [facility] after ? syncopal episode w/ head strike and c/o ankle pain.     y.o male with asthma, HIV, ?AIDS (has taken his HIV meds in 2 months, follows at [facility]), presented to [facility] s/p multiple complaints  Per the chart, pt was waiting for donut and coffee that he ordered from a truck outside the hospital when he had a ? syncopal episode with head strike. As he is walking to the ED, he had a misstep and heard a pop in his ankle. He admitted to increased SOB and productive cough. No fever, chest pain, LE edema, palpitations  In the ER, initial vitals with /83, HR 83, R 16, T 98, O2 96%. Labs significant for WBC 3.13, H/H 10/31.2, K 3.4, Mag 1.4, BNP 3422, Alcohol level 80, trop 64.2-->73.4. EKG  CTH without acute infarct, CH chest with mild pulmonary edema but negative for PE or pneumonia. Xray tibia/fibula/ankle:  no fracture or dislocation,  no discrete lytic or blastic lesions  He received Lasix 40mg IV x 1, duoneb, potassium, magnesium repletion, Ketorolac and tylenol and transferred to [facility] for further management and telemetry 61M, undomiciled, non compliant and poor historian, h/o cocaine use and PMHx of AF (non compliant w/ Eliquis), HFpEF, severe MR and HIV/AIDS (non compliant w/ HAART), presented to [facility] after ? syncopal episode w/ head strike and c/o ankle pain. in ED, VSS, Lab significant for ETOH level 80, Trop I neg x 2, BNP 3K, WBC 3.13, H/H 10/31.2, K 3.4, Mag 1.4. EKG revealed rate controlled AFib (HR 80s), non ischemic. CTH/CT cervical revealed no acute traumatic injury to the head and cervical spine. CTA chest revealed no PE, mild pulm edema.  Xray Ankle/Tibia/Fibula revealed distal calf and ankle soft tissue swelling, no fractures/dislocations/osseous deformities. Pt received Lasix 40mg IVP x 1, Potassium and Magnesium repletion, Ketorolac and Tylenol. Pt was transferred to [facility] cardiac telemetry for telemetry monitor and further workup.    On arrival to [facility], pt noted to self convert to SR. Pt diuresed well w/ Lasix 40mg IVP x 2, and remained euvolemic. Pt refused telemetry monitoring and TTE. Pt remained asymptomatic and HD stable. Pt seen and examined at bedside this AM without any complaints or events overnight, VSS, labs and telemetry reviewed and pt stable for discharge as discussed with Dr. Donahue. Pt has received appropriate discharge instructions, including medication regimen, access site management and follow up with Dr. Mortensen on 6/27/23.    Discharge medications: Eliquis 5mg BID, Toprol 25mg QD, Lasix 20mg QD, Farxiga 10mg QD, Valsartan 40mg QD and Biktarvy 50-200mg QD.

## 2023-06-15 RX ORDER — PREDNISONE 50 MG/1
50 TABLET ORAL
Qty: 3 | Refills: 0 | Status: ACTIVE | COMMUNITY
Start: 2023-06-15 | End: 1900-01-01

## 2023-06-15 RX ORDER — DIPHENHYDRAMINE HCL 50 MG/1
50 CAPSULE ORAL
Qty: 1 | Refills: 0 | Status: ACTIVE | COMMUNITY
Start: 2023-06-15 | End: 1900-01-01

## 2023-06-29 ENCOUNTER — OUTPATIENT (OUTPATIENT)
Dept: OUTPATIENT SERVICES | Facility: HOSPITAL | Age: 78
LOS: 1 days | End: 2023-06-29
Payer: MEDICARE

## 2023-06-29 VITALS
HEIGHT: 61 IN | SYSTOLIC BLOOD PRESSURE: 125 MMHG | TEMPERATURE: 98 F | HEART RATE: 74 BPM | OXYGEN SATURATION: 96 % | WEIGHT: 113.98 LBS | DIASTOLIC BLOOD PRESSURE: 74 MMHG | RESPIRATION RATE: 20 BRPM

## 2023-06-29 DIAGNOSIS — I35.0 NONRHEUMATIC AORTIC (VALVE) STENOSIS: ICD-10-CM

## 2023-06-29 DIAGNOSIS — Z33.2 ENCOUNTER FOR ELECTIVE TERMINATION OF PREGNANCY: Chronic | ICD-10-CM

## 2023-06-29 DIAGNOSIS — E89.2 POSTPROCEDURAL HYPOPARATHYROIDISM: Chronic | ICD-10-CM

## 2023-06-29 DIAGNOSIS — Z01.818 ENCOUNTER FOR OTHER PREPROCEDURAL EXAMINATION: ICD-10-CM

## 2023-06-29 DIAGNOSIS — Z29.9 ENCOUNTER FOR PROPHYLACTIC MEASURES, UNSPECIFIED: ICD-10-CM

## 2023-06-29 DIAGNOSIS — Z98.89 OTHER SPECIFIED POSTPROCEDURAL STATES: Chronic | ICD-10-CM

## 2023-06-29 LAB
A1C WITH ESTIMATED AVERAGE GLUCOSE RESULT: 6.1 % — HIGH (ref 4–5.6)
ANION GAP SERPL CALC-SCNC: 12 MMOL/L — SIGNIFICANT CHANGE UP (ref 5–17)
BLD GP AB SCN SERPL QL: NEGATIVE — SIGNIFICANT CHANGE UP
BUN SERPL-MCNC: 22 MG/DL — SIGNIFICANT CHANGE UP (ref 7–23)
CALCIUM SERPL-MCNC: 9.8 MG/DL — SIGNIFICANT CHANGE UP (ref 8.4–10.5)
CHLORIDE SERPL-SCNC: 104 MMOL/L — SIGNIFICANT CHANGE UP (ref 96–108)
CO2 SERPL-SCNC: 25 MMOL/L — SIGNIFICANT CHANGE UP (ref 22–31)
CREAT SERPL-MCNC: 0.77 MG/DL — SIGNIFICANT CHANGE UP (ref 0.5–1.3)
EGFR: 78 ML/MIN/1.73M2 — SIGNIFICANT CHANGE UP
ESTIMATED AVERAGE GLUCOSE: 128 MG/DL — HIGH (ref 68–114)
GLUCOSE SERPL-MCNC: 104 MG/DL — HIGH (ref 70–99)
HCT VFR BLD CALC: 38.1 % — SIGNIFICANT CHANGE UP (ref 34.5–45)
HGB BLD-MCNC: 11.7 G/DL — SIGNIFICANT CHANGE UP (ref 11.5–15.5)
MCHC RBC-ENTMCNC: 26.7 PG — LOW (ref 27–34)
MCHC RBC-ENTMCNC: 30.7 GM/DL — LOW (ref 32–36)
MCV RBC AUTO: 86.8 FL — SIGNIFICANT CHANGE UP (ref 80–100)
MRSA PCR RESULT.: SIGNIFICANT CHANGE UP
NRBC # BLD: 0 /100 WBCS — SIGNIFICANT CHANGE UP (ref 0–0)
PLATELET # BLD AUTO: 293 K/UL — SIGNIFICANT CHANGE UP (ref 150–400)
POTASSIUM SERPL-MCNC: 3.9 MMOL/L — SIGNIFICANT CHANGE UP (ref 3.5–5.3)
POTASSIUM SERPL-SCNC: 3.9 MMOL/L — SIGNIFICANT CHANGE UP (ref 3.5–5.3)
RBC # BLD: 4.39 M/UL — SIGNIFICANT CHANGE UP (ref 3.8–5.2)
RBC # FLD: 14.7 % — HIGH (ref 10.3–14.5)
RH IG SCN BLD-IMP: POSITIVE — SIGNIFICANT CHANGE UP
S AUREUS DNA NOSE QL NAA+PROBE: SIGNIFICANT CHANGE UP
SODIUM SERPL-SCNC: 141 MMOL/L — SIGNIFICANT CHANGE UP (ref 135–145)
WBC # BLD: 10.37 K/UL — SIGNIFICANT CHANGE UP (ref 3.8–10.5)
WBC # FLD AUTO: 10.37 K/UL — SIGNIFICANT CHANGE UP (ref 3.8–10.5)

## 2023-06-29 PROCEDURE — 87641 MR-STAPH DNA AMP PROBE: CPT

## 2023-06-29 PROCEDURE — 86923 COMPATIBILITY TEST ELECTRIC: CPT

## 2023-06-29 PROCEDURE — 86900 BLOOD TYPING SEROLOGIC ABO: CPT

## 2023-06-29 PROCEDURE — 86901 BLOOD TYPING SEROLOGIC RH(D): CPT

## 2023-06-29 PROCEDURE — 80048 BASIC METABOLIC PNL TOTAL CA: CPT

## 2023-06-29 PROCEDURE — 85027 COMPLETE CBC AUTOMATED: CPT

## 2023-06-29 PROCEDURE — 87640 STAPH A DNA AMP PROBE: CPT

## 2023-06-29 PROCEDURE — 36415 COLL VENOUS BLD VENIPUNCTURE: CPT

## 2023-06-29 PROCEDURE — 93880 EXTRACRANIAL BILAT STUDY: CPT

## 2023-06-29 PROCEDURE — 71046 X-RAY EXAM CHEST 2 VIEWS: CPT | Mod: 26

## 2023-06-29 PROCEDURE — 71046 X-RAY EXAM CHEST 2 VIEWS: CPT

## 2023-06-29 PROCEDURE — 93880 EXTRACRANIAL BILAT STUDY: CPT | Mod: 26

## 2023-06-29 PROCEDURE — 83036 HEMOGLOBIN GLYCOSYLATED A1C: CPT

## 2023-06-29 PROCEDURE — 86850 RBC ANTIBODY SCREEN: CPT

## 2023-06-29 PROCEDURE — G0463: CPT

## 2023-06-29 RX ORDER — POTASSIUM CHLORIDE 20 MEQ
1 PACKET (EA) ORAL
Refills: 0 | DISCHARGE

## 2023-06-29 NOTE — H&P PST ADULT - PROBLEM SELECTOR PLAN 1
Scheduled for TAVR  preop instruction provided in writing and verbally, both patient and her  verbalized understanding  fs on admit   labs drawn per protocol, pending result

## 2023-06-29 NOTE — H&P PST ADULT - NSICDXPASTMEDICALHX_GEN_ALL_CORE_FT
PAST MEDICAL HISTORY:  Cigarette smoker current    COPD (chronic obstructive pulmonary disease)     Costal chondritis     Emphysema lung     Gastritis     Herniated disc, cervical lumbar    History of memory loss     Hyperparathyroidism     MVP (mitral valve prolapse)     Nonrheumatic aortic valve stenosis     Osteoporosis     Prediabetes     Psoriatic arthritis     Stroke     Tachycardia     Ulcerative colitis     Varicose veins     Vertebral fracture, closed Lumbar x 4

## 2023-06-29 NOTE — H&P PST ADULT - TEMPERATURE IN CELSIUS (DEGREES C)
07/13/2022  Michael Paredes Gabriela Qureshi MD;  Location: [facility] CATH LAB;  Len Barakat Jr., MD;  Location: [facility] CATH LAB;  Len Barakat Jr., MD;  Location: [facility] CATH LAB;  Gabriela Qureshi MD;  Location: [facility] CATH LAB;  Service: Cardiology;  Laterality: N/A;  [facility]    CORONARY ANGIOGRAPHY N/A 6/27/2018    Procedure: ANGIOGRAM, CORONARY ARTERY;  Surgeon: Gabriela Qureshi MD;  Location: [facility] CATH LAB;  36.4

## 2023-06-29 NOTE — H&P PST ADULT - NSICDXPROCEDURE_GEN_ALL_CORE_FT
PROCEDURES:  TAVR, percutaneous 29-Jun-2023 14:30:33 nonrheumatic aortic valve stenosis Guanakito Clements

## 2023-06-29 NOTE — H&P PST ADULT - ASSESSMENT
DAISI ACTIVITIES: LIMITED DUE TO WORSEN SOB ON EXERTION, DOES OWN ADL'S   DASI SCORE: 3.59  DENIES LOOSE OR BROKEN TOOTH   CAPRINI SCORE [CLOT updated 18]    AGE RELATED RISK FACTORS                                                       MOBILITY RELATED FACTORS  [ ] Age 41-60 years                                            (1 Point)                    [ ] Bed rest                                                        (1 Point)  [ ] Age: 61-74 years                                           (2 Points)                  [ ] Plaster cast                                                   (2 Points)  3[ ] Age= 75 years                                              (3 Points)                    [ ] Bed bound for more than 72 hours                 (2 Points)    DISEASE RELATED RISK FACTORS                                               GENDER SPECIFIC FACTORS  [ ] Edema in the lower extremities                       (1 Point)              [ ] Pregnancy                                                     (1 Point)  [ ] Varicose veins                                               (1 Point)                     [ ] Post-partum < 6 weeks                                   (1 Point)             [ ] BMI > 25 Kg/m2                                            (1 Point)                     [ ] Hormonal therapy  or oral contraception          (1 Point)                 [ ] Sepsis (in the previous month)                        (1 Point)               [ ] History of pregnancy complications                 (1 point)  [ ] Pneumonia or serious lung disease                                               [ ] Unexplained or recurrent                     (1 Point)           (in the previous month)                               (1 Point)  [ ] Abnormal pulmonary function test                     (1 Point)                 SURGERY RELATED RISK FACTORS  [ ] Acute myocardial infarction                              (1 Point)               [ ]  Section                                             (1 Point)  [ ] Congestive heart failure (in the previous month)  (1 Point)      [ ] Minor surgery                                                  (1 Point)   [ ] Inflammatory bowel disease                             (1 Point)               [ ] Arthroscopic surgery                                        (2 Points)  [ ] Central venous access                                      (2 Points)                [ 2] General surgery lasting more than 45 minutes (2 points)  [ ] Present or previous malignancy                     (2 Points)                [ ] Elective arthroplasty                                         (5 points)    [ ] Stroke (in the previous month)                          (5 Points)                                                                                                                                                           HEMATOLOGY RELATED FACTORS                                                 TRAUMA RELATED RISK FACTORS  [ ] Prior episodes of VTE                                     (3 Points)                [ ] Fracture of the hip, pelvis, or leg                       (5 Points)  [ ] Positive family history for VTE                         (3 Points)             [ ] Acute spinal cord injury (in the previous month)  (5 Points)  [ ] Prothrombin 42240 A                                     (3 Points)               [ ] Paralysis  (less than 1 month)                             (5 Points)  [ ] Factor V Leiden                                             (3 Points)                  [ ] Multiple Trauma within 1 month                        (5 Points)  [ ] Lupus anticoagulants                                     (3 Points)                                                           [ ] Anticardiolipin antibodies                               (3 Points)                                                       [ ] High homocysteine in the blood                      (3 Points)                                             [ ] Other congenital or acquired thrombophilia      (3 Points)                                                [ ] Heparin induced thrombocytopenia                  (3 Points)                                     Total Score [5]

## 2023-06-29 NOTE — H&P PST ADULT - PRIMARY CARE PROVIDER
[Time Spent: ___ minutes] : I have spent [unfilled] minutes of time on the encounter. Christopher Mason # [phone]

## 2023-06-29 NOTE — H&P PST ADULT - OTHER CARE PROVIDERS
Gastro: Dr. Jose Watkinsch [phone] Cardiologist: Dr. Brijesh Kebede # [phone]                      Gastro: Dr. Jose Raines [phone]

## 2023-06-29 NOTE — H&P PST ADULT - HISTORY OF PRESENT ILLNESS
november 2021 stroke cognitive impairment   psoriasis arthritis  78 year old female scheduled for TAVR on 7/6/2023, originally scheduled last month 5/18/2023, was cancelled in SDA cause patient admitted she ate around 1 AM that morning and also had some rectal staining. She has h/o     november 2021 stroke cognitive impairment   psoriasis arthritis  78 year old female scheduled for TAVR on 7/6/2023 for non rheumatoid aortic valve stenosis, originally scheduled last month 5/18/2023, was cancelled in SDA cause patient admitted she ate around 1 AM that morning and also had some rectal staining. She complaints of worsen SOB on exertion, has a h/o CVA 11/2021 with cognitive impairment, MVP, tachycardia, costal chondritis, loop recorder in place, former smoker 20pk/yrs, COPD (no recent exacerbation), lung nodule (monitor), HTN, hyperlipidemia, ulcerative colitis, gastritis, lumbar and cervical herniated discs, parathyroidectomy 2014, osteoporosis, prediabetes, psoriatic arthritis.

## 2023-07-05 ENCOUNTER — TRANSCRIPTION ENCOUNTER (OUTPATIENT)
Age: 78
End: 2023-07-05

## 2023-07-06 ENCOUNTER — APPOINTMENT (OUTPATIENT)
Dept: CARDIOTHORACIC SURGERY | Facility: HOSPITAL | Age: 78
End: 2023-07-06

## 2023-07-06 ENCOUNTER — INPATIENT (INPATIENT)
Facility: HOSPITAL | Age: 78
LOS: 0 days | Discharge: HOME | DRG: 267 | End: 2023-07-07
Attending: THORACIC SURGERY (CARDIOTHORACIC VASCULAR SURGERY) | Admitting: THORACIC SURGERY (CARDIOTHORACIC VASCULAR SURGERY)
Payer: MEDICARE

## 2023-07-06 VITALS
WEIGHT: 115.52 LBS | DIASTOLIC BLOOD PRESSURE: 75 MMHG | SYSTOLIC BLOOD PRESSURE: 148 MMHG | RESPIRATION RATE: 18 BRPM | HEIGHT: 60.98 IN | TEMPERATURE: 97 F | OXYGEN SATURATION: 95 % | HEART RATE: 84 BPM

## 2023-07-06 DIAGNOSIS — Z98.89 OTHER SPECIFIED POSTPROCEDURAL STATES: Chronic | ICD-10-CM

## 2023-07-06 DIAGNOSIS — Z33.2 ENCOUNTER FOR ELECTIVE TERMINATION OF PREGNANCY: Chronic | ICD-10-CM

## 2023-07-06 DIAGNOSIS — I35.0 NONRHEUMATIC AORTIC (VALVE) STENOSIS: ICD-10-CM

## 2023-07-06 DIAGNOSIS — Z95.2 PRESENCE OF PROSTHETIC HEART VALVE: ICD-10-CM

## 2023-07-06 DIAGNOSIS — E89.2 POSTPROCEDURAL HYPOPARATHYROIDISM: Chronic | ICD-10-CM

## 2023-07-06 LAB
ANION GAP SERPL CALC-SCNC: 10 MMOL/L — SIGNIFICANT CHANGE UP (ref 5–17)
BUN SERPL-MCNC: 15 MG/DL — SIGNIFICANT CHANGE UP (ref 7–23)
CALCIUM SERPL-MCNC: 9.2 MG/DL — SIGNIFICANT CHANGE UP (ref 8.4–10.5)
CHLORIDE SERPL-SCNC: 108 MMOL/L — SIGNIFICANT CHANGE UP (ref 96–108)
CO2 SERPL-SCNC: 23 MMOL/L — SIGNIFICANT CHANGE UP (ref 22–31)
CREAT SERPL-MCNC: 0.56 MG/DL — SIGNIFICANT CHANGE UP (ref 0.5–1.3)
EGFR: 92 ML/MIN/1.73M2 — SIGNIFICANT CHANGE UP
GLUCOSE BLDC GLUCOMTR-MCNC: 186 MG/DL — HIGH (ref 70–99)
GLUCOSE SERPL-MCNC: 137 MG/DL — HIGH (ref 70–99)
HCT VFR BLD CALC: 35.5 % — SIGNIFICANT CHANGE UP (ref 34.5–45)
HGB BLD-MCNC: 11.1 G/DL — LOW (ref 11.5–15.5)
MCHC RBC-ENTMCNC: 26.6 PG — LOW (ref 27–34)
MCHC RBC-ENTMCNC: 31.3 GM/DL — LOW (ref 32–36)
MCV RBC AUTO: 84.9 FL — SIGNIFICANT CHANGE UP (ref 80–100)
NRBC # BLD: 0 /100 WBCS — SIGNIFICANT CHANGE UP (ref 0–0)
PLATELET # BLD AUTO: 298 K/UL — SIGNIFICANT CHANGE UP (ref 150–400)
POTASSIUM SERPL-MCNC: 4.2 MMOL/L — SIGNIFICANT CHANGE UP (ref 3.5–5.3)
POTASSIUM SERPL-SCNC: 4.2 MMOL/L — SIGNIFICANT CHANGE UP (ref 3.5–5.3)
RBC # BLD: 4.18 M/UL — SIGNIFICANT CHANGE UP (ref 3.8–5.2)
RBC # FLD: 14.6 % — HIGH (ref 10.3–14.5)
SODIUM SERPL-SCNC: 141 MMOL/L — SIGNIFICANT CHANGE UP (ref 135–145)
WBC # BLD: 13.51 K/UL — HIGH (ref 3.8–10.5)
WBC # FLD AUTO: 13.51 K/UL — HIGH (ref 3.8–10.5)

## 2023-07-06 PROCEDURE — 33361 REPLACE AORTIC VALVE PERQ: CPT | Mod: 62,Q0

## 2023-07-06 PROCEDURE — 93306 TTE W/DOPPLER COMPLETE: CPT | Mod: 26

## 2023-07-06 PROCEDURE — 99231 SBSQ HOSP IP/OBS SF/LOW 25: CPT

## 2023-07-06 PROCEDURE — 93010 ELECTROCARDIOGRAM REPORT: CPT

## 2023-07-06 DEVICE — CATH VASCULAR EXPO FEMORAL LEFT CURVE (FL4) 0.045" X 5FR X 100CM: Type: IMPLANTABLE DEVICE | Status: FUNCTIONAL

## 2023-07-06 DEVICE — CATH TAVR EVOLUT FX 14FR 23-29MM: Type: IMPLANTABLE DEVICE | Status: FUNCTIONAL

## 2023-07-06 DEVICE — PACER KT BLLN FLW DIR 5FR: Type: IMPLANTABLE DEVICE | Status: FUNCTIONAL

## 2023-07-06 DEVICE — GLDWIRE ADVANTAGE .035X260 CM: Type: IMPLANTABLE DEVICE | Status: FUNCTIONAL

## 2023-07-06 DEVICE — WIRE GUIDE EXCHANGE J TIP 3MM X 260CM: Type: IMPLANTABLE DEVICE | Status: FUNCTIONAL

## 2023-07-06 DEVICE — GWIRE GUID  0.035INX150CM: Type: IMPLANTABLE DEVICE | Status: FUNCTIONAL

## 2023-07-06 DEVICE — IMPLANTABLE DEVICE: Type: IMPLANTABLE DEVICE | Status: FUNCTIONAL

## 2023-07-06 DEVICE — VALVE TAVR EVOLUT FX 26MM: Type: IMPLANTABLE DEVICE | Status: FUNCTIONAL

## 2023-07-06 DEVICE — WIRE GD SAFARI2 275CM XSML CRV: Type: IMPLANTABLE DEVICE | Status: FUNCTIONAL

## 2023-07-06 DEVICE — LOADING SYSTEM EVOLUT FX 23-29MM: Type: IMPLANTABLE DEVICE | Status: FUNCTIONAL

## 2023-07-06 DEVICE — SHEATH INTRODUCER TERUMO PINNACLE CORONARY 8FR X 10CM X 0.038" MINI WIRE: Type: IMPLANTABLE DEVICE | Status: FUNCTIONAL

## 2023-07-06 DEVICE — SHEATH INTRODUCER TERUMO PINNACLE CORONARY 6FR X 10CM X 0.038" MINI WIRE: Type: IMPLANTABLE DEVICE | Status: FUNCTIONAL

## 2023-07-06 DEVICE — SUT PERCLOSE PROGLIDE 6FR: Type: IMPLANTABLE DEVICE | Status: FUNCTIONAL

## 2023-07-06 DEVICE — GWIRE STR .038X180 STIFF LONG TAPR: Type: IMPLANTABLE DEVICE | Status: FUNCTIONAL

## 2023-07-06 DEVICE — CATH VASCULAR EXPO VENTRICULAR PIGTAIL CURVE (PIG 145) 0.045" X 5FR X 10CM: Type: IMPLANTABLE DEVICE | Status: FUNCTIONAL

## 2023-07-06 DEVICE — SHEATH INTRO DRYSEAL FLEX 14FR 33CM: Type: IMPLANTABLE DEVICE | Status: FUNCTIONAL

## 2023-07-06 DEVICE — CATH VASCULAR EXPO VENTRICULAR PIGTAIL CURVE (PIG) 0.045" X 5FR X 100CM: Type: IMPLANTABLE DEVICE | Status: FUNCTIONAL

## 2023-07-06 DEVICE — CATH VASCULAR EXPO EXPO AMPLATZ LEFT CURVE (AL1) 0.045" X 5FR X 100CM: Type: IMPLANTABLE DEVICE | Status: FUNCTIONAL

## 2023-07-06 RX ORDER — ROSUVASTATIN CALCIUM 5 MG/1
10 TABLET ORAL AT BEDTIME
Refills: 0 | Status: DISCONTINUED | OUTPATIENT
Start: 2023-07-06 | End: 2023-07-07

## 2023-07-06 RX ORDER — POTASSIUM CHLORIDE 20 MEQ
2 PACKET (EA) ORAL
Refills: 0 | DISCHARGE

## 2023-07-06 RX ORDER — ROSUVASTATIN CALCIUM 5 MG/1
1 TABLET ORAL
Refills: 0 | DISCHARGE

## 2023-07-06 RX ORDER — BUDESONIDE, GLYCOPYRROLATE, AND FORMOTEROL FUMARATE 160; 9; 4.8 UG/1; UG/1; UG/1
2 AEROSOL, METERED RESPIRATORY (INHALATION)
Refills: 0 | DISCHARGE

## 2023-07-06 RX ORDER — CEFUROXIME AXETIL 250 MG
1500 TABLET ORAL EVERY 8 HOURS
Refills: 0 | Status: COMPLETED | OUTPATIENT
Start: 2023-07-06 | End: 2023-07-07

## 2023-07-06 RX ORDER — ASPIRIN/CALCIUM CARB/MAGNESIUM 324 MG
81 TABLET ORAL DAILY
Refills: 0 | Status: DISCONTINUED | OUTPATIENT
Start: 2023-07-06 | End: 2023-07-07

## 2023-07-06 RX ORDER — FUROSEMIDE 40 MG
1 TABLET ORAL
Refills: 0 | DISCHARGE

## 2023-07-06 RX ORDER — ASPIRIN/CALCIUM CARB/MAGNESIUM 324 MG
81 TABLET ORAL DAILY
Refills: 0 | Status: DISCONTINUED | OUTPATIENT
Start: 2023-07-06 | End: 2023-07-06

## 2023-07-06 RX ORDER — LOSARTAN POTASSIUM 100 MG/1
25 TABLET, FILM COATED ORAL DAILY
Refills: 0 | Status: DISCONTINUED | OUTPATIENT
Start: 2023-07-06 | End: 2023-07-07

## 2023-07-06 RX ORDER — LOSARTAN POTASSIUM 100 MG/1
2 TABLET, FILM COATED ORAL
Refills: 0 | DISCHARGE

## 2023-07-06 RX ADMIN — Medication 81 MILLIGRAM(S): at 17:12

## 2023-07-06 RX ADMIN — LOSARTAN POTASSIUM 25 MILLIGRAM(S): 100 TABLET, FILM COATED ORAL at 17:46

## 2023-07-06 RX ADMIN — Medication 100 MILLIGRAM(S): at 17:11

## 2023-07-06 NOTE — PATIENT PROFILE ADULT - FALL HARM RISK - HARM RISK INTERVENTIONS
Family Assistance with ambulation/Assistance OOB with selected safe patient handling equipment/Communicate Risk of Fall with Harm to all staff/Discuss with provider need for PT consult/Monitor gait and stability/Provide patient with walking aids - walker, cane, crutches/Reinforce activity limits and safety measures with patient and family/Tailored Fall Risk Interventions/Visual Cue: Yellow wristband and red socks/Bed in lowest position, wheels locked, appropriate side rails in place/Call bell, personal items and telephone in reach/Instruct patient to call for assistance before getting out of bed or chair/Non-slip footwear when patient is out of bed/[place] to call system/Physically safe environment - no spills, clutter or unnecessary equipment/Purposeful Proactive Rounding/Room/bathroom lighting operational, light cord in reach

## 2023-07-06 NOTE — BRIEF OPERATIVE NOTE - NSICDXBRIEFPREOP_GEN_ALL_CORE_FT
PRE-OP DIAGNOSIS:  Severe aortic stenosis 06-Jul-2023 17:11:17  Rogelio Rios  Severe aortic valve stenosis 06-Jul-2023 17:13:17  Rogelio Rios

## 2023-07-06 NOTE — CHART NOTE - NSCHARTNOTEFT_GEN_A_CORE
79 yo F PMHx CVA 11/2021 with cognitive impairment, MVP, tachycardia, costal chondritis, ILR,, former smoker 20pk/yrs, COPD, lung nodule (monitor), HTN, HLD, ulcerative colitis, gastritis, C, L spine discs, parathyroidectomy 2014, osteoporosis, prediabetes, psoriatic arthritis, severe AS.     s/p TAVR     Rt groin Atrial access Perclose x2, 8Fr Venous access: pulled manually in lab.   Left Groin 14Fr 26 mm Evolut Fx perslose x2,   SR: some T wave changes with wide QRS     Bilateral groin site benign,   VSS   pt is alert and Ox4, denies acute pain or SOB      Plan   Admit to Tele 2 K[facility]   Continue to monitor  Cefuroxime 1.5Gm Q 8 Hrs 2 more doses (18:00)  TTE in am   ASA 81mg in 6 hours (17:00)

## 2023-07-06 NOTE — PATIENT PROFILE ADULT - FALL HARM RISK - CONCLUSION
Dr. Igor Malin  [address] [facility] [address]  Dept: [phone]  Dept Fax: [phone]    6/30/19    Patient: Cathy Travis  YOB: 1963    Dear CHARLENE Navarro CNP,    I had the pleasure of seeing one of your patients, Suzette Smoker today in the office. Below are the relevant portions of my assessment and plan of care. IMPRESSION:     1. Chronic pain of left knee    2. History of left knee replacement    3. Arthritis of left knee      PLAN:     Reviewed today's radiologies of the left knee. Discussed etiology and natural history of weakness of the left knee from the total knee replacement. The treatment options may include oral anti-inflammatories, bracing, injections, advanced imaging, activity modification, physical therapy and/or surgical intervention. The patient would like to proceed with formal physical therapy. Also discussed with the numbness along the lateral aspect of the knee and pain with kneeling in the left knee is normal. Patient states that due to some personal issues, she may have to hold off with scheduling therapy. Instructed the patient that I would see her back 8 weeks from the time she starts therapy. In the meantime I can see her as needed. We discussed that the patient should call us with any concerns or questions. Thank you for allowing me to participate in the care of this patient. I will keep you updated on this patient's follow up and I look forward to serving you and your patients again in the future. Please don't hesitate to contact me at my mobile number [phone].       Jenni Dyer Fall with Harm Risk

## 2023-07-07 ENCOUNTER — TRANSCRIPTION ENCOUNTER (OUTPATIENT)
Age: 78
End: 2023-07-07

## 2023-07-07 VITALS — WEIGHT: 121.47 LBS

## 2023-07-07 LAB
ALBUMIN SERPL ELPH-MCNC: 4.4 G/DL — SIGNIFICANT CHANGE UP (ref 3.3–5)
ALP SERPL-CCNC: 91 U/L — SIGNIFICANT CHANGE UP (ref 40–120)
ALT FLD-CCNC: 9 U/L — LOW (ref 10–45)
ANION GAP SERPL CALC-SCNC: 15 MMOL/L — SIGNIFICANT CHANGE UP (ref 5–17)
AST SERPL-CCNC: 20 U/L — SIGNIFICANT CHANGE UP (ref 10–40)
BILIRUB SERPL-MCNC: 0.2 MG/DL — SIGNIFICANT CHANGE UP (ref 0.2–1.2)
BUN SERPL-MCNC: 25 MG/DL — HIGH (ref 7–23)
CALCIUM SERPL-MCNC: 9.8 MG/DL — SIGNIFICANT CHANGE UP (ref 8.4–10.5)
CHLORIDE SERPL-SCNC: 105 MMOL/L — SIGNIFICANT CHANGE UP (ref 96–108)
CO2 SERPL-SCNC: 22 MMOL/L — SIGNIFICANT CHANGE UP (ref 22–31)
CREAT SERPL-MCNC: 0.75 MG/DL — SIGNIFICANT CHANGE UP (ref 0.5–1.3)
EGFR: 80 ML/MIN/1.73M2 — SIGNIFICANT CHANGE UP
GLUCOSE SERPL-MCNC: 109 MG/DL — HIGH (ref 70–99)
HCT VFR BLD CALC: 36.5 % — SIGNIFICANT CHANGE UP (ref 34.5–45)
HGB BLD-MCNC: 11.3 G/DL — LOW (ref 11.5–15.5)
MCHC RBC-ENTMCNC: 26.8 PG — LOW (ref 27–34)
MCHC RBC-ENTMCNC: 31 GM/DL — LOW (ref 32–36)
MCV RBC AUTO: 86.5 FL — SIGNIFICANT CHANGE UP (ref 80–100)
NRBC # BLD: 0 /100 WBCS — SIGNIFICANT CHANGE UP (ref 0–0)
PLATELET # BLD AUTO: 294 K/UL — SIGNIFICANT CHANGE UP (ref 150–400)
POTASSIUM SERPL-MCNC: 3.9 MMOL/L — SIGNIFICANT CHANGE UP (ref 3.5–5.3)
POTASSIUM SERPL-SCNC: 3.9 MMOL/L — SIGNIFICANT CHANGE UP (ref 3.5–5.3)
PROT SERPL-MCNC: 7.3 G/DL — SIGNIFICANT CHANGE UP (ref 6–8.3)
RBC # BLD: 4.22 M/UL — SIGNIFICANT CHANGE UP (ref 3.8–5.2)
RBC # FLD: 14.8 % — HIGH (ref 10.3–14.5)
SODIUM SERPL-SCNC: 142 MMOL/L — SIGNIFICANT CHANGE UP (ref 135–145)
WBC # BLD: 22.39 K/UL — HIGH (ref 3.8–10.5)
WBC # FLD AUTO: 22.39 K/UL — HIGH (ref 3.8–10.5)

## 2023-07-07 PROCEDURE — 76000 FLUOROSCOPY <1 HR PHYS/QHP: CPT

## 2023-07-07 PROCEDURE — 85027 COMPLETE CBC AUTOMATED: CPT

## 2023-07-07 PROCEDURE — 93010 ELECTROCARDIOGRAM REPORT: CPT

## 2023-07-07 PROCEDURE — 99232 SBSQ HOSP IP/OBS MODERATE 35: CPT | Mod: FS

## 2023-07-07 PROCEDURE — 80053 COMPREHEN METABOLIC PANEL: CPT

## 2023-07-07 PROCEDURE — C1769: CPT

## 2023-07-07 PROCEDURE — C1887: CPT

## 2023-07-07 PROCEDURE — 80048 BASIC METABOLIC PNL TOTAL CA: CPT

## 2023-07-07 PROCEDURE — C1894: CPT

## 2023-07-07 PROCEDURE — 93306 TTE W/DOPPLER COMPLETE: CPT

## 2023-07-07 PROCEDURE — 82962 GLUCOSE BLOOD TEST: CPT

## 2023-07-07 PROCEDURE — C1725: CPT

## 2023-07-07 PROCEDURE — 93005 ELECTROCARDIOGRAM TRACING: CPT

## 2023-07-07 PROCEDURE — C1760: CPT

## 2023-07-07 PROCEDURE — 93306 TTE W/DOPPLER COMPLETE: CPT | Mod: 26

## 2023-07-07 PROCEDURE — C1889: CPT

## 2023-07-07 RX ORDER — UBIDECARENONE 100 MG
1 CAPSULE ORAL
Refills: 0 | DISCHARGE

## 2023-07-07 RX ORDER — ASCORBIC ACID 60 MG
1 TABLET,CHEWABLE ORAL
Refills: 0 | DISCHARGE

## 2023-07-07 RX ADMIN — Medication 100 MILLIGRAM(S): at 01:58

## 2023-07-07 RX ADMIN — ROSUVASTATIN CALCIUM 10 MILLIGRAM(S): 5 TABLET ORAL at 00:59

## 2023-07-07 RX ADMIN — Medication 81 MILLIGRAM(S): at 09:10

## 2023-07-07 RX ADMIN — LOSARTAN POTASSIUM 25 MILLIGRAM(S): 100 TABLET, FILM COATED ORAL at 06:05

## 2023-07-07 NOTE — DISCHARGE NOTE PROVIDER - NSDCFUADDAPPT_GEN_ALL_CORE_FT
Followup with Dr Tillman July 11 at 830am  Follow up appointment with [facility] Cardiac surgery office at [facility] entrance 3  first floor on left after entering [facility]  Office telephone     Your Care Navigator Nurse Practitioner will be in touch to see you in your home within a few days from discharge. The Follow Your Heart program can help ensure you understand your medications, discharge instructions and answer any questions you may have at that time. They are also a great source to address concerns during the day and may be reached at [phone].

## 2023-07-07 NOTE — DISCHARGE NOTE PROVIDER - NSDCPNSUBOBJ_GEN_ALL_CORE
vasyl Note:   · Provider Specialty  CT Surgery    Reason for Admission:   Reason for Admission:  · Reason for Admission  TAVR      · Subjective and Objective:   Subjective:  "Hello"  OOB chair  Family at bedside      Tele:  SR  60-70           V/S:                    T(F): 97.7 (23 @ 05:09), Max: 97.7 (23 @ 11:00)  HR: 72 (23 @ 05:09) (62 - 76)  BP: 167/76 (23 @ 05:09) (115/70 - 167/76)  RR: 17 (23 @ 05:09) (16 - 18)  SpO2: 97% (23 @ 05:09) (89% - 97%)  Wt(kg): --      LV EF:      Labs:      142  |  105  |  25<H>  ----------------------------<  109<H>  3.9   |  22  |  0.75    Ca    9.8      2023 06:28    TPro  7.3  /  Alb  4.4  /  TBili  0.2  /  DBili  x   /  AST  20  /  ALT  9<L>  /  AlkPhos  91                                 11.3   22.39 )-----------( 294      ( 2023 06:28 )             36.5             CAPILLARY BLOOD GLUCOSE               CXR:    I&O's Detail    2023 07:01  -  2023 07:00  --------------------------------------------------------  IN:    IV PiggyBack: 100 mL    Oral Fluid: 520 mL  Total IN: 620 mL    OUT:    Voided (mL): 150 mL  Total OUT: 150 mL    Total NET: 470 mL      2023 07:01  -  2023 09:03  --------------------------------------------------------  IN:    Oral Fluid: 240 mL  Total IN: 240 mL    OUT:  Total OUT: 0 mL    Total NET: 240 mL      BOWEL MOVEMENT:  [ x] YES [ ] NO      Daily     Daily Weight in k.1 (2023 08:43)  Medications:  aspirin enteric coated 81 milliGRAM(s) Oral daily  losartan 25 milliGRAM(s) Oral daily  rosuvastatin 10 milliGRAM(s) Oral at bedtime        Physical Exam:      Neurology: alert and oriented x 3, nonfocal, no gross deficits    CV : [id number]    Lungs: B/l CTA on room air     Abdomen: soft, nontender, nondistended, positive bowel sounds,         Extremities: warm well perfused equal strength throughout    B/lle + DP   Right groin CDI no Hematoma nontender  no ecchymosis      Left  groin CDI no Hematoma nontender  no ecchymosis                  PAST MEDICAL & SURGICAL HISTORY:  Gastritis      Tachycardia      MVP (mitral valve prolapse)      Ulcerative colitis      Cigarette smoker  current      Psoriatic arthritis      Vertebral fracture, closed  Lumbar x 4      Osteoporosis      Varicose veins      Hyperparathyroidism      Costal chondritis      Prediabetes      Herniated disc, cervical  lumbar      Emphysema lung      COPD (chronic obstructive pulmonary disease)      Stroke      History of memory loss      Nonrheumatic aortic valve stenosis      Termination of pregnancy (fetus)      History of tonsillectomy      S/P parathyroidectomy                        Assessment and Plan:   · Assessment    78 year old female scheduled for TAVR on 2023 for non rheumatoid aortic valve stenosis, originally scheduled last month 2023, was cancelled in SDA cause patient admitted she ate around 1 AM that morning and also had some rectal staining. She complains  of worsen SOB on exertion, has a h/o CVA 2021 with cognitive impairment, MVP, tachycardia, costal chondritis, loop recorder in place, former smoker 20pk/yrs, COPD (no recent exacerbation), lung nodule (monitor), HTN, hyperlipidemia, ulcerative colitis, gastritis, lumbar and cervical herniated discs, parathyroidectomy , osteoporosis, prediabetes, psoriatic arthritis.   Underwent  TAVR 26 Evolute recovered in CRS  stabilized transferred to 2 [facility]- ASA only- Hold metoprolol, losartan resume when appropriate.    DC home  w/ MCOT  EKG ECHO         Problem/Plan - 1:  ·  Problem: S/P TAVR (transcatheter aortic valve replacement).   ·  Plan: EKG daily  ECHO completed  ASA  Rosuvastatin   resume Losartan and metoprolol  Follow up with structural heart team in one week after discharge.      Electronic Signatures:  Lucia Lantigua (NP)  (Signed 2023 09:05)  	Authored: Progress Note, Reason for Admission, Subjective and Objective, Assessment and Plan      Last Updated: 2023 09:05 by Lucia Lantigua (ROLAND)

## 2023-07-07 NOTE — DISCHARGE NOTE PROVIDER - HOSPITAL COURSE
78 year old female scheduled for TAVR on 7/6/2023 for non rheumatoid aortic valve stenosis, originally scheduled last month 5/18/2023, was cancelled in SDA cause patient admitted she ate around 1 AM that morning and also had some rectal staining. She complains  of worsen SOB on exertion, has a h/o CVA 11/2021 with cognitive impairment, MVP, tachycardia, costal chondritis, loop recorder in place, former smoker 20pk/yrs, COPD (no recent exacerbation), lung nodule (monitor), HTN, hyperlipidemia, ulcerative colitis, gastritis, lumbar and cervical herniated discs, parathyroidectomy 2014, osteoporosis, prediabetes, psoriatic arthritis.  7/6 Underwent  TAVR 26 Evolute recovered in CRS  stabilized transferred to 2 [facility]- ASA only- Hold metoprolol, losartan resume when appropriate. EKG in am  ECHO in am discharge home.   7/7 DC w/ SHERMAN

## 2023-07-07 NOTE — CONSULT NOTE ADULT - ASSESSMENT
Patient is 78 year old female with PMHx of CVA 11/2021 with cognitive impairment, MVP, tachycardia, costal chondritis, loop recorder in place, former smoker 20pk/yrs, COPD (no recent exacerbation), lung nodule (monitor), HTN, hyperlipidemia, ulcerative colitis, gastritis, lumbar and cervical herniated discs, parathyroidectomy 2014, osteoporosis, prediabetes, psoriatic arthritis. Presents to [facility] for scheduled for TAVR on 7/6/2023 for non rheumatoid aortic valve stenosis on 7/6/2023. Consult called for post op medical mgmt.    # s/p TAVR:  Incision care per CTS  Pain mgmt  Incentive spirometry  EKG daily  Echo completed  C/w ASA, Statin, Losartan and BB  Care per CTS    # HLD/HTN:  No active chest pain at this time  C/w CV meds    # DVT ppx:  IPCs    [employer]

## 2023-07-07 NOTE — CONSULT NOTE ADULT - SUBJECTIVE AND OBJECTIVE BOX
81 year old female with HTN, COPD, RA, severe AS who is now S/P TAVR.  She is doing well OOB to chair with out complaints    PMH:  HLD   ulcerative colitis   lung nodule    Meds:  ASA 81 mg qd              Losartan 25 mg qd              Crestor 10 mg qd    /76  HR 72 sinus  Lungs clear  RR 1-2/6 systolic murmur  No edema    post procedure EKG: NSR  LVH with repolarization changes    WBC 22.39  Hgb 11.3  Hct 36.5  Plt 294K  BUN 25  Crt 0.75    Imp:  Doing well S/P TAVR   Elevated WBC is likely due to a stress reaction.   No evidence of infection / sepsis  Stable for DC to home

## 2023-07-07 NOTE — DISCHARGE NOTE PROVIDER - NSDCCPCAREPLAN_GEN_ALL_CORE_FT
PRINCIPAL DISCHARGE DIAGNOSIS  Diagnosis: S/P TAVR (transcatheter aortic valve replacement)  Assessment and Plan of Treatment:

## 2023-07-07 NOTE — DISCHARGE NOTE PROVIDER - NSDCFUADDINST_GEN_ALL_CORE_FT
TAVR discharge instructions>Keep femoral or groin site clean,please shower daily and pat site dry.Watch site for signs of reddness or drainage, these should be reported to your doctor.You will receive a card about your valve in the mail,please carry in your wallet.    You will be discharged with a heart rythm monitoring device called an "MCOT" that will be with your for 30 days.  Make sure to follow the insutrctions provided in the hospital.  Reminders: You can shower with the device. Charge the phone every other day for an hour. Change the patch every 5 days and charge your monitor at the same time. Do not charge devices overnight. If you need to charge the devices sooner than recommended, you will get an alarm on the devices to indicate you to do so. Carry these devices with you at ALL times.

## 2023-07-07 NOTE — DISCHARGE NOTE NURSING/CASE MANAGEMENT/SOCIAL WORK - NSDCFUADDAPPT_GEN_ALL_CORE_FT
Followup with Dr Tillman July 11 at 830am  Follow up appointment with [facility] Cardiac surgery office at [facility] entrance 3  first floor on left after entering [facility]  Office telephone     Your Care Navigator Nurse Practitioner will be in touch to see you in your home within a few days from discharge. The Follow Your Heart program can help ensure you understand your medications, discharge instructions and answer any questions you may have at that time. They are also a great source to address concerns during the day and may be reached at [phone].

## 2023-07-07 NOTE — PROGRESS NOTE ADULT - SUBJECTIVE AND OBJECTIVE BOX
Subjective:      Tele:             V/S:                    T(F): 97.7 (23 @ 05:09), Max: 97.7 (23 @ 11:00)  HR: 72 (23 @ 05:09) (62 - 76)  BP: 167/76 (23 @ 05:09) (115/70 - 167/76)  RR: 17 (23 @ 05:09) (16 - 18)  SpO2: 97% (23 @ 05:09) (89% - 97%)  Wt(kg): --      LV EF:      Labs:      142  |  105  |  25<H>  ----------------------------<  109<H>  3.9   |  22  |  0.75    Ca    9.8      2023 06:28    TPro  7.3  /  Alb  4.4  /  TBili  0.2  /  DBili  x   /  AST  20  /  ALT  9<L>  /  AlkPhos  91                                 11.3   22.39 )-----------( 294      ( 2023 06:28 )             36.5             CAPILLARY BLOOD GLUCOSE               CXR:    I&O's Detail    2023 07:01  -  2023 07:00  --------------------------------------------------------  IN:    IV PiggyBack: 100 mL    Oral Fluid: 520 mL  Total IN: 620 mL    OUT:    Voided (mL): 150 mL  Total OUT: 150 mL    Total NET: 470 mL      2023 07:01  -  2023 09:03  --------------------------------------------------------  IN:    Oral Fluid: 240 mL  Total IN: 240 mL    OUT:  Total OUT: 0 mL    Total NET: 240 mL          CHEST TUBE:  [ ] YES [ ] NO  OUTPUT:     per 24 hours    AIR LEAKS:  [ ] YES [ ] NO      EFRAIN DRAIN:   [ ] YES [ ] NO  OUTPUT:     per 24 hours    EPICARDIAL WIRES:  [ ] YES [ ] NO      BOWEL MOVEMENT:  [ ] YES [ ] NO      Daily     Daily Weight in k.1 (2023 08:43)  Medications:  aspirin enteric coated 81 milliGRAM(s) Oral daily  losartan 25 milliGRAM(s) Oral daily  rosuvastatin 10 milliGRAM(s) Oral at bedtime        Physical Exam:    Neuro:     Pulm:     CV:    Abd:     Extremities:    Incision(s):                  PAST MEDICAL & SURGICAL HISTORY:  Gastritis      Tachycardia      MVP (mitral valve prolapse)      Ulcerative colitis      Cigarette smoker  current      Psoriatic arthritis      Vertebral fracture, closed  Lumbar x 4      Osteoporosis      Varicose veins      Hyperparathyroidism      Costal chondritis      Prediabetes      Herniated disc, cervical  lumbar      Emphysema lung      COPD (chronic obstructive pulmonary disease)      Stroke      History of memory loss      Nonrheumatic aortic valve stenosis      Termination of pregnancy (fetus)      History of tonsillectomy      S/P parathyroidectomy                  
Subjective " hello I am feeling well"    VITAL SIGNS    Telemetry:  NSR 71    Vital Signs Last 24 Hrs  T(C): 36.5 (07-06-23 @ 11:00), Max: 36.5 (07-06-23 @ 11:00)  T(F): 97.7 (07-06-23 @ 11:00), Max: 97.7 (07-06-23 @ 11:00)  HR: 76 (07-06-23 @ 14:40) (62 - 84)  BP: 127/62 (07-06-23 @ 14:40) (127/62 - 160/67)  RR: 16 (07-06-23 @ 14:40) (16 - 18)  SpO2: 93% (07-06-23 @ 14:40) (89% - 95%)                                11.1   13.51 )-----------( 298      ( 06 Jul 2023 14:30 )             35.5       07-06    141  |  108  |  15  ----------------------------<  137<H>  4.2   |  23  |  0.56    Ca    9.2      06 Jul 2023 14:30      MEDICATIONS  (STANDING):  aspirin enteric coated 81 milliGRAM(s) Oral daily  cefuroxime  IVPB 1500 milliGRAM(s) IV Intermittent every 8 hours    MEDICATIONS  (PRN):              PHYSICAL EXAM        Neurology: alert and oriented x 3, nonfocal, no gross deficits    CV : [id number]    Lungs: B/l CTA on room air     Abdomen: soft, nontender, nondistended, positive bowel sounds, last bowel movement preop    : DTV              Extremities: warm well perfused equal strength throughout    B/lle + DP   Right groin CDI no Hematoma nontender  no ecchymosis      Left  groin CDI no Hematoma nontender  no ecchymosis                                             Discussed with Cardiothoracic Team at rounds.

## 2023-07-07 NOTE — DISCHARGE NOTE PROVIDER - CARE PROVIDER_API CALL
Toby Tillman  Thoracic and Cardiac Surgery  [address] [place] [id number]  Phone: [phone]  Fax: [phone]  Scheduled Appointment: 07/11/2023 08:30 AM

## 2023-07-07 NOTE — SBIRT NOTE ADULT - NSSBIRTALCPOSREINDET_GEN_A_CORE
patient within healthy drinking limits. LMSW provided positive intervention,.     SBIRT completed by Tali Deluna LMSW and entered by Danny Morton LMSW

## 2023-07-07 NOTE — CONSULT NOTE ADULT - SUBJECTIVE AND OBJECTIVE BOX
Neurology Consult    Reason for Consult: Patient is a 78y old  Female who presents with a chief complaint of TAVR (07 Jul 2023 10:59)      HPI:  78 year old female scheduled for TAVR on 7/6/2023 for non rheumatoid aortic valve stenosis, originally scheduled last month 5/18/2023, was cancelled in [facility] cause patient admitted she ate around 1 AM that morning and also had some rectal staining. She complaints of worsen SOB on exertion, has a h/o CVA 11/2021 with cognitive impairment, MVP, tachycardia, costal chondritis, loop recorder in place, former smoker 20pk/yrs, COPD (no recent exacerbation), lung nodule (monitor), HTN, hyperlipidemia, ulcerative colitis, gastritis, lumbar and cervical herniated discs, parathyroidectomy 2014, osteoporosis, prediabetes, psoriatic arthritis.   (29 Jun 2023 11:58)       PAST MEDICAL & SURGICAL HISTORY:  Gastritis      Tachycardia      MVP (mitral valve prolapse)      Ulcerative colitis      Cigarette smoker  current      Psoriatic arthritis      Vertebral fracture, closed  Lumbar x 4      Osteoporosis      Varicose veins      Hyperparathyroidism      Costal chondritis      Prediabetes      Herniated disc, cervical  lumbar      Emphysema lung      COPD (chronic obstructive pulmonary disease)      Stroke      History of memory loss      Nonrheumatic aortic valve stenosis      Termination of pregnancy (fetus)      History of tonsillectomy      S/P parathyroidectomy  2014          Allergies: Allergies    Gluten (Unknown)  caffeine (Blisters)  penicillins (Hives)  IV Contrast (Hives)    Intolerances    lactose (Vomiting)      Social History: Denies toxic habits including tobacco, ETOH or illicit drugs.    Family History: FAMILY HISTORY:  Family history of Parkinson disease    Family history of colorectal cancer    Family history of diabetes mellitus    Family history of transitional cell carcinoma of bladder (Child)    Family history of melanoma (Child)    . No family history of strokes    Medications: MEDICATIONS  (STANDING):  aspirin enteric coated 81 milliGRAM(s) Oral daily  losartan 25 milliGRAM(s) Oral daily  rosuvastatin 10 milliGRAM(s) Oral at bedtime    MEDICATIONS  (PRN):      Review of Systems:  CONSTITUTIONAL:  No weight loss, fever, chills, weakness or fatigue.  HEENT:  Eyes:  No visual loss, blurred vision, double vision or yellow sclera. Ears, Nose, Throat:  No hearing loss, sneezing, congestion, runny nose or sore throat.  SKIN:  No rash or itching.  CARDIOVASCULAR:  No chest pain, chest pressure or chest discomfort. No palpitations or edema.  RESPIRATORY:  No shortness of breath, cough or sputum.  GASTROINTESTINAL:  No anorexia, nausea, vomiting or diarrhea. No abdominal pain or blood.  GENITOURINARY:  No burning on urination or incontinence   NEUROLOGICAL:  No headache, dizziness, syncope, paralysis, ataxia, numbness or tingling in the extremities. No change in bowel or bladder control. no limb weakness. no vision changes.   MUSCULOSKELETAL:  No muscle, back pain, joint pain or stiffness.  HEMATOLOGIC:  No anemia, bleeding or bruising.  LYMPHATICS:  No enlarged nodes. No history of splenectomy.  PSYCHIATRIC:  No history of depression or anxiety.  ENDOCRINOLOGIC:  No reports of sweating, cold or heat intolerance. No polyuria or polydipsia.      Vitals:  Vital Signs Last 24 Hrs  T(C): 36.5 (07 Jul 2023 05:09), Max: 36.5 (07 Jul 2023 05:09)  T(F): 97.7 (07 Jul 2023 05:09), Max: 97.7 (07 Jul 2023 05:09)  HR: 72 (07 Jul 2023 05:09) (70 - 72)  BP: 167/76 (07 Jul 2023 05:09) (115/70 - 167/76)  BP(mean): --  RR: 17 (07 Jul 2023 05:09) (16 - 17)  SpO2: 97% (07 Jul 2023 05:09) (92% - 97%)    Parameters below as of 07 Jul 2023 05:09  Patient On (Oxygen Delivery Method): room air        General Exam:   General Appearance: Appropriately dressed and in no acute distress       Head: Normocephalic, atraumatic and no dysmorphic features  Ear, Nose, and Throat: Moist mucous membranes  CVS: S1S2+  Resp: No SOB, no wheeze or rhonchi  GI: soft NT/ND  Extremities: No edema or cyanosis  Skin: No bruises or rashes     Neurological Exam:  Mental Status: Awake, alert and oriented x 3.  Able to follow simple and complex verbal commands. Able to name and repeat. fluent speech. No obvious aphasia or dysarthria noted.   Cranial Nerves: PERRL, EOMI, VFFC, sensation V1-V3 intact,  no obvious facial asymmetry, equal elevation of palate, scm/trap 5/5, tongue is midline on protrusion. no obvious papilledema on fundoscopic exam. hearing is grossly intact.   Motor: Normal bulk, tone and strength throughout. Fine finger movements were intact and symmetric. no tremors or drift noted.    Sensation: Intact to light touch and pinprick throughout. no right/left confusion. no extinction to tactile on DSS. Romberg was negative.   Reflexes: 1+ throughout at biceps, brachioradialis, triceps, patellars and ankles bilaterally and equal. No clonus. R toe and L toe were both downgoing.  Coordination: No dysmetria on FNF or HKS  Gait: Narrow based and steady. Able to tandem. no limitations in gait.     Data/Labs/Imaging which I personally reviewed.     Labs:     CBC Full  -  ( 07 Jul 2023 06:28 )  WBC Count : 22.39 K/uL  RBC Count : 4.22 M/uL  Hemoglobin : 11.3 g/dL  Hematocrit : 36.5 %  Platelet Count - Automated : 294 K/uL  Mean Cell Volume : 86.5 fl  Mean Cell Hemoglobin : 26.8 pg  Mean Cell Hemoglobin Concentration : 31.0 gm/dL  Auto Neutrophil # : x  Auto Lymphocyte # : x  Auto Monocyte # : x  Auto Eosinophil # : x  Auto Basophil # : x  Auto Neutrophil % : x  Auto Lymphocyte % : x  Auto Monocyte % : x  Auto Eosinophil % : x  Auto Basophil % : x    07-07    142  |  105  |  25<H>  ----------------------------<  109<H>  3.9   |  22  |  0.75    Ca    9.8      07 Jul 2023 06:28    TPro  7.3  /  Alb  4.4  /  TBili  0.2  /  DBili  x   /  AST  20  /  ALT  9<L>  /  AlkPhos  91  07-07    LIVER FUNCTIONS - ( 07 Jul 2023 06:28 )  Alb: 4.4 g/dL / Pro: 7.3 g/dL / ALK PHOS: 91 U/L / ALT: 9 U/L / AST: 20 U/L / GGT: x             Urinalysis Basic - ( 07 Jul 2023 06:28 )    Color: x / Appearance: x / SG: x / pH: x  Gluc: 109 mg/dL / Ketone: x  / Bili: x / Urobili: x   Blood: x / Protein: x / Nitrite: x   Leuk Esterase: x / RBC: x / WBC x   Sq Epi: x / Non Sq Epi: x / Bacteria: x

## 2023-07-07 NOTE — PROGRESS NOTE ADULT - PROBLEM SELECTOR PLAN 1
EKG daily  ECHO in am  Zinacef x2   ASA  Rosuvastatin   resume Losartan and metoprolol  Follow up with structural heart team in one week after discharge
EKG daily  ECHO completed  ASA  Rosuvastatin   resume Losartan and metoprolol  Follow up with structural heart team in one week after discharge

## 2023-07-07 NOTE — DISCHARGE NOTE PROVIDER - NSDCFUSCHEDAPPT_GEN_ALL_CORE_FT
Toby Tillman  [facility]  [facility] [address]  Scheduled Appointment: 07/11/2023

## 2023-07-07 NOTE — DISCHARGE NOTE PROVIDER - NSDCMRMEDTOKEN_GEN_ALL_CORE_FT
aspirin 325 mg oral delayed release tablet: 1 tab(s) orally once a day  Breztri Aerosphere inhalation aerosol: 2 inhaled  furosemide 20 mg oral tablet: 1 tab(s) orally once a day  losartan 25 mg oral tablet: 2 orally every other day  Metoprolol Tartrate 25 mg oral tablet: 1 tab(s) orally once a day  potassium chloride 8 mEq (600 mg) oral tablet, extended release: 2 orally every other day  rosuvastatin 10 mg oral tablet: 1 tab(s) orally once a day  
Detail Level: Zone

## 2023-07-07 NOTE — CONSULT NOTE ADULT - SUBJECTIVE AND OBJECTIVE BOX
Patient is a 78y old  Female who presents with a chief complaint of TAVR (07 Jul 2023 09:02)      HPI:  78 year old female scheduled for TAVR on 7/6/2023 for non rheumatoid aortic valve stenosis, originally scheduled last month 5/18/2023, was cancelled in SDA cause patient admitted she ate around 1 AM that morning and also had some rectal staining. She complaints of worsen SOB on exertion, has a h/o CVA 11/2021 with cognitive impairment, MVP, tachycardia, costal chondritis, loop recorder in place, former smoker 20pk/yrs, COPD (no recent exacerbation), lung nodule (monitor), HTN, hyperlipidemia, ulcerative colitis, gastritis, lumbar and cervical herniated discs, parathyroidectomy 2014, osteoporosis, prediabetes, psoriatic arthritis.   (29 Jun 2023 11:58)      PAST MEDICAL & SURGICAL HISTORY:  Gastritis      Tachycardia      MVP (mitral valve prolapse)      Ulcerative colitis      Cigarette smoker  current      Psoriatic arthritis      Vertebral fracture, closed  Lumbar x 4      Osteoporosis      Varicose veins      Hyperparathyroidism      Costal chondritis      Prediabetes      Herniated disc, cervical  lumbar      Emphysema lung      COPD (chronic obstructive pulmonary disease)      Stroke      History of memory loss      Nonrheumatic aortic valve stenosis      Termination of pregnancy (fetus)      History of tonsillectomy      S/P parathyroidectomy  2014          FAMILY HISTORY:  Family history of Parkinson disease    Family history of colorectal cancer    Family history of diabetes mellitus    Family history of transitional cell carcinoma of bladder (Child)    Family history of melanoma (Child)        SOCIAL HISTORY:    Allergies    Gluten (Unknown)  caffeine (Blisters)  penicillins (Hives)  IV Contrast (Hives)    Intolerances    lactose (Vomiting)        REVIEW OF SYSTEMS:  General: no weakness, no fever/chills, no weight loss/gain  Skin/Breast: no rash, no jaundice  Ophthalmologic: no vision changes, no dry eyes   Respiratory and Thorax: no cough, no wheezing, no hemoptysis, no dyspnea  Cardiovascular: no chest pain, no shortness of breath, no orthopnea  Gastrointestinal: no n/v/d, no abdominal pain, no dysphagia   Genitourinary: no dysuria, no frequency, no nocturia, no hematuria  Musculoskeletal: no trauma, no sprain/strain, no myalgias, no arthralgias, no fracture  Neurological: no HA, no dizziness, no weakness, no numbness  Psychiatric: no depression, no SI/HI  Hematology/Lymphatics: no easy bruising  Endocrine: no heat or cold intolerance. no weight gain or loss  Allergic/Immunologic: no allergy or recent reaction     SUBJECTIVE / OVERNIGHT EVENTS:    patient seen and examined  OOB in chair,  at bedside  no c/o at this time    Vital Signs Last 24 Hrs  T(C): 36.5 (07 Jul 2023 05:09), Max: 36.5 (06 Jul 2023 11:00)  T(F): 97.7 (07 Jul 2023 05:09), Max: 97.7 (06 Jul 2023 11:00)  HR: 72 (07 Jul 2023 05:09) (62 - 76)  BP: 167/76 (07 Jul 2023 05:09) (115/70 - 167/76)  BP(mean): --  RR: 17 (07 Jul 2023 05:09) (16 - 18)  SpO2: 97% (07 Jul 2023 05:09) (89% - 97%)    Parameters below as of 07 Jul 2023 05:09  Patient On (Oxygen Delivery Method): room air      I&O's Summary    06 Jul 2023 07:01  -  07 Jul 2023 07:00  --------------------------------------------------------  IN: 620 mL / OUT: 150 mL / NET: 470 mL    07 Jul 2023 07:01  -  07 Jul 2023 10:01  --------------------------------------------------------  IN: 240 mL / OUT: 0 mL / NET: 240 mL        PHYSICAL EXAM:  GENERAL: NAD, Comfortable  HEAD:  Atraumatic, Normocephalic  EYES: EOMI, PERRLA, conjunctiva and sclera clear  NECK: Supple, No JVD  CHEST/LUNG: Clear to auscultation bilaterally; No wheeze  HEART: Regular rate and rhythm; No murmurs, rubs, or gallops  ABDOMEN: Soft, Nontender, Nondistended; Bowel sounds present  NEURO: AAOx3, no focal deficit, 5/5 b/l extremities  EXTREMITIES:  2+ Peripheral Pulses, No clubbing, cyanosis, or edema, bilateral groins c/d/i  SKIN: No rashes or lesions    LABS:                        11.3   22.39 )-----------( 294      ( 07 Jul 2023 06:28 )             36.5     07-07    142  |  105  |  25<H>  ----------------------------<  109<H>  3.9   |  22  |  0.75    Ca    9.8      07 Jul 2023 06:28    TPro  7.3  /  Alb  4.4  /  TBili  0.2  /  DBili  x   /  AST  20  /  ALT  9<L>  /  AlkPhos  91  07-07      CAPILLARY BLOOD GLUCOSE            Urinalysis Basic - ( 07 Jul 2023 06:28 )    Color: x / Appearance: x / SG: x / pH: x  Gluc: 109 mg/dL / Ketone: x  / Bili: x / Urobili: x   Blood: x / Protein: x / Nitrite: x   Leuk Esterase: x / RBC: x / WBC x   Sq Epi: x / Non Sq Epi: x / Bacteria: x        RADIOLOGY & ADDITIONAL TESTS:    Imaging Personally Reviewed:  [x] YES  [ ] NO    Consultant(s) Notes Reviewed:  [x] YES  [ ] NO      MEDICATIONS  (STANDING):  aspirin enteric coated 81 milliGRAM(s) Oral daily  losartan 25 milliGRAM(s) Oral daily  rosuvastatin 10 milliGRAM(s) Oral at bedtime    MEDICATIONS  (PRN):      Care Discussed with Consultants/Other Providers [x] YES  [ ] NO     Patient is a 78y old  Female who presents with a chief complaint of TAVR (07 Jul 2023 09:02)      HPI:  78 year old female scheduled for TAVR on 7/6/2023 for non rheumatoid aortic valve stenosis, originally scheduled last month 5/18/2023, was cancelled in SDA cause patient admitted she ate around 1 AM that morning and also had some rectal staining. She complaints of worsen SOB on exertion, has a h/o CVA 11/2021 with cognitive impairment, MVP, tachycardia, costal chondritis, loop recorder in place, former smoker 20pk/yrs, COPD (no recent exacerbation), lung nodule (monitor), HTN, hyperlipidemia, ulcerative colitis, gastritis, lumbar and cervical herniated discs, parathyroidectomy 2014, osteoporosis, prediabetes, psoriatic arthritis.      PAST MEDICAL & SURGICAL HISTORY:  Gastritis      Tachycardia      MVP (mitral valve prolapse)      Ulcerative colitis      Cigarette smoker  current      Psoriatic arthritis      Vertebral fracture, closed  Lumbar x 4      Osteoporosis      Varicose veins      Hyperparathyroidism      Costal chondritis      Prediabetes      Herniated disc, cervical  lumbar      Emphysema lung      COPD (chronic obstructive pulmonary disease)      Stroke      History of memory loss      Nonrheumatic aortic valve stenosis      Termination of pregnancy (fetus)      History of tonsillectomy      S/P parathyroidectomy  2014          FAMILY HISTORY:  Family history of Parkinson disease    Family history of colorectal cancer    Family history of diabetes mellitus    Family history of transitional cell carcinoma of bladder (Child)    Family history of melanoma (Child)        SOCIAL HISTORY:    Allergies    Gluten (Unknown)  caffeine (Blisters)  penicillins (Hives)  IV Contrast (Hives)    Intolerances    lactose (Vomiting)        REVIEW OF SYSTEMS:  General: no weakness, no fever/chills, no weight loss/gain  Skin/Breast: no rash, no jaundice  Ophthalmologic: no vision changes, no dry eyes   Respiratory and Thorax: no cough, no wheezing, no hemoptysis, no dyspnea  Cardiovascular: no chest pain, no shortness of breath, no orthopnea  Gastrointestinal: no n/v/d, no abdominal pain, no dysphagia   Genitourinary: no dysuria, no frequency, no nocturia, no hematuria  Musculoskeletal: no trauma, no sprain/strain, no myalgias, no arthralgias, no fracture  Neurological: no HA, no dizziness, no weakness, no numbness  Psychiatric: no depression, no SI/HI  Hematology/Lymphatics: no easy bruising  Endocrine: no heat or cold intolerance. no weight gain or loss  Allergic/Immunologic: no allergy or recent reaction     SUBJECTIVE / OVERNIGHT EVENTS:    patient seen and examined  OOB in chair,  at bedside  no c/o at this time    Vital Signs Last 24 Hrs  T(C): 36.5 (07 Jul 2023 05:09), Max: 36.5 (06 Jul 2023 11:00)  T(F): 97.7 (07 Jul 2023 05:09), Max: 97.7 (06 Jul 2023 11:00)  HR: 72 (07 Jul 2023 05:09) (62 - 76)  BP: 167/76 (07 Jul 2023 05:09) (115/70 - 167/76)  BP(mean): --  RR: 17 (07 Jul 2023 05:09) (16 - 18)  SpO2: 97% (07 Jul 2023 05:09) (89% - 97%)    Parameters below as of 07 Jul 2023 05:09  Patient On (Oxygen Delivery Method): room air      I&O's Summary    06 Jul 2023 07:01  -  07 Jul 2023 07:00  --------------------------------------------------------  IN: 620 mL / OUT: 150 mL / NET: 470 mL    07 Jul 2023 07:01  -  07 Jul 2023 10:01  --------------------------------------------------------  IN: 240 mL / OUT: 0 mL / NET: 240 mL        PHYSICAL EXAM:  GENERAL: NAD, Comfortable  HEAD:  Atraumatic, Normocephalic  EYES: EOMI, PERRLA, conjunctiva and sclera clear  NECK: Supple, No JVD  CHEST/LUNG: Clear to auscultation bilaterally; No wheeze  HEART: Regular rate and rhythm; No murmurs, rubs, or gallops  ABDOMEN: Soft, Nontender, Nondistended; Bowel sounds present  NEURO: AAOx3, no focal deficit, 5/5 b/l extremities  EXTREMITIES:  2+ Peripheral Pulses, No clubbing, cyanosis, or edema  SKIN: No rashes or lesions    LABS:                        11.3   22.39 )-----------( 294      ( 07 Jul 2023 06:28 )             36.5     07-07    142  |  105  |  25<H>  ----------------------------<  109<H>  3.9   |  22  |  0.75    Ca    9.8      07 Jul 2023 06:28    TPro  7.3  /  Alb  4.4  /  TBili  0.2  /  DBili  x   /  AST  20  /  ALT  9<L>  /  AlkPhos  91  07-07      CAPILLARY BLOOD GLUCOSE            Urinalysis Basic - ( 07 Jul 2023 06:28 )    Color: x / Appearance: x / SG: x / pH: x  Gluc: 109 mg/dL / Ketone: x  / Bili: x / Urobili: x   Blood: x / Protein: x / Nitrite: x   Leuk Esterase: x / RBC: x / WBC x   Sq Epi: x / Non Sq Epi: x / Bacteria: x        RADIOLOGY & ADDITIONAL TESTS:    Imaging Personally Reviewed:  [x] YES  [ ] NO    Consultant(s) Notes Reviewed:  [x] YES  [ ] NO      MEDICATIONS  (STANDING):  aspirin enteric coated 81 milliGRAM(s) Oral daily  losartan 25 milliGRAM(s) Oral daily  rosuvastatin 10 milliGRAM(s) Oral at bedtime    MEDICATIONS  (PRN):      Care Discussed with Consultants/Other Providers [x] YES  [ ] NO

## 2023-07-07 NOTE — DISCHARGE NOTE PROVIDER - REASON FOR ADMISSION
Pt says Dr. Delgado has given him diflucan, 2 tabs on day one, 1 tab on day two and the nystatin swish and swallow for 10 days and that was the most effective.  He says the nystatin alone seemed not to be as effective as diflucan.    Please advise.   TAVR

## 2023-07-07 NOTE — CONSULT NOTE ADULT - ASSESSMENT
78 year old female scheduled for TAVR on 7/6/2023 for non rheumatoid aortic valve stenosis, originally scheduled last month 5/18/2023, was cancelled in SDA cause patient admitted she ate around 1 AM that morning and also had some rectal staining. She complaints of worsen SOB on exertion, has a h/o CVA 11/2021 with cognitive impairment, MVP, tachycardia, costal chondritis, loop recorder in place, former smoker 20pk/yrs, COPD (no recent exacerbation), lung nodule (monitor), HTN, hyperlipidemia, ulcerative colitis, gastritis, lumbar and cervical herniated discs, parathyroidectomy 2014, osteoporosis, prediabetes, psoriatic arthritis.  s/p TAVR no complications     Prior Stor Workup:   MRI Head showed multiple small foci of diffusion restriction in both hemispheres as well as within the posterior fossa.   MRA H/N neg   LDL 92, A1c 6.2  b12 386  CT c a p neg   JASON as above with mod AS. no cardiac source + PFO   LE doppler neg  ROPE score 4    Impression:   1)  chronic stroke, stable     - asa and statin therapy   - dc plan with [facility]   - check FS, glucose control <180  - GI/DVT ppx  - Counseling on diet, exercise, and medication adherence was done  - Counseling on smoking cessation and alcohol consumption offered when appropriate.  - Pain assessed and judicious use of narcotics when appropriate was discussed.    - Stroke education given when appropriate.  - Importance of fall prevention discussed.   - Differential diagnosis and plan of care discussed with patient and/or family and primary team  - Thank you for allowing me to participate in the care of this patient. Call with questions.   - outpatient f/u  - spoke with  at bedside as well   Cheko Dukes MD  Vascular Neurology  Office: [phone]  78 year old female scheduled for TAVR on 7/6/2023 for non rheumatoid aortic valve stenosis, originally scheduled last month 5/18/2023, was cancelled in SDA cause patient admitted she ate around 1 AM that morning and also had some rectal staining. She complaints of worsen SOB on exertion, has a h/o CVA 11/2021 with cognitive impairment, MVP, tachycardia, costal chondritis, loop recorder in place, former smoker 20pk/yrs, COPD (no recent exacerbation), lung nodule (monitor), HTN, hyperlipidemia, ulcerative colitis, gastritis, lumbar and cervical herniated discs, parathyroidectomy 2014, osteoporosis, prediabetes, psoriatic arthritis.  s/p TAVR no complications     Prior Storke Workup:   MRI Head showed multiple small foci of diffusion restriction in both hemispheres as well as within the posterior fossa.   MRA H/N neg   LDL 92, A1c 6.2  b12 386  CT c a p neg   JASON as above with mod AS. no cardiac source + PFO   LE doppler neg  ROPE score 4    Impression:   1)  chronic stroke, stable ; ESUS     - asa and statin therapy   - dc plan with [facility]   - check FS, glucose control <180  - GI/DVT ppx  - Counseling on diet, exercise, and medication adherence was done  - Counseling on smoking cessation and alcohol consumption offered when appropriate.  - Pain assessed and judicious use of narcotics when appropriate was discussed.    - Stroke education given when appropriate.  - Importance of fall prevention discussed.   - Differential diagnosis and plan of care discussed with patient and/or family and primary team  - Thank you for allowing me to participate in the care of this patient. Call with questions.   - outpatient f/u  - spoke with  at bedside as well   Cheko Dukes MD  Vascular Neurology  Office: [phone]

## 2023-07-07 NOTE — DISCHARGE NOTE NURSING/CASE MANAGEMENT/SOCIAL WORK - PATIENT PORTAL LINK FT
You can access the FollowMyHealth Patient Portal offered by [facility] by registering at the following website: [email or web address]. By joining [email or web address]’s FollowMyHealth portal, you will also be able to view your health information using other applications (apps) compatible with our system.

## 2023-07-07 NOTE — PROGRESS NOTE ADULT - ASSESSMENT
78 year old female scheduled for TAVR on 7/6/2023 for non rheumatoid aortic valve stenosis, originally scheduled last month 5/18/2023, was cancelled in SDA cause patient admitted she ate around 1 AM that morning and also had some rectal staining. She complains  of worsen SOB on exertion, has a h/o CVA 11/2021 with cognitive impairment, MVP, tachycardia, costal chondritis, loop recorder in place, former smoker 20pk/yrs, COPD (no recent exacerbation), lung nodule (monitor), HTN, hyperlipidemia, ulcerative colitis, gastritis, lumbar and cervical herniated discs, parathyroidectomy 2014, osteoporosis, prediabetes, psoriatic arthritis.  7/6 Underwent  TAVR 26 Evolute recovered in CRS  stabilized transferred to 2 [facility]- ASA only- Hold metoprolol, losartan resume when appropriate. EKG in am  ECHO in am discharge home.   
78 year old female scheduled for TAVR on 7/6/2023 for non rheumatoid aortic valve stenosis, originally scheduled last month 5/18/2023, was cancelled in SDA cause patient admitted she ate around 1 AM that morning and also had some rectal staining. She complains  of worsen SOB on exertion, has a h/o CVA 11/2021 with cognitive impairment, MVP, tachycardia, costal chondritis, loop recorder in place, former smoker 20pk/yrs, COPD (no recent exacerbation), lung nodule (monitor), HTN, hyperlipidemia, ulcerative colitis, gastritis, lumbar and cervical herniated discs, parathyroidectomy 2014, osteoporosis, prediabetes, psoriatic arthritis.  7/6 Underwent  TAVR 26 Evolute recovered in CRS  stabilized transferred to 2 [facility]- ASA only- Hold metoprolol, losartan resume when appropriate. EKG in am  ECHO in am discharge home.

## 2023-07-08 ENCOUNTER — TRANSCRIPTION ENCOUNTER (OUTPATIENT)
Age: 78
End: 2023-07-08

## 2023-07-10 ENCOUNTER — NON-APPOINTMENT (OUTPATIENT)
Age: 78
End: 2023-07-10

## 2023-07-10 PROBLEM — Z95.2 S/P TAVR (TRANSCATHETER AORTIC VALVE REPLACEMENT): Status: ACTIVE | Noted: 2023-07-10

## 2023-07-10 RX ORDER — ADHESIVE TAPE 3"X 2.3 YD
50 MCG TAPE, NON-MEDICATED TOPICAL DAILY
Refills: 0 | Status: DISCONTINUED | COMMUNITY
End: 2023-07-10

## 2023-07-10 RX ORDER — LOSARTAN POTASSIUM 25 MG/1
25 TABLET, FILM COATED ORAL EVERY OTHER DAY
Refills: 0 | Status: ACTIVE | COMMUNITY

## 2023-07-10 RX ORDER — ALBUTEROL SULFATE 90 UG/1
108 (90 BASE) AEROSOL, METERED RESPIRATORY (INHALATION)
Qty: 1 | Refills: 5 | Status: DISCONTINUED | COMMUNITY
Start: 2017-09-05 | End: 2023-07-10

## 2023-07-10 RX ORDER — METOPROLOL TARTRATE 25 MG/1
25 TABLET, FILM COATED ORAL DAILY
Refills: 0 | Status: ACTIVE | COMMUNITY
Start: 2023-07-10

## 2023-07-10 RX ORDER — SENNOSIDES 8.6 MG
TABLET ORAL
Refills: 0 | Status: DISCONTINUED | COMMUNITY
End: 2023-07-10

## 2023-07-10 RX ORDER — PREDNISONE 2.5 MG/1
2.5 TABLET ORAL DAILY
Refills: 0 | Status: DISCONTINUED | COMMUNITY
End: 2023-07-10

## 2023-07-10 RX ORDER — METOPROLOL SUCCINATE 25 MG/1
25 TABLET, EXTENDED RELEASE ORAL DAILY
Refills: 0 | Status: DISCONTINUED | COMMUNITY
End: 2023-07-10

## 2023-07-10 RX ORDER — BUDESONIDE, GLYCOPYRROLATE, AND FORMOTEROL FUMARATE 160; 9; 4.8 UG/1; UG/1; UG/1
160-9-4.8 AEROSOL, METERED RESPIRATORY (INHALATION)
Qty: 32 | Refills: 0 | Status: ACTIVE | COMMUNITY
Start: 2023-03-28

## 2023-07-10 RX ORDER — POTASSIUM CHLORIDE 600 MG/1
8 CAPSULE, EXTENDED RELEASE ORAL EVERY OTHER DAY
Refills: 0 | Status: ACTIVE | COMMUNITY

## 2023-07-11 ENCOUNTER — APPOINTMENT (OUTPATIENT)
Dept: CARDIOTHORACIC SURGERY | Facility: CLINIC | Age: 78
End: 2023-07-11
Payer: MEDICARE

## 2023-07-11 VITALS
SYSTOLIC BLOOD PRESSURE: 141 MMHG | TEMPERATURE: 98 F | DIASTOLIC BLOOD PRESSURE: 80 MMHG | WEIGHT: 110 LBS | OXYGEN SATURATION: 98 % | BODY MASS INDEX: 20.77 KG/M2 | HEART RATE: 88 BPM | RESPIRATION RATE: 15 BRPM | HEIGHT: 61 IN

## 2023-07-11 DIAGNOSIS — Z95.2 PRESENCE OF PROSTHETIC HEART VALVE: ICD-10-CM

## 2023-07-11 DIAGNOSIS — I35.0 NONRHEUMATIC AORTIC (VALVE) STENOSIS: ICD-10-CM

## 2023-07-11 PROCEDURE — 99212 OFFICE O/P EST SF 10 MIN: CPT

## 2023-07-11 RX ORDER — ROSUVASTATIN CALCIUM 10 MG/1
10 TABLET, FILM COATED ORAL
Refills: 0 | Status: ACTIVE | COMMUNITY
Start: 2023-07-11

## 2023-07-11 NOTE — CONSULT LETTER
[Dear  ___] : Dear  [unfilled], [Courtesy Letter:] : I had the pleasure of seeing your patient, [unfilled], in my office today. [Please see my note below.] : Please see my note below. [Sincerely,] : Sincerely, [FreeTextEntry2] : Dr. Brijesh Kebede\par [address]\[place] [address]\par \par F: [phone]\par  [FreeTextEntry3] : Toby Tillman MD

## 2023-07-11 NOTE — ASSESSMENT
[FreeTextEntry1] : Today on exam, patient's lungs are clear bilaterally and bilateral groins are clean, dry and intact. There is no hematoma. No peripheral edema noted on exam. \par \par [facility] reports were reviewed.  SBE antibiotic prophylaxis discussed at length.  Patient instructed to continue current medication regimen.\par \par Plan:\par 1) Follow up with cardiologist\par 2) SBE antibiotic prophylaxis discussed \par 3) Follow up in 30 days with cardiologist for repeat transthoracic echocardiogram.\par \par

## 2023-07-11 NOTE — REASON FOR VISIT
[de-identified] : TAVR 26 Evolute [de-identified] : 78 year old female PMH non rheumatoid aortic valve stenosis, CVA 11/2021 with cognitive impairment, MVP, tachycardia, costal chondritis, loop recorder in place, former smoker 20pk/yrs, COPD (no recent exacerbation), lung nodule (monitor), HTN, hyperlipidemia, ulcerative colitis, gastritis, lumbar and cervical herniated discs, parathyroidectomy 2014, osteoporosis, prediabetes, psoriatic arthritis. DC Home 7/7 with [facility] \par \par aspirin 325 mg oral delayed release tablet: 1 tab(s) orally once a day \par Breztri Aerosphere inhalation aerosol: 2 inhaled \par furosemide 20 mg oral tablet: 1 tab(s) orally once a day \par losartan 25 mg oral tablet: 2 orally every other day \par Metoprolol Tartrate 25 mg oral tablet: 1 tab(s) orally once a day \par potassium chloride 8 mEq (600 mg) oral tablet, extended release: 2 orally every other day \par rosuvastatin 10 mg oral tablet: 1 tab(s) orally once a day \par \par Followed by Dr. Brijesh Kebede\par \par

## 2023-07-12 ENCOUNTER — APPOINTMENT (OUTPATIENT)
Dept: CARE COORDINATION | Facility: HOME HEALTH | Age: 78
End: 2023-07-12

## 2023-07-12 VITALS
RESPIRATION RATE: 14 BRPM | DIASTOLIC BLOOD PRESSURE: 72 MMHG | HEART RATE: 86 BPM | SYSTOLIC BLOOD PRESSURE: 134 MMHG | OXYGEN SATURATION: 98 %

## 2023-07-12 NOTE — HISTORY OF PRESENT ILLNESS
[FreeTextEntry1] : 78 year old female scheduled for TAVR on 7/6/2023 for non rheumatoid aortic\par valve stenosis, originally scheduled last month 5/18/2023, was cancelled in SDA\par cause patient admitted she ate around 1 AM that morning and also had some\par rectal staining. She complains  of worsen SOB on exertion, has a h/o CVA\par 11/2021 with cognitive impairment, MVP, tachycardia, costal chondritis, loop\par recorder in place, former smoker 20pk/yrs, COPD (no recent exacerbation), lung\par nodule (monitor), HTN, hyperlipidemia, ulcerative colitis, gastritis, lumbar\par and cervical herniated discs, parathyroidectomy 2014, osteoporosis,\par prediabetes, psoriatic arthritis.\par 7/6 Underwent  TAVR 26 Evolute recovered in CRS  stabilized transferred to \par [facility]- ASA only- Hold metoprolol, losartan resume when appropriate. EKG in am\par ECHO in am discharge home.\par 7/7 DC home w/ [facility]\par 7/11- s/p F/U visit with Dr Tillman\par 7/12- Seen by [facility] NP for a f/u home visit. Emotional support and education provided. All questions answered. Pt overall is recovering well w/o complaints.\par

## 2023-07-12 NOTE — PHYSICAL EXAM
[Sclera] : the sclera and conjunctiva were normal [Neck Appearance] : the appearance of the neck was normal [] : no respiratory distress [Respiration, Rhythm And Depth] : normal respiratory rhythm and effort [Exaggerated Use Of Accessory Muscles For Inspiration] : no accessory muscle use [Auscultation Breath Sounds / Voice Sounds] : lungs were clear to auscultation bilaterally [Apical Impulse] : the apical impulse was normal [Heart Rate And Rhythm] : heart rate was normal and rhythm regular [Heart Sounds] : normal S1 and S2 [Heart Sounds Gallop] : no gallops [Murmurs] : no murmurs [Heart Sounds Pericardial Friction Rub] : no pericardial rub [FreeTextEntry1] : MCOT in place. TAVR sites without erythema or drainage or warmth, with edges well approximated.  B/L LE - no edema. [Examination Of The Chest] : the chest was normal in appearance [Chest Visual Inspection Thoracic Asymmetry] : no chest asymmetry [Diminished Respiratory Excursion] : normal chest expansion [Bowel Sounds] : normal bowel sounds [Abdomen Soft] : soft [Abdomen Tenderness] : non-tender [Abnormal Walk] : normal gait [Skin Color & Pigmentation] : normal skin color and pigmentation [Sensation] : the sensory exam was normal to light touch and pinprick [Motor Exam] : the motor exam was normal [No Focal Deficits] : no focal deficits [Oriented To Time, Place, And Person] : oriented to person, place, and time [Impaired Insight] : insight and judgment were intact [Affect] : the affect was normal [Mood] : the mood was normal

## 2023-07-12 NOTE — ASSESSMENT
[FreeTextEntry1] : Pt recovering well at home s/p cardiac surgery. Good family support was noted from pt spouse. Pt has all medications in home and is taking as prescribed. Pain controlled with current medication regimen. Pt is aware of F/U appts scheduled and that need to be scheduled as listed below. [facility] teaching provided to pt and spouse.\par

## 2023-07-12 NOTE — REASON FOR VISIT
[Post Hospitalization] : a post hospitalization visit [Spouse] : spouse [FreeTextEntry1] : FOLLOW YOUR HEART - Transitional Care Management Program - [facility]

## 2023-07-16 ENCOUNTER — APPOINTMENT (OUTPATIENT)
Dept: AFTER HOURS CARE | Facility: EMERGENCY ROOM | Age: 78
End: 2023-07-16
Payer: MEDICARE

## 2023-07-16 ENCOUNTER — TRANSCRIPTION ENCOUNTER (OUTPATIENT)
Age: 78
End: 2023-07-16

## 2023-07-16 DIAGNOSIS — R19.7 DIARRHEA, UNSPECIFIED: ICD-10-CM

## 2023-07-16 PROCEDURE — 99203 OFFICE O/P NEW LOW 30 MIN: CPT | Mod: 95

## 2023-07-16 NOTE — HISTORY OF PRESENT ILLNESS
[Home] : at home, [unfilled] , at the time of the visit. [Other Location: e.g. Home (Enter Location, City,State)___] : at [unfilled] [Verbal consent obtained from patient] : the patient, [unfilled] [FreeTextEntry8] : 77 yo F s/p TAVR 7/6 c/o diarrhea x last 2 days with increased mucus in stool.  Pt denies being on any Abx. No assoc abd pain, fever, chills or blood in the stool.  While waiting to speak to Telehealth pt's PCP called her and asked her if she had eaten any mushrooms and told him she did not and advised her to try Pepto Bismol

## 2023-07-16 NOTE — REVIEW OF SYSTEMS
[Diarrhea] : diarrhea [Chills] : no chills [Fever] : no fever [Chest Pain] : no chest pain [Shortness Of Breath] : no shortness of breath [Abdominal Pain] : no abdominal pain [Vomiting] : no vomiting

## 2023-07-16 NOTE — PLAN
[FreeTextEntry1] : Pt would like to try Pepto Bismol as recommended (Pt advised that stool may turn black) Encouraged BRAT diet and to see how she does over the next 8 hrs Advised to go to ER for in person eval and treatment if not improving

## 2023-11-15 NOTE — ED PROVIDER NOTE - CPE EDP [facility] NORM
She has a [facility] appt with you next week, daughter was made aware that the referral could be done at that appt but she still needs help figuring out virtual appt, hopefully we can help with that normal...

## 2023-12-11 NOTE — OCCUPATIONAL THERAPY INITIAL EVALUATION ADULT - LEVEL OF CONSCIOUSNESS, OT EVAL
[facility], [facility]  [address]  [place] [address]  Dept: [phone]  Dept Fax: [phone]       Loni Alvarado :1974 MRN:[id number]    2023 Time Session Began: 5:00 PM Time Session Ended: 5:51 PM    Clinician Location:Clinic  Patient Location: Clinic.  Verified patient identity:  [x] Yes    Session Type:Therapy 38-52 minutes ([id number])    Others Present: None    Intervention: Cognitive Behavioral    Suicide/Homicide/Violence Ideation: No    If Yes, explain:     Current Outpatient Medications   Medication Sig    celecoxib (CeleBREX) 200 MG capsule Take 1 capsule by mouth daily.    albuterol 108 (90 Base) MCG/ACT inhaler Inhale 2 puffs into the lungs every 4 hours as needed for Shortness of Breath or Wheezing.    clobetasol (TEMOVATE) 0.05 % ointment Apply ointment topically to eruption twice daily to both hands under CeraVe cream    clobetasol (TEMOVATE) 0.05 % topical solution Apply solution topically to areas of scalp eruption daily. Place a drop in areas of a wet scalp, then rub in.    tretinoin (RETIN-A) 0.025 % cream Apply cream topically to whole face nightly under Cetaphil cream for aging and wrinkles.    SUMAtriptan (Imitrex) 50 MG tablet Take 1 tablet by mouth at onset of migraine. May repeat after 2 hours if needed.     No current facility-administered medications for this visit.       Change in Medication(s) Reported: No  If Yes, explain:     Patient/Family Education Provided: Yes  Patient/Family Displays Understanding: Yes    If No, explain:     Chief complaint in patient's own words: \"I couldn't do it anymore.\"    Progress Note containing chief complaint and symptoms/problems related to the complaint:    (Data/Action/Response/Plan)    D: Patient consented to therapy, with concerns regarding feeling \"I'm frustrated.\"  Patient discussed the following stressors: moving away from spouse, finding self.     A: Writer utilized systemic interventions to assess the  patient's presenting concerns and to explore changes in the patient's mood and behaviors.  Writer provided psychoeducation regarding marital separation, self worth, and boundary strategies.     R: Patient processed the thoughts and emotions, associated with the identified stressors, including feeling \"I need to be able to turn back on my inner voice. More consistency. Being true to myself.\"  Patient explored alternative strategies to resolve the presenting concerns, including recognizing personal goals. Patient created a plan to focus on personal goals, establishing friendships and routines.       P: Patient is scheduled for a follow-up therapy session on 1/8/2024.    Need for Community Resources Assessed: No    Resources Needed: No    If Yes, what resources:     Primary Diagnosis: Adjustment disorder with mixed emotional features  (primary encounter diagnosis)  Divorce    Treatment Plan: Unchanged    Discharge Plan: N/A    Next Appointment: 1/8/2024  Lisa Schobert, LPC, [facility]   alert

## 2024-01-04 NOTE — ED PROVIDER NOTE - WR ORDER DATE AND TIME 1
Please see edited instructions above  Rest easy today  Quiet environment  Refrain from blowing your nose     Change gauze as needed  
18-Aug-2021 17:28

## 2024-03-02 NOTE — PROGRESS NOTE ADULT - PROBLEM SELECTOR PLAN 6
- pt without acute SOB, unlikely exacerbation  - if worsening mental status will obtain abg   - continue home albuterol 90mcg 2puffs q6hrs Inadequate Protein Energy Intake - pt without acute SOB, unlikely exacerbation  - if worsening mental status will obtain abg   - continue home albuterol 90mcg 2puffs q6hrs PRN

## 2024-04-15 NOTE — PATIENT PROFILE ADULT - NSPROMEDSADMININFO_GEN_A_NUR
What Type Of Note Output Would You Prefer (Optional)?: Standard Output How Severe Are Your Spot(S)?: mild Have Your Spot(S) Been Treated In The Past?: has not been treated Hpi Title: Evaluation of Skin Lesions no concerns

## 2024-12-06 ENCOUNTER — INPATIENT (INPATIENT)
Facility: HOSPITAL | Age: 79
LOS: 5 days | Discharge: HOME | DRG: 305 | End: 2024-12-12
Attending: INTERNAL MEDICINE | Admitting: HOSPITALIST
Payer: MEDICARE

## 2024-12-06 VITALS
SYSTOLIC BLOOD PRESSURE: 148 MMHG | OXYGEN SATURATION: 96 % | WEIGHT: 98.99 LBS | HEART RATE: 80 BPM | HEIGHT: 61 IN | RESPIRATION RATE: 21 BRPM | TEMPERATURE: 99 F | DIASTOLIC BLOOD PRESSURE: 83 MMHG

## 2024-12-06 DIAGNOSIS — Z33.2 ENCOUNTER FOR ELECTIVE TERMINATION OF PREGNANCY: Chronic | ICD-10-CM

## 2024-12-06 DIAGNOSIS — Z98.89 OTHER SPECIFIED POSTPROCEDURAL STATES: Chronic | ICD-10-CM

## 2024-12-06 DIAGNOSIS — E89.2 POSTPROCEDURAL HYPOPARATHYROIDISM: Chronic | ICD-10-CM

## 2024-12-06 DIAGNOSIS — R07.89 OTHER CHEST PAIN: ICD-10-CM

## 2024-12-06 LAB
ALBUMIN SERPL ELPH-MCNC: 3.8 G/DL — SIGNIFICANT CHANGE UP (ref 3.3–5)
ALP SERPL-CCNC: 88 U/L — SIGNIFICANT CHANGE UP (ref 30–120)
ALT FLD-CCNC: 17 U/L — SIGNIFICANT CHANGE UP (ref 10–60)
ANION GAP SERPL CALC-SCNC: 11 MMOL/L — SIGNIFICANT CHANGE UP (ref 5–17)
APTT BLD: 31.6 SEC — SIGNIFICANT CHANGE UP (ref 24.5–35.6)
AST SERPL-CCNC: 21 U/L — SIGNIFICANT CHANGE UP (ref 10–40)
BASOPHILS # BLD AUTO: 0 K/UL — SIGNIFICANT CHANGE UP (ref 0–0.2)
BASOPHILS NFR BLD AUTO: 0 % — SIGNIFICANT CHANGE UP (ref 0–2)
BILIRUB SERPL-MCNC: 0.5 MG/DL — SIGNIFICANT CHANGE UP (ref 0.2–1.2)
BUN SERPL-MCNC: 14 MG/DL — SIGNIFICANT CHANGE UP (ref 7–23)
CALCIUM SERPL-MCNC: 9.6 MG/DL — SIGNIFICANT CHANGE UP (ref 8.4–10.5)
CHLORIDE SERPL-SCNC: 102 MMOL/L — SIGNIFICANT CHANGE UP (ref 96–108)
CK SERPL-CCNC: 177 U/L — SIGNIFICANT CHANGE UP (ref 26–192)
CK SERPL-CCNC: 186 U/L — SIGNIFICANT CHANGE UP (ref 26–192)
CO2 SERPL-SCNC: 30 MMOL/L — SIGNIFICANT CHANGE UP (ref 22–31)
CREAT SERPL-MCNC: 0.74 MG/DL — SIGNIFICANT CHANGE UP (ref 0.5–1.3)
D DIMER BLD IA.RAPID-MCNC: 620 NG/ML DDU — HIGH
EGFR: 81 ML/MIN/1.73M2 — SIGNIFICANT CHANGE UP
EOSINOPHIL # BLD AUTO: 0 K/UL — SIGNIFICANT CHANGE UP (ref 0–0.5)
EOSINOPHIL NFR BLD AUTO: 0 % — SIGNIFICANT CHANGE UP (ref 0–6)
GLUCOSE SERPL-MCNC: 122 MG/DL — HIGH (ref 70–99)
HCT VFR BLD CALC: 39.2 % — SIGNIFICANT CHANGE UP (ref 34.5–45)
HCT VFR BLD CALC: 42.3 % — SIGNIFICANT CHANGE UP (ref 34.5–45)
HGB BLD-MCNC: 12.5 G/DL — SIGNIFICANT CHANGE UP (ref 11.5–15.5)
HGB BLD-MCNC: 13.3 G/DL — SIGNIFICANT CHANGE UP (ref 11.5–15.5)
INR BLD: 1.03 RATIO — SIGNIFICANT CHANGE UP (ref 0.85–1.16)
LACTATE SERPL-SCNC: 1.1 MMOL/L — SIGNIFICANT CHANGE UP (ref 0.7–2)
LYMPHOCYTES # BLD AUTO: 1.07 K/UL — SIGNIFICANT CHANGE UP (ref 1–3.3)
LYMPHOCYTES # BLD AUTO: 4 % — LOW (ref 13–44)
MCHC RBC-ENTMCNC: 26.6 PG — LOW (ref 27–34)
MCHC RBC-ENTMCNC: 26.7 PG — LOW (ref 27–34)
MCHC RBC-ENTMCNC: 31.4 G/DL — LOW (ref 32–36)
MCHC RBC-ENTMCNC: 31.9 G/DL — LOW (ref 32–36)
MCV RBC AUTO: 83.8 FL — SIGNIFICANT CHANGE UP (ref 80–100)
MCV RBC AUTO: 84.6 FL — SIGNIFICANT CHANGE UP (ref 80–100)
MONOCYTES # BLD AUTO: 1.34 K/UL — HIGH (ref 0–0.9)
MONOCYTES NFR BLD AUTO: 5 % — SIGNIFICANT CHANGE UP (ref 2–14)
NEUTROPHILS # BLD AUTO: 24.43 K/UL — HIGH (ref 1.8–7.4)
NEUTROPHILS NFR BLD AUTO: 85 % — HIGH (ref 43–77)
NRBC # BLD: 0 /100 WBCS — SIGNIFICANT CHANGE UP (ref 0–0)
NRBC # BLD: SIGNIFICANT CHANGE UP /100 WBCS (ref 0–0)
NT-PROBNP SERPL-SCNC: 424 PG/ML — SIGNIFICANT CHANGE UP (ref 0–450)
PLATELET # BLD AUTO: 279 K/UL — SIGNIFICANT CHANGE UP (ref 150–400)
PLATELET # BLD AUTO: 322 K/UL — SIGNIFICANT CHANGE UP (ref 150–400)
POTASSIUM SERPL-MCNC: 3.7 MMOL/L — SIGNIFICANT CHANGE UP (ref 3.5–5.3)
POTASSIUM SERPL-SCNC: 3.7 MMOL/L — SIGNIFICANT CHANGE UP (ref 3.5–5.3)
PROT SERPL-MCNC: 7.5 G/DL — SIGNIFICANT CHANGE UP (ref 6–8.3)
PROTHROM AB SERPL-ACNC: 11.9 SEC — SIGNIFICANT CHANGE UP (ref 9.9–13.4)
RBC # BLD: 4.68 M/UL — SIGNIFICANT CHANGE UP (ref 3.8–5.2)
RBC # BLD: 5 M/UL — SIGNIFICANT CHANGE UP (ref 3.8–5.2)
RBC # FLD: 15.6 % — HIGH (ref 10.3–14.5)
RBC # FLD: 15.8 % — HIGH (ref 10.3–14.5)
SODIUM SERPL-SCNC: 143 MMOL/L — SIGNIFICANT CHANGE UP (ref 135–145)
TROPONIN I, HIGH SENSITIVITY RESULT: 30.3 NG/L — SIGNIFICANT CHANGE UP
TROPONIN I, HIGH SENSITIVITY RESULT: 38.9 NG/L — SIGNIFICANT CHANGE UP
WBC # BLD: 25.03 K/UL — HIGH (ref 3.8–10.5)
WBC # BLD: 26.85 K/UL — HIGH (ref 3.8–10.5)
WBC # FLD AUTO: 25.03 K/UL — HIGH (ref 3.8–10.5)
WBC # FLD AUTO: 26.85 K/UL — HIGH (ref 3.8–10.5)

## 2024-12-06 PROCEDURE — 99285 EMERGENCY DEPT VISIT HI MDM: CPT

## 2024-12-06 PROCEDURE — 99223 1ST HOSP IP/OBS HIGH 75: CPT

## 2024-12-06 PROCEDURE — 71250 CT THORAX DX C-: CPT | Mod: 26,MC

## 2024-12-06 PROCEDURE — 93010 ELECTROCARDIOGRAM REPORT: CPT

## 2024-12-06 PROCEDURE — 74176 CT ABD & PELVIS W/O CONTRAST: CPT | Mod: 26,MC

## 2024-12-06 PROCEDURE — 71045 X-RAY EXAM CHEST 1 VIEW: CPT | Mod: 26

## 2024-12-06 RX ORDER — ACETAMINOPHEN 500MG 500 MG/1
650 TABLET, COATED ORAL EVERY 6 HOURS
Refills: 0 | Status: DISCONTINUED | OUTPATIENT
Start: 2024-12-06 | End: 2024-12-12

## 2024-12-06 RX ORDER — SODIUM CHLORIDE 9 MG/ML
1000 INJECTION, SOLUTION INTRAMUSCULAR; INTRAVENOUS; SUBCUTANEOUS ONCE
Refills: 0 | Status: COMPLETED | OUTPATIENT
Start: 2024-12-06 | End: 2024-12-06

## 2024-12-06 RX ORDER — ROSUVASTATIN CALCIUM 5 MG/1
10 TABLET, FILM COATED ORAL AT BEDTIME
Refills: 0 | Status: DISCONTINUED | OUTPATIENT
Start: 2024-12-06 | End: 2024-12-12

## 2024-12-06 RX ORDER — MAGNESIUM, ALUMINUM HYDROXIDE 200-225/5
30 SUSPENSION, ORAL (FINAL DOSE FORM) ORAL EVERY 4 HOURS
Refills: 0 | Status: DISCONTINUED | OUTPATIENT
Start: 2024-12-06 | End: 2024-12-12

## 2024-12-06 RX ORDER — ACETAMINOPHEN, DIPHENHYDRAMINE HCL, PHENYLEPHRINE HCL 325; 25; 5 MG/1; MG/1; MG/1
3 TABLET ORAL AT BEDTIME
Refills: 0 | Status: DISCONTINUED | OUTPATIENT
Start: 2024-12-06 | End: 2024-12-12

## 2024-12-06 RX ORDER — 0.9 % SODIUM CHLORIDE 0.9 %
1 INTRAVENOUS SOLUTION INTRAVENOUS ONCE
Refills: 0 | Status: COMPLETED | OUTPATIENT
Start: 2024-12-06 | End: 2024-12-06

## 2024-12-06 RX ORDER — LOSARTAN POTASSIUM 100 MG/1
50 TABLET, FILM COATED ORAL DAILY
Refills: 0 | Status: DISCONTINUED | OUTPATIENT
Start: 2024-12-06 | End: 2024-12-08

## 2024-12-06 RX ORDER — ENOXAPARIN SODIUM 30 MG/.3ML
30 INJECTION SUBCUTANEOUS EVERY 24 HOURS
Refills: 0 | Status: DISCONTINUED | OUTPATIENT
Start: 2024-12-06 | End: 2024-12-10

## 2024-12-06 RX ORDER — ONDANSETRON HYDROCHLORIDE 4 MG/1
4 TABLET, FILM COATED ORAL EVERY 8 HOURS
Refills: 0 | Status: DISCONTINUED | OUTPATIENT
Start: 2024-12-06 | End: 2024-12-12

## 2024-12-06 RX ORDER — IPRATROPIUM BROMIDE AND ALBUTEROL SULFATE 2.5; .5 MG/3ML; MG/3ML
3 SOLUTION RESPIRATORY (INHALATION) EVERY 6 HOURS
Refills: 0 | Status: DISCONTINUED | OUTPATIENT
Start: 2024-12-06 | End: 2024-12-12

## 2024-12-06 RX ORDER — FUROSEMIDE 40 MG/1
20 TABLET ORAL DAILY
Refills: 0 | Status: DISCONTINUED | OUTPATIENT
Start: 2024-12-06 | End: 2024-12-12

## 2024-12-06 RX ORDER — PREDNISONE 20 MG/1
40 TABLET ORAL DAILY
Refills: 0 | Status: DISCONTINUED | OUTPATIENT
Start: 2024-12-06 | End: 2024-12-07

## 2024-12-06 RX ORDER — FLUTICASONE PROPIONATE AND SALMETEROL XINAFOATE 45; 21 UG/1; UG/1
1 AEROSOL, METERED RESPIRATORY (INHALATION)
Refills: 0 | Status: DISCONTINUED | OUTPATIENT
Start: 2024-12-06 | End: 2024-12-12

## 2024-12-06 RX ORDER — METOPROLOL TARTRATE 100 MG/1
25 TABLET, FILM COATED ORAL DAILY
Refills: 0 | Status: DISCONTINUED | OUTPATIENT
Start: 2024-12-06 | End: 2024-12-12

## 2024-12-06 RX ADMIN — PREDNISONE 40 MILLIGRAM(S): 20 TABLET ORAL at 18:31

## 2024-12-06 RX ADMIN — Medication 1 MILLILITER(S): at 17:58

## 2024-12-06 RX ADMIN — ACETAMINOPHEN 500MG 650 MILLIGRAM(S): 500 TABLET, COATED ORAL at 18:31

## 2024-12-06 RX ADMIN — ROSUVASTATIN CALCIUM 10 MILLIGRAM(S): 5 TABLET, FILM COATED ORAL at 22:14

## 2024-12-06 RX ADMIN — IPRATROPIUM BROMIDE AND ALBUTEROL SULFATE 3 MILLILITER(S): 2.5; .5 SOLUTION RESPIRATORY (INHALATION) at 20:22

## 2024-12-06 RX ADMIN — ACETAMINOPHEN 500MG 650 MILLIGRAM(S): 500 TABLET, COATED ORAL at 22:14

## 2024-12-06 RX ADMIN — LOSARTAN POTASSIUM 50 MILLIGRAM(S): 100 TABLET, FILM COATED ORAL at 17:24

## 2024-12-06 RX ADMIN — METOPROLOL TARTRATE 25 MILLIGRAM(S): 100 TABLET, FILM COATED ORAL at 17:25

## 2024-12-06 RX ADMIN — SODIUM CHLORIDE 1000 MILLILITER(S): 9 INJECTION, SOLUTION INTRAMUSCULAR; INTRAVENOUS; SUBCUTANEOUS at 16:15

## 2024-12-06 RX ADMIN — FUROSEMIDE 20 MILLIGRAM(S): 40 TABLET ORAL at 18:30

## 2024-12-06 NOTE — ED ADULT TRIAGE NOTE - STATUS:
Applied Carac Counseling:  I discussed with the patient the risks of Carac including but not limited to erythema, scaling, itching, weeping, crusting, and pain.

## 2024-12-06 NOTE — ED PROVIDER NOTE - ENMT, MLM
Prior fusion of the right sacroiliac joint. Please check ESR and C-reactive protein, obtain MRI of sacroiliac joints with contrast to evaluate for any active inflammation   Airway patent, Nasal mucosa clear. Mouth with normal mucosa. Throat has no vesicles, no oropharyngeal exudates and uvula is midline. neck supple. no meningeal signs.

## 2024-12-06 NOTE — PATIENT PROFILE ADULT - FALL HARM RISK - HARM RISK INTERVENTIONS

## 2024-12-06 NOTE — ED ADULT NURSE REASSESSMENT NOTE - NS ED NURSE REASSESS COMMENT FT1
received report and assumed care of pt at change of shift. telephone report given to DANIEL Pal. pt admitted to telemetry. awaiting transport to unit

## 2024-12-06 NOTE — ED PROVIDER NOTE - CLINICAL SUMMARY MEDICAL DECISION MAKING FREE TEXT BOX
Atypical chest pain with shortness of breath today.  Will check labs, x-ray, serial cardiac enzymes, cardiology evaluation, possible admission

## 2024-12-06 NOTE — ED PROVIDER NOTE - PROGRESS NOTE DETAILS
Patient seen by Dr. Robson Driscoll, agree with serial cardiac enzymes.  He will follow. Patient doing well, no acute complaints or changes.  Discussed with patient and , agree with admission plan. Discussed with hospitalist, Dr. Vogel, Will see patient to admit.  Discussed with radiologist.  The patient can be scheduled to [place] tomorrow to have V/Q.  Dr. Vogel aware.

## 2024-12-06 NOTE — ED ADULT TRIAGE NOTE - SOURCE OF INFORMATION
"  SUBJECTIVE:   Serjio Shields is a 57 year old male who presents to clinic today for the following health issues:    Dr. Kidd's patient is here for biometric screening forms to be filled out today with me. He has no concerns and doesn't want a physical. His labs are up to date. He is a  and gets FAA physical every 6 months.     Patient Active Problem List   Diagnosis     Kidney stone     Colon polyp     Family history of colon cancer     Family history of prostate cancer     Advanced directives, counseling/discussion     Hyperlipidemia LDL goal <130     History of pulmonary embolism     Seborrheic keratosis     Freckled skin      Current Outpatient Prescriptions   Medication     simvastatin (ZOCOR) 40 MG tablet     hydrochlorothiazide (HYDRODIURIL) 50 MG tablet     Cyanocobalamin (VITAMIN B 12 PO)     Cholecalciferol (VITAMIN D3 PO)     No current facility-administered medications for this visit.       Problem list and histories reviewed & adjusted, as indicated.  Additional history: as documented    Labs reviewed in EPIC    Reviewed and updated as needed this visit by clinical staff  Tobacco  Allergies  Med Hx  Surg Hx  Fam Hx  Soc Hx      Reviewed and updated as needed this visit by Provider         ROS:  Constitutional, HEENT, cardiovascular, pulmonary, gi and gu systems are negative, except as otherwise noted.      OBJECTIVE:   /82 (Cuff Size: Adult Regular)  Pulse 88  Temp 98.4  F (36.9  C) (Oral)  Ht 5' 10\" (1.778 m)  Wt 175 lb (79.4 kg)  BMI 25.11 kg/m2  Body mass index is 25.11 kg/(m^2).  GENERAL: healthy, alert and no distress    I reviewed his labs  Lab Results   Component Value Date    LDL 92 05/09/2017        Lab Results   Component Value Date    GLC 90 05/09/2017        ASSESSMENT/PLAN:       ICD-10-CM    1. Encounter for biometric screening Z01.89       I completed his forms. Our visit was 10 minutes in length.     Ozzy Serna, H[place]OLIT[place], PA-C   "
Patient

## 2024-12-06 NOTE — ED ADULT NURSE NOTE - CHIEF COMPLAINT QUOTE
78 y/o female presents axo3 ambulatory with cane c/o upper mid chest pain that originated in her throat today, associated with shortness of breath.

## 2024-12-06 NOTE — ED ADULT NURSE NOTE - OBJECTIVE STATEMENT
Pt brought in by her  for pain on her upper midsternal area - anterior neck region started this morning, No vomiting, fever or diarrhea

## 2024-12-06 NOTE — H&P ADULT - ASSESSMENT
#?chest pain: patient and spouse says it was more SOB and pain around neck and upper chest. Feels fine now. Possible acute exacerbation of her COPD rather than ACS event. PE unlikely given low d-dimer, when corrected to her age. Will give prednisone 40mg daily, duoneb q6 and resume her inhalers (uses breztri; will give symbicort, spiriva here). Wean O2 as tolerated. Trend troponin.    #HTN urgency: will give stat dose of her home losartan 50mg and metoprolol 25mg. Resume daily.    #ambulatory dysfunction: PT evaluation.    #?leukocytosis: no source of infection. Labs from 2023 showed leukocytosis as well. Hold antibiotics for now. May need hematology outpatient.    Code: FULL  Diet: regular  DVT ppx: enoxaparin  Dispo: admit to the hospital as inpatient

## 2024-12-06 NOTE — ED PROVIDER NOTE - OBJECTIVE STATEMENT
79-year-old female with a history of gastritis, MVP, ulcerative colitis, psoriatic arthritis, osteoporosis, hyperparathyroidism, prediabetes, COPD, CVA, memory loss, aortic stenosis presents with having some chest pain today.  The patient had been in her usual state of health, but this morning around 930 she started complaining of chest discomfort.  Some associated shortness of breath.  No palpitations.  No fall or recent trauma.  No cough/URI.  No known recent COVID infection.  No vomiting or diarrhea.  No urinary symptoms.  No aggravating or alleviating factors otherwise noted.  No other acute complaints.

## 2024-12-06 NOTE — ED ADULT NURSE NOTE - ED CARDIAC RHYTHM
irregular Paramedian Forehead Flap Text: A decision was made to reconstruct the defect utilizing an interpolation axial flap and a staged reconstruction.  A telfa template was made of the defect.  This telfa template was then used to outline the paramedian forehead pedicle flap.  The donor area for the pedicle flap was then injected with anesthesia.  The flap was excised through the skin and subcutaneous tissue down to the layer of the underlying musculature.  The pedicle flap was carefully excised within this deep plane to maintain its blood supply.  The edges of the donor site were undermined.   The donor site was closed in a primary fashion.  The pedicle was then rotated into position and sutured.  Once the tube was sutured into place, adequate blood supply was confirmed with blanching and refill.  The pedicle was then wrapped with xeroform gauze and dressed appropriately with a telfa and gauze bandage to ensure continued blood supply and protect the attached pedicle.

## 2024-12-06 NOTE — H&P ADULT - HISTORY OF PRESENT ILLNESS
79-year-old female with PMHx of HTN, COPD, prediabetes, gastritis, MVP, ulcerative colitis, psoriatic arthritis, osteoporosis, hyperparathyroidism, CVA, memory loss, aortic stenosis presents on 12/6 with chest pain or neck pain.    According to  at bedside, it started around 8:30AM today. She was fine yesterday. She was complaining of anterior neck pain as well as mild SOB. No recent changes in medication. No sick contacts or travel history.    In the ED, cardiology consulted. D-dimer low when correlated to her age. Leukocytosis noted. No acute abnormalities in CT. Case was discussed with ED provider.    Time of my evaluation, feels weak. No pain.  at bedside. 
yes

## 2024-12-06 NOTE — H&P ADULT - NSHPLABSRESULTS_GEN_ALL_CORE
Labs:                          12.5   25.03 )-----------( 279      ( 06 Dec 2024 16:37 )             39.2       12-06    143  |  102  |  14  ----------------------------<  122[H]  3.7   |  30  |  0.74    Ca    9.6      06 Dec 2024 12:40    TPro  7.5  /  Alb  3.8  /  TBili  0.5  /  DBili  x   /  AST  21  /  ALT  17  /  AlkPhos  88  12-06              Urinalysis Basic - ( 06 Dec 2024 12:40 )    Color: x / Appearance: x / SG: x / pH: x  Gluc: 122 mg/dL / Ketone: x  / Bili: x / Urobili: x   Blood: x / Protein: x / Nitrite: x   Leuk Esterase: x / RBC: x / WBC x   Sq Epi: x / Non Sq Epi: x / Bacteria: x        PT/INR - ( 06 Dec 2024 12:40 )   PT: 11.9 sec;   INR: 1.03 ratio         PTT - ( 06 Dec 2024 12:40 )  PTT:31.6 sec    Lactate Trend            CAPILLARY BLOOD GLUCOSE          EKG:   Personally Reviewed:  [ ] YES     Imaging:   Personally Reviewed:  [ ] YES

## 2024-12-06 NOTE — ED ADULT TRIAGE NOTE - CHIEF COMPLAINT QUOTE
80 y/o female presents axo3 ambulatory with cane c/o upper mid chest pain that originated in her throat today, associated with shortness of breath.

## 2024-12-06 NOTE — PATIENT PROFILE ADULT - FUNCTIONAL ASSESSMENT - BASIC MOBILITY 2.
Addended by: ROBINSON MILLER on: 5/15/2023 09:19 AM     Modules accepted: Orders    
2 = A lot of assistance

## 2024-12-07 LAB
APPEARANCE UR: CLEAR — SIGNIFICANT CHANGE UP
BILIRUB UR-MCNC: NEGATIVE — SIGNIFICANT CHANGE UP
COLOR SPEC: YELLOW — SIGNIFICANT CHANGE UP
DIFF PNL FLD: NEGATIVE — SIGNIFICANT CHANGE UP
GLUCOSE UR QL: NEGATIVE MG/DL — SIGNIFICANT CHANGE UP
KETONES UR-MCNC: NEGATIVE MG/DL — SIGNIFICANT CHANGE UP
LEUKOCYTE ESTERASE UR-ACNC: NEGATIVE — SIGNIFICANT CHANGE UP
NITRITE UR-MCNC: NEGATIVE — SIGNIFICANT CHANGE UP
PH UR: 7 — SIGNIFICANT CHANGE UP (ref 5–8)
PROT UR-MCNC: NEGATIVE MG/DL — SIGNIFICANT CHANGE UP
SP GR SPEC: 1.01 — SIGNIFICANT CHANGE UP (ref 1–1.03)
UROBILINOGEN FLD QL: 0.2 MG/DL — SIGNIFICANT CHANGE UP (ref 0.2–1)

## 2024-12-07 PROCEDURE — 99232 SBSQ HOSP IP/OBS MODERATE 35: CPT

## 2024-12-07 RX ORDER — OLANZAPINE 20 MG/1
2.5 TABLET ORAL EVERY 6 HOURS
Refills: 0 | Status: DISCONTINUED | OUTPATIENT
Start: 2024-12-07 | End: 2024-12-08

## 2024-12-07 RX ADMIN — OLANZAPINE 2.5 MILLIGRAM(S): 20 TABLET ORAL at 13:11

## 2024-12-07 RX ADMIN — LOSARTAN POTASSIUM 50 MILLIGRAM(S): 100 TABLET, FILM COATED ORAL at 17:12

## 2024-12-07 RX ADMIN — FUROSEMIDE 20 MILLIGRAM(S): 40 TABLET ORAL at 17:13

## 2024-12-07 RX ADMIN — METOPROLOL TARTRATE 25 MILLIGRAM(S): 100 TABLET, FILM COATED ORAL at 17:12

## 2024-12-07 RX ADMIN — Medication 12.5 MILLIGRAM(S): at 17:12

## 2024-12-07 NOTE — PROVIDER CONTACT NOTE (OTHER) - RECOMMENDATIONS
Dr. Godwin made aware and PRN medication is ordered for agitation. UA is ordered and collected. Pending result.

## 2024-12-07 NOTE — CARE COORDINATION ASSESSMENT. - COGNITIVE/PERCEPTUAL/DEVELOPMENTAL CONCERNS:
Pt OOB to BR with a steady upright gait. Pt AAOX4 [place] well and equal. Skin normal color warm and dry. Monitor reveals NSR without ectopy Resp regular unlabored. Breath sounds clear all fields. Abd soft non tender.  TN I#3 obtained and sent to lab concerns to be addressed

## 2024-12-07 NOTE — CONSULT NOTE ADULT - ASSESSMENT
79-year-old female with PMHx of HTN, COPD, prediabetes, gastritis, MVP, ulcerative colitis, psoriatic arthritis, osteoporosis, hyperparathyroidism, CVA, memory loss, aortic stenosis presents on 12/6 with chest pain or neck pain.    copd  emphysema  OP  OA  HTN  MVP  UC  P Arthritis  CVA hx  AS  MCI    CT chest without PNA -   ID cx - biomarkers - cx -   NEBS for COPD - Inhaler rx regimen  VS noted - meds reviewed - labs reviewed  I guanaco - atelectasis - emphysema -   monitor VS and HD and Sat  goal sat > 88 pct  check UA - Cx -   cvs rx regimen  BP control  OLD records reviewed  Nutritional assessment  assist with needs - pt with cognitive impairment
The patient is an 79 year old female with a history of HTN, HL, RA, TAVR, COPD who presents with chest pain.    Plan:  - ECG with sinus rhythm and LVH related abnormalities; largely unchanged from prior  - Echo 7/23 with hyperdynamic LV systolic function, normal TAVR function  - Rule out an acute MI with two sets of cardiac enzymes  - CXR with hyperinflation and grossly clear lungs  - Check d-dimer; if elevated check CTA chest  - WBC elevated - unclear etiology. Has been elevated in the past but no history of hematologic issues.

## 2024-12-07 NOTE — CARE COORDINATION ASSESSMENT. - NSPASTMEDSURGHISTORY_GEN_ALL_CORE_FT
details…
PAST MEDICAL & SURGICAL HISTORY:  Gastritis      Costal chondritis      Hyperparathyroidism      Varicose veins      Osteoporosis      Vertebral fracture, closed  Lumbar x 4      Psoriatic arthritis      Cigarette smoker  current      Ulcerative colitis      MVP (mitral valve prolapse)      Tachycardia      History of tonsillectomy      Termination of pregnancy (fetus)      COPD (chronic obstructive pulmonary disease)      Emphysema lung      Herniated disc, cervical  lumbar      Prediabetes      S/P parathyroidectomy  2014      Nonrheumatic aortic valve stenosis      History of memory loss      Stroke

## 2024-12-07 NOTE — CARE COORDINATION ASSESSMENT. - OTHER PERTINENT DISCHARGE PLANNING INFORMATION:
Pt admitted with chest pain from home. Pt has dementia and is a poor historian and is unable to be interviewed. Pt's  and sons provided collateral. Per family, pt has been getting more confused and hosptile at home with worsening demential. She has a private aide Mon-Fri 9-5. Awaiting P.T recommendation.

## 2024-12-07 NOTE — PROVIDER CONTACT NOTE (OTHER) - SITUATION
patient is confused and combative, hitting, trying to bite, kicking, pulling prim fit off, and touching feces and non-compliance with safety.

## 2024-12-07 NOTE — CARE COORDINATION ASSESSMENT. - NSCAREPROVIDERS_GEN_ALL_CORE_FT
CARE PROVIDERS:  Accepting Physician: Willis Vogel  Administration: Kendrick Sanders  Admitting: Willis Vogel  Attending: Willis Vogel  Consultant: Kendrick Sanders  Consultant: Robson Driscoll  Covering Team: Ada Driscoll  ED Attending: Neo Hernandez  ED Nurse: Alisha Horner  Nurse: Paola Richard  Nurse: Brittani Hancock  Nurse: Albaro Ibarra  Ordered: ServiceAccount, SCMMLM  Ordered: Doctor, Unknown  Outpatient Provider: Robson Driscoll  Outpatient Provider: Brijesh Kebede  Outpatient Provider: Glory Gramajo  Outpatient Provider: Cheko Dukes  Outpatient Provider: Jose Howard  Outpatient Provider: Jason Gilbert  Outpatient Provider: Daniel Zuleta  Override: Rickey Cyr  Physical Therapy: Rodger Moya  Physical Therapy: Mickie Ram  Primary Team: Azael Godwin  Primary Team: José Miguel Kelly  Primary Team: Sunny Astorga  Primary Team: Willis Vogel  Registered Dietitian: Cora Teran  Respiratory Therapy: Liban Guerra  Respiratory Therapy: Delia Vargas  : Aaliyah Kelley  Team: PRECIOUS  Hospitalists, Team

## 2024-12-08 LAB
ALBUMIN SERPL ELPH-MCNC: 3.1 G/DL — LOW (ref 3.3–5)
ALP SERPL-CCNC: 71 U/L — SIGNIFICANT CHANGE UP (ref 30–120)
ALT FLD-CCNC: 14 U/L — SIGNIFICANT CHANGE UP (ref 10–60)
ANION GAP SERPL CALC-SCNC: 10 MMOL/L — SIGNIFICANT CHANGE UP (ref 5–17)
AST SERPL-CCNC: 26 U/L — SIGNIFICANT CHANGE UP (ref 10–40)
BILIRUB SERPL-MCNC: 0.5 MG/DL — SIGNIFICANT CHANGE UP (ref 0.2–1.2)
BUN SERPL-MCNC: 17 MG/DL — SIGNIFICANT CHANGE UP (ref 7–23)
CALCIUM SERPL-MCNC: 9.7 MG/DL — SIGNIFICANT CHANGE UP (ref 8.4–10.5)
CHLORIDE SERPL-SCNC: 103 MMOL/L — SIGNIFICANT CHANGE UP (ref 96–108)
CO2 SERPL-SCNC: 29 MMOL/L — SIGNIFICANT CHANGE UP (ref 22–31)
CREAT SERPL-MCNC: 0.69 MG/DL — SIGNIFICANT CHANGE UP (ref 0.5–1.3)
EGFR: 87 ML/MIN/1.73M2 — SIGNIFICANT CHANGE UP
GLUCOSE SERPL-MCNC: 109 MG/DL — HIGH (ref 70–99)
HCT VFR BLD CALC: 44 % — SIGNIFICANT CHANGE UP (ref 34.5–45)
HGB BLD-MCNC: 13.8 G/DL — SIGNIFICANT CHANGE UP (ref 11.5–15.5)
MCHC RBC-ENTMCNC: 26.1 PG — LOW (ref 27–34)
MCHC RBC-ENTMCNC: 31.4 G/DL — LOW (ref 32–36)
MCV RBC AUTO: 83.2 FL — SIGNIFICANT CHANGE UP (ref 80–100)
NRBC # BLD: 0 /100 WBCS — SIGNIFICANT CHANGE UP (ref 0–0)
PLATELET # BLD AUTO: 342 K/UL — SIGNIFICANT CHANGE UP (ref 150–400)
POTASSIUM SERPL-MCNC: 4.2 MMOL/L — SIGNIFICANT CHANGE UP (ref 3.5–5.3)
POTASSIUM SERPL-SCNC: 4.2 MMOL/L — SIGNIFICANT CHANGE UP (ref 3.5–5.3)
PROT SERPL-MCNC: 7.3 G/DL — SIGNIFICANT CHANGE UP (ref 6–8.3)
RBC # BLD: 5.29 M/UL — HIGH (ref 3.8–5.2)
RBC # FLD: 15.7 % — HIGH (ref 10.3–14.5)
SODIUM SERPL-SCNC: 142 MMOL/L — SIGNIFICANT CHANGE UP (ref 135–145)
WBC # BLD: 17.91 K/UL — HIGH (ref 3.8–10.5)
WBC # FLD AUTO: 17.91 K/UL — HIGH (ref 3.8–10.5)

## 2024-12-08 PROCEDURE — 99232 SBSQ HOSP IP/OBS MODERATE 35: CPT

## 2024-12-08 RX ORDER — LOSARTAN POTASSIUM 100 MG/1
100 TABLET, FILM COATED ORAL DAILY
Refills: 0 | Status: DISCONTINUED | OUTPATIENT
Start: 2024-12-08 | End: 2024-12-12

## 2024-12-08 RX ORDER — HALOPERIDOL 2 MG
2 TABLET ORAL EVERY 6 HOURS
Refills: 0 | Status: DISCONTINUED | OUTPATIENT
Start: 2024-12-08 | End: 2024-12-12

## 2024-12-08 RX ADMIN — IPRATROPIUM BROMIDE AND ALBUTEROL SULFATE 3 MILLILITER(S): 2.5; .5 SOLUTION RESPIRATORY (INHALATION) at 08:29

## 2024-12-08 RX ADMIN — ENOXAPARIN SODIUM 30 MILLIGRAM(S): 30 INJECTION SUBCUTANEOUS at 17:34

## 2024-12-08 RX ADMIN — IPRATROPIUM BROMIDE AND ALBUTEROL SULFATE 3 MILLILITER(S): 2.5; .5 SOLUTION RESPIRATORY (INHALATION) at 14:39

## 2024-12-08 RX ADMIN — Medication 12.5 MILLIGRAM(S): at 17:32

## 2024-12-08 NOTE — DIETITIAN INITIAL EVALUATION ADULT - REASON FOR ADMISSION
HPI:  79-year-old female with PMHx of HTN, COPD, prediabetes, gastritis, MVP, ulcerative colitis, psoriatic arthritis, osteoporosis, hyperparathyroidism, CVA, memory loss, aortic stenosis presents on 12/6 with chest pain or neck pain.    According to  at bedside, it started around 8:30AM today. She was fine yesterday. She was complaining of anterior neck pain as well as mild SOB. No recent changes in medication. No sick contacts or travel history.    In the ED, cardiology consulted. D-dimer low when correlated to her age. Leukocytosis noted. No acute abnormalities in CT. Case was discussed with ED provider.    Time of my evaluation, feels weak. No pain.  at bedside.  (06 Dec 2024 16:26)

## 2024-12-08 NOTE — DIETITIAN INITIAL EVALUATION ADULT - PERTINENT MEDS FT
MEDICATIONS  (STANDING):  albuterol/ipratropium for Nebulization 3 milliLiter(s) Nebulizer every 6 hours  enoxaparin Injectable 30 milliGRAM(s) SubCutaneous every 24 hours  fluticasone propionate/ salmeterol 250-50 MICROgram(s) Diskus 1 Dose(s) Inhalation two times a day  furosemide    Tablet 20 milliGRAM(s) Oral daily  levoFLOXacin  Tablet 500 milliGRAM(s) Oral every 24 hours  losartan 100 milliGRAM(s) Oral daily  metoprolol tartrate 25 milliGRAM(s) Oral daily  QUEtiapine 12.5 milliGRAM(s) Oral once  QUEtiapine 12.5 milliGRAM(s) Oral two times a day  rosuvastatin 10 milliGRAM(s) Oral at bedtime  tiotropium 2.5 MICROgram(s) Inhaler 2 Puff(s) Inhalation daily    MEDICATIONS  (PRN):  acetaminophen     Tablet .. 650 milliGRAM(s) Oral every 6 hours PRN Temp greater or equal to 38C (100.4F), Mild Pain (1 - 3)  aluminum hydroxide/magnesium hydroxide/simethicone Suspension 30 milliLiter(s) Oral every 4 hours PRN Dyspepsia  melatonin 3 milliGRAM(s) Oral at bedtime PRN Insomnia  OLANZapine 2.5 milliGRAM(s) Oral every 6 hours PRN agitaiton  ondansetron Injectable 4 milliGRAM(s) IV Push every 8 hours PRN Nausea and/or Vomiting

## 2024-12-08 NOTE — DIETITIAN INITIAL EVALUATION ADULT - ADD RECOMMEND
1. Continue with current diet order  2. Provide ONS: fortified smoothie 8oz bid  3. Encourage po intake as needed

## 2024-12-08 NOTE — DIETITIAN INITIAL EVALUATION ADULT - PERTINENT LABORATORY DATA
12-08    142  |  103  |  17  ----------------------------<  109[H]  4.2   |  29  |  0.69    Ca    9.7      08 Dec 2024 06:30    TPro  7.3  /  Alb  3.1[L]  /  TBili  0.5  /  DBili  x   /  AST  26  /  ALT  14  /  AlkPhos  71  12-08

## 2024-12-08 NOTE — DIETITIAN INITIAL EVALUATION ADULT - NSFNSPHYEXAMSKINFT_GEN_A_CORE
Pressure Injury 1: buttocks, Stage I  Pressure Injury 2: coccyx, Stage I  Pressure Injury 3: Left:, heel, Stage I  Pressure Injury 4: Right:, heel, Stage I

## 2024-12-08 NOTE — DIETITIAN INITIAL EVALUATION ADULT - OTHER INFO
Visited patient in room, per RN presents with fair appetite/po intake, consuming ~50% of meals. No reported n/v/d/c, last BM 12/7. No reported difficulty chewing or swallowing. Per chart, pt allergic to caffeine, gluten, lactose intolerance. No reported UBW, per HIE, wt 110# July 2023, current adm weight 99#, suggesting 10% wt loss in 1.5 years, will continue to monitor weight trends as able.     Pertinent medications/nutrition labs reviewed; receiving antibiotic, diuretic in house.     Education is not appropriate at this time. Recommend adding fortified smoothie 8oz bid to optimize intake. RD to continue to monitor nutrition status per protocol.

## 2024-12-09 DIAGNOSIS — D72.829 ELEVATED WHITE BLOOD CELL COUNT, UNSPECIFIED: ICD-10-CM

## 2024-12-09 DIAGNOSIS — J44.9 CHRONIC OBSTRUCTIVE PULMONARY DISEASE, UNSPECIFIED: ICD-10-CM

## 2024-12-09 DIAGNOSIS — I35.0 NONRHEUMATIC AORTIC (VALVE) STENOSIS: ICD-10-CM

## 2024-12-09 LAB
ANION GAP SERPL CALC-SCNC: 11 MMOL/L — SIGNIFICANT CHANGE UP (ref 5–17)
BUN SERPL-MCNC: 22 MG/DL — SIGNIFICANT CHANGE UP (ref 7–23)
CALCIUM SERPL-MCNC: 9.8 MG/DL — SIGNIFICANT CHANGE UP (ref 8.4–10.5)
CHLORIDE SERPL-SCNC: 102 MMOL/L — SIGNIFICANT CHANGE UP (ref 96–108)
CO2 SERPL-SCNC: 30 MMOL/L — SIGNIFICANT CHANGE UP (ref 22–31)
CREAT SERPL-MCNC: 0.83 MG/DL — SIGNIFICANT CHANGE UP (ref 0.5–1.3)
EGFR: 71 ML/MIN/1.73M2 — SIGNIFICANT CHANGE UP
GLUCOSE SERPL-MCNC: 107 MG/DL — HIGH (ref 70–99)
HCT VFR BLD CALC: 44.3 % — SIGNIFICANT CHANGE UP (ref 34.5–45)
HGB BLD-MCNC: 13.7 G/DL — SIGNIFICANT CHANGE UP (ref 11.5–15.5)
MAGNESIUM SERPL-MCNC: 1.9 MG/DL — SIGNIFICANT CHANGE UP (ref 1.6–2.6)
MCHC RBC-ENTMCNC: 26.3 PG — LOW (ref 27–34)
MCHC RBC-ENTMCNC: 30.9 G/DL — LOW (ref 32–36)
MCV RBC AUTO: 85 FL — SIGNIFICANT CHANGE UP (ref 80–100)
NRBC # BLD: 0 /100 WBCS — SIGNIFICANT CHANGE UP (ref 0–0)
PHOSPHATE SERPL-MCNC: 4.9 MG/DL — HIGH (ref 2.5–4.5)
PLATELET # BLD AUTO: 435 K/UL — HIGH (ref 150–400)
POTASSIUM SERPL-MCNC: 4.2 MMOL/L — SIGNIFICANT CHANGE UP (ref 3.5–5.3)
POTASSIUM SERPL-SCNC: 4.2 MMOL/L — SIGNIFICANT CHANGE UP (ref 3.5–5.3)
PROCALCITONIN SERPL-MCNC: 0.47 NG/ML — HIGH (ref 0.02–0.1)
RBC # BLD: 5.21 M/UL — HIGH (ref 3.8–5.2)
RBC # FLD: 15.9 % — HIGH (ref 10.3–14.5)
SODIUM SERPL-SCNC: 143 MMOL/L — SIGNIFICANT CHANGE UP (ref 135–145)
WBC # BLD: 19.37 K/UL — HIGH (ref 3.8–10.5)
WBC # FLD AUTO: 19.37 K/UL — HIGH (ref 3.8–10.5)

## 2024-12-09 PROCEDURE — 99232 SBSQ HOSP IP/OBS MODERATE 35: CPT

## 2024-12-09 RX ADMIN — ROSUVASTATIN CALCIUM 10 MILLIGRAM(S): 5 TABLET, FILM COATED ORAL at 21:22

## 2024-12-09 RX ADMIN — FUROSEMIDE 20 MILLIGRAM(S): 40 TABLET ORAL at 06:34

## 2024-12-09 RX ADMIN — ENOXAPARIN SODIUM 30 MILLIGRAM(S): 30 INJECTION SUBCUTANEOUS at 17:25

## 2024-12-09 RX ADMIN — Medication 12.5 MILLIGRAM(S): at 06:34

## 2024-12-09 RX ADMIN — Medication 12.5 MILLIGRAM(S): at 17:24

## 2024-12-09 RX ADMIN — ACETAMINOPHEN, DIPHENHYDRAMINE HCL, PHENYLEPHRINE HCL 3 MILLIGRAM(S): 325; 25; 5 TABLET ORAL at 21:43

## 2024-12-09 RX ADMIN — LOSARTAN POTASSIUM 100 MILLIGRAM(S): 100 TABLET, FILM COATED ORAL at 06:33

## 2024-12-09 RX ADMIN — METOPROLOL TARTRATE 25 MILLIGRAM(S): 100 TABLET, FILM COATED ORAL at 06:33

## 2024-12-09 NOTE — CONSULT NOTE ADULT - SUBJECTIVE AND OBJECTIVE BOX
HPI:  80YO M PMHx of HTN, COPD, prediabetes, gastritis, MVP, ulcerative colitis, psoriatic arthritis, osteoporosis, hyperparathyroidism, CVA, memory loss, aortic stenosis presents on 12/6 with chest pain or neck pain. Noted to have leukocytosis 26K with bandemia. Was febrile in ED Tmax 100.7 but no further fevers since them. UA neg CT CAP no acute pathology. cultures ngtd. currently on empiric levaquin day 3. Pt is confused and unable to provide history. No n/v/d CP SOB cough.     Infectious Disease consult was called to evaluate pt.      Past Medical & Surgical Hx:  PAST MEDICAL & SURGICAL HISTORY:  Gastritis  Tachycardia  MVP (mitral valve prolapse)  Ulcerative colitis  Cigarette smoker  current  Psoriatic arthritis  Vertebral fracture, closed  Lumbar x 4  Osteoporosis  Varicose veins  Hyperparathyroidism  Costal chondritis  Prediabetes  Herniated disc, cervical  lumbar  Emphysema lung  COPD (chronic obstructive pulmonary disease)  Stroke  History of memory loss  Nonrheumatic aortic valve stenosis  Termination of pregnancy (fetus)  History of tonsillectomy  S/P parathyroidectomy  2014        Social History--  EtOH: denies   Tobacco: denies   Drug Use: denies     FAMILY HISTORY:  Family history of Parkinson disease    Family history of colorectal cancer    Family history of diabetes mellitus    Family history of transitional cell carcinoma of bladder (Child)    Family history of melanoma (Child)      Allergies  Gluten (Unknown)  caffeine (Blisters)  IV Contrast (Hives)  penicillins (Hives)    Intolerances  lactose (Vomiting)      Home Medications:  Breztri Aerosphere inhalation aerosol: 2 inhaled (06 Jul 2023 06:11)  furosemide 20 mg oral tablet: 1 tab(s) orally once a day (06 Jul 2023 06:11)  losartan 25 mg oral tablet: 2 orally every other day (06 Jul 2023 06:11)  potassium chloride 8 mEq (600 mg) oral tablet, extended release: 2 orally every other day (06 Jul 2023 06:11)  rosuvastatin 10 mg oral tablet: 1 tab(s) orally once a day (06 Jul 2023 06:11)      Current Inpatient Medications :    ANTIBIOTICS:   levoFLOXacin  Tablet 500 milliGRAM(s) Oral every 24 hours      OTHER RELEVANT MEDICATIONS :  acetaminophen     Tablet .. 650 milliGRAM(s) Oral every 6 hours PRN  albuterol/ipratropium for Nebulization 3 milliLiter(s) Nebulizer every 6 hours  aluminum hydroxide/magnesium hydroxide/simethicone Suspension 30 milliLiter(s) Oral every 4 hours PRN  enoxaparin Injectable 30 milliGRAM(s) SubCutaneous every 24 hours  fluticasone propionate/ salmeterol 250-50 MICROgram(s) Diskus 1 Dose(s) Inhalation two times a day  furosemide    Tablet 20 milliGRAM(s) Oral daily  haloperidol    Injectable 2 milliGRAM(s) IntraMuscular every 6 hours PRN  losartan 100 milliGRAM(s) Oral daily  melatonin 3 milliGRAM(s) Oral at bedtime PRN  metoprolol tartrate 25 milliGRAM(s) Oral daily  ondansetron Injectable 4 milliGRAM(s) IV Push every 8 hours PRN  QUEtiapine 12.5 milliGRAM(s) Oral once  QUEtiapine 12.5 milliGRAM(s) Oral two times a day  rosuvastatin 10 milliGRAM(s) Oral at bedtime  tiotropium 2.5 MICROgram(s) Inhaler 2 Puff(s) Inhalation daily      ROS:  Unable to obtain due to : pt's condition      I&O's Detail    09 Dec 2024 07:01  -  09 Dec 2024 23:02  --------------------------------------------------------  IN:  Total IN: 0 mL    OUT:    Voided (mL): 600 mL  Total OUT: 600 mL    Total NET: -600 mL      Physical Exam:  Vital Signs Last 24 Hrs  T(C): 36.4 (09 Dec 2024 21:46), Max: 36.7 (09 Dec 2024 13:27)  T(F): 97.6 (09 Dec 2024 21:46), Max: 98 (09 Dec 2024 13:27)  HR: 111 (09 Dec 2024 21:46) (79 - 111)  BP: 125/67 (09 Dec 2024 21:46) (125/67 - 148/85)  RR: 18 (09 Dec 2024 21:46) (18 - 18)  SpO2: 93% (09 Dec 2024 21:46) (93% - 96%)    Parameters below as of 09 Dec 2024 21:46  Patient On (Oxygen Delivery Method): room air      General: no acute distress  Neck: supple, trachea midline  Lungs: clear, no wheeze/rhonchi  Cardiovascular: regular rate and rhythm, S1 S2  Abdomen: soft, nontender, ND, bowel sounds normal  Neurological:  awake confused  Skin: no rash  Extremities: no edema    Labs:                           13.7   19.37 )-----------( 435      ( 09 Dec 2024 12:30 )             44.3   12-09    143  |  102  |  22  ----------------------------<  107[H]  4.2   |  30  |  0.83    Ca    9.8      09 Dec 2024 12:30  Phos  4.9     12-09  Mg     1.9     12-09    TPro  7.3  /  Alb  3.1[L]  /  TBili  0.5  /  DBili  x   /  AST  26  /  ALT  14  /  AlkPhos  71  12-08    RECENT CULTURES:          RADIOLOGY & ADDITIONAL STUDIES:    ACC: [id number] EXAM:  CT ABDOMEN AND PELVIS   ORDERED BY: CHRISTOPHER VARGAS     ACC: [id number] EXAM:  CT CHEST   ORDERED BY: CHRISTOPHER VARGAS     PROCEDURE DATE:  12/06/2024          INTERPRETATION:  CLINICAL INFORMATION: Atypical chest pain. Leukocytosis.    COMPARISON: Noncontrast CT of the abdomen and pelvis May 19, 2023, CT   angiogram of the thorax, abdomen, and pelvis April 27, 2023.    CONTRAST/COMPLICATIONS:  IV Contrast: NONE  Oral Contrast: NONE  .    PROCEDURE:  CT of the Chest, Abdomen andPelvis was performed.  Sagittal and coronal reformats were performed.    FINDINGS:  CHEST:  LUNGS AND LARGE AIRWAYS: Patent central airways. Moderate centrilobular   emphysema. No pulmonary nodules.  PLEURA: No pleural effusion.  VESSELS: Within normal limits.  HEART: Status post TAVR. Extensive mitral annulus calcification. Moderate   coronary artery calcification. Heart size is normal. No pericardial   effusion.  MEDIASTINUM AND EVANGELINA: No lymphadenopathy.  CHEST WALL AND LOWER NECK: Within normal limits. Implanted cardiac   monitor.    ABDOMEN AND PELVIS:  LIVER: Again is noted a subcapsular cyst in the right liver dome   posterolaterally. Otherwise, within normal limits.  BILE DUCTS: Normal caliber.  GALLBLADDER: Within normal limits.  SPLEEN: Within normal limits.  PANCREAS: Within normal limits.  ADRENALS: Within normal limits.  KIDNEYS/URETERS: Within normal limits.    BLADDER: Within normal limits.  REPRODUCTIVE ORGANS: Bulky multi fibroid uterus. The adnexa are   unremarkable by CTcriteria.    BOWEL: Stool-filled colon. No bowel obstruction. There is no mesenteric   adenopathy.  PERITONEUM/RETROPERITONEUM: Within normal limits.  VESSELS: Within normal limits.  LYMPH NODES: No lymphadenopathy.  ABDOMINAL WALL: Within normal limits.  BONES: ORIF intertrochanteric fracture of the left femur. Healed left   sacral ala fracture. Chronic moderate compression deformity of L3. Healed   sternal fracture.    IMPRESSION: No evidence of acute infectious or inflammatory process in   thethorax. No evidence of acute inflammatory or obstructive process in   the abdomen and pelvis.    Assessment :   80YO M PMHx of HTN, COPD, prediabetes, gastritis, MVP, ulcerative colitis, psoriatic arthritis, osteoporosis, hyperparathyroidism, CVA, memory loss, aortic stenosis presents on 12/6 with chest pain or neck pain. Noted to have leukocytosis 26K with bandemia. Was febrile in ED Tmax 100.7 but no further fevers since then. currently on empiric levaquin day 3. Pt is confused and unable to provide history. No n/v/d CP SOB cough.   No s/s for imfectious process  cultures ngtd. UA neg CT CAP no acute pathology  Leukocytosis likely non infectious process  Thrombocytosis    Plan :   Stop Levaquin and monitor off antibiotics  Trend temps and cbc  Consider heme eval  Asp precautions    Continue with present regiment .  Approptiate use of antibiotics and adverse effects reviewed.      > 45 minutes spent in direct patient care reviewing  the notes, lab data/ imaging , discussion with multidisciplinary team. All questions were addressed and answered to the best of my capacity .    Thank you for allowing me to participate in the care of your patient .      Tera Donis MD  Infectious Disease  [phone]
History of Present Illness: The patient is an 79 year old female with a history of HTN, HL, RA, TAVR, COPD who presents with chest pain. The pain began this morning. It is sharp in the upper part of the chest. It is non-radiating, non-tender. It is a bit worse with inspiration and leaning forward. There is associated shortness of breath. Her legs have been swollen but she is unsure for how long.    Past Medical/Surgical History:  HTN, HL, RA, TAVR, COPD    Medications:  Home Medications:  Breztri Aerosphere inhalation aerosol: 2 inhaled (06 Jul 2023 06:11)  furosemide 20 mg oral tablet: 1 tab(s) orally once a day (06 Jul 2023 06:11)  losartan 25 mg oral tablet: 2 orally every other day (06 Jul 2023 06:11)  potassium chloride 8 mEq (600 mg) oral tablet, extended release: 2 orally every other day (06 Jul 2023 06:11)  rosuvastatin 10 mg oral tablet: 1 tab(s) orally once a day (06 Jul 2023 06:11)      Family History: Non-contributory family history of premature cardiovascular atherosclerotic disease    Social History: No tobacco, alcohol or drug use    Review of Systems:  General: No fevers, chills, weight gain  Skin: No rashes, color changes  Cardiovascular: No chest pain, orthopnea  Respiratory: No shortness of breath, cough  Gastrointestinal: No nausea, abdominal pain  Genitourinary: No incontinence, pain with urination  Musculoskeletal: No pain, swelling, decreased range of motion  Neurological: No headache, weakness  Psychiatric: No depression, anxiety  Endocrine: No weight gain, increased thirst  All other systems are comprehensively negative.    Physical Exam:  Vitals:        Vital Signs Last 24 Hrs  T(C): 37.2 (06 Dec 2024 11:43), Max: 37.2 (06 Dec 2024 11:43)  T(F): 98.9 (06 Dec 2024 11:43), Max: 98.9 (06 Dec 2024 11:43)  HR: 80 (06 Dec 2024 11:43) (80 - 80)  BP: 148/83 (06 Dec 2024 11:43) (148/83 - 148/83)  BP(mean): --  RR: 21 (06 Dec 2024 11:43) (21 - 21)  SpO2: --  Parameters below as of 06 Dec 2024 11:43  Patient On (Oxygen Delivery Method): room air  General: NAD  HEENT: MMM  Neck: No JVD, no carotid bruit  Lungs: CTAB  CV: RRR, nl S1/S2, no M/R/G  Abdomen: S/NT/ND, +BS  Extremities: No LE edema, no cyanosis  Neuro: AAOx3, non-focal  Skin: No rash    Labs:                        13.3   26.85 )-----------( 322      ( 06 Dec 2024 12:40 )             42.3     12-06    143  |  102  |  14  ----------------------------<  122[H]  3.7   |  30  |  0.74    Ca    9.6      06 Dec 2024 12:40          PT/INR - ( 06 Dec 2024 12:40 )   PT: 11.9 sec;   INR: 1.03 ratio         PTT - ( 06 Dec 2024 12:40 )  PTT:31.6 sec    ECG/Telemetry: NSR, normal axis, LVH with secondary repolarization abnormality    
Date/Time Patient Seen:  		  Referring MD:   Data Reviewed	       Patient is a 79y old  Female who presents with a chief complaint of chest pain (06 Dec 2024 16:26)      Subjective/HPI   79-year-old female with PMHx of HTN, COPD, prediabetes, gastritis, MVP, ulcerative colitis, psoriatic arthritis, osteoporosis, hyperparathyroidism, CVA, memory loss, aortic stenosis presents on 12/6 with chest pain or neck pain.    According to  at bedside, it started around 8:30AM today. She was fine yesterday. She was complaining of anterior neck pain as well as mild SOB. No recent changes in medication. No sick contacts or travel history.    In the ED, cardiology consulted. D-dimer low when correlated to her age. Leukocytosis noted. No acute abnormalities in CT. Case was discussed with ED provider.    Time of my evaluation, feels weak. No pain.  at bedside.   PAST MEDICAL & SURGICAL HISTORY:  Nicotine addiction    Bronchitis    Gastritis    Tachycardia    MVP (mitral valve prolapse)    Ulcerative colitis    Cigarette smoker  current    Psoriatic arthritis    Vertebral fracture, closed  Lumbar x 4    Osteoporosis    Varicose veins    Hyperparathyroidism    Costal chondritis    Prediabetes    Herniated disc, cervical  lumbar    Emphysema lung    COPD (chronic obstructive pulmonary disease)    Stroke    History of memory loss    Nonrheumatic aortic valve stenosis    Termination of pregnancy (fetus)    History of tonsillectomy    S/P parathyroidectomy  2014      Review of Systems   Review of Systems: REVIEW OF SYSTEMS:  CONSTITUTIONAL: No fever, weight loss, or fatigue  EYES: No eye pain, visual disturbances, or discharge  ENMT:  No difficulty hearing, tinnitus, vertigo; No sinus or throat pain  NECK: No pain or stiffness  BREASTS: No pain, masses, or nipple discharge  RESPIRATORY: No cough, wheezing, chills or hemoptysis; No shortness of breath  CARDIOVASCULAR: No chest pain, palpitations, dizziness, or leg swelling  GASTROINTESTINAL: No abdominal or epigastric pain. No nausea, vomiting, or hematemesis; No diarrhea or constipation. No melena or hematochezia.  GENITOURINARY: No dysuria, frequency, hematuria, or incontinence  NEUROLOGICAL: No headaches, memory loss, loss of strength, numbness, or tremors  SKIN: No itching, burning, rashes, or lesions   LYMPH NODES: No enlarged glands  ENDOCRINE: No heat or cold intolerance; No hair loss; No polydipsia or polyuria  MUSCULOSKELETAL: No joint pain or swelling; No muscle, back, or extremity pain  PSYCHIATRIC: No depression, anxiety, mood swings, or difficulty sleeping  HEME/LYMPH: No easy bruising, or bleeding gums  ALLERGY AND IMMUNOLOGIC: No hives or eczema      Allergies and Intolerances:        Allergies:  	IV Contrast: Drug, Hives  	penicillins: Drug Category, Hives  	caffeine: Drug, Blisters  	Gluten: Food, Unknown       Intolerances:  	lactose: Food, Vomiting    Home Medications:   * No Current Medications as of 07-Jul-2023 11:05 documented in Structured Notes  · 	Metoprolol Tartrate 25 mg oral tablet: 1 tab(s) orally once a day  · 	aspirin 325 mg oral delayed release tablet: 1 tab(s) orally once a day  · 	furosemide 20 mg oral tablet: 1 tab(s) orally once a day  · 	rosuvastatin 10 mg oral tablet: 1 tab(s) orally once a day  · 	Breztri Aerosphere inhalation aerosol: 2 inhaled  · 	potassium chloride 8 mEq (600 mg) oral tablet, extended release: 2 orally every other day  · 	losartan 25 mg oral tablet: 2 orally every other day    .    Patient History:   Social History   · Substance use	No    Tobacco Screening   · Core Measure Site	No    Risk Assessment:   Present on Admission   Deep Venous Thrombosis	no  Pulmonary Embolus	no    HIV Screening   · In accordance with [place] State law, we offer every patient who comes to our ED an HIV test. Would you like to be tested today?	Opt out    Hepatitis C Test Questions   · In accordance with [place] State Law, we offer every patient a Hepatitis C test. Would you like to be tested today?	Opt out        Medication list         MEDICATIONS  (STANDING):  albuterol/ipratropium for Nebulization 3 milliLiter(s) Nebulizer every 6 hours  enoxaparin Injectable 30 milliGRAM(s) SubCutaneous every 24 hours  fluticasone propionate/ salmeterol 250-50 MICROgram(s) Diskus 1 Dose(s) Inhalation two times a day  furosemide    Tablet 20 milliGRAM(s) Oral daily  levoFLOXacin  Tablet 500 milliGRAM(s) Oral every 24 hours  losartan 50 milliGRAM(s) Oral daily  metoprolol tartrate 25 milliGRAM(s) Oral daily  predniSONE   Tablet 40 milliGRAM(s) Oral daily  rosuvastatin 10 milliGRAM(s) Oral at bedtime  tiotropium 2.5 MICROgram(s) Inhaler 2 Puff(s) Inhalation daily    MEDICATIONS  (PRN):  acetaminophen     Tablet .. 650 milliGRAM(s) Oral every 6 hours PRN Temp greater or equal to 38C (100.4F), Mild Pain (1 - 3)  aluminum hydroxide/magnesium hydroxide/simethicone Suspension 30 milliLiter(s) Oral every 4 hours PRN Dyspepsia  melatonin 3 milliGRAM(s) Oral at bedtime PRN Insomnia  ondansetron Injectable 4 milliGRAM(s) IV Push every 8 hours PRN Nausea and/or Vomiting         Vitals log        ICU Vital Signs Last 24 Hrs  T(C): 36.3 (07 Dec 2024 05:08), Max: 38.2 (06 Dec 2024 17:45)  T(F): 97.4 (07 Dec 2024 05:08), Max: 100.7 (06 Dec 2024 17:45)  HR: 97 (07 Dec 2024 05:08) (78 - 110)  BP: 160/78 (07 Dec 2024 05:08) (127/60 - 211/79)  BP(mean): --  ABP: --  ABP(mean): --  RR: 18 (07 Dec 2024 05:08) (18 - 24)  SpO2: 95% (07 Dec 2024 05:08) (92% - 97%)    O2 Parameters below as of 07 Dec 2024 05:08  Patient On (Oxygen Delivery Method): room air                 Input and Output:  I&O's Detail    06 Dec 2024 07:01  -  07 Dec 2024 07:00  --------------------------------------------------------  IN:  Total IN: 0 mL    OUT:    Voided (mL): 600 mL  Total OUT: 600 mL    Total NET: -600 mL          Lab Data                        12.5   25.03 )-----------( 279      ( 06 Dec 2024 16:37 )             39.2     12-06    143  |  102  |  14  ----------------------------<  122[H]  3.7   |  30  |  0.74    Ca    9.6      06 Dec 2024 12:40    TPro  7.5  /  Alb  3.8  /  TBili  0.5  /  DBili  x   /  AST  21  /  ALT  17  /  AlkPhos  88  12-06            Review of Systems	  weakness  poor historian  all systems reviewed      Objective     Physical Examination  heart s1s2  lung dc BS  head nc  head at  abd soft        Pertinent Lab findings & Imaging      Crowder:  NO   Adequate UO     I&O's Detail    06 Dec 2024 07:01  -  07 Dec 2024 07:00  --------------------------------------------------------  IN:  Total IN: 0 mL    OUT:    Voided (mL): 600 mL  Total OUT: 600 mL    Total NET: -600 mL               Discussed with:     Cultures:	        Radiology        ACC: [id number] EXAM:  CT ABDOMEN AND PELVIS   ORDERED BY: CHRISTOPHER VARGAS     ACC: [id number] EXAM:  CT CHEST   ORDERED BY: CHRISTOPHER VARGAS     PROCEDURE DATE:  12/06/2024          INTERPRETATION:  CLINICAL INFORMATION: Atypical chest pain. Leukocytosis.    COMPARISON: Noncontrast CT of the abdomen and pelvis May 19, 2023, CT   angiogram of the thorax, abdomen, and pelvis April 27, 2023.    CONTRAST/COMPLICATIONS:  IV Contrast: NONE  Oral Contrast: NONE  .    PROCEDURE:  CT of the Chest, Abdomen and Pelvis was performed.  Sagittal and coronal reformats were performed.    FINDINGS:  CHEST:  LUNGS AND LARGE AIRWAYS: Patent central airways. Moderate centrilobular   emphysema. No pulmonary nodules.  PLEURA: No pleural effusion.  VESSELS: Within normal limits.  HEART: Status post TAVR. Extensive mitral annulus calcification. Moderate   coronary artery calcification. Heart size is normal. No pericardial   effusion.  MEDIASTINUM AND EVANGELINA: No lymphadenopathy.  CHEST WALL AND LOWER NECK: Within normal limits. Implanted cardiac   monitor.    ABDOMEN AND PELVIS:  LIVER: Again is noted a subcapsular cyst in the right liver dome   posterolaterally. Otherwise, within normal limits.  BILE DUCTS: Normal caliber.  GALLBLADDER: Within normal limits.  SPLEEN: Within normal limits.  PANCREAS: Within normal limits.  ADRENALS: Within normal limits.  KIDNEYS/URETERS: Within normal limits.    BLADDER: Within normal limits.  REPRODUCTIVE ORGANS: Bulky multi fibroid uterus. The adnexa are   unremarkable by CT criteria.    BOWEL: Stool-filled colon. No bowel obstruction. There is no mesenteric   adenopathy.  PERITONEUM/RETROPERITONEUM: Within normal limits.  VESSELS: Within normal limits.  LYMPH NODES: No lymphadenopathy.  ABDOMINAL WALL: Within normal limits.  BONES: ORIF intertrochanteric fracture of the left femur. Healed left   sacral ala fracture. Chronic moderate compression deformity of L3. Healed   sternal fracture.    IMPRESSION: No evidence of acute infectious or inflammatory process in   the thorax. No evidence of acute inflammatory or obstructive process in   the abdomen and pelvis.    --- End of Report ---            FLORI ROMAN MD; Attending Radiologist  This document has been electronically signed. Dec  6 2024  3:14PM

## 2024-12-09 NOTE — PHYSICAL THERAPY INITIAL EVALUATION ADULT - LEVEL OF INDEPENDENCE: SIT/STAND, REHAB EVAL
Detail Level: Detailed Consent: The patient's consent was obtained including but not limited to risks of crusting, scabbing, blistering, scarring, darker or lighter pigmentary change, recurrence, incomplete removal and infection. Show Aperture Variable?: Yes Number Of Freeze-Thaw Cycles: 1 freeze-thaw cycle Render Post-Care Instructions In Note?: no Post-Care Instructions: I reviewed with the patient in detail post-care instructions. Patient is to wear sunprotection, and avoid picking at any of the treated lesions. Pt may apply Vaseline to crusted or scabbing areas. Duration Of Freeze Thaw-Cycle (Seconds): 0 Application Tool (Optional): Liquid Nitrogen Sprayer Spray Paint Text: The liquid nitrogen was applied to the skin utilizing a spray paint frosting technique. Medical Necessity Clause: This procedure was medically necessary because the lesions that were treated were: Medical Necessity Information: It is in your best interest to select a reason for this procedure from the list below. All of these items fulfill various CMS LCD requirements except the new and changing color options. maximum assist (25% patients effort)

## 2024-12-09 NOTE — PHYSICAL THERAPY INITIAL EVALUATION ADULT - GENERAL OBSERVATIONS, REHAB EVAL
Pt encountered seated at EOB with DANIEL Cesar, agitated. Pt encountered seated at EOB with DANIEL Cesar, +bed alarm, agitated.

## 2024-12-09 NOTE — PHYSICAL THERAPY INITIAL EVALUATION ADULT - PERTINENT HX OF CURRENT PROBLEM, REHAB EVAL
Pt is a 79 year old female with PMHx of HTN, COPD, prediabetes, gastritis, MVP, ulcerative colitis, psoriatic arthritis, osteoporosis, hyperparathyroidism, CVA, memory loss, aortic stenosis presents on 12/6 with chest pain or neck pain.  According to  at bedside, it started around 8:30AM today (12/6/24). She was fine yesterday. She was complaining of anterior neck pain as well as mild SOB. No recent changes in medication. No sick contacts or travel history.  In the ED, cardiology consulted. D-dimer low when correlated to her age. Leukocytosis noted. No acute abnormalities in CT.

## 2024-12-09 NOTE — SOCIAL WORK PROGRESS NOTE - NSSWPROGRESSNOTE_GEN_ALL_CORE
Late Entry: Bob met with pt's son on the unit and discussed the possibility of [facility]. DC packet provided to him BOB following

## 2024-12-10 ENCOUNTER — TRANSCRIPTION ENCOUNTER (OUTPATIENT)
Age: 79
End: 2024-12-10

## 2024-12-10 LAB
ANION GAP SERPL CALC-SCNC: 8 MMOL/L — SIGNIFICANT CHANGE UP (ref 5–17)
BASOPHILS # BLD AUTO: 0.06 K/UL — SIGNIFICANT CHANGE UP (ref 0–0.2)
BASOPHILS NFR BLD AUTO: 0.4 % — SIGNIFICANT CHANGE UP (ref 0–2)
BUN SERPL-MCNC: 20 MG/DL — SIGNIFICANT CHANGE UP (ref 7–23)
CALCIUM SERPL-MCNC: 9.7 MG/DL — SIGNIFICANT CHANGE UP (ref 8.4–10.5)
CHLORIDE SERPL-SCNC: 106 MMOL/L — SIGNIFICANT CHANGE UP (ref 96–108)
CO2 SERPL-SCNC: 32 MMOL/L — HIGH (ref 22–31)
CREAT SERPL-MCNC: 0.74 MG/DL — SIGNIFICANT CHANGE UP (ref 0.5–1.3)
CULTURE RESULTS: NO GROWTH — SIGNIFICANT CHANGE UP
EGFR: 81 ML/MIN/1.73M2 — SIGNIFICANT CHANGE UP
EOSINOPHIL # BLD AUTO: 0.32 K/UL — SIGNIFICANT CHANGE UP (ref 0–0.5)
EOSINOPHIL NFR BLD AUTO: 2.1 % — SIGNIFICANT CHANGE UP (ref 0–6)
GLUCOSE SERPL-MCNC: 140 MG/DL — HIGH (ref 70–99)
HCT VFR BLD CALC: 40.7 % — SIGNIFICANT CHANGE UP (ref 34.5–45)
HGB BLD-MCNC: 13.3 G/DL — SIGNIFICANT CHANGE UP (ref 11.5–15.5)
IMM GRANULOCYTES NFR BLD AUTO: 1.6 % — HIGH (ref 0–0.9)
LYMPHOCYTES # BLD AUTO: 21 % — SIGNIFICANT CHANGE UP (ref 13–44)
LYMPHOCYTES # BLD AUTO: 3.26 K/UL — SIGNIFICANT CHANGE UP (ref 1–3.3)
MAGNESIUM SERPL-MCNC: 1.7 MG/DL — SIGNIFICANT CHANGE UP (ref 1.6–2.6)
MCHC RBC-ENTMCNC: 26.5 PG — LOW (ref 27–34)
MCHC RBC-ENTMCNC: 32.7 G/DL — SIGNIFICANT CHANGE UP (ref 32–36)
MCV RBC AUTO: 81.1 FL — SIGNIFICANT CHANGE UP (ref 80–100)
MONOCYTES # BLD AUTO: 0.83 K/UL — SIGNIFICANT CHANGE UP (ref 0–0.9)
MONOCYTES NFR BLD AUTO: 5.3 % — SIGNIFICANT CHANGE UP (ref 2–14)
NEUTROPHILS # BLD AUTO: 10.83 K/UL — HIGH (ref 1.8–7.4)
NEUTROPHILS NFR BLD AUTO: 69.6 % — SIGNIFICANT CHANGE UP (ref 43–77)
NRBC # BLD: 0 /100 WBCS — SIGNIFICANT CHANGE UP (ref 0–0)
PHOSPHATE SERPL-MCNC: 4.3 MG/DL — SIGNIFICANT CHANGE UP (ref 2.5–4.5)
PLATELET # BLD AUTO: 312 K/UL — SIGNIFICANT CHANGE UP (ref 150–400)
POTASSIUM SERPL-MCNC: 4.3 MMOL/L — SIGNIFICANT CHANGE UP (ref 3.5–5.3)
POTASSIUM SERPL-SCNC: 4.3 MMOL/L — SIGNIFICANT CHANGE UP (ref 3.5–5.3)
PROCALCITONIN SERPL-MCNC: 0.48 NG/ML — HIGH (ref 0.02–0.1)
RAPID RVP RESULT: SIGNIFICANT CHANGE UP
RBC # BLD: 5.02 M/UL — SIGNIFICANT CHANGE UP (ref 3.8–5.2)
RBC # FLD: 15.8 % — HIGH (ref 10.3–14.5)
SARS-COV-2 RNA SPEC QL NAA+PROBE: SIGNIFICANT CHANGE UP
SODIUM SERPL-SCNC: 146 MMOL/L — HIGH (ref 135–145)
SPECIMEN SOURCE: SIGNIFICANT CHANGE UP
WBC # BLD: 15.55 K/UL — HIGH (ref 3.8–10.5)
WBC # FLD AUTO: 15.55 K/UL — HIGH (ref 3.8–10.5)

## 2024-12-10 PROCEDURE — 99233 SBSQ HOSP IP/OBS HIGH 50: CPT

## 2024-12-10 PROCEDURE — 99221 1ST HOSP IP/OBS SF/LOW 40: CPT

## 2024-12-10 RX ORDER — NICOTINE 21 MG/24H
1 PATCH, EXTENDED RELEASE TRANSDERMAL DAILY
Refills: 0 | Status: DISCONTINUED | OUTPATIENT
Start: 2024-12-10 | End: 2024-12-12

## 2024-12-10 RX ADMIN — Medication 12.5 MILLIGRAM(S): at 17:27

## 2024-12-10 RX ADMIN — LOSARTAN POTASSIUM 100 MILLIGRAM(S): 100 TABLET, FILM COATED ORAL at 05:55

## 2024-12-10 RX ADMIN — ROSUVASTATIN CALCIUM 10 MILLIGRAM(S): 5 TABLET, FILM COATED ORAL at 21:27

## 2024-12-10 RX ADMIN — Medication 2 MILLIGRAM(S): at 14:54

## 2024-12-10 RX ADMIN — IPRATROPIUM BROMIDE AND ALBUTEROL SULFATE 3 MILLILITER(S): 2.5; .5 SOLUTION RESPIRATORY (INHALATION) at 20:14

## 2024-12-10 RX ADMIN — NICOTINE 1 PATCH: 21 PATCH, EXTENDED RELEASE TRANSDERMAL at 11:44

## 2024-12-10 RX ADMIN — FUROSEMIDE 20 MILLIGRAM(S): 40 TABLET ORAL at 05:54

## 2024-12-10 RX ADMIN — IPRATROPIUM BROMIDE AND ALBUTEROL SULFATE 3 MILLILITER(S): 2.5; .5 SOLUTION RESPIRATORY (INHALATION) at 07:35

## 2024-12-10 RX ADMIN — NICOTINE 1 PATCH: 21 PATCH, EXTENDED RELEASE TRANSDERMAL at 19:30

## 2024-12-10 RX ADMIN — FLUTICASONE PROPIONATE AND SALMETEROL XINAFOATE 1 DOSE(S): 45; 21 AEROSOL, METERED RESPIRATORY (INHALATION) at 20:39

## 2024-12-10 RX ADMIN — Medication 12.5 MILLIGRAM(S): at 05:54

## 2024-12-10 RX ADMIN — METOPROLOL TARTRATE 25 MILLIGRAM(S): 100 TABLET, FILM COATED ORAL at 05:54

## 2024-12-10 NOTE — DISCHARGE NOTE NURSING/CASE MANAGEMENT/SOCIAL WORK - FINANCIAL ASSISTANCE
[facility] provides services at a reduced cost to those who are determined to be eligible through [facility]’s financial assistance program. Information regarding [facility]’s financial assistance program can be found by going to [email or web address] or by calling [phone].

## 2024-12-10 NOTE — BH CONSULTATION LIAISON ASSESSMENT NOTE - HPI (INCLUDE ILLNESS QUALITY, SEVERITY, DURATION, TIMING, CONTEXT, MODIFYING FACTORS, ASSOCIATED SIGNS AND SYMPTOMS)
Patient seen, evaluated and chart reviewed. Patient is an 79-year-old female with PMHx of HTN, COPD, prediabetes, gastritis, MVP, ulcerative colitis, psoriatic arthritis, osteoporosis, hyperparathyroidism, CVA, memory loss, aortic stenosis presents on 12/6 with chest pain or neck pain. Patient was complaining of anterior neck pain as well as mild SOB. No recent changes in medication. No sick contacts or travel history. In the ED, cardiology consulted. D-dimer low when correlated to her age. Leukocytosis noted. No acute abnormalities in CT. Patient has been intermittently agitated and the issue of management was raised. At this time patient is cooperative, but very confused, with limited understanding of her situation.

## 2024-12-10 NOTE — DISCHARGE NOTE NURSING/CASE MANAGEMENT/SOCIAL WORK - NSSCNAMETXT_GEN_ALL_CORE
[facility] [place] [phone]/ [phone]. Home care agency will reach out to you within 24-72 hours of your discharge to schedule home care visit/eval appointment with you. Please call agency for any queries regarding home care services

## 2024-12-10 NOTE — CASE MANAGEMENT PROGRESS NOTE - NSCMPROGRESSNOTE_GEN_ALL_CORE
RN/CM noted pt's case discussed during Interdisciplinary rounds, pt remains acute as per MD. PT- recommending sub-acute rehab once medically cleared for transition home. Pt has rolling walker, cane @ home; has private hired care MON-FRI 9am-5pm. Pt resides with VERY supportive spouse. DC pending hospital course. CM and MSW remain available and continue to follow case.

## 2024-12-10 NOTE — DISCHARGE NOTE NURSING/CASE MANAGEMENT/SOCIAL WORK - NSDCDMENAME_GEN_ALL_CORE_FT
prior to admission you have a cane and rolling walker @ home NEW wheelchair thru [employer] [facility] [phone] -will be delivered to your home once approved by your insurance  prior to admission you have a cane and rolling walker @ home

## 2024-12-10 NOTE — DISCHARGE NOTE NURSING/CASE MANAGEMENT/SOCIAL WORK - NSSCCARECORD_GEN_ALL_CORE
Durable Medical Equipment Agency 22-Aug-2023 09:58 Home Care Agency/Durable Medical Equipment Agency/[facility]

## 2024-12-10 NOTE — BH CONSULTATION LIAISON ASSESSMENT NOTE - NSBHCHARTREVIEWLAB_PSY_A_CORE FT
13.3   15.55 )-----------( 312      ( 10 Dec 2024 07:48 )             40.7   12-10    146[H]  |  106  |  20  ----------------------------<  140[H]  4.3   |  32[H]  |  0.74    Ca    9.7      10 Dec 2024 07:48  Phos  4.3     12-10  Mg     1.7     12-10

## 2024-12-10 NOTE — BH CONSULTATION LIAISON ASSESSMENT NOTE - NSBHCHARTREVIEWVS_PSY_A_CORE FT
Vital Signs Last 24 Hrs  T(C): 36.7 (10 Dec 2024 14:12), Max: 36.7 (10 Dec 2024 14:12)  T(F): 98.1 (10 Dec 2024 14:12), Max: 98.1 (10 Dec 2024 14:12)  HR: 84 (10 Dec 2024 14:12) (74 - 111)  BP: 125/81 (10 Dec 2024 14:12) (121/66 - 180/84)  BP(mean): --  RR: 16 (10 Dec 2024 14:12) (16 - 18)  SpO2: 93% (10 Dec 2024 14:12) (93% - 96%)    Parameters below as of 10 Dec 2024 14:12  Patient On (Oxygen Delivery Method): room air

## 2024-12-10 NOTE — BH CONSULTATION LIAISON ASSESSMENT NOTE - CURRENT MEDICATION
MEDICATIONS  (STANDING):  albuterol/ipratropium for Nebulization 3 milliLiter(s) Nebulizer every 6 hours  fluticasone propionate/ salmeterol 250-50 MICROgram(s) Diskus 1 Dose(s) Inhalation two times a day  furosemide    Tablet 20 milliGRAM(s) Oral daily  losartan 100 milliGRAM(s) Oral daily  metoprolol tartrate 25 milliGRAM(s) Oral daily  nicotine -   7 mG/24Hr(s) Patch 1 Patch Transdermal daily  QUEtiapine 12.5 milliGRAM(s) Oral once  QUEtiapine 12.5 milliGRAM(s) Oral two times a day  rosuvastatin 10 milliGRAM(s) Oral at bedtime  tiotropium 2.5 MICROgram(s) Inhaler 2 Puff(s) Inhalation daily    MEDICATIONS  (PRN):  acetaminophen     Tablet .. 650 milliGRAM(s) Oral every 6 hours PRN Temp greater or equal to 38C (100.4F), Mild Pain (1 - 3)  aluminum hydroxide/magnesium hydroxide/simethicone Suspension 30 milliLiter(s) Oral every 4 hours PRN Dyspepsia  haloperidol    Injectable 2 milliGRAM(s) IntraMuscular every 6 hours PRN Agitation  melatonin 3 milliGRAM(s) Oral at bedtime PRN Insomnia  ondansetron Injectable 4 milliGRAM(s) IV Push every 8 hours PRN Nausea and/or Vomiting

## 2024-12-10 NOTE — DISCHARGE NOTE NURSING/CASE MANAGEMENT/SOCIAL WORK - NSDCPEFALRISK_GEN_ALL_CORE
For information on Fall & Injury Prevention, visit: [email or web address] OR  [email or web address] OR  https://www.cdc.gov/steadi/patient.html

## 2024-12-10 NOTE — DISCHARGE NOTE NURSING/CASE MANAGEMENT/SOCIAL WORK - PATIENT PORTAL LINK FT
You can access the FollowMyHealth Patient Portal offered by [facility] by registering at the following website: [email or web address]. By joining [facility]’s FollowMyHealth portal, you will also be able to view your health information using other applications (apps) compatible with our system.

## 2024-12-11 LAB
ANION GAP SERPL CALC-SCNC: 8 MMOL/L — SIGNIFICANT CHANGE UP (ref 5–17)
BUN SERPL-MCNC: 33 MG/DL — HIGH (ref 7–23)
CALCIUM SERPL-MCNC: 9.5 MG/DL — SIGNIFICANT CHANGE UP (ref 8.4–10.5)
CHLORIDE SERPL-SCNC: 101 MMOL/L — SIGNIFICANT CHANGE UP (ref 96–108)
CO2 SERPL-SCNC: 33 MMOL/L — HIGH (ref 22–31)
CREAT SERPL-MCNC: 0.96 MG/DL — SIGNIFICANT CHANGE UP (ref 0.5–1.3)
EGFR: 59 ML/MIN/1.73M2 — LOW
GLUCOSE SERPL-MCNC: 136 MG/DL — HIGH (ref 70–99)
HCT VFR BLD CALC: 41.5 % — SIGNIFICANT CHANGE UP (ref 34.5–45)
HGB BLD-MCNC: 13 G/DL — SIGNIFICANT CHANGE UP (ref 11.5–15.5)
MCHC RBC-ENTMCNC: 26.4 PG — LOW (ref 27–34)
MCHC RBC-ENTMCNC: 31.3 G/DL — LOW (ref 32–36)
MCV RBC AUTO: 84.2 FL — SIGNIFICANT CHANGE UP (ref 80–100)
NRBC # BLD: 0 /100 WBCS — SIGNIFICANT CHANGE UP (ref 0–0)
PLATELET # BLD AUTO: 366 K/UL — SIGNIFICANT CHANGE UP (ref 150–400)
POTASSIUM SERPL-MCNC: 3 MMOL/L — LOW (ref 3.5–5.3)
POTASSIUM SERPL-SCNC: 3 MMOL/L — LOW (ref 3.5–5.3)
RBC # BLD: 4.93 M/UL — SIGNIFICANT CHANGE UP (ref 3.8–5.2)
RBC # FLD: 15.8 % — HIGH (ref 10.3–14.5)
SODIUM SERPL-SCNC: 142 MMOL/L — SIGNIFICANT CHANGE UP (ref 135–145)
WBC # BLD: 14.09 K/UL — HIGH (ref 3.8–10.5)
WBC # FLD AUTO: 14.09 K/UL — HIGH (ref 3.8–10.5)

## 2024-12-11 PROCEDURE — 99232 SBSQ HOSP IP/OBS MODERATE 35: CPT

## 2024-12-11 PROCEDURE — 99231 SBSQ HOSP IP/OBS SF/LOW 25: CPT

## 2024-12-11 PROCEDURE — 99239 HOSP IP/OBS DSCHRG MGMT >30: CPT

## 2024-12-11 RX ORDER — POTASSIUM CHLORIDE 600 MG/1
40 TABLET, EXTENDED RELEASE ORAL ONCE
Refills: 0 | Status: COMPLETED | OUTPATIENT
Start: 2024-12-11 | End: 2024-12-11

## 2024-12-11 RX ADMIN — NICOTINE 1 PATCH: 21 PATCH, EXTENDED RELEASE TRANSDERMAL at 07:55

## 2024-12-11 RX ADMIN — ROSUVASTATIN CALCIUM 10 MILLIGRAM(S): 5 TABLET, FILM COATED ORAL at 21:37

## 2024-12-11 RX ADMIN — METOPROLOL TARTRATE 25 MILLIGRAM(S): 100 TABLET, FILM COATED ORAL at 05:32

## 2024-12-11 RX ADMIN — Medication 12.5 MILLIGRAM(S): at 05:32

## 2024-12-11 RX ADMIN — NICOTINE 1 PATCH: 21 PATCH, EXTENDED RELEASE TRANSDERMAL at 11:41

## 2024-12-11 RX ADMIN — LOSARTAN POTASSIUM 100 MILLIGRAM(S): 100 TABLET, FILM COATED ORAL at 05:32

## 2024-12-11 RX ADMIN — POTASSIUM CHLORIDE 40 MILLIEQUIVALENT(S): 600 TABLET, EXTENDED RELEASE ORAL at 13:31

## 2024-12-11 RX ADMIN — IPRATROPIUM BROMIDE AND ALBUTEROL SULFATE 3 MILLILITER(S): 2.5; .5 SOLUTION RESPIRATORY (INHALATION) at 20:34

## 2024-12-11 RX ADMIN — Medication 12.5 MILLIGRAM(S): at 17:37

## 2024-12-11 RX ADMIN — NICOTINE 1 PATCH: 21 PATCH, EXTENDED RELEASE TRANSDERMAL at 19:00

## 2024-12-11 RX ADMIN — NICOTINE 1 PATCH: 21 PATCH, EXTENDED RELEASE TRANSDERMAL at 11:40

## 2024-12-11 RX ADMIN — FUROSEMIDE 20 MILLIGRAM(S): 40 TABLET ORAL at 05:31

## 2024-12-11 NOTE — PATIENT CHOICE NOTE. - NSPTCHOICENOTES_GEN_ALL_CORE
Pt/family opted for [facility] [phone]/ [phone]. CM referred case accordingly.

## 2024-12-11 NOTE — CAREGIVER ENGAGEMENT NOTE - CAREGIVER EDUCATION HOME CARE SERVICES - FREE TEXT
Skilled Nursing, Physical therapy services thru [facility] [place] [phone]/ [phone]

## 2024-12-11 NOTE — BH CONSULTATION LIAISON PROGRESS NOTE - NSBHCHARTREVIEWLAB_PSY_A_CORE FT
13.0   14.09 )-----------( 366      ( 11 Dec 2024 12:18 )             41.5   12-11    142  |  101  |  33[H]  ----------------------------<  136[H]  3.0[L]   |  33[H]  |  0.96    Ca    9.5      11 Dec 2024 12:18  Phos  4.3     12-10  Mg     1.7     12-10

## 2024-12-11 NOTE — BH CONSULTATION LIAISON PROGRESS NOTE - NSBHCHARTREVIEWVS_PSY_A_CORE FT
Vital Signs Last 24 Hrs  T(C): 36.4 (11 Dec 2024 05:41), Max: 36.7 (10 Dec 2024 14:12)  T(F): 97.5 (11 Dec 2024 05:41), Max: 98.1 (10 Dec 2024 14:12)  HR: 83 (11 Dec 2024 05:41) (83 - 97)  BP: 122/70 (11 Dec 2024 05:41) (114/67 - 125/81)  BP(mean): --  RR: 17 (11 Dec 2024 05:41) (16 - 17)  SpO2: 95% (11 Dec 2024 05:41) (91% - 95%)    Parameters below as of 11 Dec 2024 05:41  Patient On (Oxygen Delivery Method): room air

## 2024-12-11 NOTE — BH CONSULTATION LIAISON PROGRESS NOTE - NSBHFUPINTERVALHXFT_PSY_A_CORE
Patient seen, evaluated and chart reviewed. Patient appears to be significantly more lucid than yesterday, with improved understanding of her medical situation. She was agitated last night and required Haldol PRN. Currently calm and cooperative, with no evidence of acute psychosis, katiuska or depression. Patient is pleasantly confused.

## 2024-12-11 NOTE — CHART NOTE - NSCHARTNOTEFT_GEN_A_CORE
Pt would benefit from a manual standard wheelchair. Pt is unable to ambulate with cane or rolling walker. Pt requires a manual wheelchair to be able to participate/perform ADLs in the home, pt is deconditioned due to the following DX: .Pt has sufficient upper body strength to self-propel and has a caregiver @ home to assist. Use of a manual wheelchair will significantly improve beneficiary's ability to participate in ADLs and beneficiary will use it on regular basis @ home. The beneficiary has NOT expressed any UNWILLINGNESS to use manual wheelchair. Pt requires elevating leg rests due to Pt would benefit from a manual standard wheelchair with gelfoam seat cushion. Pt is unable to ambulate with cane or rolling walker. Pt requires a manual wheelchair to be able to participate/perform ADLs in the home, pt is deconditioned due to the following DX: prolonged hospital stay for Delirium with Dementia, Osteoporosis, h/o CVA, .Pt has sufficient upper body strength to self-propel and has a caregiver @ home to assist. Use of a manual wheelchair will significantly improve beneficiary's ability to participate in ADLs and beneficiary will use it on regular basis @ home. The beneficiary has NOT expressed any UNWILLINGNESS to use manual wheelchair. Pt requires elevating leg rests to prevent leg swelling, pt has BLE varicose veins. Pt would benefit from a manual standard wheelchair with foam seat cushion. Pt is unable to ambulate with cane or rolling walker. Pt requires a manual wheelchair to be able to participate/perform ADLs in the home, pt is deconditioned due to the following DX: prolonged hospital stay for Delirium with Dementia, Osteoporosis, h/o CVA, .Pt has sufficient upper body strength to self-propel and has a caregiver @ home to assist. Use of a manual wheelchair will significantly improve beneficiary's ability to participate in ADLs and beneficiary will use it on regular basis @ home. The beneficiary has NOT expressed any UNWILLINGNESS to use manual wheelchair. Pt requires elevating leg rests to prevent edema 2ndary to BLE varicose veins. Pt would benefit from a manual standard wheelchair with foam seat cushion. Pt is unable to ambulate with cane or rolling walker. Pt requires a manual wheelchair to be able to participate/perform ADLs in the home, pt is deconditioned due to the following DX: prolonged hospital stay for Delirium with Dementia, Osteoporosis, h/o CVA, .Pt has sufficient upper body strength to self-propel and has a caregiver @ home to assist. Use of a manual wheelchair will significantly improve beneficiary's ability to participate in ADLs and beneficiary will use it on regular basis @ home. The beneficiary has NOT expressed any UNWILLINGNESS to use manual wheelchair. Pt requires elevating leg rests to prevent edema 2ndary to BLE varicose veins and pt's limited mobility.

## 2024-12-11 NOTE — CHART NOTE - NSCHARTNOTEFT_GEN_A_CORE
Assessment: 79-year-old female with PMHx of HTN, COPD, prediabetes, gastritis, MVP, ulcerative colitis, psoriatic arthritis, osteoporosis, hyperparathyroidism, CVA, memory loss, aortic stenosis presents on  with chest pain or neck pain.      Pt seen for nutrition follow-up. Visited patient in room, presents with fair, improving appetite/po intake, consuming >50-75% of meals. Denies n/v/d/c, last BM  12/10. Pertinent medications/nutrition labs reviewed; noted hypernatremia, receiving diuretic in house. Plan to c/w current diet, change fortified smoothie to Ensure High Protein plus 8oz (350 kcal, 20g protein) qd, encourage po intake. RD to continue to monitor nutrition status per protocol.     Factors impacting intake: [ ] none [ ] nausea  [ ] vomiting [ ] diarrhea [ ] constipation  [ ]chewing problems [ ] swallowing issues  [x ] other: appetite    Diet Prescription: Diet, Regular (24 @ 16:25)    Intake: fair, improving     Daily Weight in k.6 (11 Dec 2024 06:30)  Weight in k.8 (09 Dec 2024 06:14)    % Weight Change    Pertinent Medications: MEDICATIONS  (STANDING):  albuterol/ipratropium for Nebulization 3 milliLiter(s) Nebulizer every 6 hours  fluticasone propionate/ salmeterol 250-50 MICROgram(s) Diskus 1 Dose(s) Inhalation two times a day  furosemide    Tablet 20 milliGRAM(s) Oral daily  losartan 100 milliGRAM(s) Oral daily  metoprolol tartrate 25 milliGRAM(s) Oral daily  nicotine -   7 mG/24Hr(s) Patch 1 Patch Transdermal daily  QUEtiapine 12.5 milliGRAM(s) Oral once  QUEtiapine 12.5 milliGRAM(s) Oral two times a day  rosuvastatin 10 milliGRAM(s) Oral at bedtime  tiotropium 2.5 MICROgram(s) Inhaler 2 Puff(s) Inhalation daily    MEDICATIONS  (PRN):  acetaminophen     Tablet .. 650 milliGRAM(s) Oral every 6 hours PRN Temp greater or equal to 38C (100.4F), Mild Pain (1 - 3)  aluminum hydroxide/magnesium hydroxide/simethicone Suspension 30 milliLiter(s) Oral every 4 hours PRN Dyspepsia  haloperidol    Injectable 2 milliGRAM(s) IntraMuscular every 6 hours PRN Agitation  melatonin 3 milliGRAM(s) Oral at bedtime PRN Insomnia  ondansetron Injectable 4 milliGRAM(s) IV Push every 8 hours PRN Nausea and/or Vomiting    Pertinent Labs: 12-11 Na142 mmol/L Glu 136 mg/dL[H] K+ 3.0 mmol/L[L] Cr  0.96 mg/dL BUN 33 mg/dL[H] 12-10 Phos 4.3 mg/dL 12-08 Alb 3.1 g/dL[L]     CAPILLARY BLOOD GLUCOSE      Edema per flowsheets: none  Skin: Pressure Injury 1: buttocks, Stage I  Pressure Injury 2: coccyx, Stage I  Pressure Injury 3: Left:, heel, Stage I  Pressure Injury 4: Right:, heel, Stage I      Estimated Needs:   [ x] no change since previous assessment  [ ] recalculated:     Previous Nutrition Diagnosis:   [ ] Inadequate Energy Intake [ ]Inadequate Oral Intake [ ] Excessive Energy Intake   [ ] Underweight [ ] Increased Nutrient Needs [ ] Overweight/Obesity [ ] Altered Nutrition related lab values  [ ] Altered GI Function [ ] Unintended Weight Loss [ ] Food & Nutrition Related Knowledge Deficit [x ] Malnutrition-mod    Nutrition Diagnosis is [ x] ongoing  [ ] resolved [ ] not applicable     New Nutrition Diagnosis: [x ] not applicable       Interventions: Plan to c/w current diet, Ensure High Protein plus 8oz (350 kcal, 20g protein) qd, encourage po intake. RD to continue to monitor nutrition status per protocol.   Recommend  [ ] Change Diet To:  [ ] Nutrition Supplement  [ ] Nutrition Support  [ ] Other:     Monitoring and Evaluation:   [X ] Intake [ x ] Tolerance to diet prescription [ x ] weights [ x ] labs[ x ] follow up per protocol  [ ] other:

## 2024-12-11 NOTE — PROGRESS NOTE ADULT - NUTRITIONAL ASSESSMENT
This patient has been assessed with a concern for Malnutrition and has been determined to have a diagnosis/diagnoses of Moderate protein-calorie malnutrition and Underweight (BMI < 19).    This patient is being managed with:   Diet Regular-  Entered: Dec  6 2024  4:24PM  
46

## 2024-12-11 NOTE — CAREGIVER ENGAGEMENT NOTE - CAREGIVER EDUCATION NOTES - FREE TEXT
RN/CM noted pt's case discussed during Interdisciplinary rounds, pt remains acute as per MD. PT- recommending sub-acute rehab once medically cleared for transition home. Pt has rolling walker, cane @ home; has private hired care MON-FRI 9am-5pm. Pt resides with VERY supportive spouse. Pt's community MD is DR Marlon White and pharmacy is [facility]. CM met with pt and family (spouse- mae Ortega- Thiago [phone] who is from [place] and Merlin [phone] who [place] [facility] @ this time are opting for DC home and pt's family has engaged 12H private-hired care to startr tomorrow to provide pt with needed assistance @ home. Pt has rolling walker, cane @ home. Family has inquired about a wheelchair and CM reached out to MD about this. CM discussed wheelchair referral process and what to expect; family has no preferred durable medical equipment provider and is agreeable to utilize onsite durable medical equipment company [employer] [phone]. CM will send referral accordingly once all documentation has been received from MD. Family is agreeable for pt to be dced home while wheelchair is still pending approval. CM discussed about home care services/expectations, insurance provisions, choices of agencies. [facility] [phone]/ [phone] is home care agency of choice and CM referred case with plan for DC THURS 12/12/2024, requested start of care 12/13/2024. CM offered ambulance transport but family opting to transport pt home themselves. DC pending hospital course. CM remains available and continues to follow case.   RN/CM noted pt's case discussed during Interdisciplinary rounds, pt remains acute as per MD. PT- recommending sub-acute rehab once medically cleared for transition home. Pt has rolling walker, cane @ home; has private hired care MON-FRI 9am-5pm. Pt resides with VERY supportive spouse. Pt's community MD is DR Marlon White and pharmacy is [facility] in [facility]. CM met with pt and family (spouse- mae Ortega- Thiago [phone] who is from [place] and Merlin [phone] [place] [facility] [place] @ this time are opting for DC home and pt's family has engaged 12H private-hired care to startr tomorrow to provide pt with needed assistance @ home. Pt has rolling walker, cane @ home. Family has inquired about a wheelchair and CM reached out to MD about this. CM discussed wheelchair referral process and what to expect; family has no preferred durable medical equipment provider and is agreeable to utilize onsite durable medical equipment company [employer] [phone]. CM will send referral accordingly once all documentation has been received from MD. Family is agreeable for pt to be dced home while wheelchair is still pending approval. CM discussed about home care services/expectations, insurance provisions, choices of agencies. [facility] [facility] [phone]/ [phone] is home care agency of choice and CM referred case with plan for DC THURS 12/12/2024, requested start of care 12/13/2024. CM offered ambulance transport but family opting to transport pt home themselves. Family aware of plan for DC THURS 12/12/2024, IMM reviewed. CM remains available and continues to follow case.

## 2024-12-11 NOTE — CASE MANAGEMENT PROGRESS NOTE - NSCMPROGRESSNOTE_GEN_ALL_CORE
RN/CM noted pt's case discussed during Interdisciplinary rounds, pt remains acute as per MD. PT- recommending sub-acute rehab once medically cleared for transition home. Pt has rolling walker, cane @ home; has private hired care MON-FRI 9am-5pm. Pt resides with VERY supportive spouse. Pt's community MD is DR Marlon White and pharmacy is [facility] in [facility]. Pt and family (spouse- mae Ortega- Thiago [phone] who is from [place] and Merlin [phone] who [place] [facility] [place] @ this time are opting for DC home and pt's family has engaged 12H private-hired care to startr tomorrow to provide pt with needed assistance @ home. Pt has rolling walker, cane @ home. Family has inquired about a wheelchair and CM reached out to MD about this. CM discussed wheelchair referral process and what to expect; family has no preferred durable medical equipment provider and is agreeable to utilize onsite durable medical equipment company [employer] [phone]. CM will send referral accordingly once all documentation has been received from MD. Family is agreeable for pt to be dced home while wheelchair is still pending approval. CM discussed about home care services/expectations, insurance provisions, choices of agencies. [employer] [facility] [phone]/ [phone] is home care agency of choice and CM referred case with plan for DC THURS 12/12/2024, requested start of care 12/13/2024. CM offered ambulance transport but family opting to transport pt home themselves. DC pending hospital course. CM remains available and continues to follow case.  RN/CM noted pt's case discussed during Interdisciplinary rounds, pt remains acute as per MD. PT- recommending sub-acute rehab once medically cleared for transition home. Pt has rolling walker, cane @ home; has private hired care MON-FRI 9am-5pm. Pt resides with VERY supportive spouse. Pt's community MD is DR Marlon White and pharmacy is [facility] in [facility]. Pt and family (spouse- mae Ortega- Thiago [phone] who is from [place] and Merlin [phone] [place] [facility] [place] @ this time are opting for DC home and pt's family has engaged 12H private-hired care to startr tomorrow to provide pt with needed assistance @ home. Pt has rolling walker, cane @ home. Family has inquired about a wheelchair and CM reached out to MD about this. CM discussed wheelchair referral process and what to expect; family has no preferred durable medical equipment provider and is agreeable to utilize onsite durable medical equipment company [employer] [phone]. CM will send referral accordingly once all documentation has been received from MD. Family is agreeable for pt to be dced home while wheelchair is still pending approval. CM discussed about home care services/expectations, insurance provisions, choices of agencies. [email or web address] [facility] Home [phone]/ [phone] is home care agency of choice and CM referred case with plan for DC THURS 12/12/2024, requested start of care 12/13/2024. Family aware of above plan for D/C, IMM reviewed. CM offered ambulance transport but family opting to transport pt home themselves. CM remains available and continues to follow case.

## 2024-12-11 NOTE — SOCIAL WORK PROGRESS NOTE - NSSWPROGRESSNOTE_GEN_ALL_CORE
LETTY spoke with pt's spouse and pt's sons regarding dc planning from the hospital. LETTY provided a dc resource folder to spouse for review. SW provided education regarding [facility] vs [facility]. Sw to continue to follow. Pt's family has multiple questions and concerns about pt's plan. SW notified Md and psychiatrist of concerns. Sw remains available.

## 2024-12-12 ENCOUNTER — TRANSCRIPTION ENCOUNTER (OUTPATIENT)
Age: 79
End: 2024-12-12

## 2024-12-12 VITALS
RESPIRATION RATE: 18 BRPM | DIASTOLIC BLOOD PRESSURE: 72 MMHG | TEMPERATURE: 98 F | WEIGHT: 82.89 LBS | OXYGEN SATURATION: 92 % | SYSTOLIC BLOOD PRESSURE: 153 MMHG | HEART RATE: 93 BPM

## 2024-12-12 LAB
CULTURE RESULTS: SIGNIFICANT CHANGE UP
CULTURE RESULTS: SIGNIFICANT CHANGE UP
SPECIMEN SOURCE: SIGNIFICANT CHANGE UP
SPECIMEN SOURCE: SIGNIFICANT CHANGE UP

## 2024-12-12 PROCEDURE — 84145 PROCALCITONIN (PCT): CPT

## 2024-12-12 PROCEDURE — 85610 PROTHROMBIN TIME: CPT

## 2024-12-12 PROCEDURE — 85027 COMPLETE CBC AUTOMATED: CPT

## 2024-12-12 PROCEDURE — 84100 ASSAY OF PHOSPHORUS: CPT

## 2024-12-12 PROCEDURE — 85025 COMPLETE CBC W/AUTO DIFF WBC: CPT

## 2024-12-12 PROCEDURE — 71045 X-RAY EXAM CHEST 1 VIEW: CPT

## 2024-12-12 PROCEDURE — 81003 URINALYSIS AUTO W/O SCOPE: CPT

## 2024-12-12 PROCEDURE — 85730 THROMBOPLASTIN TIME PARTIAL: CPT

## 2024-12-12 PROCEDURE — 71250 CT THORAX DX C-: CPT | Mod: MC

## 2024-12-12 PROCEDURE — 99239 HOSP IP/OBS DSCHRG MGMT >30: CPT

## 2024-12-12 PROCEDURE — 97110 THERAPEUTIC EXERCISES: CPT

## 2024-12-12 PROCEDURE — 80048 BASIC METABOLIC PNL TOTAL CA: CPT

## 2024-12-12 PROCEDURE — 74176 CT ABD & PELVIS W/O CONTRAST: CPT | Mod: MC

## 2024-12-12 PROCEDURE — 36415 COLL VENOUS BLD VENIPUNCTURE: CPT

## 2024-12-12 PROCEDURE — 99231 SBSQ HOSP IP/OBS SF/LOW 25: CPT

## 2024-12-12 PROCEDURE — 94664 DEMO&/EVAL PT USE INHALER: CPT

## 2024-12-12 PROCEDURE — 85379 FIBRIN DEGRADATION QUANT: CPT

## 2024-12-12 PROCEDURE — 97161 PT EVAL LOW COMPLEX 20 MIN: CPT

## 2024-12-12 PROCEDURE — 94640 AIRWAY INHALATION TREATMENT: CPT

## 2024-12-12 PROCEDURE — 82550 ASSAY OF CK (CPK): CPT

## 2024-12-12 PROCEDURE — 0225U NFCT DS DNA&RNA 21 SARSCOV2: CPT

## 2024-12-12 PROCEDURE — 87040 BLOOD CULTURE FOR BACTERIA: CPT

## 2024-12-12 PROCEDURE — 93005 ELECTROCARDIOGRAM TRACING: CPT

## 2024-12-12 PROCEDURE — 84484 ASSAY OF TROPONIN QUANT: CPT

## 2024-12-12 PROCEDURE — 83605 ASSAY OF LACTIC ACID: CPT

## 2024-12-12 PROCEDURE — 83880 ASSAY OF NATRIURETIC PEPTIDE: CPT

## 2024-12-12 PROCEDURE — 94760 N-INVAS EAR/PLS OXIMETRY 1: CPT

## 2024-12-12 PROCEDURE — 80053 COMPREHEN METABOLIC PANEL: CPT

## 2024-12-12 PROCEDURE — 87086 URINE CULTURE/COLONY COUNT: CPT

## 2024-12-12 PROCEDURE — 83735 ASSAY OF MAGNESIUM: CPT

## 2024-12-12 PROCEDURE — 99285 EMERGENCY DEPT VISIT HI MDM: CPT

## 2024-12-12 RX ADMIN — METOPROLOL TARTRATE 25 MILLIGRAM(S): 100 TABLET, FILM COATED ORAL at 05:08

## 2024-12-12 RX ADMIN — FUROSEMIDE 20 MILLIGRAM(S): 40 TABLET ORAL at 05:07

## 2024-12-12 RX ADMIN — LOSARTAN POTASSIUM 100 MILLIGRAM(S): 100 TABLET, FILM COATED ORAL at 05:07

## 2024-12-12 RX ADMIN — Medication 12.5 MILLIGRAM(S): at 05:07

## 2024-12-12 NOTE — DISCHARGE NOTE PROVIDER - CARE PROVIDERS DIRECT ADDRESSES
,[email or web address]

## 2024-12-12 NOTE — PROGRESS NOTE ADULT - SUBJECTIVE AND OBJECTIVE BOX
Chief Complaint: Chest pain    Interval Events: No events overnight.    Review of Systems:  General: No fevers, chills, weight gain  Skin: No rashes, color changes  Cardiovascular: No chest pain, orthopnea  Respiratory: No shortness of breath, cough  Gastrointestinal: No nausea, abdominal pain  Genitourinary: No incontinence, pain with urination  Musculoskeletal: No pain, swelling, decreased range of motion  Neurological: No headache, weakness  Psychiatric: No depression, anxiety  Endocrine: No weight gain, increased thirst  All other systems are comprehensively negative.    Physical Exam:  Vital Signs Last 24 Hrs  T(C): 36.4 (11 Dec 2024 05:41), Max: 36.7 (10 Dec 2024 14:12)  T(F): 97.5 (11 Dec 2024 05:41), Max: 98.1 (10 Dec 2024 14:12)  HR: 83 (11 Dec 2024 05:41) (74 - 97)  BP: 122/70 (11 Dec 2024 05:41) (114/67 - 125/81)  BP(mean): --  RR: 17 (11 Dec 2024 05:41) (16 - 17)  SpO2: 95% (11 Dec 2024 05:41) (91% - 96%)  Parameters below as of 11 Dec 2024 05:41  Patient On (Oxygen Delivery Method): room air  General: NAD  HEENT: MMM  Neck: No JVD, no carotid bruit  Lungs: CTAB  CV: RRR, nl S1/S2, no M/R/G  Abdomen: S/NT/ND, +BS  Extremities: No LE edema, no cyanosis  Neuro: AAOx3, non-focal  Skin: No rash    Labs:             12-10    146[H]  |  106  |  20  ----------------------------<  140[H]  4.3   |  32[H]  |  0.74    Ca    9.7      10 Dec 2024 07:48  Phos  4.3     12-10  Mg     1.7     12-10                          13.3   15.55 )-----------( 312      ( 10 Dec 2024 07:48 )             40.7       ECG/Telemetry: Sinus rhythm
Date/Time Patient Seen:  		  Referring MD:   Data Reviewed	       Patient is a 79y old  Female who presents with a chief complaint of chest pain (07 Dec 2024 12:25)      Subjective/HPI     PAST MEDICAL & SURGICAL HISTORY:  Nicotine addiction    Bronchitis    Gastritis    Tachycardia    MVP (mitral valve prolapse)    Ulcerative colitis    Cigarette smoker  current    Psoriatic arthritis    Vertebral fracture, closed  Lumbar x 4    Osteoporosis    Varicose veins    Hyperparathyroidism    Costal chondritis    Prediabetes    Herniated disc, cervical  lumbar    Emphysema lung    COPD (chronic obstructive pulmonary disease)    Stroke    History of memory loss    Nonrheumatic aortic valve stenosis    Termination of pregnancy (fetus)    History of tonsillectomy    S/P parathyroidectomy  2014          Medication list         MEDICATIONS  (STANDING):  albuterol/ipratropium for Nebulization 3 milliLiter(s) Nebulizer every 6 hours  enoxaparin Injectable 30 milliGRAM(s) SubCutaneous every 24 hours  fluticasone propionate/ salmeterol 250-50 MICROgram(s) Diskus 1 Dose(s) Inhalation two times a day  furosemide    Tablet 20 milliGRAM(s) Oral daily  levoFLOXacin  Tablet 500 milliGRAM(s) Oral every 24 hours  losartan 50 milliGRAM(s) Oral daily  metoprolol tartrate 25 milliGRAM(s) Oral daily  QUEtiapine 12.5 milliGRAM(s) Oral once  QUEtiapine 12.5 milliGRAM(s) Oral two times a day  rosuvastatin 10 milliGRAM(s) Oral at bedtime  tiotropium 2.5 MICROgram(s) Inhaler 2 Puff(s) Inhalation daily    MEDICATIONS  (PRN):  acetaminophen     Tablet .. 650 milliGRAM(s) Oral every 6 hours PRN Temp greater or equal to 38C (100.4F), Mild Pain (1 - 3)  aluminum hydroxide/magnesium hydroxide/simethicone Suspension 30 milliLiter(s) Oral every 4 hours PRN Dyspepsia  melatonin 3 milliGRAM(s) Oral at bedtime PRN Insomnia  OLANZapine 2.5 milliGRAM(s) Oral every 6 hours PRN agitaiton  ondansetron Injectable 4 milliGRAM(s) IV Push every 8 hours PRN Nausea and/or Vomiting         Vitals log        ICU Vital Signs Last 24 Hrs  T(C): 36.6 (08 Dec 2024 06:52), Max: 36.8 (07 Dec 2024 22:04)  T(F): 97.8 (08 Dec 2024 06:52), Max: 98.3 (07 Dec 2024 22:04)  HR: 79 (08 Dec 2024 06:52) (79 - 98)  BP: 157/79 (07 Dec 2024 22:04) (157/79 - 179/98)  BP(mean): --  ABP: --  ABP(mean): --  RR: 20 (08 Dec 2024 06:52) (17 - 20)  SpO2: 96% (08 Dec 2024 06:52) (92% - 96%)    O2 Parameters below as of 08 Dec 2024 06:52  Patient On (Oxygen Delivery Method): room air                 Input and Output:  I&O's Detail    07 Dec 2024 07:01  -  08 Dec 2024 07:00  --------------------------------------------------------  IN:    Oral Fluid: 800 mL  Total IN: 800 mL    OUT:    Voided (mL): 1500 mL  Total OUT: 1500 mL    Total NET: -700 mL          Lab Data                        13.8   17.91 )-----------( 342      ( 08 Dec 2024 06:30 )             44.0     12-08    142  |  103  |  17  ----------------------------<  109[H]  4.2   |  29  |  0.69    Ca    9.7      08 Dec 2024 06:30    TPro  7.3  /  Alb  3.1[L]  /  TBili  0.5  /  DBili  x   /  AST  26  /  ALT  14  /  AlkPhos  71  12-08            Review of Systems	      Objective     Physical Examination    heart s1s2  lung dc BS  head nc  head at  abd soft      Pertinent Lab findings & Imaging      Lakesha:  NO   Adequate UO     I&O's Detail    07 Dec 2024 07:01  -  08 Dec 2024 07:00  --------------------------------------------------------  IN:    Oral Fluid: 800 mL  Total IN: 800 mL    OUT:    Voided (mL): 1500 mL  Total OUT: 1500 mL    Total NET: -700 mL               Discussed with:     Cultures:	        Radiology                            
Patient is a 79y old  Female who presents with a chief complaint of chest pain (07 Dec 2024 07:28)        HPI:  79-year-old female with PMHx of HTN, COPD, prediabetes, gastritis, MVP, ulcerative colitis, psoriatic arthritis, osteoporosis, hyperparathyroidism, CVA, memory loss, aortic stenosis presents on  with chest pain or neck pain.    According to  at bedside, it started around 8:30AM today. She was fine yesterday. She was complaining of anterior neck pain as well as mild SOB. No recent changes in medication. No sick contacts or travel history.    In the ED, cardiology consulted. D-dimer low when correlated to her age. Leukocytosis noted. No acute abnormalities in CT. Case was discussed with ED provider.    Time of my evaluation, feels weak. No pain.  at bedside.  (06 Dec 2024 16:26)      SUBJECTIVE & OBJECTIVE: No acute events overnight. Pt seen and examined this morning with  at the bedside. Denies chest pain or SOB. Appetite so so. Denies nausea or vomiting.     PHYSICAL EXAM:   Vital Signs Last 24 Hrs  T(C): 36.6 (08 Dec 2024 06:52), Max: 36.8 (07 Dec 2024 22:04)  T(F): 97.8 (08 Dec 2024 06:52), Max: 98.3 (07 Dec 2024 22:04)  HR: 78 (08 Dec 2024 08:32) (78 - 98)  BP: 157/79 (07 Dec 2024 22:04) (157/79 - 179/98)  BP(mean): --  RR: 20 (08 Dec 2024 06:52) (17 - 20)  SpO2: 92% (08 Dec 2024 08:32) (92% - 96%)    Parameters below as of 08 Dec 2024 08:32  Patient On (Oxygen Delivery Method): room air      GENERAL: agitation   NECK: Supple, No JVD  NERVOUS SYSTEM:  Alert & Oriented X3,   CHEST/LUNG: Clear to auscultation bilaterally; No rales, rhonchi, wheezing, or rubs  HEART: Regular rate and rhythm; No murmurs, rubs, or gallops  ABDOMEN: Soft, Nontender, Nondistended; Bowel sounds present  EXTREMITIES:  2+ Peripheral Pulses, No clubbing, cyanosis, or edema     MEDICATIONS  (STANDING):  albuterol/ipratropium for Nebulization 3 milliLiter(s) Nebulizer every 6 hours  enoxaparin Injectable 30 milliGRAM(s) SubCutaneous every 24 hours  fluticasone propionate/ salmeterol 250-50 MICROgram(s) Diskus 1 Dose(s) Inhalation two times a day  furosemide    Tablet 20 milliGRAM(s) Oral daily  levoFLOXacin  Tablet 500 milliGRAM(s) Oral every 24 hours  losartan 50 milliGRAM(s) Oral daily  metoprolol tartrate 25 milliGRAM(s) Oral daily  QUEtiapine 12.5 milliGRAM(s) Oral once  QUEtiapine 12.5 milliGRAM(s) Oral two times a day  rosuvastatin 10 milliGRAM(s) Oral at bedtime  tiotropium 2.5 MICROgram(s) Inhaler 2 Puff(s) Inhalation daily    MEDICATIONS  (PRN):  acetaminophen     Tablet .. 650 milliGRAM(s) Oral every 6 hours PRN Temp greater or equal to 38C (100.4F), Mild Pain (1 - 3)  aluminum hydroxide/magnesium hydroxide/simethicone Suspension 30 milliLiter(s) Oral every 4 hours PRN Dyspepsia  melatonin 3 milliGRAM(s) Oral at bedtime PRN Insomnia  OLANZapine 2.5 milliGRAM(s) Oral every 6 hours PRN agitaiton  ondansetron Injectable 4 milliGRAM(s) IV Push every 8 hours PRN Nausea and/or Vomiting        LABS:                142  |  103  |  17  ----------------------------<  109[H]  4.2   |  29  |  0.69    Ca    9.7      08 Dec 2024 06:30    TPro  7.3  /  Alb  3.1[L]  /  TBili  0.5  /  DBili  x   /  AST  26  /  ALT  14  /  AlkPhos  71  12-08    PT/INR - ( 06 Dec 2024 12:40 )   PT: 11.9 sec;   INR: 1.03 ratio         PTT - ( 06 Dec 2024 12:40 )  PTT:31.6 sec  Urinalysis Basic - ( 07 Dec 2024 11:50 )    Color: Yellow / Appearance: Clear / S.011 / pH: x  Gluc: x / Ketone: Negative mg/dL  / Bili: Negative / Urobili: 0.2 mg/dL   Blood: x / Protein: Negative mg/dL / Nitrite: Negative   Leuk Esterase: Negative / RBC: x / WBC x   Sq Epi: x / Non Sq Epi: x / Bacteria: x           142  |  103  |  17  ----------------------------<  109[H]  4.2   |  29  |  0.69    Ca    9.7      08 Dec 2024 06:30    TPro  7.3  /  Alb  3.1[L]  /  TBili  0.5  /  DBili  x   /  AST  26  /  ALT  14  /  AlkPhos  71                          
Chief Complaint: Chest pain    Interval Events: No events overnight.    Review of Systems:  General: No fevers, chills, weight gain  Skin: No rashes, color changes  Cardiovascular: No chest pain, orthopnea  Respiratory: No shortness of breath, cough  Gastrointestinal: No nausea, abdominal pain  Genitourinary: No incontinence, pain with urination  Musculoskeletal: No pain, swelling, decreased range of motion  Neurological: No headache, weakness  Psychiatric: No depression, anxiety  Endocrine: No weight gain, increased thirst  All other systems are comprehensively negative.    Physical Exam:  Vital Signs Last 24 Hrs  T(C): 36.7 (12 Dec 2024 04:56), Max: 36.7 (12 Dec 2024 04:56)  T(F): 98.1 (12 Dec 2024 04:56), Max: 98.1 (12 Dec 2024 04:56)  HR: 93 (12 Dec 2024 04:56) (80 - 97)  BP: 153/72 (12 Dec 2024 04:56) (97/52 - 153/72)  BP(mean): --  RR: 18 (12 Dec 2024 04:56) (18 - 18)  SpO2: 92% (12 Dec 2024 04:56) (90% - 94%)  Parameters below as of 12 Dec 2024 08:36  Patient On (Oxygen Delivery Method): room air  General: NAD  HEENT: MMM  Neck: No JVD, no carotid bruit  Lungs: CTAB  CV: RRR, nl S1/S2, no M/R/G  Abdomen: S/NT/ND, +BS  Extremities: No LE edema, no cyanosis  Neuro: AAOx3, non-focal  Skin: No rash    Labs:             12-11    142  |  101  |  33[H]  ----------------------------<  136[H]  3.0[L]   |  33[H]  |  0.96    Ca    9.5      11 Dec 2024 12:18                          13.0   14.09 )-----------( 366      ( 11 Dec 2024 12:18 )             41.5       ECG/Telemetry: Sinus rhythm
Patient is a 79y old  Female who presents with a chief complaint of chest pain (07 Dec 2024 07:28)        HPI:  79-year-old female with PMHx of HTN, COPD, prediabetes, gastritis, MVP, ulcerative colitis, psoriatic arthritis, osteoporosis, hyperparathyroidism, CVA, memory loss, aortic stenosis presents on  with chest pain or neck pain.    According to  at bedside, it started around 8:30AM today. She was fine yesterday. She was complaining of anterior neck pain as well as mild SOB. No recent changes in medication. No sick contacts or travel history.    In the ED, cardiology consulted. D-dimer low when correlated to her age. Leukocytosis noted. No acute abnormalities in CT. Case was discussed with ED provider.    Time of my evaluation, feels weak. No pain.  at bedside.  (06 Dec 2024 16:26)      SUBJECTIVE & OBJECTIVE: Pt seen and examined at bedside. nad    PHYSICAL EXAM:  T(C): 36.3 (24 @ 05:08), Max: 38.2 (24 @ 17:45)  HR: 97 (24 @ 05:08) (78 - 110)  BP: 160/78 (24 @ 05:08) (127/60 - 211/79)  RR: 18 (24 @ 05:08) (18 - 24)  SpO2: 95% (24 @ 05:08) (92% - 97%)  Wt(kg): --   GENERAL: agitation   NECK: Supple, No JVD  NERVOUS SYSTEM:  Alert & Oriented X3,   CHEST/LUNG: Clear to auscultation bilaterally; No rales, rhonchi, wheezing, or rubs  HEART: Regular rate and rhythm; No murmurs, rubs, or gallops  ABDOMEN: Soft, Nontender, Nondistended; Bowel sounds present  EXTREMITIES:  2+ Peripheral Pulses, No clubbing, cyanosis, or edema        MEDICATIONS  (STANDING):  albuterol/ipratropium for Nebulization 3 milliLiter(s) Nebulizer every 6 hours  enoxaparin Injectable 30 milliGRAM(s) SubCutaneous every 24 hours  fluticasone propionate/ salmeterol 250-50 MICROgram(s) Diskus 1 Dose(s) Inhalation two times a day  furosemide    Tablet 20 milliGRAM(s) Oral daily  levoFLOXacin  Tablet 500 milliGRAM(s) Oral every 24 hours  losartan 50 milliGRAM(s) Oral daily  metoprolol tartrate 25 milliGRAM(s) Oral daily  rosuvastatin 10 milliGRAM(s) Oral at bedtime  tiotropium 2.5 MICROgram(s) Inhaler 2 Puff(s) Inhalation daily    MEDICATIONS  (PRN):  acetaminophen     Tablet .. 650 milliGRAM(s) Oral every 6 hours PRN Temp greater or equal to 38C (100.4F), Mild Pain (1 - 3)  aluminum hydroxide/magnesium hydroxide/simethicone Suspension 30 milliLiter(s) Oral every 4 hours PRN Dyspepsia  melatonin 3 milliGRAM(s) Oral at bedtime PRN Insomnia  OLANZapine 2.5 milliGRAM(s) Oral every 6 hours PRN agitaiton  ondansetron Injectable 4 milliGRAM(s) IV Push every 8 hours PRN Nausea and/or Vomiting      LABS:                        12.5   25.03 )-----------( 279      ( 06 Dec 2024 16:37 )             39.2     12-06    143  |  102  |  14  ----------------------------<  122[H]  3.7   |  30  |  0.74    Ca    9.6      06 Dec 2024 12:40    TPro  7.5  /  Alb  3.8  /  TBili  0.5  /  DBili  x   /  AST  21  /  ALT  17  /  AlkPhos  88  12-06    PT/INR - ( 06 Dec 2024 12:40 )   PT: 11.9 sec;   INR: 1.03 ratio         PTT - ( 06 Dec 2024 12:40 )  PTT:31.6 sec  Urinalysis Basic - ( 07 Dec 2024 11:50 )    Color: Yellow / Appearance: Clear / S.011 / pH: x  Gluc: x / Ketone: Negative mg/dL  / Bili: Negative / Urobili: 0.2 mg/dL   Blood: x / Protein: Negative mg/dL / Nitrite: Negative   Leuk Esterase: Negative / RBC: x / WBC x   Sq Epi: x / Non Sq Epi: x / Bacteria: x        CAPILLARY BLOOD GLUCOSE          CAPILLARY BLOOD GLUCOSE        CAPILLARY BLOOD GLUCOSE                RECENT CULTURES:      RADIOLOGY & ADDITIONAL TESTS:                        DVT/GI ppx  Discussed with pt @ bedside
Chief Complaint: Chest pain    Interval Events: No events overnight.    Review of Systems:  General: No fevers, chills, weight gain  Skin: No rashes, color changes  Cardiovascular: No chest pain, orthopnea  Respiratory: No shortness of breath, cough  Gastrointestinal: No nausea, abdominal pain  Genitourinary: No incontinence, pain with urination  Musculoskeletal: No pain, swelling, decreased range of motion  Neurological: No headache, weakness  Psychiatric: No depression, anxiety  Endocrine: No weight gain, increased thirst  All other systems are comprehensively negative.    Physical Exam:  Vital Signs Last 24 Hrs  T(C): 35.9 (10 Dec 2024 10:42), Max: 36.7 (09 Dec 2024 13:27)  T(F): 96.7 (10 Dec 2024 10:42), Max: 98 (09 Dec 2024 13:27)  HR: 74 (10 Dec 2024 10:42) (74 - 111)  BP: 121/66 (10 Dec 2024 10:42) (121/66 - 180/84)  BP(mean): --  RR: 16 (10 Dec 2024 10:42) (16 - 18)  SpO2: 96% (10 Dec 2024 10:42) (93% - 96%)  Parameters below as of 10 Dec 2024 10:42  Patient On (Oxygen Delivery Method): room air  General: NAD  HEENT: MMM  Neck: No JVD, no carotid bruit  Lungs: CTAB  CV: RRR, nl S1/S2, no M/R/G  Abdomen: S/NT/ND, +BS  Extremities: No LE edema, no cyanosis  Neuro: AAOx3, non-focal  Skin: No rash    Labs:             12-10    146[H]  |  106  |  20  ----------------------------<  140[H]  4.3   |  32[H]  |  0.74    Ca    9.7      10 Dec 2024 07:48  Phos  4.3     12-10  Mg     1.7     12-10                          13.3   15.55 )-----------( 312      ( 10 Dec 2024 07:48 )             40.7       ECG/Telemetry: Sinus rhythm
Chief Complaint: Chest pain    Interval Events: No events overnight.    Review of Systems:  General: No fevers, chills, weight gain  Skin: No rashes, color changes  Cardiovascular: No chest pain, orthopnea  Respiratory: No shortness of breath, cough  Gastrointestinal: No nausea, abdominal pain  Genitourinary: No incontinence, pain with urination  Musculoskeletal: No pain, swelling, decreased range of motion  Neurological: No headache, weakness  Psychiatric: No depression, anxiety  Endocrine: No weight gain, increased thirst  All other systems are comprehensively negative.    Physical Exam:  Vitals:        Vital Signs Last 24 Hrs  T(C): 36.4 (09 Dec 2024 06:14), Max: 36.7 (08 Dec 2024 17:06)  T(F): 97.5 (09 Dec 2024 06:14), Max: 98.1 (08 Dec 2024 17:06)  HR: 79 (09 Dec 2024 06:14) (79 - 90)  BP: 148/85 (09 Dec 2024 06:14) (136/77 - 148/85)  BP(mean): --  RR: 18 (09 Dec 2024 06:14) (18 - 18)  SpO2: 96% (09 Dec 2024 06:14) (93% - 96%)    Parameters below as of 09 Dec 2024 06:14  Patient On (Oxygen Delivery Method): room air      General: NAD  HEENT: MMM  Neck: No JVD, no carotid bruit  Lungs: CTAB  CV: RRR, nl S1/S2, no M/R/G  Abdomen: S/NT/ND, +BS  Extremities: No LE edema, no cyanosis  Neuro: AAOx3, non-focal  Skin: No rash    Labs:                        13.8   17.91 )-----------( 342      ( 08 Dec 2024 06:30 )             44.0     12-08    142  |  103  |  17  ----------------------------<  109[H]  4.2   |  29  |  0.69    Ca    9.7      08 Dec 2024 06:30    TPro  7.3  /  Alb  3.1[L]  /  TBili  0.5  /  DBili  x   /  AST  26  /  ALT  14  /  AlkPhos  71  12-08            ECG/Telemetry: Sinus rhythm
[facility], Division of Infectious Diseases  SAUNDRA Goodwin Y. Patel, S. Shah, G. [facility]  [phone]    Name: GABRIELA WHEALN  Age: 79y  Gender: Female  MRN: [id number]    Interval History:  No acute overnight events.   Notes reviewed    Antibiotics:      Medications:  acetaminophen     Tablet .. 650 milliGRAM(s) Oral every 6 hours PRN  albuterol/ipratropium for Nebulization 3 milliLiter(s) Nebulizer every 6 hours  aluminum hydroxide/magnesium hydroxide/simethicone Suspension 30 milliLiter(s) Oral every 4 hours PRN  enoxaparin Injectable 30 milliGRAM(s) SubCutaneous every 24 hours  fluticasone propionate/ salmeterol 250-50 MICROgram(s) Diskus 1 Dose(s) Inhalation two times a day  furosemide    Tablet 20 milliGRAM(s) Oral daily  haloperidol    Injectable 2 milliGRAM(s) IntraMuscular every 6 hours PRN  losartan 100 milliGRAM(s) Oral daily  melatonin 3 milliGRAM(s) Oral at bedtime PRN  metoprolol tartrate 25 milliGRAM(s) Oral daily  ondansetron Injectable 4 milliGRAM(s) IV Push every 8 hours PRN  QUEtiapine 12.5 milliGRAM(s) Oral once  QUEtiapine 12.5 milliGRAM(s) Oral two times a day  rosuvastatin 10 milliGRAM(s) Oral at bedtime  tiotropium 2.5 MICROgram(s) Inhaler 2 Puff(s) Inhalation daily      Review of Systems:  A 10-point review of systems was obtained.   Review of systems otherwise negative except as previously noted.    Allergies: Gluten (Unknown)  caffeine (Blisters)  IV Contrast (Hives)  penicillins (Hives)    For details regarding the patient's past medical history, social history, family history, and other miscellaneous elements, please refer the initial infectious diseases consultation and/or the admitting history and physical examination for this admission.    Objective:  Vitals:   T(C): 36.1 (12-10-24 @ 05:11), Max: 36.7 (12-09-24 @ 13:27)  HR: 97 (12-10-24 @ 07:35) (93 - 111)  BP: 180/84 (12-10-24 @ 05:11) (125/67 - 180/84)  RR: 18 (12-10-24 @ 05:11) (18 - 18)  SpO2: 95% (12-10-24 @ 07:35) (93% - 96%)    Physical Examination:  General: no acute distress  HEENT: NC/AT, EOMI  Cardio: S1, S2 heard, RRR, no murmurs  Resp: decreased breath sounds  Abd: soft, NT, ND  Ext: no edema or cyanosis  Skin: warm, dry, no visible rash      Laboratory Studies:  CBC:                       13.3   15.55 )-----------( 312      ( 10 Dec 2024 07:48 )             40.7     CMP: 12-10    146[H]  |  106  |  20  ----------------------------<  140[H]  4.3   |  32[H]  |  0.74    Ca    9.7      10 Dec 2024 07:48  Phos  4.3     12-10  Mg     1.7     12-10        Urinalysis Basic - ( 10 Dec 2024 07:48 )    Color: x / Appearance: x / SG: x / pH: x  Gluc: 140 mg/dL / Ketone: x  / Bili: x / Urobili: x   Blood: x / Protein: x / Nitrite: x   Leuk Esterase: x / RBC: x / WBC x   Sq Epi: x / Non Sq Epi: x / Bacteria: x        Microbiology: reviewed    Culture - Urine (collected 12-09-24 @ 08:10)  Source: Clean Catch Clean Catch (Midstream)  Final Report (12-10-24 @ 07:33):    No growth    Culture - Blood (collected 12-07-24 @ 08:01)  Source: .Blood BLOOD  Preliminary Report (12-09-24 @ 14:01):    No growth at 48 Hours    Culture - Blood (collected 12-06-24 @ 18:00)  Source: .Blood BLOOD  Preliminary Report (12-10-24 @ 01:02):    No growth at 72 Hours          Radiology: reviewed      
PROGRESS NOTE:   Authored by Dr. Althea Tanner MD, Available on MS Teams    Patient is a 79y old  Female who presents with a chief complaint of chest pain (09 Dec 2024 07:19)      SUBJECTIVE / OVERNIGHT EVENTS: Patient feels lousy today. No chest pain or shortness of breath     ADDITIONAL REVIEW OF SYSTEMS:    MEDICATIONS  (STANDING):  albuterol/ipratropium for Nebulization 3 milliLiter(s) Nebulizer every 6 hours  enoxaparin Injectable 30 milliGRAM(s) SubCutaneous every 24 hours  fluticasone propionate/ salmeterol 250-50 MICROgram(s) Diskus 1 Dose(s) Inhalation two times a day  furosemide    Tablet 20 milliGRAM(s) Oral daily  levoFLOXacin  Tablet 500 milliGRAM(s) Oral every 24 hours  losartan 100 milliGRAM(s) Oral daily  metoprolol tartrate 25 milliGRAM(s) Oral daily  QUEtiapine 12.5 milliGRAM(s) Oral once  QUEtiapine 12.5 milliGRAM(s) Oral two times a day  rosuvastatin 10 milliGRAM(s) Oral at bedtime  tiotropium 2.5 MICROgram(s) Inhaler 2 Puff(s) Inhalation daily    MEDICATIONS  (PRN):  acetaminophen     Tablet .. 650 milliGRAM(s) Oral every 6 hours PRN Temp greater or equal to 38C (100.4F), Mild Pain (1 - 3)  aluminum hydroxide/magnesium hydroxide/simethicone Suspension 30 milliLiter(s) Oral every 4 hours PRN Dyspepsia  haloperidol    Injectable 2 milliGRAM(s) IntraMuscular every 6 hours PRN Agitation  melatonin 3 milliGRAM(s) Oral at bedtime PRN Insomnia  ondansetron Injectable 4 milliGRAM(s) IV Push every 8 hours PRN Nausea and/or Vomiting      CAPILLARY BLOOD GLUCOSE        I&O's Summary      PHYSICAL EXAM:  Vital Signs Last 24 Hrs  T(C): 36.4 (09 Dec 2024 06:14), Max: 36.7 (08 Dec 2024 17:06)  T(F): 97.5 (09 Dec 2024 06:14), Max: 98.1 (08 Dec 2024 17:06)  HR: 79 (09 Dec 2024 06:14) (79 - 90)  BP: 148/85 (09 Dec 2024 06:14) (136/77 - 148/85)  BP(mean): --  RR: 18 (09 Dec 2024 06:14) (18 - 18)  SpO2: 96% (09 Dec 2024 06:14) (93% - 96%)    Parameters below as of 09 Dec 2024 06:14  Patient On (Oxygen Delivery Method): room air        CONSTITUTIONAL: NAD  RESPIRATORY: Normal respiratory effort; lungs are clear to auscultation bilaterally  CARDIOVASCULAR: Regular rate and rhythm, normal S1 and S2, no murmur/rub/gallop; No lower extremity edema  ABDOMEN: Nontender to palpation, normoactive bowel sounds, no rebound/guarding  MUSCLOSKELETAL: no clubbing or cyanosis of digits; no joint swelling or tenderness to palpation  PSYCH: A+O to person  NEURO: follows commands, moving all extremities     LABS:                        13.8   17.91 )-----------( 342      ( 08 Dec 2024 06:30 )             44.0     12-08    142  |  103  |  17  ----------------------------<  109[H]  4.2   |  29  |  0.69    Ca    9.7      08 Dec 2024 06:30    TPro  7.3  /  Alb  3.1[L]  /  TBili  0.5  /  DBili  x   /  AST  26  /  ALT  14  /  AlkPhos  71  12-08          Urinalysis Basic - ( 08 Dec 2024 06:30 )    Color: x / Appearance: x / SG: x / pH: x  Gluc: 109 mg/dL / Ketone: x  / Bili: x / Urobili: x   Blood: x / Protein: x / Nitrite: x   Leuk Esterase: x / RBC: x / WBC x   Sq Epi: x / Non Sq Epi: x / Bacteria: x        Culture - Blood (collected 07 Dec 2024 08:01)  Source: .Blood BLOOD  Preliminary Report (08 Dec 2024 14:02):    No growth at 24 hours    Culture - Blood (collected 06 Dec 2024 18:00)  Source: .Blood BLOOD  Preliminary Report (09 Dec 2024 01:01):    No growth at 48 Hours  
Patient is a 79y old  Female who presents with a chief complaint of chest pain (10 Dec 2024 09:26)      INTERVAL HPI/OVERNIGHT EVENTS:  Patient seen and examined, mildly confused, denies pain, dizziness, nausea.     ROS reviewed and is otherwise negative        Vital Signs Last 24 Hrs  T(C): 35.9 (10 Dec 2024 10:42), Max: 36.7 (09 Dec 2024 13:27)  T(F): 96.7 (10 Dec 2024 10:42), Max: 98 (09 Dec 2024 13:27)  HR: 74 (10 Dec 2024 10:42) (74 - 111)  BP: 121/66 (10 Dec 2024 10:42) (121/66 - 180/84)  BP(mean): --  RR: 16 (10 Dec 2024 10:42) (16 - 18)  SpO2: 96% (10 Dec 2024 10:42) (93% - 96%)    Parameters below as of 10 Dec 2024 10:42  Patient On (Oxygen Delivery Method): room air        PHYSICAL EXAM:  GENERAL: NAD,  elderly female, thin  HEAD:  Atraumatic, Normocephalic  EYES: EOMI, PERRLA  ENMT: Moist mucous membranes, No lesions; No tonsillar erythema  NECK: Supple, No JVD  NERVOUS SYSTEM:  Alert & Oriented X2, mild confusion, All 4 extremities mobile  CHEST/LUNG: Clear to auscultation bilaterally; No rales, rhonchi, wheezing, or rubs  HEART: Regular rate and rhythm; No murmurs, rubs, or gallops  ABDOMEN: Soft, Nontender, Nondistended; Bowel sounds present  EXTREMITIES:  + Pulses, No clubbing  SKIN:senile ecchymoses on arms    MEDICATIONS  (STANDING):  albuterol/ipratropium for Nebulization 3 milliLiter(s) Nebulizer every 6 hours  enoxaparin Injectable 30 milliGRAM(s) SubCutaneous every 24 hours  fluticasone propionate/ salmeterol 250-50 MICROgram(s) Diskus 1 Dose(s) Inhalation two times a day  furosemide    Tablet 20 milliGRAM(s) Oral daily  losartan 100 milliGRAM(s) Oral daily  metoprolol tartrate 25 milliGRAM(s) Oral daily  nicotine -   7 mG/24Hr(s) Patch 1 Patch Transdermal daily  QUEtiapine 12.5 milliGRAM(s) Oral once  QUEtiapine 12.5 milliGRAM(s) Oral two times a day  rosuvastatin 10 milliGRAM(s) Oral at bedtime  tiotropium 2.5 MICROgram(s) Inhaler 2 Puff(s) Inhalation daily    MEDICATIONS  (PRN):  acetaminophen     Tablet .. 650 milliGRAM(s) Oral every 6 hours PRN Temp greater or equal to 38C (100.4F), Mild Pain (1 - 3)  aluminum hydroxide/magnesium hydroxide/simethicone Suspension 30 milliLiter(s) Oral every 4 hours PRN Dyspepsia  haloperidol    Injectable 2 milliGRAM(s) IntraMuscular every 6 hours PRN Agitation  melatonin 3 milliGRAM(s) Oral at bedtime PRN Insomnia  ondansetron Injectable 4 milliGRAM(s) IV Push every 8 hours PRN Nausea and/or Vomiting      Allergies    Gluten (Unknown)  caffeine (Blisters)  IV Contrast (Hives)  penicillins (Hives)    Intolerances    lactose (Vomiting)        LABS:                        13.3   15.55 )-----------( 312      ( 10 Dec 2024 07:48 )             40.7     10 Dec 2024 07:48    146    |  106    |  20     ----------------------------<  140    4.3     |  32     |  0.74     Ca    9.7        10 Dec 2024 07:48  Phos  4.3       10 Dec 2024 07:48  Mg     1.7       10 Dec 2024 07:48        Urinalysis Basic - ( 10 Dec 2024 07:48 )    Color: x / Appearance: x / SG: x / pH: x  Gluc: 140 mg/dL / Ketone: x  / Bili: x / Urobili: x   Blood: x / Protein: x / Nitrite: x   Leuk Esterase: x / RBC: x / WBC x   Sq Epi: x / Non Sq Epi: x / Bacteria: x      CAPILLARY BLOOD GLUCOSE          RADIOLOGY & ADDITIONAL TESTS:        Care Discussed with Consultants/Other Providers:    Advanced Directives: [ ] DNR  [ ] No feeding tube  [ ] MOLST in chart  [ ] MOLST completed today  [ ] Unknown  
Patient is a 79y old  Female who presents with a chief complaint of chest pain (11 Dec 2024 07:16)      INTERVAL HPI/OVERNIGHT EVENTS:  Patient seen and examined in am, more calm, still showing some confusion, when mentioned episode of bleeding, said it occurred multiple times, no reports of this as per nursing.     ROS reviewed and is otherwise negative        Vital Signs Last 24 Hrs  T(C): 36.4 (11 Dec 2024 05:41), Max: 36.7 (10 Dec 2024 14:12)  T(F): 97.5 (11 Dec 2024 05:41), Max: 98.1 (10 Dec 2024 14:12)  HR: 83 (11 Dec 2024 05:41) (83 - 97)  BP: 122/70 (11 Dec 2024 05:41) (114/67 - 125/81)  BP(mean): --  RR: 17 (11 Dec 2024 05:41) (16 - 17)  SpO2: 95% (11 Dec 2024 05:41) (91% - 95%)    Parameters below as of 11 Dec 2024 05:41  Patient On (Oxygen Delivery Method): room air        PHYSICAL EXAM:  GENERAL: NAD, well-groomed, well-developed  HEAD:  Atraumatic, Normocephalic  EYES: EOMI, PERRLA  ENMT: Moist mucous membranes, No lesions; No tonsillar erythema  NECK: Supple, No JVD  NERVOUS SYSTEM:  Alert & Oriented X3, Good concentration; All 4 extremities mobile, no gross sensory deficits.   CHEST/LUNG: Clear to auscultation bilaterally; No rales, rhonchi, wheezing, or rubs  HEART: Regular rate and rhythm; No murmurs, rubs, or gallops  ABDOMEN: Soft, mild distension, small area of superficial point tenderness on right myrna-umbilical area, Bowel sounds present  EXTREMITIES:  + Pulses, No clubbing,   SKIN: No rashes or lesions    MEDICATIONS  (STANDING):  albuterol/ipratropium for Nebulization 3 milliLiter(s) Nebulizer every 6 hours  fluticasone propionate/ salmeterol 250-50 MICROgram(s) Diskus 1 Dose(s) Inhalation two times a day  furosemide    Tablet 20 milliGRAM(s) Oral daily  losartan 100 milliGRAM(s) Oral daily  metoprolol tartrate 25 milliGRAM(s) Oral daily  nicotine -   7 mG/24Hr(s) Patch 1 Patch Transdermal daily  potassium chloride    Tablet ER 40 milliEquivalent(s) Oral once  QUEtiapine 12.5 milliGRAM(s) Oral once  QUEtiapine 12.5 milliGRAM(s) Oral two times a day  rosuvastatin 10 milliGRAM(s) Oral at bedtime  tiotropium 2.5 MICROgram(s) Inhaler 2 Puff(s) Inhalation daily    MEDICATIONS  (PRN):  acetaminophen     Tablet .. 650 milliGRAM(s) Oral every 6 hours PRN Temp greater or equal to 38C (100.4F), Mild Pain (1 - 3)  aluminum hydroxide/magnesium hydroxide/simethicone Suspension 30 milliLiter(s) Oral every 4 hours PRN Dyspepsia  haloperidol    Injectable 2 milliGRAM(s) IntraMuscular every 6 hours PRN Agitation  melatonin 3 milliGRAM(s) Oral at bedtime PRN Insomnia  ondansetron Injectable 4 milliGRAM(s) IV Push every 8 hours PRN Nausea and/or Vomiting      Allergies    Gluten (Unknown)  caffeine (Blisters)  IV Contrast (Hives)  penicillins (Hives)    Intolerances    lactose (Vomiting)        LABS:                        13.0   14.09 )-----------( 366      ( 11 Dec 2024 12:18 )             41.5     11 Dec 2024 12:18    142    |  101    |  33     ----------------------------<  136    3.0     |  33     |  0.96     Ca    9.5        11 Dec 2024 12:18        Urinalysis Basic - ( 11 Dec 2024 12:18 )    Color: x / Appearance: x / SG: x / pH: x  Gluc: 136 mg/dL / Ketone: x  / Bili: x / Urobili: x   Blood: x / Protein: x / Nitrite: x   Leuk Esterase: x / RBC: x / WBC x   Sq Epi: x / Non Sq Epi: x / Bacteria: x      CAPILLARY BLOOD GLUCOSE          RADIOLOGY & ADDITIONAL TESTS:        Care Discussed with Consultants/Other Providers:    Advanced Directives: [ ] DNR  [ ] No feeding tube  [ ] MOLST in chart  [ ] MOLST completed today  [ ] Unknown  
Pulmonary fu:  DOS 12/11/24  Improved, more alert.   Pt. denies sob, chest pain. Somewhat confused.        Patient is a 79y old  Female who presents with a chief complaint of chest pain (07 Dec 2024 12:25)      Subjective/HPI     PAST MEDICAL & SURGICAL HISTORY:  Nicotine addiction    Bronchitis    Gastritis    Tachycardia    MVP (mitral valve prolapse)    Ulcerative colitis    Cigarette smoker  current    Psoriatic arthritis    Vertebral fracture, closed  Lumbar x 4    Osteoporosis    Varicose veins    Hyperparathyroidism    Costal chondritis    Prediabetes    Herniated disc, cervical  lumbar    Emphysema lung    COPD (chronic obstructive pulmonary disease)    Stroke    History of memory loss    Nonrheumatic aortic valve stenosis    Termination of pregnancy (fetus)    History of tonsillectomy    S/P parathyroidectomy  2014          Medication list         MEDICATIONS  (STANDING):  albuterol/ipratropium for Nebulization 3 milliLiter(s) Nebulizer every 6 hours  enoxaparin Injectable 30 milliGRAM(s) SubCutaneous every 24 hours  fluticasone propionate/ salmeterol 250-50 MICROgram(s) Diskus 1 Dose(s) Inhalation two times a day  furosemide    Tablet 20 milliGRAM(s) Oral daily  levoFLOXacin  Tablet 500 milliGRAM(s) Oral every 24 hours  losartan 50 milliGRAM(s) Oral daily  metoprolol tartrate 25 milliGRAM(s) Oral daily  QUEtiapine 12.5 milliGRAM(s) Oral once  QUEtiapine 12.5 milliGRAM(s) Oral two times a day  rosuvastatin 10 milliGRAM(s) Oral at bedtime  tiotropium 2.5 MICROgram(s) Inhaler 2 Puff(s) Inhalation daily    MEDICATIONS  (PRN):  acetaminophen     Tablet .. 650 milliGRAM(s) Oral every 6 hours PRN Temp greater or equal to 38C (100.4F), Mild Pain (1 - 3)  aluminum hydroxide/magnesium hydroxide/simethicone Suspension 30 milliLiter(s) Oral every 4 hours PRN Dyspepsia  melatonin 3 milliGRAM(s) Oral at bedtime PRN Insomnia  OLANZapine 2.5 milliGRAM(s) Oral every 6 hours PRN agitaiton  ondansetron Injectable 4 milliGRAM(s) IV Push every 8 hours PRN Nausea and/or Vomiting         Vitals log        ICU Vital Signs Last 24 Hrs  T(C): 36.6 (08 Dec 2024 06:52), Max: 36.8 (07 Dec 2024 22:04)  T(F): 97.8 (08 Dec 2024 06:52), Max: 98.3 (07 Dec 2024 22:04)  HR: 79 (08 Dec 2024 06:52) (79 - 98)  BP: 157/79 (07 Dec 2024 22:04) (157/79 - 179/98)  BP(mean): --  ABP: --  ABP(mean): --  RR: 20 (08 Dec 2024 06:52) (17 - 20)  SpO2: 96% (08 Dec 2024 06:52) (92% - 96%)    O2 Parameters below as of 08 Dec 2024 06:52  Patient On (Oxygen Delivery Method): room air                 Input and Output:  I&O's Detail    07 Dec 2024 07:01  -  08 Dec 2024 07:00  --------------------------------------------------------  IN:    Oral Fluid: 800 mL  Total IN: 800 mL    OUT:    Voided (mL): 1500 mL  Total OUT: 1500 mL    Total NET: -700 mL          Lab Data                        13.8   17.91 )-----------( 342      ( 08 Dec 2024 06:30 )             44.0     12-08    142  |  103  |  17  ----------------------------<  109[H]  4.2   |  29  |  0.69    Ca    9.7      08 Dec 2024 06:30    TPro  7.3  /  Alb  3.1[L]  /  TBili  0.5  /  DBili  x   /  AST  26  /  ALT  14  /  AlkPhos  71  12-08            Review of Systems	unobtainable.       Objective     Physical Examination  Elderly female lying comfortably in bed  Heent normocephalic ears nose throat neg  heart nl s1s2 no mtg no jvd  lung clear no wheeze rhonchi rales no change percussion.  abd no mass organomegaly bs pos non tender  ext  no calf tenderness no edema.  neuro alert uses all ext somewhat confused.      Pertinent Lab findings & Imaging      Crowder:  NO   Adequate UO     I&O's Detail    07 Dec 2024 07:01  -  08 Dec 2024 07:00  --------------------------------------------------------  IN:    Oral Fluid: 800 mL  Total IN: 800 mL    OUT:    Voided (mL): 1500 mL  Total OUT: 1500 mL    Total NET: -700 mL               Discussed with:     Cultures:	        Radiology  rad< from: CT Abdomen and Pelvis No Cont (12.06.24 @ 14:47) >  ACC: [id number] EXAM:  CT ABDOMEN AND PELVIS   ORDERED BY: CHRISTOPHER VARGAS     ACC: [id number] EXAM:  CT CHEST   ORDERED BY: CHRISTOPHER VARGAS     PROCEDURE DATE:  12/06/2024          INTERPRETATION:  CLINICAL INFORMATION: Atypical chest pain. Leukocytosis.    COMPARISON: Noncontrast CT of the abdomen and pelvis May 19, 2023, CT   angiogram of the thorax, abdomen, and pelvis April 27, 2023.    CONTRAST/COMPLICATIONS:  IV Contrast: NONE  Oral Contrast: NONE  .    PROCEDURE:  CT of the Chest, Abdomen andPelvis was performed.  Sagittal and coronal reformats were performed.    FINDINGS:  CHEST:  LUNGS AND LARGE AIRWAYS: Patent central airways. Moderate centrilobular   emphysema. No pulmonary nodules.  PLEURA: No pleural effusion.  VESSELS: Within normal limits.  HEART: Status post TAVR. Extensive mitral annulus calcification. Moderate   coronary artery calcification. Heart size is normal. No pericardial   effusion.  MEDIASTINUM AND VEANGELINA: No lymphadenopathy.  CHEST WALL AND LOWER NECK: Within normal limits. Implanted cardiac   monitor.    ABDOMEN AND PELVIS:  LIVER: Again is noted a subcapsular cyst in the right liver dome   posterolaterally. Otherwise, within normal limits.  BILE DUCTS: Normal caliber.  GALLBLADDER: Within normal limits.  SPLEEN: Within normal limits.  PANCREAS: Within normal limits.  ADRENALS: Within normal limits.  KIDNEYS/URETERS: Within normal limits.    BLADDER: Within normal limits.  REPRODUCTIVE ORGANS: Bulky multi fibroid uterus. The adnexa are   unremarkable by CTcriteria.    BOWEL: Stool-filled colon. No bowel obstruction. There is no mesenteric   adenopathy.  PERITONEUM/RETROPERITONEUM: Within normal limits.  VESSELS: Within normal limits.  LYMPH NODES: No lymphadenopathy.  ABDOMINAL WALL: Within normal limits.  BONES: ORIF intertrochanteric fracture of the left femur. Healed left   sacral ala fracture. Chronic moderate compression deformity of L3. Healed   sternal fracture.    IMPRESSION: No evidence of acute infectious or inflammatory process in   thethorax. No evidence of acute inflammatory or obstructive process in   the abdomen and pelvis.    --- End of Report ---            FLORI ROMAN MD; Attending Radiologist  This document has been electronically signed. Dec  6 2024  3:14PM    < end of copied text >                            
Pulmonary fu:  DOS 12/9/24  Pt. denies sob, chest pain. Somewhat confused.        Patient is a 79y old  Female who presents with a chief complaint of chest pain (07 Dec 2024 12:25)      Subjective/HPI     PAST MEDICAL & SURGICAL HISTORY:  Nicotine addiction    Bronchitis    Gastritis    Tachycardia    MVP (mitral valve prolapse)    Ulcerative colitis    Cigarette smoker  current    Psoriatic arthritis    Vertebral fracture, closed  Lumbar x 4    Osteoporosis    Varicose veins    Hyperparathyroidism    Costal chondritis    Prediabetes    Herniated disc, cervical  lumbar    Emphysema lung    COPD (chronic obstructive pulmonary disease)    Stroke    History of memory loss    Nonrheumatic aortic valve stenosis    Termination of pregnancy (fetus)    History of tonsillectomy    S/P parathyroidectomy  2014          Medication list         MEDICATIONS  (STANDING):  albuterol/ipratropium for Nebulization 3 milliLiter(s) Nebulizer every 6 hours  enoxaparin Injectable 30 milliGRAM(s) SubCutaneous every 24 hours  fluticasone propionate/ salmeterol 250-50 MICROgram(s) Diskus 1 Dose(s) Inhalation two times a day  furosemide    Tablet 20 milliGRAM(s) Oral daily  levoFLOXacin  Tablet 500 milliGRAM(s) Oral every 24 hours  losartan 50 milliGRAM(s) Oral daily  metoprolol tartrate 25 milliGRAM(s) Oral daily  QUEtiapine 12.5 milliGRAM(s) Oral once  QUEtiapine 12.5 milliGRAM(s) Oral two times a day  rosuvastatin 10 milliGRAM(s) Oral at bedtime  tiotropium 2.5 MICROgram(s) Inhaler 2 Puff(s) Inhalation daily    MEDICATIONS  (PRN):  acetaminophen     Tablet .. 650 milliGRAM(s) Oral every 6 hours PRN Temp greater or equal to 38C (100.4F), Mild Pain (1 - 3)  aluminum hydroxide/magnesium hydroxide/simethicone Suspension 30 milliLiter(s) Oral every 4 hours PRN Dyspepsia  melatonin 3 milliGRAM(s) Oral at bedtime PRN Insomnia  OLANZapine 2.5 milliGRAM(s) Oral every 6 hours PRN agitaiton  ondansetron Injectable 4 milliGRAM(s) IV Push every 8 hours PRN Nausea and/or Vomiting         Vitals log        ICU Vital Signs Last 24 Hrs  T(C): 36.6 (08 Dec 2024 06:52), Max: 36.8 (07 Dec 2024 22:04)  T(F): 97.8 (08 Dec 2024 06:52), Max: 98.3 (07 Dec 2024 22:04)  HR: 79 (08 Dec 2024 06:52) (79 - 98)  BP: 157/79 (07 Dec 2024 22:04) (157/79 - 179/98)  BP(mean): --  ABP: --  ABP(mean): --  RR: 20 (08 Dec 2024 06:52) (17 - 20)  SpO2: 96% (08 Dec 2024 06:52) (92% - 96%)    O2 Parameters below as of 08 Dec 2024 06:52  Patient On (Oxygen Delivery Method): room air                 Input and Output:  I&O's Detail    07 Dec 2024 07:01  -  08 Dec 2024 07:00  --------------------------------------------------------  IN:    Oral Fluid: 800 mL  Total IN: 800 mL    OUT:    Voided (mL): 1500 mL  Total OUT: 1500 mL    Total NET: -700 mL          Lab Data                        13.8   17.91 )-----------( 342      ( 08 Dec 2024 06:30 )             44.0     12-08    142  |  103  |  17  ----------------------------<  109[H]  4.2   |  29  |  0.69    Ca    9.7      08 Dec 2024 06:30    TPro  7.3  /  Alb  3.1[L]  /  TBili  0.5  /  DBili  x   /  AST  26  /  ALT  14  /  AlkPhos  71  12-08            Review of Systems	unobtainable.       Objective     Physical Examination  Elderly female lying comfortably in bed  Heent normocephalic ears nose throat neg  heart nl s1s2 no mtg no jvd  lung clear no wheeze rhonchi rales no change percussion.  abd no mass organomegaly bs pos non tender  ext  no calf tenderness no edema.  neuro alert uses all ext somewhat confused.      Pertinent Lab findings & Imaging      Crowder:  NO   Adequate UO     I&O's Detail    07 Dec 2024 07:01  -  08 Dec 2024 07:00  --------------------------------------------------------  IN:    Oral Fluid: 800 mL  Total IN: 800 mL    OUT:    Voided (mL): 1500 mL  Total OUT: 1500 mL    Total NET: -700 mL               Discussed with:     Cultures:	        Radiology  rad< from: CT Abdomen and Pelvis No Cont (12.06.24 @ 14:47) >  ACC: [id number] EXAM:  CT ABDOMEN AND PELVIS   ORDERED BY: CHRISTOPHER VARGAS     ACC: [id number] EXAM:  CT CHEST   ORDERED BY: CHRISTOPHER VARGAS     PROCEDURE DATE:  12/06/2024          INTERPRETATION:  CLINICAL INFORMATION: Atypical chest pain. Leukocytosis.    COMPARISON: Noncontrast CT of the abdomen and pelvis May 19, 2023, CT   angiogram of the thorax, abdomen, and pelvis April 27, 2023.    CONTRAST/COMPLICATIONS:  IV Contrast: NONE  Oral Contrast: NONE  .    PROCEDURE:  CT of the Chest, Abdomen andPelvis was performed.  Sagittal and coronal reformats were performed.    FINDINGS:  CHEST:  LUNGS AND LARGE AIRWAYS: Patent central airways. Moderate centrilobular   emphysema. No pulmonary nodules.  PLEURA: No pleural effusion.  VESSELS: Within normal limits.  HEART: Status post TAVR. Extensive mitral annulus calcification. Moderate   coronary artery calcification. Heart size is normal. No pericardial   effusion.  MEDIASTINUM AND EVANGELINA: No lymphadenopathy.  CHEST WALL AND LOWER NECK: Within normal limits. Implanted cardiac   monitor.    ABDOMEN AND PELVIS:  LIVER: Again is noted a subcapsular cyst in the right liver dome   posterolaterally. Otherwise, within normal limits.  BILE DUCTS: Normal caliber.  GALLBLADDER: Within normal limits.  SPLEEN: Within normal limits.  PANCREAS: Within normal limits.  ADRENALS: Within normal limits.  KIDNEYS/URETERS: Within normal limits.    BLADDER: Within normal limits.  REPRODUCTIVE ORGANS: Bulky multi fibroid uterus. The adnexa are   unremarkable by CTcriteria.    BOWEL: Stool-filled colon. No bowel obstruction. There is no mesenteric   adenopathy.  PERITONEUM/RETROPERITONEUM: Within normal limits.  VESSELS: Within normal limits.  LYMPH NODES: No lymphadenopathy.  ABDOMINAL WALL: Within normal limits.  BONES: ORIF intertrochanteric fracture of the left femur. Healed left   sacral ala fracture. Chronic moderate compression deformity of L3. Healed   sternal fracture.    IMPRESSION: No evidence of acute infectious or inflammatory process in   thethorax. No evidence of acute inflammatory or obstructive process in   the abdomen and pelvis.    --- End of Report ---            FLORI ROMAN MD; Attending Radiologist  This document has been electronically signed. Dec  6 2024  3:14PM    < end of copied text >                            
Pulmonary fu:  DOS 12/12/24  Improved, more alert.  Conversant. Psych note apprec.  Pt. denies sob, chest pain. Somewhat confused.        Patient is a 79y old  Female who presents with a chief complaint of chest pain (07 Dec 2024 12:25)      Subjective/HPI     PAST MEDICAL & SURGICAL HISTORY:  Nicotine addiction    Bronchitis    Gastritis    Tachycardia    MVP (mitral valve prolapse)    Ulcerative colitis    Cigarette smoker  current    Psoriatic arthritis    Vertebral fracture, closed  Lumbar x 4    Osteoporosis    Varicose veins    Hyperparathyroidism    Costal chondritis    Prediabetes    Herniated disc, cervical  lumbar    Emphysema lung    COPD (chronic obstructive pulmonary disease)    Stroke    History of memory loss    Nonrheumatic aortic valve stenosis    Termination of pregnancy (fetus)    History of tonsillectomy    S/P parathyroidectomy  2014          Medication list         MEDICATIONS  (STANDING):  albuterol/ipratropium for Nebulization 3 milliLiter(s) Nebulizer every 6 hours  enoxaparin Injectable 30 milliGRAM(s) SubCutaneous every 24 hours  fluticasone propionate/ salmeterol 250-50 MICROgram(s) Diskus 1 Dose(s) Inhalation two times a day  furosemide    Tablet 20 milliGRAM(s) Oral daily  levoFLOXacin  Tablet 500 milliGRAM(s) Oral every 24 hours  losartan 50 milliGRAM(s) Oral daily  metoprolol tartrate 25 milliGRAM(s) Oral daily  QUEtiapine 12.5 milliGRAM(s) Oral once  QUEtiapine 12.5 milliGRAM(s) Oral two times a day  rosuvastatin 10 milliGRAM(s) Oral at bedtime  tiotropium 2.5 MICROgram(s) Inhaler 2 Puff(s) Inhalation daily    MEDICATIONS  (PRN):  acetaminophen     Tablet .. 650 milliGRAM(s) Oral every 6 hours PRN Temp greater or equal to 38C (100.4F), Mild Pain (1 - 3)  aluminum hydroxide/magnesium hydroxide/simethicone Suspension 30 milliLiter(s) Oral every 4 hours PRN Dyspepsia  melatonin 3 milliGRAM(s) Oral at bedtime PRN Insomnia  OLANZapine 2.5 milliGRAM(s) Oral every 6 hours PRN agitaiton  ondansetron Injectable 4 milliGRAM(s) IV Push every 8 hours PRN Nausea and/or Vomiting         Vitals log        ICU Vital Signs Last 24 Hrs  T(C): 36.6 (08 Dec 2024 06:52), Max: 36.8 (07 Dec 2024 22:04)  T(F): 97.8 (08 Dec 2024 06:52), Max: 98.3 (07 Dec 2024 22:04)  HR: 79 (08 Dec 2024 06:52) (79 - 98)  BP: 157/79 (07 Dec 2024 22:04) (157/79 - 179/98)  BP(mean): --  ABP: --  ABP(mean): --  RR: 20 (08 Dec 2024 06:52) (17 - 20)  SpO2: 96% (08 Dec 2024 06:52) (92% - 96%)    O2 Parameters below as of 08 Dec 2024 06:52  Patient On (Oxygen Delivery Method): room air                 Input and Output:  I&O's Detail    07 Dec 2024 07:01  -  08 Dec 2024 07:00  --------------------------------------------------------  IN:    Oral Fluid: 800 mL  Total IN: 800 mL    OUT:    Voided (mL): 1500 mL  Total OUT: 1500 mL    Total NET: -700 mL          Lab Data                        13.8   17.91 )-----------( 342      ( 08 Dec 2024 06:30 )             44.0     12-08    142  |  103  |  17  ----------------------------<  109[H]  4.2   |  29  |  0.69    Ca    9.7      08 Dec 2024 06:30    TPro  7.3  /  Alb  3.1[L]  /  TBili  0.5  /  DBili  x   /  AST  26  /  ALT  14  /  AlkPhos  71  12-08            Review of Systems	unobtainable.       Objective     Physical Examination  Elderly female lying comfortably in bed  Heent normocephalic ears nose throat neg  heart nl s1s2 no mtg no jvd  lung clear no wheeze rhonchi rales no change percussion.  abd no mass organomegaly bs pos non tender  ext  no calf tenderness no edema.  neuro alert uses all ext somewhat confused.      Pertinent Lab findings & Imaging      Lakesha:  NO   Adequate UO     I&O's Detail    07 Dec 2024 07:01  -  08 Dec 2024 07:00  --------------------------------------------------------  IN:    Oral Fluid: 800 mL  Total IN: 800 mL    OUT:    Voided (mL): 1500 mL  Total OUT: 1500 mL    Total NET: -700 mL               Discussed with:     Cultures:	        Radiology  rad< from: CT Abdomen and Pelvis No Cont (12.06.24 @ 14:47) >  ACC: [id number] EXAM:  CT ABDOMEN AND PELVIS   ORDERED BY: CHRISTOPHER VARGAS     ACC: [id number] EXAM:  CT CHEST   ORDERED BY: CHRISTOPHER VARGAS     PROCEDURE DATE:  12/06/2024          INTERPRETATION:  CLINICAL INFORMATION: Atypical chest pain. Leukocytosis.    COMPARISON: Noncontrast CT of the abdomen and pelvis May 19, 2023, CT   angiogram of the thorax, abdomen, and pelvis April 27, 2023.    CONTRAST/COMPLICATIONS:  IV Contrast: NONE  Oral Contrast: NONE  .    PROCEDURE:  CT of the Chest, Abdomen andPelvis was performed.  Sagittal and coronal reformats were performed.    FINDINGS:  CHEST:  LUNGS AND LARGE AIRWAYS: Patent central airways. Moderate centrilobular   emphysema. No pulmonary nodules.  PLEURA: No pleural effusion.  VESSELS: Within normal limits.  HEART: Status post TAVR. Extensive mitral annulus calcification. Moderate   coronary artery calcification. Heart size is normal. No pericardial   effusion.  MEDIASTINUM AND EVANGELINA: No lymphadenopathy.  CHEST WALL AND LOWER NECK: Within normal limits. Implanted cardiac   monitor.    ABDOMEN AND PELVIS:  LIVER: Again is noted a subcapsular cyst in the right liver dome   posterolaterally. Otherwise, within normal limits.  BILE DUCTS: Normal caliber.  GALLBLADDER: Within normal limits.  SPLEEN: Within normal limits.  PANCREAS: Within normal limits.  ADRENALS: Within normal limits.  KIDNEYS/URETERS: Within normal limits.    BLADDER: Within normal limits.  REPRODUCTIVE ORGANS: Bulky multi fibroid uterus. The adnexa are   unremarkable by CTcriteria.    BOWEL: Stool-filled colon. No bowel obstruction. There is no mesenteric   adenopathy.  PERITONEUM/RETROPERITONEUM: Within normal limits.  VESSELS: Within normal limits.  LYMPH NODES: No lymphadenopathy.  ABDOMINAL WALL: Within normal limits.  BONES: ORIF intertrochanteric fracture of the left femur. Healed left   sacral ala fracture. Chronic moderate compression deformity of L3. Healed   sternal fracture.    IMPRESSION: No evidence of acute infectious or inflammatory process in   thethorax. No evidence of acute inflammatory or obstructive process in   the abdomen and pelvis.    --- End of Report ---            FLORI ROMAN MD; Attending Radiologist  This document has been electronically signed. Dec  6 2024  3:14PM    < end of copied text >

## 2024-12-12 NOTE — DISCHARGE NOTE PROVIDER - DETAILS OF MALNUTRITION DIAGNOSIS/DIAGNOSES
This patient has been assessed with a concern for Malnutrition and was treated during this hospitalization for the following Nutrition diagnosis/diagnoses:     -  12/08/2024: Moderate protein-calorie malnutrition   -  12/08/2024: Underweight (BMI < 19)

## 2024-12-12 NOTE — DISCHARGE NOTE PROVIDER - ATTENDING DISCHARGE PHYSICAL EXAMINATION:
Vital Signs Last 24 Hrs  T(C): 36.7 (12 Dec 2024 04:56), Max: 36.7 (12 Dec 2024 04:56)  T(F): 98.1 (12 Dec 2024 04:56), Max: 98.1 (12 Dec 2024 04:56)  HR: 93 (12 Dec 2024 04:56) (80 - 97)  BP: 153/72 (12 Dec 2024 04:56) (97/52 - 153/72)  BP(mean): --  RR: 18 (12 Dec 2024 04:56) (18 - 18)  SpO2: 92% (12 Dec 2024 04:56) (90% - 94%)    Parameters below as of 12 Dec 2024 04:56  Patient On (Oxygen Delivery Method): room air      CONSTITUTIONAL: elderly female, in no acute distress  HEAD: Normocephalic; atraumatic.  EYES: PERRL, EOM intact;  ENT: No nasal discharge; airway clear. TMs clear.  NECK: Supple; non tender.  CARD: S1, S2 normal; no murmurs, gallops, or rubs. Regular rate and rhythm.  RESP: No wheezes, rales or rhonchi.  ABD: Normal bowel sounds; soft; mild distension non-tender; no hepatosplenomegaly.  EXT: Normal ROM. No clubbing, cyanosis or edema.  LYMPH: No acute cervical adenopathy.  NEURO: Alert, oriented x 1-2. mild confusion, CN grossly intact

## 2024-12-12 NOTE — PROGRESS NOTE ADULT - PROBLEM SELECTOR PROBLEM 2
Nonrheumatic aortic valve stenosis

## 2024-12-12 NOTE — PROGRESS NOTE ADULT - PROVIDER SPECIALTY LIST ADULT
Cardiology
Cardiology
Hospitalist
Infectious Disease
Cardiology
Internal Medicine
Pulmonology
Cardiology
Hospitalist
Pulmonology

## 2024-12-12 NOTE — DISCHARGE NOTE PROVIDER - NSDCCPCAREPLAN_GEN_ALL_CORE_FT
PRINCIPAL DISCHARGE DIAGNOSIS  Diagnosis: Atypical chest pain  Assessment and Plan of Treatment: ACS ruled out. follow up with cardiology in 1-2 weeks      SECONDARY DISCHARGE DIAGNOSES  Diagnosis: Leukocytosis  Assessment and Plan of Treatment: infectious cause ruled out, refer to hematology for eval for myeloproliferative disorder    Diagnosis: BRBPR (bright red blood per rectum)  Assessment and Plan of Treatment: small trace amounts when on lovenox, no active bleeding, follow up with GI for outpatient colonoscopy

## 2024-12-12 NOTE — DISCHARGE NOTE PROVIDER - CARE PROVIDER_API CALL
Marlon White  Internal Medicine  [address], [address]  [place] [address]  Phone: [phone]  Fax: [phone]  Established Patient  Follow Up Time: 2 weeks    Angela Pearce  Medical Oncology  [address], [address]  [place] [address]  Phone: [phone]  Fax: [phone]  Follow Up Time: 2 weeks    Thiago Frausto  Gastroenterology  [address] [place] [address]  Phone: [phone]  Fax: [phone]  Follow Up Time: 2 weeks    Robson Driscoll  Cardiology  [address], [address]  [place] [address]  Phone: [phone]  Fax: [phone]  Follow Up Time: 2 weeks

## 2024-12-12 NOTE — DISCHARGE NOTE PROVIDER - PROVIDER TOKENS
PROVIDER:[TOKEN:[[id number]],FOLLOWUP:[2 weeks],ESTABLISHEDPATIENT:[T]],PROVIDER:[TOKEN:[[id number]],FOLLOWUP:[2 weeks]],PROVIDER:[TOKEN:[[id number]],FOLLOWUP:[2 weeks]],PROVIDER:[TOKEN:[[id number]],FOLLOWUP:[2 weeks]]

## 2024-12-12 NOTE — PROGRESS NOTE ADULT - PROBLEM SELECTOR PROBLEM 6
R/O Nonrheumatic aortic valve stenosis

## 2024-12-12 NOTE — PROGRESS NOTE ADULT - ASSESSMENT
79-year-old female with PMHx of HTN, COPD, prediabetes, gastritis, MVP, ulcerative colitis, psoriatic arthritis, osteoporosis, hyperparathyroidism, CVA, memory loss, aortic stenosis presents on 12/6 with chest pain or neck pain.     # leukocytosis:   - Persistent leukocytosis - reviewing previous labs shows this to be chronic with lowest WBC count of 10 since 2021.   - procalcitonin mildly elevated, not indicative of sepsis   - Blood culture negative so far  - UA negative   - ID following, monitor off antibiotics. if stable no fever, possible dc tomorrow    #HTN urgency:  - on losartan 100 mg daily and metoprolol 25 mg daily   - monitor bp, had one isolated elevated BP noted today, mildly labile    # Chest pain  no signs of ACS. seen by cardiology  follow up with cardiology as outpatient    #ambulatory dysfunction/ deconditioning  PT evaluation recommending [facility]  Fall precaution     # HLD  continue with rosuvastatin 10 mg QHS    Code: FULL  Diet: regular  DVT ppx: enoxaparin      Discussed with  at bedside  likely DC to [facility] in next 24 hours
The patient is an 79 year old female with a history of HTN, HL, RA, TAVR, COPD who presents with chest pain.    Plan:  - ECG with sinus rhythm and LVH related abnormalities; largely unchanged from prior  - Echo 7/23 with hyperdynamic LV systolic function, normal TAVR function  - An acute MI is ruled out with two sets of cardiac enzymes  - CXR with hyperinflation and grossly clear lungs  - D-dimer elevated. As per primary team, age-adjusted within normal limits. Defer to primary team.  - Previously on antibiotics for leukocytosis
79-year-old female with PMHx of HTN, COPD, prediabetes, gastritis, MVP, ulcerative colitis, psoriatic arthritis, osteoporosis, hyperparathyroidism, CVA, memory loss, aortic stenosis presents on 12/6 with chest pain or neck pain.       # leukocytosis:   - Persistent leukocytosis that is now improving initially with 6% band   - On Levofloxacin for CAP treatment   - Check procalcitonin   - Blood culture negative so far  - UA negative   - Continue to trend WBC, now improving, if persistently elevated, will need ID evaluation     #HTN urgency:  - -170/70-90  - Home on losartan 50 mg daily and metoprolol 25 mg daily   - BP remains elevated, increase losartan to 100 mg daily     # Chest pain  nstemi rule dout  suspect copd exacerbation   albyterol  levaquin 12/6 for CAP    #ambulatory dysfunction:   PT evaluation   Fall precaution     # HLD  continue with rosuvastatin 10 mg QHS        Code: FULL  Diet: regular  DVT ppx: enoxaparin       
79-year-old female with PMHx of HTN, COPD, prediabetes, gastritis, MVP, ulcerative colitis, psoriatic arthritis, osteoporosis, hyperparathyroidism, CVA, memory loss, aortic stenosis presents on 12/6 with chest pain or neck pain.     # leukocytosis:   - Persistent leukocytosis - reviewing previous labs shows this to be chronic with lowest WBC count of 10 since 2021.   - procalcitonin mildly elevated, not indicative of sepsis   - Blood culture negative so far  - UA negative   - ID following,  - stable off antibiotics.   - follow up with hematology as outpatient for workup for Myeloproliferative disorder    Bright red blood per rectum - one episode not mixed with stool, likely hemorrhoidal bleeding exacerbated by lovenox. stopped. CBC showing stable hgb without anemia  - outpatient GI eval for colonoscopy    #HTN urgency:  - on losartan 100 mg daily and metoprolol 25 mg daily   - monitor bp stable on current regimen    # Chest pain  no signs of ACS. seen by cardiology      #ambulatory dysfunction/ deconditioning  PT evaluation recommending [facility]  Fall precaution     Dementia with agitation - continue seroquel  - psych eval appreciated    # HLD  continue with rosuvastatin 10 mg QHS    Code: FULL  Diet: regular  DVT ppx: scds      Discussed with  at bedside  likely DC to [facility] pending placement  
79-year-old female with PMHx of HTN, COPD, prediabetes, gastritis, MVP, ulcerative colitis, psoriatic arthritis, osteoporosis, hyperparathyroidism, CVA, memory loss, aortic stenosis presents on 12/6 with chest pain which has resolved.  Unclear etiology of infection.  Off Levaquin as per ID.  OOB.Should be ready for Rehab placement.    Hx copd  HTN  MVP  UC  P Arthritis  CVA hx  AS      
The patient is an 79 year old female with a history of HTN, HL, RA, TAVR, COPD who presents with chest pain.    Plan:  - ECG with sinus rhythm and LVH related abnormalities; largely unchanged from prior  - Echo 7/23 with hyperdynamic LV systolic function, normal TAVR function  - An acute MI is ruled out with two sets of cardiac enzymes  - CXR with hyperinflation and grossly clear lungs  - D-dimer elevated. As per primary team, age-adjusted within normal limits. Defer to primary team.  - On antibiotics for leukocytosis
The patient is an 79 year old female with a history of HTN, HL, RA, TAVR, COPD who presents with chest pain.    Plan:  - ECG with sinus rhythm and LVH related abnormalities; largely unchanged from prior  - Echo 7/23 with hyperdynamic LV systolic function, normal TAVR function  - An acute MI is ruled out with two sets of cardiac enzymes  - CXR with hyperinflation and grossly clear lungs  - D-dimer elevated. As per primary team, age-adjusted within normal limits. Defer to primary team.  - Previously on antibiotics for leukocytosis  - Discharge planning
79-year-old female with PMHx of HTN, COPD, prediabetes, gastritis, MVP, ulcerative colitis, psoriatic arthritis, osteoporosis, hyperparathyroidism, CVA, memory loss, aortic stenosis presents on 12/6 with chest pain or neck pain.    copd  emphysema  OP  OA  HTN  MVP  UC  P Arthritis  CVA hx  AS  MCI    CT chest without PNA - consider monitoring off ABX  ID cx - biomarkers - cx -   NEBS for COPD - Inhaler rx regimen  VS noted - meds reviewed - labs reviewed  I guanaco - atelectasis - emphysema -   monitor VS and HD and Sat  goal sat > 88 pct  check UA - Cx -   cvs rx regimen  BP control  OLD records reviewed  Nutritional assessment  assist with needs - pt with cognitive impairment
The patient is an 79 year old female with a history of HTN, HL, RA, TAVR, COPD who presents with chest pain.    Plan:  - ECG with sinus rhythm and LVH related abnormalities; largely unchanged from prior  - Echo 7/23 with hyperdynamic LV systolic function, normal TAVR function  - An acute MI is ruled out with two sets of cardiac enzymes  - CXR with hyperinflation and grossly clear lungs  - D-dimer elevated. As per primary team, age-adjusted within normal limits. Defer to primary team.  - Previously on antibiotics for leukocytosis  - Discharge planning
      79-year-old female with PMHx of HTN, COPD, prediabetes, gastritis, MVP, ulcerative colitis, psoriatic arthritis, osteoporosis, hyperparathyroidism, CVA, memory loss, aortic stenosis presents on 12/6 with chest pain or neck pain.  According to  at bedside, it started around 8:30AM today. She was fine yesterday. She was complaining of anterior neck pain as well as mild SOB. No recent changes in medication. No sick contacts or travel history.      chest pain  nstemi rule dout  suspect copd exacerbation   albyterol  levaquin #      HTN urgency:   will give stat dose of her home losartan 50mg and metoprolol 25mg. Resume daily.    #ambulatory dysfunction:   T evaluation.    HLD  statin    #?leukocytosis: no source of infection. Labs from 2023 showed leukocytosis as well. Hold antibiotics for now. May need hematology outpatient.    Code: FULL  Diet: regular  DVT ppx: enoxaparin      time spent 45 minutes 
78YO M PMHx of HTN, COPD, prediabetes, gastritis, MVP, ulcerative colitis, psoriatic arthritis, osteoporosis, hyperparathyroidism, CVA, memory loss, aortic stenosis presents on 12/6 with chest pain or neck pain.   Noted to have leukocytosis 26K with bandemia.   Was febrile in ED Tmax 100.7 but no further fevers since then.   Pt is confused and unable to provide history.     No n/v/d CP SOB cough.   UA neg   BCx NGTD  CT CAP no acute pathology    Leukocytosis likely non infectious process  Thrombocytosis    Received levofloxacin x3 day course, dc-ed on 12/9    Recommendations:   Continue to monitor off Abx  Trend temps and cbc  Send full RVP  Consider heme eval  Asp precautions  Additional care per primary team    D/w Dr. Eddy  Infectious Diseases will follow. Please call with any questions.  Leslye Sanchez M.D.  [facility] Division of Infectious Diseases [phone]  For after 5 P.M. and weekends, please call [phone]  Available on [employer] TEAMS    
79-year-old female with PMHx of HTN, COPD, prediabetes, gastritis, MVP, ulcerative colitis, psoriatic arthritis, osteoporosis, hyperparathyroidism, CVA, memory loss, aortic stenosis presents on 12/6 with chest pain or neck pain.     # leukocytosis:   - Persistent leukocytosis that is now improving initially with 6% band   - On Levofloxacin for CAP treatment   - Check procalcitonin   - Blood culture negative so far  - UA negative   - Continue to trend WBC, now improving, if persistently elevated, will need ID evaluation     #HTN urgency:  - -170/70-90  - Home on losartan 50 mg daily and metoprolol 25 mg daily   - BP remains elevated, increased losartan to 100 mg daily     # Chest pain  nstemi rule dout  suspect copd exacerbation   albuterol  levaquin 12/6 for CAP    #ambulatory dysfunction:   PT evaluation   Fall precaution     # HLD  continue with rosuvastatin 10 mg QHS    Code: FULL  Diet: regular  DVT ppx: enoxaparin      Discussed with  at bedside  Likely DC once WBC is improving  PT eval pending     
79-year-old female with PMHx of HTN, COPD, prediabetes, gastritis, MVP, ulcerative colitis, psoriatic arthritis, osteoporosis, hyperparathyroidism, CVA, memory loss, aortic stenosis presents on 12/6 with chest pain which has resolved.  Unclear etiology of infection.  Continue Levaquin.  OOB.  ?Rehab placement.    Hx copd  HTN  MVP  UC  P Arthritis  CVA hx  AS      
79-year-old female with PMHx of HTN, COPD, prediabetes, gastritis, MVP, ulcerative colitis, psoriatic arthritis, osteoporosis, hyperparathyroidism, CVA, memory loss, aortic stenosis presents on 12/6 with chest pain which has resolved.  Unclear etiology of infection.  Off Levaquin as per ID.  OOB.  ?Rehab placement.    Hx copd  HTN  MVP  UC  P Arthritis  CVA hx  AS

## 2024-12-12 NOTE — DISCHARGE NOTE PROVIDER - HOSPITAL COURSE
79-year-old female with PMHx of HTN, COPD, prediabetes, gastritis, MVP, ulcerative colitis, psoriatic arthritis, osteoporosis, hyperparathyroidism, CVA, memory loss, aortic stenosis presents on 12/6 with chest pain or neck pain.     # noninfectious SIRS  - Persistent leukocytosis - reviewing previous labs shows this to be chronic with lowest WBC count of 10 since 2021.   - procalcitonin mildly elevated, not indicative of sepsis   - Blood culture negative so far  - UA negative   - ID following,  - stable off antibiotics.   - follow up with hematology as outpatient for workup for Myeloproliferative disorder    Bright red blood per rectum - one episode not mixed with stool, likely hemorrhoidal bleeding exacerbated by lovenox. stopped. CBC showing stable hgb without anemia  - outpatient GI eval for colonoscopy    #HTN urgency:  - had been labile, on losartan, metoprolol, initially increased losartan to 100, changing back to home dosing on DC. follow up BP control with PCP in 1-2 weeks      # Chest pain  no signs of ACS. seen by cardiology, can follow up with cardiology for outpatient workup       #ambulatory dysfunction/ deconditioning  PT evaluation recommending [facility], family refusing, can be discharged home with home care and aides.       Dementia with agitation - continue seroquel as needed at home  - psych eval appreciated    # HLD  continue with rosuvastatin 10 mg QHS

## 2024-12-12 NOTE — CASE MANAGEMENT PROGRESS NOTE - NSCMPROGRESSNOTE_GEN_ALL_CORE
RN/CM noted pt's case discussed during Interdisciplinary rounds, pt medically cleared for transition home today THURS 12/12/2024. Pt has rolling walker, cane @ home; has private hired care engaged by family 12H/day x 7 days. Pt resides with VERY supportive spouse. Pt's community MD is DR Marlon White and pharmacy is [facility] in [place] [facility]. Family has requested a wheelchair, CM discussed wheelchair referral process and what to expect; family has no preferred durable medical equipment provider and is agreeable to utilize onsite durable medical equipment company [facility] [phone]. CM sent referral to above durable medical equipment company and requested home delivery of wheelchair, company made aware that pt is being dced today 12/12/2024. Family is agreeable for pt to be dced home while wheelchair is still pending approval. CM reviewed about home care services/expectations, insurance provisions, choices of agencies. [facility] [phone]/ [phone] is home care agency of choice and CM referred case with plan for DC today THURS 12/12/2024, requested start of care 12/13/2024. Family aware of above plan for D/C and agreeable, IMM reviewed. CM offered ambulance transport but family opting to transport pt home themselves. Staff on unit apprised re: all above.

## 2025-02-01 NOTE — PATIENT PROFILE ADULT - NSPROPOAPRESSUREINJURYCT_GEN_A_NUR
.Refill Request     CONFIRM preferred pharmacy with the patient.    If Mail Order Rx - Pend for 90 day refill.      Last Seen: Last Seen Department: 12/3/2024  Last Seen by PCP: 12/3/2024    Last Written: 10/14/24 90 with 2     If no future appointment scheduled:  Review the last OV with PCP and review information for follow-up visit,  Route STAFF MESSAGE with patient name to the  Pool for scheduling with the following information:            -  Timing of next visit           -  Visit type ie Physical, OV, etc           -  Diagnoses/Reason ie. COPD, HTN - Do not use MEDICATION, Follow-up or CHECK UP - Give reason for visit      Next Appointment:   Future Appointments   Date Time Provider Department Center   6/3/2025 10:30 AM Noa Cardoza PA [facility] DE[facility]       Message sent to  to schedule appt with patient?  NO      Requested Prescriptions     Pending Prescriptions Disp Refills    gabapentin (NEURONTIN) 800 MG tablet [Pharmacy Med Name: GABAPENTIN 800 MG TABLET] 90 tablet 2     Sig: Take 1 tablet by mouth 3 times daily.       
1

## 2025-03-17 NOTE — BH CONSULTATION LIAISON ASSESSMENT NOTE - CURRENT INTENT:
Informed pt that she would need a urine sample before medication is sent in. Pt sister would collect sample and bring to [facility]   
None known

## 2025-04-02 NOTE — PHYSICAL THERAPY INITIAL EVALUATION ADULT - BED MOBILITY LIMITATIONS, REHAB EVAL
Detail Level: Detailed
Detail Level: Zone
Detail Level: Generalized
Moisturizer Recommendations: Cerave moisturizer to apply daily.
decreased ability to use legs for bridging/pushing

## 2025-04-10 NOTE — SWALLOW BEDSIDE ASSESSMENT ADULT - COMMENTS
no
Consult received and chart reviewed. The patient was seen at bedside this AM for an initial assessment of swallow function, at which time she was alert and cooperative. The patient appropriately followed directives throughout today's evaluation. When engaging in conversational discourse with this clinician, the patient exhibited a "gurgly" vocal quality, at which time she independently performed a throat clear which improved vocal quality. The patient denied speech/language/swallowing difficulties or pain at this time.    Per charting, the patient is a "75 y/o F with pmhx significant for HTN, COPD/emphysema, osteoporosis, psoriatic arthritis, MVP, tachycardia hx, gastritis, vertebral fracture, ulcerative colitis, costal chondritis, pre-DM, who presents with dizziness, nausea, and disorientation that began after 8:30am this morning, around which time she was last known well."    WBC is elevated. CT of the chest revealed, "Patent central airways. Moderate centrilobular emphysematous changes. Mild lingular atelectasis. Right lower lobe mucus plugging on image 113. No discrete pulmonary nodule or confluent airspace opacity is identified. CT of the head revealed, "No acute hemorrhage, mass effect or extra-axial collections."    Discussed results and recommendations from this evaluation with the patient, RN, and call out to MD.

## 2025-07-17 ENCOUNTER — INPATIENT (INPATIENT)
Facility: HOSPITAL | Age: 80
LOS: 4 days | Discharge: HOME | DRG: 204 | End: 2025-07-22
Attending: INTERNAL MEDICINE | Admitting: STUDENT IN AN ORGANIZED HEALTH CARE EDUCATION/TRAINING PROGRAM
Payer: MEDICARE

## 2025-07-17 VITALS
SYSTOLIC BLOOD PRESSURE: 139 MMHG | DIASTOLIC BLOOD PRESSURE: 81 MMHG | TEMPERATURE: 98 F | HEART RATE: 115 BPM | OXYGEN SATURATION: 85 % | RESPIRATION RATE: 20 BRPM

## 2025-07-17 DIAGNOSIS — R09.89 OTHER SPECIFIED SYMPTOMS AND SIGNS INVOLVING THE CIRCULATORY AND RESPIRATORY SYSTEMS: ICD-10-CM

## 2025-07-17 DIAGNOSIS — R73.03 PREDIABETES: ICD-10-CM

## 2025-07-17 DIAGNOSIS — Z33.2 ENCOUNTER FOR ELECTIVE TERMINATION OF PREGNANCY: Chronic | ICD-10-CM

## 2025-07-17 DIAGNOSIS — J96.01 ACUTE RESPIRATORY FAILURE WITH HYPOXIA: ICD-10-CM

## 2025-07-17 DIAGNOSIS — Z98.89 OTHER SPECIFIED POSTPROCEDURAL STATES: Chronic | ICD-10-CM

## 2025-07-17 DIAGNOSIS — U07.1 COVID-19: ICD-10-CM

## 2025-07-17 DIAGNOSIS — I50.32 CHRONIC DIASTOLIC (CONGESTIVE) HEART FAILURE: ICD-10-CM

## 2025-07-17 DIAGNOSIS — Z29.9 ENCOUNTER FOR PROPHYLACTIC MEASURES, UNSPECIFIED: ICD-10-CM

## 2025-07-17 DIAGNOSIS — E89.2 POSTPROCEDURAL HYPOPARATHYROIDISM: Chronic | ICD-10-CM

## 2025-07-17 DIAGNOSIS — I10 ESSENTIAL (PRIMARY) HYPERTENSION: ICD-10-CM

## 2025-07-17 DIAGNOSIS — D72.829 ELEVATED WHITE BLOOD CELL COUNT, UNSPECIFIED: ICD-10-CM

## 2025-07-17 DIAGNOSIS — R06.02 SHORTNESS OF BREATH: ICD-10-CM

## 2025-07-17 LAB
ALBUMIN SERPL ELPH-MCNC: 4 G/DL — SIGNIFICANT CHANGE UP (ref 3.3–5)
ALP SERPL-CCNC: 87 U/L — SIGNIFICANT CHANGE UP (ref 40–120)
ALT FLD-CCNC: 8 U/L — LOW (ref 10–45)
ANION GAP SERPL CALC-SCNC: 17 MMOL/L — SIGNIFICANT CHANGE UP (ref 5–17)
ANISOCYTOSIS BLD QL: SLIGHT — SIGNIFICANT CHANGE UP
APTT BLD: 28.2 SEC — SIGNIFICANT CHANGE UP (ref 26.1–36.8)
AST SERPL-CCNC: 15 U/L — SIGNIFICANT CHANGE UP (ref 10–40)
BASOPHILS # BLD AUTO: 0.05 K/UL — SIGNIFICANT CHANGE UP (ref 0–0.2)
BASOPHILS # BLD MANUAL: 0 K/UL — SIGNIFICANT CHANGE UP (ref 0–0.2)
BASOPHILS NFR BLD AUTO: 0.2 % — SIGNIFICANT CHANGE UP (ref 0–2)
BASOPHILS NFR BLD MANUAL: 0 % — SIGNIFICANT CHANGE UP (ref 0–2)
BILIRUB SERPL-MCNC: 0.5 MG/DL — SIGNIFICANT CHANGE UP (ref 0.2–1.2)
BUN SERPL-MCNC: 26 MG/DL — HIGH (ref 7–23)
CALCIUM SERPL-MCNC: 9.9 MG/DL — SIGNIFICANT CHANGE UP (ref 8.4–10.5)
CHLORIDE SERPL-SCNC: 98 MMOL/L — SIGNIFICANT CHANGE UP (ref 96–108)
CO2 SERPL-SCNC: 25 MMOL/L — SIGNIFICANT CHANGE UP (ref 22–31)
CREAT SERPL-MCNC: 0.54 MG/DL — SIGNIFICANT CHANGE UP (ref 0.5–1.3)
D DIMER BLD IA.RAPID-MCNC: 369 NG/ML DDU — HIGH
DACRYOCYTES BLD QL SMEAR: SLIGHT — SIGNIFICANT CHANGE UP
EGFR: 92 ML/MIN/1.73M2 — SIGNIFICANT CHANGE UP
EGFR: 92 ML/MIN/1.73M2 — SIGNIFICANT CHANGE UP
ELLIPTOCYTES BLD QL SMEAR: SLIGHT — SIGNIFICANT CHANGE UP
EOSINOPHIL # BLD AUTO: 0.01 K/UL — SIGNIFICANT CHANGE UP (ref 0–0.5)
EOSINOPHIL # BLD MANUAL: 0 K/UL — SIGNIFICANT CHANGE UP (ref 0–0.5)
EOSINOPHIL NFR BLD AUTO: 0 % — SIGNIFICANT CHANGE UP (ref 0–6)
EOSINOPHIL NFR BLD MANUAL: 0 % — SIGNIFICANT CHANGE UP (ref 0–6)
FLUAV AG NPH QL: SIGNIFICANT CHANGE UP
FLUBV AG NPH QL: SIGNIFICANT CHANGE UP
GAS PNL BLDV: SIGNIFICANT CHANGE UP
GAS PNL BLDV: SIGNIFICANT CHANGE UP
GLUCOSE SERPL-MCNC: 117 MG/DL — HIGH (ref 70–99)
HCT VFR BLD CALC: 44.6 % — SIGNIFICANT CHANGE UP (ref 34.5–45)
HGB BLD-MCNC: 13.7 G/DL — SIGNIFICANT CHANGE UP (ref 11.5–15.5)
IMM GRANULOCYTES # BLD AUTO: 0.17 K/UL — HIGH (ref 0–0.07)
IMM GRANULOCYTES NFR BLD AUTO: 0.7 % — SIGNIFICANT CHANGE UP (ref 0–0.9)
INR BLD: 1.02 RATIO — SIGNIFICANT CHANGE UP (ref 0.85–1.16)
LYMPHOCYTES # BLD AUTO: 1.52 K/UL — SIGNIFICANT CHANGE UP (ref 1–3.3)
LYMPHOCYTES # BLD MANUAL: 0.83 K/UL — LOW (ref 1–3.3)
LYMPHOCYTES NFR BLD AUTO: 6.4 % — LOW (ref 13–44)
LYMPHOCYTES NFR BLD MANUAL: 3.5 % — LOW (ref 13–44)
MANUAL METAMYELOCYTE #: 0.21 K/UL — HIGH (ref 0–0)
MCHC RBC-ENTMCNC: 27.2 PG — SIGNIFICANT CHANGE UP (ref 27–34)
MCHC RBC-ENTMCNC: 30.7 G/DL — LOW (ref 32–36)
MCV RBC AUTO: 88.7 FL — SIGNIFICANT CHANGE UP (ref 80–100)
METAMYELOCYTES # FLD: 0.9 % — HIGH (ref 0–0)
METAMYELOCYTES NFR BLD: 0.9 % — HIGH (ref 0–0)
MONOCYTES # BLD AUTO: 1.55 K/UL — HIGH (ref 0–0.9)
MONOCYTES # BLD MANUAL: 0.62 K/UL — SIGNIFICANT CHANGE UP (ref 0–0.9)
MONOCYTES NFR BLD AUTO: 6.5 % — SIGNIFICANT CHANGE UP (ref 2–14)
MONOCYTES NFR BLD MANUAL: 2.6 % — SIGNIFICANT CHANGE UP (ref 2–14)
NEUTROPHILS # BLD AUTO: 20.51 K/UL — HIGH (ref 1.8–7.4)
NEUTROPHILS # BLD MANUAL: 22.14 K/UL — HIGH (ref 1.8–7.4)
NEUTROPHILS NFR BLD AUTO: 86.2 % — HIGH (ref 43–77)
NEUTROPHILS NFR BLD MANUAL: 93 % — HIGH (ref 43–77)
NRBC # BLD AUTO: 0 K/UL — SIGNIFICANT CHANGE UP (ref 0–0)
NRBC # FLD: 0 K/UL — SIGNIFICANT CHANGE UP (ref 0–0)
NRBC BLD AUTO-RTO: 0 /100 WBCS — SIGNIFICANT CHANGE UP (ref 0–0)
NT-PROBNP SERPL-SCNC: 1122 PG/ML — HIGH (ref 0–300)
OVALOCYTES BLD QL SMEAR: SLIGHT — SIGNIFICANT CHANGE UP
PLAT MORPH BLD: NORMAL — SIGNIFICANT CHANGE UP
PLATELET # BLD AUTO: 292 K/UL — SIGNIFICANT CHANGE UP (ref 150–400)
PMV BLD: 10.5 FL — SIGNIFICANT CHANGE UP (ref 7–13)
POIKILOCYTOSIS BLD QL AUTO: SLIGHT — SIGNIFICANT CHANGE UP
POTASSIUM SERPL-MCNC: 3.9 MMOL/L — SIGNIFICANT CHANGE UP (ref 3.5–5.3)
POTASSIUM SERPL-SCNC: 3.9 MMOL/L — SIGNIFICANT CHANGE UP (ref 3.5–5.3)
PROT SERPL-MCNC: 7.6 G/DL — SIGNIFICANT CHANGE UP (ref 6–8.3)
PROTHROM AB SERPL-ACNC: 11.7 SEC — SIGNIFICANT CHANGE UP (ref 9.9–13.4)
RBC # BLD: 5.03 M/UL — SIGNIFICANT CHANGE UP (ref 3.8–5.2)
RBC # FLD: 14.4 % — SIGNIFICANT CHANGE UP (ref 10.3–14.5)
RBC BLD AUTO: ABNORMAL
RSV RNA NPH QL NAA+NON-PROBE: SIGNIFICANT CHANGE UP
SARS-COV-2 RNA SPEC QL NAA+PROBE: DETECTED
SODIUM SERPL-SCNC: 140 MMOL/L — SIGNIFICANT CHANGE UP (ref 135–145)
SOURCE RESPIRATORY: SIGNIFICANT CHANGE UP
TROPONIN T, HIGH SENSITIVITY RESULT: 16 NG/L — SIGNIFICANT CHANGE UP (ref 0–51)
TROPONIN T, HIGH SENSITIVITY RESULT: 19 NG/L — SIGNIFICANT CHANGE UP (ref 0–51)
WBC # BLD: 23.81 K/UL — HIGH (ref 3.8–10.5)
WBC # FLD AUTO: 23.81 K/UL — HIGH (ref 3.8–10.5)

## 2025-07-17 PROCEDURE — 82330 ASSAY OF CALCIUM: CPT

## 2025-07-17 PROCEDURE — 93010 ELECTROCARDIOGRAM REPORT: CPT

## 2025-07-17 PROCEDURE — 85018 HEMOGLOBIN: CPT

## 2025-07-17 PROCEDURE — 71045 X-RAY EXAM CHEST 1 VIEW: CPT

## 2025-07-17 PROCEDURE — 71045 X-RAY EXAM CHEST 1 VIEW: CPT | Mod: 26

## 2025-07-17 PROCEDURE — 99223 1ST HOSP IP/OBS HIGH 75: CPT

## 2025-07-17 PROCEDURE — 82435 ASSAY OF BLOOD CHLORIDE: CPT

## 2025-07-17 PROCEDURE — 87637 SARSCOV2&INF A&B&RSV AMP PRB: CPT

## 2025-07-17 PROCEDURE — 84295 ASSAY OF SERUM SODIUM: CPT

## 2025-07-17 PROCEDURE — 85025 COMPLETE CBC W/AUTO DIFF WBC: CPT

## 2025-07-17 PROCEDURE — 83880 ASSAY OF NATRIURETIC PEPTIDE: CPT

## 2025-07-17 PROCEDURE — 85610 PROTHROMBIN TIME: CPT

## 2025-07-17 PROCEDURE — 85014 HEMATOCRIT: CPT

## 2025-07-17 PROCEDURE — 82803 BLOOD GASES ANY COMBINATION: CPT

## 2025-07-17 PROCEDURE — 84484 ASSAY OF TROPONIN QUANT: CPT

## 2025-07-17 PROCEDURE — 83605 ASSAY OF LACTIC ACID: CPT

## 2025-07-17 PROCEDURE — 99285 EMERGENCY DEPT VISIT HI MDM: CPT

## 2025-07-17 PROCEDURE — 84132 ASSAY OF SERUM POTASSIUM: CPT

## 2025-07-17 PROCEDURE — 94640 AIRWAY INHALATION TREATMENT: CPT

## 2025-07-17 PROCEDURE — 82947 ASSAY GLUCOSE BLOOD QUANT: CPT

## 2025-07-17 PROCEDURE — 85730 THROMBOPLASTIN TIME PARTIAL: CPT

## 2025-07-17 PROCEDURE — 80053 COMPREHEN METABOLIC PANEL: CPT

## 2025-07-17 PROCEDURE — 85379 FIBRIN DEGRADATION QUANT: CPT

## 2025-07-17 RX ORDER — METOPROLOL SUCCINATE 50 MG/1
25 TABLET, EXTENDED RELEASE ORAL DAILY
Refills: 0 | Status: DISCONTINUED | OUTPATIENT
Start: 2025-07-17 | End: 2025-07-22

## 2025-07-17 RX ORDER — LOSARTAN POTASSIUM 100 MG/1
25 TABLET, FILM COATED ORAL DAILY
Refills: 0 | Status: DISCONTINUED | OUTPATIENT
Start: 2025-07-17 | End: 2025-07-22

## 2025-07-17 RX ORDER — ROSUVASTATIN CALCIUM 20 MG/1
10 TABLET, FILM COATED ORAL AT BEDTIME
Refills: 0 | Status: DISCONTINUED | OUTPATIENT
Start: 2025-07-17 | End: 2025-07-22

## 2025-07-17 RX ORDER — MELATONIN 5 MG
3 TABLET ORAL AT BEDTIME
Refills: 0 | Status: DISCONTINUED | OUTPATIENT
Start: 2025-07-17 | End: 2025-07-22

## 2025-07-17 RX ORDER — METHYLPREDNISOLONE ACETATE 80 MG/ML
125 INJECTION, SUSPENSION INTRA-ARTICULAR; INTRALESIONAL; INTRAMUSCULAR; SOFT TISSUE ONCE
Refills: 0 | Status: COMPLETED | OUTPATIENT
Start: 2025-07-17 | End: 2025-07-17

## 2025-07-17 RX ORDER — CEFTRIAXONE 500 MG/1
1000 INJECTION, POWDER, FOR SOLUTION INTRAMUSCULAR; INTRAVENOUS ONCE
Refills: 0 | Status: COMPLETED | OUTPATIENT
Start: 2025-07-17 | End: 2025-07-17

## 2025-07-17 RX ORDER — DIPHENHYDRAMINE HCL 12.5MG/5ML
50 ELIXIR ORAL ONCE
Refills: 0 | Status: DISCONTINUED | OUTPATIENT
Start: 2025-07-17 | End: 2025-07-17

## 2025-07-17 RX ORDER — ENOXAPARIN SODIUM 100 MG/ML
40 INJECTION SUBCUTANEOUS EVERY 24 HOURS
Refills: 0 | Status: DISCONTINUED | OUTPATIENT
Start: 2025-07-17 | End: 2025-07-22

## 2025-07-17 RX ORDER — ONDANSETRON HCL/PF 4 MG/2 ML
4 VIAL (ML) INJECTION EVERY 8 HOURS
Refills: 0 | Status: DISCONTINUED | OUTPATIENT
Start: 2025-07-17 | End: 2025-07-22

## 2025-07-17 RX ORDER — ACETAMINOPHEN 500 MG/5ML
650 LIQUID (ML) ORAL EVERY 6 HOURS
Refills: 0 | Status: DISCONTINUED | OUTPATIENT
Start: 2025-07-17 | End: 2025-07-22

## 2025-07-17 RX ORDER — AZITHROMYCIN 250 MG
500 CAPSULE ORAL ONCE
Refills: 0 | Status: DISCONTINUED | OUTPATIENT
Start: 2025-07-17 | End: 2025-07-18

## 2025-07-17 RX ORDER — IPRATROPIUM BROMIDE AND ALBUTEROL SULFATE .5; 2.5 MG/3ML; MG/3ML
3 SOLUTION RESPIRATORY (INHALATION) ONCE
Refills: 0 | Status: COMPLETED | OUTPATIENT
Start: 2025-07-17 | End: 2025-07-17

## 2025-07-17 RX ORDER — FUROSEMIDE 10 MG/ML
20 INJECTION INTRAMUSCULAR; INTRAVENOUS DAILY
Refills: 0 | Status: DISCONTINUED | OUTPATIENT
Start: 2025-07-17 | End: 2025-07-22

## 2025-07-17 RX ORDER — IPRATROPIUM BROMIDE AND ALBUTEROL SULFATE .5; 2.5 MG/3ML; MG/3ML
3 SOLUTION RESPIRATORY (INHALATION) EVERY 6 HOURS
Refills: 0 | Status: DISCONTINUED | OUTPATIENT
Start: 2025-07-17 | End: 2025-07-22

## 2025-07-17 RX ORDER — DEXAMETHASONE 0.5 MG/1
6 TABLET ORAL DAILY
Refills: 0 | Status: DISCONTINUED | OUTPATIENT
Start: 2025-07-17 | End: 2025-07-22

## 2025-07-17 RX ADMIN — ROSUVASTATIN CALCIUM 10 MILLIGRAM(S): 20 TABLET, FILM COATED ORAL at 22:18

## 2025-07-17 RX ADMIN — CEFTRIAXONE 100 MILLIGRAM(S): 500 INJECTION, POWDER, FOR SOLUTION INTRAMUSCULAR; INTRAVENOUS at 20:34

## 2025-07-17 RX ADMIN — IPRATROPIUM BROMIDE AND ALBUTEROL SULFATE 3 MILLILITER(S): .5; 2.5 SOLUTION RESPIRATORY (INHALATION) at 16:50

## 2025-07-17 RX ADMIN — ENOXAPARIN SODIUM 40 MILLIGRAM(S): 100 INJECTION SUBCUTANEOUS at 22:18

## 2025-07-17 RX ADMIN — METHYLPREDNISOLONE ACETATE 125 MILLIGRAM(S): 80 INJECTION, SUSPENSION INTRA-ARTICULAR; INTRALESIONAL; INTRAMUSCULAR; SOFT TISSUE at 17:02

## 2025-07-17 RX ADMIN — Medication 500 MILLILITER(S): at 20:34

## 2025-07-18 LAB
ACANTHOCYTES BLD QL SMEAR: SLIGHT — SIGNIFICANT CHANGE UP
ALBUMIN SERPL ELPH-MCNC: 3.8 G/DL — SIGNIFICANT CHANGE UP (ref 3.3–5)
ALP SERPL-CCNC: 85 U/L — SIGNIFICANT CHANGE UP (ref 40–120)
ALT FLD-CCNC: 7 U/L — LOW (ref 10–45)
ANION GAP SERPL CALC-SCNC: 19 MMOL/L — HIGH (ref 5–17)
ANISOCYTOSIS BLD QL: SLIGHT — SIGNIFICANT CHANGE UP
AST SERPL-CCNC: 13 U/L — SIGNIFICANT CHANGE UP (ref 10–40)
BASOPHILS # BLD AUTO: 0.06 K/UL — SIGNIFICANT CHANGE UP (ref 0–0.2)
BASOPHILS # BLD MANUAL: 0 K/UL — SIGNIFICANT CHANGE UP (ref 0–0.2)
BASOPHILS NFR BLD AUTO: 0.2 % — SIGNIFICANT CHANGE UP (ref 0–2)
BASOPHILS NFR BLD MANUAL: 0 % — SIGNIFICANT CHANGE UP (ref 0–2)
BILIRUB SERPL-MCNC: 0.3 MG/DL — SIGNIFICANT CHANGE UP (ref 0.2–1.2)
BUN SERPL-MCNC: 23 MG/DL — SIGNIFICANT CHANGE UP (ref 7–23)
BURR CELLS BLD QL SMEAR: SLIGHT — SIGNIFICANT CHANGE UP
CALCIUM SERPL-MCNC: 10.1 MG/DL — SIGNIFICANT CHANGE UP (ref 8.4–10.5)
CHLORIDE SERPL-SCNC: 102 MMOL/L — SIGNIFICANT CHANGE UP (ref 96–108)
CO2 SERPL-SCNC: 22 MMOL/L — SIGNIFICANT CHANGE UP (ref 22–31)
CREAT SERPL-MCNC: 0.45 MG/DL — LOW (ref 0.5–1.3)
EGFR: 96 ML/MIN/1.73M2 — SIGNIFICANT CHANGE UP
EGFR: 96 ML/MIN/1.73M2 — SIGNIFICANT CHANGE UP
EOSINOPHIL # BLD AUTO: 0 K/UL — SIGNIFICANT CHANGE UP (ref 0–0.5)
EOSINOPHIL # BLD MANUAL: 0 K/UL — SIGNIFICANT CHANGE UP (ref 0–0.5)
EOSINOPHIL NFR BLD AUTO: 0 % — SIGNIFICANT CHANGE UP (ref 0–6)
EOSINOPHIL NFR BLD MANUAL: 0 % — SIGNIFICANT CHANGE UP (ref 0–6)
GLUCOSE SERPL-MCNC: 149 MG/DL — HIGH (ref 70–99)
HCT VFR BLD CALC: 43.9 % — SIGNIFICANT CHANGE UP (ref 34.5–45)
HGB BLD-MCNC: 13.4 G/DL — SIGNIFICANT CHANGE UP (ref 11.5–15.5)
IMM GRANULOCYTES # BLD AUTO: 0.27 K/UL — HIGH (ref 0–0.07)
IMM GRANULOCYTES NFR BLD AUTO: 1.1 % — HIGH (ref 0–0.9)
LYMPHOCYTES # BLD AUTO: 1.63 K/UL — SIGNIFICANT CHANGE UP (ref 1–3.3)
LYMPHOCYTES # BLD MANUAL: 1.31 K/UL — SIGNIFICANT CHANGE UP (ref 1–3.3)
LYMPHOCYTES NFR BLD AUTO: 6.5 % — LOW (ref 13–44)
LYMPHOCYTES NFR BLD MANUAL: 5.2 % — LOW (ref 13–44)
MACROCYTES BLD QL: SLIGHT — SIGNIFICANT CHANGE UP
MAGNESIUM SERPL-MCNC: 2.2 MG/DL — SIGNIFICANT CHANGE UP (ref 1.6–2.6)
MCHC RBC-ENTMCNC: 27.6 PG — SIGNIFICANT CHANGE UP (ref 27–34)
MCHC RBC-ENTMCNC: 30.5 G/DL — LOW (ref 32–36)
MCV RBC AUTO: 90.3 FL — SIGNIFICANT CHANGE UP (ref 80–100)
MONOCYTES # BLD AUTO: 0.85 K/UL — SIGNIFICANT CHANGE UP (ref 0–0.9)
MONOCYTES # BLD MANUAL: 0.43 K/UL — SIGNIFICANT CHANGE UP (ref 0–0.9)
MONOCYTES NFR BLD AUTO: 3.4 % — SIGNIFICANT CHANGE UP (ref 2–14)
MONOCYTES NFR BLD MANUAL: 1.7 % — LOW (ref 2–14)
NEUTROPHILS # BLD AUTO: 22.4 K/UL — HIGH (ref 1.8–7.4)
NEUTROPHILS # BLD MANUAL: 23.47 K/UL — HIGH (ref 1.8–7.4)
NEUTROPHILS NFR BLD AUTO: 88.8 % — HIGH (ref 43–77)
NEUTROPHILS NFR BLD MANUAL: 93.1 % — HIGH (ref 43–77)
NRBC # BLD AUTO: 0 K/UL — SIGNIFICANT CHANGE UP (ref 0–0)
NRBC # FLD: 0 K/UL — SIGNIFICANT CHANGE UP (ref 0–0)
NRBC BLD AUTO-RTO: 0 /100 WBCS — SIGNIFICANT CHANGE UP (ref 0–0)
OVALOCYTES BLD QL SMEAR: SLIGHT — SIGNIFICANT CHANGE UP
PLAT MORPH BLD: NORMAL — SIGNIFICANT CHANGE UP
PLATELET # BLD AUTO: 333 K/UL — SIGNIFICANT CHANGE UP (ref 150–400)
PMV BLD: 10.1 FL — SIGNIFICANT CHANGE UP (ref 7–13)
POIKILOCYTOSIS BLD QL AUTO: SLIGHT — SIGNIFICANT CHANGE UP
POLYCHROMASIA BLD QL SMEAR: SLIGHT — SIGNIFICANT CHANGE UP
POTASSIUM SERPL-MCNC: 5.2 MMOL/L — SIGNIFICANT CHANGE UP (ref 3.5–5.3)
POTASSIUM SERPL-SCNC: 5.2 MMOL/L — SIGNIFICANT CHANGE UP (ref 3.5–5.3)
PROCALCITONIN SERPL-MCNC: 0.1 NG/ML — SIGNIFICANT CHANGE UP (ref 0.02–0.1)
PROT SERPL-MCNC: 7.4 G/DL — SIGNIFICANT CHANGE UP (ref 6–8.3)
RBC # BLD: 4.86 M/UL — SIGNIFICANT CHANGE UP (ref 3.8–5.2)
RBC # FLD: 14.1 % — SIGNIFICANT CHANGE UP (ref 10.3–14.5)
RBC BLD AUTO: ABNORMAL
SODIUM SERPL-SCNC: 143 MMOL/L — SIGNIFICANT CHANGE UP (ref 135–145)
WBC # BLD: 25.21 K/UL — HIGH (ref 3.8–10.5)
WBC # FLD AUTO: 25.21 K/UL — HIGH (ref 3.8–10.5)

## 2025-07-18 PROCEDURE — 93005 ELECTROCARDIOGRAM TRACING: CPT

## 2025-07-18 PROCEDURE — 93970 EXTREMITY STUDY: CPT

## 2025-07-18 PROCEDURE — 85610 PROTHROMBIN TIME: CPT

## 2025-07-18 PROCEDURE — 84132 ASSAY OF SERUM POTASSIUM: CPT

## 2025-07-18 PROCEDURE — 82947 ASSAY GLUCOSE BLOOD QUANT: CPT

## 2025-07-18 PROCEDURE — 87040 BLOOD CULTURE FOR BACTERIA: CPT

## 2025-07-18 PROCEDURE — 93970 EXTREMITY STUDY: CPT | Mod: 26

## 2025-07-18 PROCEDURE — 82435 ASSAY OF BLOOD CHLORIDE: CPT

## 2025-07-18 PROCEDURE — 84295 ASSAY OF SERUM SODIUM: CPT

## 2025-07-18 PROCEDURE — 87637 SARSCOV2&INF A&B&RSV AMP PRB: CPT

## 2025-07-18 PROCEDURE — 85018 HEMOGLOBIN: CPT

## 2025-07-18 PROCEDURE — 82330 ASSAY OF CALCIUM: CPT

## 2025-07-18 PROCEDURE — 71045 X-RAY EXAM CHEST 1 VIEW: CPT

## 2025-07-18 PROCEDURE — 83735 ASSAY OF MAGNESIUM: CPT

## 2025-07-18 PROCEDURE — 85025 COMPLETE CBC W/AUTO DIFF WBC: CPT

## 2025-07-18 PROCEDURE — 82803 BLOOD GASES ANY COMBINATION: CPT

## 2025-07-18 PROCEDURE — 85014 HEMATOCRIT: CPT

## 2025-07-18 PROCEDURE — 85379 FIBRIN DEGRADATION QUANT: CPT

## 2025-07-18 PROCEDURE — 80053 COMPREHEN METABOLIC PANEL: CPT

## 2025-07-18 PROCEDURE — 94640 AIRWAY INHALATION TREATMENT: CPT

## 2025-07-18 PROCEDURE — 84484 ASSAY OF TROPONIN QUANT: CPT

## 2025-07-18 PROCEDURE — 83605 ASSAY OF LACTIC ACID: CPT

## 2025-07-18 PROCEDURE — 85730 THROMBOPLASTIN TIME PARTIAL: CPT

## 2025-07-18 PROCEDURE — 83880 ASSAY OF NATRIURETIC PEPTIDE: CPT

## 2025-07-18 PROCEDURE — 84145 PROCALCITONIN (PCT): CPT

## 2025-07-18 RX ORDER — HALOPERIDOL 10 MG/1
1 TABLET ORAL EVERY 8 HOURS
Refills: 0 | Status: DISCONTINUED | OUTPATIENT
Start: 2025-07-18 | End: 2025-07-22

## 2025-07-18 RX ORDER — REMDESIVIR 5 MG/ML
200 INJECTION INTRAVENOUS EVERY 24 HOURS
Refills: 0 | Status: COMPLETED | OUTPATIENT
Start: 2025-07-18 | End: 2025-07-18

## 2025-07-18 RX ORDER — HALOPERIDOL 10 MG/1
0.5 TABLET ORAL AT BEDTIME
Refills: 0 | Status: COMPLETED | OUTPATIENT
Start: 2025-07-18 | End: 2025-07-20

## 2025-07-18 RX ORDER — QUETIAPINE FUMARATE 25 MG/1
12.5 TABLET ORAL EVERY 12 HOURS
Refills: 0 | Status: DISCONTINUED | OUTPATIENT
Start: 2025-07-18 | End: 2025-07-18

## 2025-07-18 RX ORDER — LOSARTAN POTASSIUM 100 MG/1
1 TABLET, FILM COATED ORAL
Refills: 0 | DISCHARGE

## 2025-07-18 RX ORDER — REMDESIVIR 5 MG/ML
INJECTION INTRAVENOUS
Refills: 0 | Status: DISCONTINUED | OUTPATIENT
Start: 2025-07-18 | End: 2025-07-21

## 2025-07-18 RX ORDER — REMDESIVIR 5 MG/ML
100 INJECTION INTRAVENOUS EVERY 24 HOURS
Refills: 0 | Status: DISCONTINUED | OUTPATIENT
Start: 2025-07-19 | End: 2025-07-21

## 2025-07-18 RX ORDER — HALOPERIDOL 10 MG/1
1 TABLET ORAL EVERY 6 HOURS
Refills: 0 | Status: DISCONTINUED | OUTPATIENT
Start: 2025-07-18 | End: 2025-07-18

## 2025-07-18 RX ADMIN — HALOPERIDOL 1 MILLIGRAM(S): 10 TABLET ORAL at 13:10

## 2025-07-18 RX ADMIN — DEXAMETHASONE 6 MILLIGRAM(S): 0.5 TABLET ORAL at 06:20

## 2025-07-18 RX ADMIN — REMDESIVIR 200 MILLIGRAM(S): 5 INJECTION INTRAVENOUS at 18:40

## 2025-07-18 RX ADMIN — HALOPERIDOL 0.5 MILLIGRAM(S): 10 TABLET ORAL at 21:19

## 2025-07-18 RX ADMIN — ROSUVASTATIN CALCIUM 10 MILLIGRAM(S): 20 TABLET, FILM COATED ORAL at 22:25

## 2025-07-19 LAB
ANION GAP SERPL CALC-SCNC: 14 MMOL/L — SIGNIFICANT CHANGE UP (ref 5–17)
BASOPHILS # BLD AUTO: 0.05 K/UL — SIGNIFICANT CHANGE UP (ref 0–0.2)
BASOPHILS NFR BLD AUTO: 0.2 % — SIGNIFICANT CHANGE UP (ref 0–2)
BUN SERPL-MCNC: 23 MG/DL — SIGNIFICANT CHANGE UP (ref 7–23)
CALCIUM SERPL-MCNC: 9.9 MG/DL — SIGNIFICANT CHANGE UP (ref 8.4–10.5)
CHLORIDE SERPL-SCNC: 99 MMOL/L — SIGNIFICANT CHANGE UP (ref 96–108)
CO2 SERPL-SCNC: 27 MMOL/L — SIGNIFICANT CHANGE UP (ref 22–31)
CREAT SERPL-MCNC: 0.47 MG/DL — LOW (ref 0.5–1.3)
EGFR: 95 ML/MIN/1.73M2 — SIGNIFICANT CHANGE UP
EGFR: 95 ML/MIN/1.73M2 — SIGNIFICANT CHANGE UP
EOSINOPHIL # BLD AUTO: 0.04 K/UL — SIGNIFICANT CHANGE UP (ref 0–0.5)
EOSINOPHIL NFR BLD AUTO: 0.2 % — SIGNIFICANT CHANGE UP (ref 0–6)
GLUCOSE SERPL-MCNC: 114 MG/DL — HIGH (ref 70–99)
HCT VFR BLD CALC: 41.6 % — SIGNIFICANT CHANGE UP (ref 34.5–45)
HGB BLD-MCNC: 13.3 G/DL — SIGNIFICANT CHANGE UP (ref 11.5–15.5)
IMM GRANULOCYTES # BLD AUTO: 0.16 K/UL — HIGH (ref 0–0.07)
IMM GRANULOCYTES NFR BLD AUTO: 0.7 % — SIGNIFICANT CHANGE UP (ref 0–0.9)
LYMPHOCYTES # BLD AUTO: 1.51 K/UL — SIGNIFICANT CHANGE UP (ref 1–3.3)
LYMPHOCYTES NFR BLD AUTO: 6.3 % — LOW (ref 13–44)
MAGNESIUM SERPL-MCNC: 2 MG/DL — SIGNIFICANT CHANGE UP (ref 1.6–2.6)
MCHC RBC-ENTMCNC: 28.1 PG — SIGNIFICANT CHANGE UP (ref 27–34)
MCHC RBC-ENTMCNC: 32 G/DL — SIGNIFICANT CHANGE UP (ref 32–36)
MCV RBC AUTO: 87.9 FL — SIGNIFICANT CHANGE UP (ref 80–100)
MONOCYTES # BLD AUTO: 1.43 K/UL — HIGH (ref 0–0.9)
MONOCYTES NFR BLD AUTO: 6 % — SIGNIFICANT CHANGE UP (ref 2–14)
NEUTROPHILS # BLD AUTO: 20.84 K/UL — HIGH (ref 1.8–7.4)
NEUTROPHILS NFR BLD AUTO: 86.6 % — HIGH (ref 43–77)
NRBC # BLD AUTO: 0 K/UL — SIGNIFICANT CHANGE UP (ref 0–0)
NRBC # FLD: 0 K/UL — SIGNIFICANT CHANGE UP (ref 0–0)
NRBC BLD AUTO-RTO: 0 /100 WBCS — SIGNIFICANT CHANGE UP (ref 0–0)
PLATELET # BLD AUTO: 369 K/UL — SIGNIFICANT CHANGE UP (ref 150–400)
PMV BLD: 10.3 FL — SIGNIFICANT CHANGE UP (ref 7–13)
POTASSIUM SERPL-MCNC: 4.2 MMOL/L — SIGNIFICANT CHANGE UP (ref 3.5–5.3)
POTASSIUM SERPL-SCNC: 4.2 MMOL/L — SIGNIFICANT CHANGE UP (ref 3.5–5.3)
RBC # BLD: 4.73 M/UL — SIGNIFICANT CHANGE UP (ref 3.8–5.2)
RBC # FLD: 13.9 % — SIGNIFICANT CHANGE UP (ref 10.3–14.5)
SODIUM SERPL-SCNC: 140 MMOL/L — SIGNIFICANT CHANGE UP (ref 135–145)
WBC # BLD: 24.03 K/UL — HIGH (ref 3.8–10.5)
WBC # FLD AUTO: 24.03 K/UL — HIGH (ref 3.8–10.5)

## 2025-07-19 RX ADMIN — METOPROLOL SUCCINATE 25 MILLIGRAM(S): 50 TABLET, EXTENDED RELEASE ORAL at 06:36

## 2025-07-19 RX ADMIN — HALOPERIDOL 1 MILLIGRAM(S): 10 TABLET ORAL at 04:59

## 2025-07-19 RX ADMIN — ROSUVASTATIN CALCIUM 10 MILLIGRAM(S): 20 TABLET, FILM COATED ORAL at 21:31

## 2025-07-19 RX ADMIN — DEXAMETHASONE 6 MILLIGRAM(S): 0.5 TABLET ORAL at 06:37

## 2025-07-19 RX ADMIN — FUROSEMIDE 20 MILLIGRAM(S): 10 INJECTION INTRAMUSCULAR; INTRAVENOUS at 06:36

## 2025-07-19 RX ADMIN — Medication 3 MILLIGRAM(S): at 21:31

## 2025-07-19 RX ADMIN — HALOPERIDOL 0.5 MILLIGRAM(S): 10 TABLET ORAL at 21:31

## 2025-07-19 RX ADMIN — REMDESIVIR 200 MILLIGRAM(S): 5 INJECTION INTRAVENOUS at 18:06

## 2025-07-19 RX ADMIN — ENOXAPARIN SODIUM 40 MILLIGRAM(S): 100 INJECTION SUBCUTANEOUS at 21:32

## 2025-07-19 RX ADMIN — LOSARTAN POTASSIUM 25 MILLIGRAM(S): 100 TABLET, FILM COATED ORAL at 06:36

## 2025-07-20 LAB
ALBUMIN SERPL ELPH-MCNC: 4.1 G/DL — SIGNIFICANT CHANGE UP (ref 3.3–5)
ALP SERPL-CCNC: 87 U/L — SIGNIFICANT CHANGE UP (ref 40–120)
ALT FLD-CCNC: 10 U/L — SIGNIFICANT CHANGE UP (ref 10–45)
AST SERPL-CCNC: 18 U/L — SIGNIFICANT CHANGE UP (ref 10–40)
BASOPHILS # BLD AUTO: 0.05 K/UL — SIGNIFICANT CHANGE UP (ref 0–0.2)
BASOPHILS NFR BLD AUTO: 0.3 % — SIGNIFICANT CHANGE UP (ref 0–2)
BILIRUB DIRECT SERPL-MCNC: <0.1 MG/DL — SIGNIFICANT CHANGE UP (ref 0–0.3)
BILIRUB INDIRECT FLD-MCNC: >0.2 MG/DL — SIGNIFICANT CHANGE UP (ref 0.2–1)
BILIRUB SERPL-MCNC: 0.3 MG/DL — SIGNIFICANT CHANGE UP (ref 0.2–1.2)
CREAT SERPL-MCNC: 0.47 MG/DL — LOW (ref 0.5–1.3)
EGFR: 95 ML/MIN/1.73M2 — SIGNIFICANT CHANGE UP
EGFR: 95 ML/MIN/1.73M2 — SIGNIFICANT CHANGE UP
EOSINOPHIL # BLD AUTO: 0.04 K/UL — SIGNIFICANT CHANGE UP (ref 0–0.5)
EOSINOPHIL NFR BLD AUTO: 0.2 % — SIGNIFICANT CHANGE UP (ref 0–6)
FOLATE SERPL-MCNC: 5.4 NG/ML — SIGNIFICANT CHANGE UP
GLUCOSE BLDC GLUCOMTR-MCNC: 125 MG/DL — HIGH (ref 70–99)
HCT VFR BLD CALC: 42.5 % — SIGNIFICANT CHANGE UP (ref 34.5–45)
HGB BLD-MCNC: 13.2 G/DL — SIGNIFICANT CHANGE UP (ref 11.5–15.5)
IMM GRANULOCYTES # BLD AUTO: 0.11 K/UL — HIGH (ref 0–0.07)
IMM GRANULOCYTES NFR BLD AUTO: 0.6 % — SIGNIFICANT CHANGE UP (ref 0–0.9)
LYMPHOCYTES # BLD AUTO: 2.35 K/UL — SIGNIFICANT CHANGE UP (ref 1–3.3)
LYMPHOCYTES NFR BLD AUTO: 12.1 % — LOW (ref 13–44)
MCHC RBC-ENTMCNC: 27.2 PG — SIGNIFICANT CHANGE UP (ref 27–34)
MCHC RBC-ENTMCNC: 31.1 G/DL — LOW (ref 32–36)
MCV RBC AUTO: 87.6 FL — SIGNIFICANT CHANGE UP (ref 80–100)
MONOCYTES # BLD AUTO: 1.28 K/UL — HIGH (ref 0–0.9)
MONOCYTES NFR BLD AUTO: 6.6 % — SIGNIFICANT CHANGE UP (ref 2–14)
NEUTROPHILS # BLD AUTO: 15.56 K/UL — HIGH (ref 1.8–7.4)
NEUTROPHILS NFR BLD AUTO: 80.2 % — HIGH (ref 43–77)
NRBC # BLD AUTO: 0 K/UL — SIGNIFICANT CHANGE UP (ref 0–0)
NRBC # FLD: 0 K/UL — SIGNIFICANT CHANGE UP (ref 0–0)
NRBC BLD AUTO-RTO: 0 /100 WBCS — SIGNIFICANT CHANGE UP (ref 0–0)
PLATELET # BLD AUTO: 360 K/UL — SIGNIFICANT CHANGE UP (ref 150–400)
PMV BLD: 10.3 FL — SIGNIFICANT CHANGE UP (ref 7–13)
PROT SERPL-MCNC: 7.5 G/DL — SIGNIFICANT CHANGE UP (ref 6–8.3)
RBC # BLD: 4.85 M/UL — SIGNIFICANT CHANGE UP (ref 3.8–5.2)
RBC # FLD: 14.1 % — SIGNIFICANT CHANGE UP (ref 10.3–14.5)
TSH SERPL-MCNC: 1.14 UIU/ML — SIGNIFICANT CHANGE UP (ref 0.27–4.2)
VIT B12 SERPL-MCNC: 1148 PG/ML — SIGNIFICANT CHANGE UP (ref 232–1245)
WBC # BLD: 19.39 K/UL — HIGH (ref 3.8–10.5)
WBC # FLD AUTO: 19.39 K/UL — HIGH (ref 3.8–10.5)

## 2025-07-20 RX ADMIN — LOSARTAN POTASSIUM 25 MILLIGRAM(S): 100 TABLET, FILM COATED ORAL at 05:29

## 2025-07-20 RX ADMIN — METOPROLOL SUCCINATE 25 MILLIGRAM(S): 50 TABLET, EXTENDED RELEASE ORAL at 05:29

## 2025-07-20 RX ADMIN — FUROSEMIDE 20 MILLIGRAM(S): 10 INJECTION INTRAMUSCULAR; INTRAVENOUS at 05:29

## 2025-07-20 RX ADMIN — DEXAMETHASONE 6 MILLIGRAM(S): 0.5 TABLET ORAL at 05:30

## 2025-07-20 RX ADMIN — HALOPERIDOL 0.5 MILLIGRAM(S): 10 TABLET ORAL at 21:46

## 2025-07-20 RX ADMIN — REMDESIVIR 200 MILLIGRAM(S): 5 INJECTION INTRAVENOUS at 18:27

## 2025-07-20 RX ADMIN — ENOXAPARIN SODIUM 40 MILLIGRAM(S): 100 INJECTION SUBCUTANEOUS at 21:46

## 2025-07-21 ENCOUNTER — TRANSCRIPTION ENCOUNTER (OUTPATIENT)
Age: 80
End: 2025-07-21

## 2025-07-21 LAB
ALBUMIN SERPL ELPH-MCNC: 3.9 G/DL — SIGNIFICANT CHANGE UP (ref 3.3–5)
ALP SERPL-CCNC: 81 U/L — SIGNIFICANT CHANGE UP (ref 40–120)
ALT FLD-CCNC: 13 U/L — SIGNIFICANT CHANGE UP (ref 10–45)
AST SERPL-CCNC: 16 U/L — SIGNIFICANT CHANGE UP (ref 10–40)
BILIRUB DIRECT SERPL-MCNC: <0.1 MG/DL — SIGNIFICANT CHANGE UP (ref 0–0.3)
BILIRUB INDIRECT FLD-MCNC: >0.2 MG/DL — SIGNIFICANT CHANGE UP (ref 0.2–1)
BILIRUB SERPL-MCNC: 0.3 MG/DL — SIGNIFICANT CHANGE UP (ref 0.2–1.2)
CREAT SERPL-MCNC: 0.41 MG/DL — LOW (ref 0.5–1.3)
D DIMER BLD IA.RAPID-MCNC: 215 NG/ML DDU — SIGNIFICANT CHANGE UP
EGFR: 98 ML/MIN/1.73M2 — SIGNIFICANT CHANGE UP
EGFR: 98 ML/MIN/1.73M2 — SIGNIFICANT CHANGE UP
HCT VFR BLD CALC: 47.3 % — HIGH (ref 34.5–45)
HGB BLD-MCNC: 14.5 G/DL — SIGNIFICANT CHANGE UP (ref 11.5–15.5)
MCHC RBC-ENTMCNC: 27 PG — SIGNIFICANT CHANGE UP (ref 27–34)
MCHC RBC-ENTMCNC: 30.7 G/DL — LOW (ref 32–36)
MCV RBC AUTO: 88.1 FL — SIGNIFICANT CHANGE UP (ref 80–100)
NRBC # BLD AUTO: 0 K/UL — SIGNIFICANT CHANGE UP (ref 0–0)
NRBC # FLD: 0 K/UL — SIGNIFICANT CHANGE UP (ref 0–0)
NRBC BLD AUTO-RTO: 0 /100 WBCS — SIGNIFICANT CHANGE UP (ref 0–0)
PLATELET # BLD AUTO: 342 K/UL — SIGNIFICANT CHANGE UP (ref 150–400)
PMV BLD: 10.3 FL — SIGNIFICANT CHANGE UP (ref 7–13)
PROT SERPL-MCNC: 7.5 G/DL — SIGNIFICANT CHANGE UP (ref 6–8.3)
RBC # BLD: 5.37 M/UL — HIGH (ref 3.8–5.2)
RBC # FLD: 13.9 % — SIGNIFICANT CHANGE UP (ref 10.3–14.5)
WBC # BLD: 22.48 K/UL — HIGH (ref 3.8–10.5)
WBC # FLD AUTO: 22.48 K/UL — HIGH (ref 3.8–10.5)

## 2025-07-21 RX ORDER — HALOPERIDOL 10 MG/1
0.5 TABLET ORAL ONCE
Refills: 0 | Status: COMPLETED | OUTPATIENT
Start: 2025-07-21 | End: 2025-07-21

## 2025-07-21 RX ORDER — HALOPERIDOL 10 MG/1
0.5 TABLET ORAL AT BEDTIME
Refills: 0 | Status: DISCONTINUED | OUTPATIENT
Start: 2025-07-21 | End: 2025-07-21

## 2025-07-21 RX ADMIN — DEXAMETHASONE 6 MILLIGRAM(S): 0.5 TABLET ORAL at 06:25

## 2025-07-21 RX ADMIN — HALOPERIDOL 0.5 MILLIGRAM(S): 10 TABLET ORAL at 21:47

## 2025-07-21 RX ADMIN — LOSARTAN POTASSIUM 25 MILLIGRAM(S): 100 TABLET, FILM COATED ORAL at 06:26

## 2025-07-21 RX ADMIN — FUROSEMIDE 20 MILLIGRAM(S): 10 INJECTION INTRAMUSCULAR; INTRAVENOUS at 06:52

## 2025-07-21 RX ADMIN — METOPROLOL SUCCINATE 25 MILLIGRAM(S): 50 TABLET, EXTENDED RELEASE ORAL at 06:26

## 2025-07-21 RX ADMIN — Medication 3 MILLIGRAM(S): at 21:46

## 2025-07-21 RX ADMIN — ROSUVASTATIN CALCIUM 10 MILLIGRAM(S): 20 TABLET, FILM COATED ORAL at 21:47

## 2025-07-21 RX ADMIN — ENOXAPARIN SODIUM 40 MILLIGRAM(S): 100 INJECTION SUBCUTANEOUS at 21:49

## 2025-07-21 NOTE — PATIENT PROFILE ADULT - NSPROPOAURINARYCATHETER_GEN_A_NUR
no Initiate Treatment: acitretin 25 mg capsule \\nQuantity: 30.0 Capsule\\nSig: Take 1 po daily Detail Level: Zone Render In Strict Bullet Format?: No

## 2025-07-22 VITALS
DIASTOLIC BLOOD PRESSURE: 84 MMHG | RESPIRATION RATE: 18 BRPM | TEMPERATURE: 98 F | HEART RATE: 83 BPM | SYSTOLIC BLOOD PRESSURE: 160 MMHG | OXYGEN SATURATION: 95 %

## 2025-07-22 DIAGNOSIS — J44.9 CHRONIC OBSTRUCTIVE PULMONARY DISEASE, UNSPECIFIED: ICD-10-CM

## 2025-07-22 LAB
ANION GAP SERPL CALC-SCNC: 12 MMOL/L — SIGNIFICANT CHANGE UP (ref 5–17)
BUN SERPL-MCNC: 25 MG/DL — HIGH (ref 7–23)
CALCIUM SERPL-MCNC: 9.1 MG/DL — SIGNIFICANT CHANGE UP (ref 8.4–10.5)
CHLORIDE SERPL-SCNC: 94 MMOL/L — LOW (ref 96–108)
CO2 SERPL-SCNC: 31 MMOL/L — SIGNIFICANT CHANGE UP (ref 22–31)
CREAT SERPL-MCNC: 0.39 MG/DL — LOW (ref 0.5–1.3)
EGFR: 99 ML/MIN/1.73M2 — SIGNIFICANT CHANGE UP
EGFR: 99 ML/MIN/1.73M2 — SIGNIFICANT CHANGE UP
GLUCOSE SERPL-MCNC: 187 MG/DL — HIGH (ref 70–99)
POTASSIUM SERPL-MCNC: 3.7 MMOL/L — SIGNIFICANT CHANGE UP (ref 3.5–5.3)
POTASSIUM SERPL-SCNC: 3.7 MMOL/L — SIGNIFICANT CHANGE UP (ref 3.5–5.3)
SODIUM SERPL-SCNC: 137 MMOL/L — SIGNIFICANT CHANGE UP (ref 135–145)

## 2025-07-22 PROCEDURE — 85379 FIBRIN DEGRADATION QUANT: CPT

## 2025-07-22 PROCEDURE — 87637 SARSCOV2&INF A&B&RSV AMP PRB: CPT

## 2025-07-22 PROCEDURE — 82803 BLOOD GASES ANY COMBINATION: CPT

## 2025-07-22 PROCEDURE — 82607 VITAMIN B-12: CPT

## 2025-07-22 PROCEDURE — 84132 ASSAY OF SERUM POTASSIUM: CPT

## 2025-07-22 PROCEDURE — 82565 ASSAY OF CREATININE: CPT

## 2025-07-22 PROCEDURE — 80076 HEPATIC FUNCTION PANEL: CPT

## 2025-07-22 PROCEDURE — 83880 ASSAY OF NATRIURETIC PEPTIDE: CPT

## 2025-07-22 PROCEDURE — 80048 BASIC METABOLIC PNL TOTAL CA: CPT

## 2025-07-22 PROCEDURE — 82746 ASSAY OF FOLIC ACID SERUM: CPT

## 2025-07-22 PROCEDURE — 93970 EXTREMITY STUDY: CPT

## 2025-07-22 PROCEDURE — 83735 ASSAY OF MAGNESIUM: CPT

## 2025-07-22 PROCEDURE — 93005 ELECTROCARDIOGRAM TRACING: CPT

## 2025-07-22 PROCEDURE — 80053 COMPREHEN METABOLIC PANEL: CPT

## 2025-07-22 PROCEDURE — 84295 ASSAY OF SERUM SODIUM: CPT

## 2025-07-22 PROCEDURE — 84443 ASSAY THYROID STIM HORMONE: CPT

## 2025-07-22 PROCEDURE — 82330 ASSAY OF CALCIUM: CPT

## 2025-07-22 PROCEDURE — 83605 ASSAY OF LACTIC ACID: CPT

## 2025-07-22 PROCEDURE — 82947 ASSAY GLUCOSE BLOOD QUANT: CPT

## 2025-07-22 PROCEDURE — 85027 COMPLETE CBC AUTOMATED: CPT

## 2025-07-22 PROCEDURE — 82962 GLUCOSE BLOOD TEST: CPT

## 2025-07-22 PROCEDURE — 84145 PROCALCITONIN (PCT): CPT

## 2025-07-22 PROCEDURE — 94640 AIRWAY INHALATION TREATMENT: CPT

## 2025-07-22 PROCEDURE — 84484 ASSAY OF TROPONIN QUANT: CPT

## 2025-07-22 PROCEDURE — 82435 ASSAY OF BLOOD CHLORIDE: CPT

## 2025-07-22 PROCEDURE — 71045 X-RAY EXAM CHEST 1 VIEW: CPT

## 2025-07-22 PROCEDURE — 85730 THROMBOPLASTIN TIME PARTIAL: CPT

## 2025-07-22 PROCEDURE — 85610 PROTHROMBIN TIME: CPT

## 2025-07-22 PROCEDURE — 99285 EMERGENCY DEPT VISIT HI MDM: CPT

## 2025-07-22 PROCEDURE — 85018 HEMOGLOBIN: CPT

## 2025-07-22 PROCEDURE — 85025 COMPLETE CBC W/AUTO DIFF WBC: CPT

## 2025-07-22 PROCEDURE — 85014 HEMATOCRIT: CPT

## 2025-07-22 PROCEDURE — 87040 BLOOD CULTURE FOR BACTERIA: CPT

## 2025-07-22 RX ORDER — ASPIRIN 325 MG
1 TABLET ORAL
Qty: 30 | Refills: 0
Start: 2025-07-22 | End: 2025-08-20

## 2025-07-22 RX ORDER — ASPIRIN 325 MG
81 TABLET ORAL DAILY
Refills: 0 | Status: DISCONTINUED | OUTPATIENT
Start: 2025-07-22 | End: 2025-07-22

## 2025-07-22 RX ORDER — FUROSEMIDE 10 MG/ML
1 INJECTION INTRAMUSCULAR; INTRAVENOUS
Qty: 30 | Refills: 0
Start: 2025-07-22 | End: 2025-08-20

## 2025-07-22 RX ADMIN — Medication 81 MILLIGRAM(S): at 15:25

## 2025-07-22 RX ADMIN — DEXAMETHASONE 6 MILLIGRAM(S): 0.5 TABLET ORAL at 06:00

## 2025-07-22 RX ADMIN — FUROSEMIDE 20 MILLIGRAM(S): 10 INJECTION INTRAMUSCULAR; INTRAVENOUS at 05:58

## 2025-07-22 RX ADMIN — LOSARTAN POTASSIUM 25 MILLIGRAM(S): 100 TABLET, FILM COATED ORAL at 05:58

## 2025-07-22 RX ADMIN — METOPROLOL SUCCINATE 25 MILLIGRAM(S): 50 TABLET, EXTENDED RELEASE ORAL at 05:59

## 2025-07-23 LAB
CULTURE RESULTS: SIGNIFICANT CHANGE UP
CULTURE RESULTS: SIGNIFICANT CHANGE UP
SPECIMEN SOURCE: SIGNIFICANT CHANGE UP
SPECIMEN SOURCE: SIGNIFICANT CHANGE UP

## (undated) DEVICE — FOLEY TRAY 16FR 5CC LF LUBRISIL ADVANCE TEMP CLOSED

## (undated) DEVICE — DRAPE TOWEL BLUE 17" X 24"

## (undated) DEVICE — SUT PROLENE 5-0 36" RB-1

## (undated) DEVICE — ELCTR REM POLYHESIVE ADULT PT RETURN 15FT

## (undated) DEVICE — GLV 7.5 PROTEXIS (WHITE)

## (undated) DEVICE — SPECIMEN CONTAINER 100ML

## (undated) DEVICE — DEFIBRILLATOR PAD PRE-CONNECT ADULT/CHILD

## (undated) DEVICE — STEALTH CLAMP INSERT FIBRA/FIBRA 90MM

## (undated) DEVICE — SUT PROLENE 3-0 36" SH

## (undated) DEVICE — WARMING BLANKET FULL UNDERBODY

## (undated) DEVICE — PACING CABLE TEMP MEDTRONIC WITH PAC-LOC

## (undated) DEVICE — POSITIONER FOAM EGG CRATE ULNAR 2PCS (PINK)

## (undated) DEVICE — SUT PROLENE 4-0 36" BB

## (undated) DEVICE — GLV 7 PROTEXIS (WHITE)

## (undated) DEVICE — DRAPE LIGHT HANDLE COVER (BLUE)

## (undated) DEVICE — NDL COUNTER FOAM AND MAGNET 40-70

## (undated) DEVICE — DRAPE IOBAN 23" X 23"

## (undated) DEVICE — ELCTR GROUNDING PAD ADULT COVIDIEN

## (undated) DEVICE — DRSG TEGADERM 6"X8"

## (undated) DEVICE — WARMING BLANKET DUO-THERM HYPER/HYPOTHERM ADULT

## (undated) DEVICE — DRSG OPSITE 2.5 X 2"

## (undated) DEVICE — SYR ASEPTO

## (undated) DEVICE — GLV 6.5 PROTEXIS (WHITE)

## (undated) DEVICE — DRAPE INSTRUMENT POUCH 6.75" X 11"

## (undated) DEVICE — GLV 8.5 PROTEXIS (WHITE)

## (undated) DEVICE — GLV 8 PROTEXIS (WHITE)

## (undated) DEVICE — ELCTR BOVIE PENCIL HANDPIECE ROCKER SWITCH 15FT

## (undated) DEVICE — PACK CARDIAC YELLOW

## (undated) DEVICE — SUT BIOSYN 4-0 18" P-12

## (undated) DEVICE — SUT PROLENE 5-0 30" RB-2

## (undated) DEVICE — DRSG STERISTRIPS 0.5 X 4"

## (undated) DEVICE — Device

## (undated) DEVICE — SUT SOFSILK 2 60" TIES

## (undated) DEVICE — SUT PLEDGET PRE PUNCH 4.8 X 9.5 X 1.5 MM

## (undated) DEVICE — SAW BLADE MICROAIRE STERNUM 1X34X9.4MM

## (undated) DEVICE — SOL IRR POUR H2O 250ML

## (undated) DEVICE — SOL NORMOSOL-R PH7.4 1000ML

## (undated) DEVICE — SAW BLADE MICROAIRE STERNUM 1.1X50X42MM

## (undated) DEVICE — VESSEL LOOP EXTRA MAXI-BLUE 0.200" X 22"

## (undated) DEVICE — TUBING SUCTION 20FT

## (undated) DEVICE — SUT ETHIBOND 2-0 36" SH

## (undated) DEVICE — SOL IRR POUR NS 0.9% 500ML

## (undated) DEVICE — DRAPE 3/4 SHEET W REINFORCEMENT 56X77"

## (undated) DEVICE — MEDICATION LABELS W MARKER

## (undated) DEVICE — DRSG OPSITE 13.75 X 4"

## (undated) DEVICE — DRAPE C ARM UNIVERSAL

## (undated) DEVICE — SUT PROLENE 4-0 36" SH

## (undated) DEVICE — PACK UNIVERSAL CARDIAC

## (undated) DEVICE — CHEST DRAIN PLEUR-EVAC WET/WET PEDS SINGLE

## (undated) DEVICE — CHEST DRAIN PLEUR-EVAC WET/WET ADULT-PEDS SINGLE (QUICK)

## (undated) DEVICE — GOWN XXXL

## (undated) DEVICE — SUT SOFSILK 0 30" V-20

## (undated) DEVICE — SUT POLYSORB 1 36" GS-25